# Patient Record
Sex: FEMALE | Race: BLACK OR AFRICAN AMERICAN | NOT HISPANIC OR LATINO | Employment: OTHER | URBAN - METROPOLITAN AREA
[De-identification: names, ages, dates, MRNs, and addresses within clinical notes are randomized per-mention and may not be internally consistent; named-entity substitution may affect disease eponyms.]

---

## 2017-01-04 DIAGNOSIS — E11.9 TYPE 2 DIABETES MELLITUS WITHOUT COMPLICATION, WITHOUT LONG-TERM CURRENT USE OF INSULIN: ICD-10-CM

## 2017-01-04 DIAGNOSIS — M05.9 RHEUMATOID ARTHRITIS WITH POSITIVE RHEUMATOID FACTOR, INVOLVING UNSPECIFIED SITE: ICD-10-CM

## 2017-01-04 DIAGNOSIS — M35.00 SJOGREN'S DISEASE: ICD-10-CM

## 2017-01-05 RX ORDER — HYDROCODONE BITARTRATE AND ACETAMINOPHEN 10; 325 MG/1; MG/1
1 TABLET ORAL EVERY 8 HOURS PRN
Qty: 90 TABLET | Refills: 0 | Status: SHIPPED | OUTPATIENT
Start: 2017-01-05 | End: 2017-01-17 | Stop reason: SDUPTHER

## 2017-01-09 ENCOUNTER — OFFICE VISIT (OUTPATIENT)
Dept: FAMILY MEDICINE | Facility: CLINIC | Age: 72
End: 2017-01-09
Payer: MEDICARE

## 2017-01-09 ENCOUNTER — LAB VISIT (OUTPATIENT)
Dept: LAB | Facility: HOSPITAL | Age: 72
End: 2017-01-09
Attending: FAMILY MEDICINE
Payer: MEDICARE

## 2017-01-09 VITALS
WEIGHT: 273.38 LBS | SYSTOLIC BLOOD PRESSURE: 94 MMHG | DIASTOLIC BLOOD PRESSURE: 52 MMHG | HEART RATE: 78 BPM | HEIGHT: 66 IN | BODY MASS INDEX: 43.93 KG/M2 | TEMPERATURE: 98 F

## 2017-01-09 DIAGNOSIS — E78.5 TYPE 2 DIABETES MELLITUS WITH HYPERLIPIDEMIA: ICD-10-CM

## 2017-01-09 DIAGNOSIS — E11.59 HYPERTENSION ASSOCIATED WITH DIABETES: ICD-10-CM

## 2017-01-09 DIAGNOSIS — I15.2 HYPERTENSION ASSOCIATED WITH DIABETES: ICD-10-CM

## 2017-01-09 DIAGNOSIS — E11.69 TYPE 2 DIABETES MELLITUS WITH HYPERLIPIDEMIA: Primary | ICD-10-CM

## 2017-01-09 DIAGNOSIS — M05.9 RHEUMATOID ARTHRITIS WITH POSITIVE RHEUMATOID FACTOR, INVOLVING UNSPECIFIED SITE: ICD-10-CM

## 2017-01-09 DIAGNOSIS — M16.0 OSTEOARTHRITIS OF BOTH HIPS, UNSPECIFIED OSTEOARTHRITIS TYPE: ICD-10-CM

## 2017-01-09 DIAGNOSIS — E78.5 TYPE 2 DIABETES MELLITUS WITH HYPERLIPIDEMIA: Primary | ICD-10-CM

## 2017-01-09 DIAGNOSIS — E11.69 TYPE 2 DIABETES MELLITUS WITH HYPERLIPIDEMIA: ICD-10-CM

## 2017-01-09 LAB
ALBUMIN SERPL BCP-MCNC: 3.4 G/DL
ALP SERPL-CCNC: 140 U/L
ALT SERPL W/O P-5'-P-CCNC: 17 U/L
ANION GAP SERPL CALC-SCNC: 14 MMOL/L
AST SERPL-CCNC: 18 U/L
BASOPHILS # BLD AUTO: ABNORMAL K/UL
BASOPHILS NFR BLD: 0 %
BILIRUB SERPL-MCNC: 0.3 MG/DL
BUN SERPL-MCNC: 22 MG/DL
CALCIUM SERPL-MCNC: 10 MG/DL
CHLORIDE SERPL-SCNC: 104 MMOL/L
CHOLEST/HDLC SERPL: 3.6 {RATIO}
CO2 SERPL-SCNC: 22 MMOL/L
CREAT SERPL-MCNC: 1.4 MG/DL
CREAT UR-MCNC: 294 MG/DL
DIFFERENTIAL METHOD: ABNORMAL
EOSINOPHIL # BLD AUTO: ABNORMAL K/UL
EOSINOPHIL NFR BLD: 1 %
ERYTHROCYTE [DISTWIDTH] IN BLOOD BY AUTOMATED COUNT: 16.4 %
EST. GFR  (AFRICAN AMERICAN): 44.2 ML/MIN/1.73 M^2
EST. GFR  (NON AFRICAN AMERICAN): 38.4 ML/MIN/1.73 M^2
GLUCOSE SERPL-MCNC: 99 MG/DL
HCT VFR BLD AUTO: 40.2 %
HDL/CHOLESTEROL RATIO: 28.2 %
HDLC SERPL-MCNC: 206 MG/DL
HDLC SERPL-MCNC: 58 MG/DL
HGB BLD-MCNC: 12.6 G/DL
LDLC SERPL CALC-MCNC: 96.4 MG/DL
LYMPHOCYTES # BLD AUTO: ABNORMAL K/UL
LYMPHOCYTES NFR BLD: 41 %
MCH RBC QN AUTO: 25.1 PG
MCHC RBC AUTO-ENTMCNC: 31.3 %
MCV RBC AUTO: 80 FL
MICROALBUMIN UR DL<=1MG/L-MCNC: 7 UG/ML
MICROALBUMIN/CREATININE RATIO: 2.4 UG/MG
MONOCYTES # BLD AUTO: ABNORMAL K/UL
MONOCYTES NFR BLD: 3 %
NEUTROPHILS NFR BLD: 55 %
NONHDLC SERPL-MCNC: 148 MG/DL
PLATELET # BLD AUTO: 396 K/UL
PMV BLD AUTO: 11.9 FL
POTASSIUM SERPL-SCNC: 4 MMOL/L
PROT SERPL-MCNC: 8 G/DL
RBC # BLD AUTO: 5.02 M/UL
SODIUM SERPL-SCNC: 140 MMOL/L
TRIGL SERPL-MCNC: 258 MG/DL
TSH SERPL DL<=0.005 MIU/L-ACNC: 1.05 UIU/ML
WBC # BLD AUTO: 16.56 K/UL

## 2017-01-09 PROCEDURE — 80061 LIPID PANEL: CPT

## 2017-01-09 PROCEDURE — 84443 ASSAY THYROID STIM HORMONE: CPT

## 2017-01-09 PROCEDURE — 86803 HEPATITIS C AB TEST: CPT

## 2017-01-09 PROCEDURE — 85027 COMPLETE CBC AUTOMATED: CPT

## 2017-01-09 PROCEDURE — 85007 BL SMEAR W/DIFF WBC COUNT: CPT

## 2017-01-09 PROCEDURE — 99999 PR PBB SHADOW E&M-EST. PATIENT-LVL III: CPT | Mod: PBBFAC,,, | Performed by: FAMILY MEDICINE

## 2017-01-09 PROCEDURE — 99214 OFFICE O/P EST MOD 30 MIN: CPT | Mod: S$PBB,,, | Performed by: FAMILY MEDICINE

## 2017-01-09 PROCEDURE — 36415 COLL VENOUS BLD VENIPUNCTURE: CPT | Mod: PO

## 2017-01-09 PROCEDURE — 80053 COMPREHEN METABOLIC PANEL: CPT

## 2017-01-09 PROCEDURE — 83036 HEMOGLOBIN GLYCOSYLATED A1C: CPT

## 2017-01-09 NOTE — PROGRESS NOTES
Rekha Miller presents to fu leg edema doing well now w current reg.  DM last a1c 7.0 no hypoglycemia. Follows w Dr Johnson dx RA,Sjogrens,      Past Medical History   Diagnosis Date    Asthma     COPD (chronic obstructive pulmonary disease)     Degenerative disc disease     Diabetes mellitus     Diabetes mellitus, type 2     Fibromyalgia     Hypertension     Rheumatoid arthritis     Rheumatoid arthritis(714.0)      Rheumatoid arthritis     Past Surgical History   Procedure Laterality Date    Orif femur fracture      Cataract extraction      Right knee ligament rpeair  2005    Breast surgery  1986    Right shoulder surgery  4/18/07     Dr. Gao    Eye surgery  2013     Catatracts     Review of patient's allergies indicates:   Allergen Reactions    Codeine     Dairy digestive ultra      Dairy products      Imuran [azathioprine sodium] Diarrhea    Morphine      DOESN'T FEEL RIGHT     Plaquenil [hydroxychloroquine] Other (See Comments)     headaches     Current Outpatient Prescriptions on File Prior to Visit   Medication Sig Dispense Refill    albuterol 90 mcg/actuation inhaler Inhale 2 puffs into the lungs every 6 (six) hours as needed for Wheezing.      ALPHAGAN P 0.1 % Drop   0    amlodipine-benazepril 5-20 mg (LOTREL) 5-20 mg per capsule Take 1 capsule by mouth once daily. 90 capsule 3    blood sugar diagnostic (TRUETEST TEST STRIPS) Strp Inject 1 strip into the skin once daily. 100 strip 11    coenzyme Q10 100 mg capsule Take 2 capsules (200 mg total) by mouth once daily. 60 capsule 11    diltiazem (DILACOR XR) 240 MG CDCR Take 240 mg by mouth once daily.      esomeprazole (NEXIUM) 40 MG capsule Take 1 capsule (40 mg total) by mouth before breakfast. 30 capsule 6    furosemide (LASIX) 40 MG tablet Take 40 mg by mouth 2 (two) times daily.       glipiZIDE (GLUCOTROL) 5 MG TR24 Take 1 tablet (5 mg total) by mouth daily with breakfast. 2 tabs po qam 90 tablet 3     hydrocodone-acetaminophen 10-325mg (NORCO)  mg Tab Take 1 tablet by mouth every 8 (eight) hours as needed. 90 tablet 0    levocetirizine (XYZAL) 5 MG tablet Take 1 tablet (5 mg total) by mouth every evening. 90 tablet 3    meloxicam (MOBIC) 15 MG tablet Take 1 tablet (15 mg total) by mouth once daily. 30 tablet 4    montelukast (SINGULAIR) 10 mg tablet Take 10 mg by mouth once daily.   0    potassium chloride SA (K-DUR,KLOR-CON) 20 MEQ tablet Take 1 tablet (20 mEq total) by mouth 2 (two) times daily. 180 tablet 4    predniSONE (DELTASONE) 5 MG tablet Take 1 tablet (5 mg total) by mouth 3 (three) times daily. 90 tablet 6    sucralfate (CARAFATE) 1 gram tablet Take 1 tablet (1 g total) by mouth 4 (four) times daily. Coat stomach 90 tablet 1    SYMBICORT 160-4.5 mcg/actuation HFAA Inhale 2 puffs into the lungs 2 (two) times daily. 10.2 g 5    TRUETEST TEST STRIPS Strp test blood sugar ONCE DAILY AS DIRECTED 100 each 11    [DISCONTINUED] albuterol 90 mcg/actuation inhaler Inhale 2 puffs into the lungs every 4 (four) hours as needed for Wheezing. 1 Inhaler 1    [DISCONTINUED] amlodipine-benazepril 5-20 mg (LOTREL) 5-20 mg per capsule TAKE ONE CAPSULE BY MOUTH DAILY 30 capsule 5    [DISCONTINUED] chlorzoxazone (PARAFON FORTE) 500 mg Tab Take 500 mg by mouth 3 (three) times daily as needed.      [DISCONTINUED] tramadol (ULTRAM) 50 mg tablet Take 50 mg by mouth every 6 (six) hours as needed for Pain.       No current facility-administered medications on file prior to visit.      Social History     Social History    Marital status:      Spouse name: N/A    Number of children: 8    Years of education: N/A     Occupational History    Not on file.     Social History Main Topics    Smoking status: Never Smoker    Smokeless tobacco: Not on file    Alcohol use Not on file    Drug use: No    Sexual activity: Not on file     Other Topics Concern    Not on file     Social History Narrative      History reviewed. No pertinent family history.      ROS:  SKIN: No rashes, itching or changes in color or texture of skin.  EYES: Visual acuity fine. No photophobia, ocular pain or diplopia.EARS: Denies ear pain, discharge or vertigo.NOSE: No loss of smell, no epistaxis some postnasal drip.MOUTH & THROAT: No hoarseness or change in voice. No excessive gum bleeding.CHEST: Denies SOSA, cyanosis, wheezing  CARDIOVASCULAR: Denies chest pain, PND, orthopnea or reduced exercise tolerance.  ABDOMEN:  No weight loss.No abdominal pain, no hematemesis or blood in stool.  URINARY: No flank pain, dysuria or hematuria.  PERIPHERAL VASCULAR: No claudication or cyanosis.  MUSCULOSKELETAL:Myalgia and polyarthralgia  NEUROLOGIC: No history of seizures, paralysis, alteration of gait or coordination.    PE: Vital signs as noted  Heent:Normocephalic with no recent cranial trauma,PERRLA,EOMI,conjunctiva clear,fundi reveal no hemmorhage exudate or papilledema.Otic canals clear, tympanic membranes slightly dull bilaterally.Nasal mucosa slightly red and edematous.Posterior pharynx slightly red but without exudate.  Neck:Supple with minimal anterior cervical adenopathy.  Chest:Clear bilateral breath sounds with mild scattered ronchi and min exp wheezing   Heart:Regular rhthym without murmer  Abdomen:Soft, non tender,no masses, no hepatosplenomegaly  Extremeties Mod severe gen degenerative changes 1/1 pretibial pedal edema   Neurologic:Grossly within normal limits  Protective Sensation (w/ 10 gram monofilament): Intact  Visual Inspection (Evidence of Foot Deformity, Ulceration, Nails Intact, Skin Intact):Normal  Pedal Pulses: Present    DM w HBP   Edema  Sjogrens syndrome  Fatigue    Cont l lasix to 80 qd  Cont  K 20meq 2x d  Lab eval  Cont Elevate legs   Rev DM management-last a1c 7.0  Needs new glucometr   Rev diet recs wt loss, exercise and Na   Flu vax

## 2017-01-10 LAB
ESTIMATED AVG GLUCOSE: 157 MG/DL
HBA1C MFR BLD HPLC: 7.1 %
HCV AB SERPL QL IA: NEGATIVE

## 2017-01-11 ENCOUNTER — TELEPHONE (OUTPATIENT)
Dept: FAMILY MEDICINE | Facility: CLINIC | Age: 72
End: 2017-01-11

## 2017-01-11 DIAGNOSIS — E78.5 TYPE 2 DIABETES MELLITUS WITH HYPERLIPIDEMIA: Primary | ICD-10-CM

## 2017-01-11 DIAGNOSIS — E11.69 TYPE 2 DIABETES MELLITUS WITH HYPERLIPIDEMIA: Primary | ICD-10-CM

## 2017-01-17 ENCOUNTER — OFFICE VISIT (OUTPATIENT)
Dept: RHEUMATOLOGY | Facility: CLINIC | Age: 72
End: 2017-01-17
Payer: MEDICARE

## 2017-01-17 VITALS
BODY MASS INDEX: 44.34 KG/M2 | WEIGHT: 274.69 LBS | SYSTOLIC BLOOD PRESSURE: 107 MMHG | HEART RATE: 84 BPM | DIASTOLIC BLOOD PRESSURE: 63 MMHG

## 2017-01-17 DIAGNOSIS — M47.817 LUMBAR AND SACRAL ARTHRITIS: ICD-10-CM

## 2017-01-17 DIAGNOSIS — M35.1 MIXED CONNECTIVE TISSUE DISEASE: ICD-10-CM

## 2017-01-17 DIAGNOSIS — M17.0 PRIMARY OSTEOARTHRITIS OF BOTH KNEES: ICD-10-CM

## 2017-01-17 DIAGNOSIS — E11.9 TYPE 2 DIABETES MELLITUS WITHOUT COMPLICATION, WITHOUT LONG-TERM CURRENT USE OF INSULIN: ICD-10-CM

## 2017-01-17 DIAGNOSIS — B02.9 HERPES ZOSTER WITHOUT COMPLICATION: ICD-10-CM

## 2017-01-17 DIAGNOSIS — M05.9 RHEUMATOID ARTHRITIS WITH POSITIVE RHEUMATOID FACTOR, INVOLVING UNSPECIFIED SITE: Primary | ICD-10-CM

## 2017-01-17 DIAGNOSIS — M35.00 SJOGREN'S DISEASE: ICD-10-CM

## 2017-01-17 DIAGNOSIS — J40 BRONCHITIS: ICD-10-CM

## 2017-01-17 PROCEDURE — 99215 OFFICE O/P EST HI 40 MIN: CPT | Mod: 25,S$PBB,, | Performed by: INTERNAL MEDICINE

## 2017-01-17 PROCEDURE — 96372 THER/PROPH/DIAG INJ SC/IM: CPT | Mod: PBBFAC,PO

## 2017-01-17 PROCEDURE — 99213 OFFICE O/P EST LOW 20 MIN: CPT | Mod: PBBFAC,PO | Performed by: INTERNAL MEDICINE

## 2017-01-17 PROCEDURE — 99999 PR PBB SHADOW E&M-EST. PATIENT-LVL III: CPT | Mod: PBBFAC,,, | Performed by: INTERNAL MEDICINE

## 2017-01-17 RX ORDER — HYDROCODONE BITARTRATE AND ACETAMINOPHEN 10; 325 MG/1; MG/1
1 TABLET ORAL EVERY 8 HOURS PRN
Qty: 90 TABLET | Refills: 0 | Status: SHIPPED | OUTPATIENT
Start: 2017-02-01 | End: 2017-03-30

## 2017-01-17 RX ORDER — CANE
1 EACH MISCELLANEOUS DAILY
Qty: 1 EACH | Refills: 0 | Status: SHIPPED | OUTPATIENT
Start: 2017-01-17 | End: 2019-03-21

## 2017-01-17 RX ORDER — HYDROCODONE BITARTRATE AND ACETAMINOPHEN 10; 325 MG/1; MG/1
1 TABLET ORAL EVERY 8 HOURS PRN
Qty: 90 TABLET | Refills: 0 | Status: SHIPPED | OUTPATIENT
Start: 2017-03-01 | End: 2017-03-30

## 2017-01-17 RX ORDER — PROMETHAZINE HYDROCHLORIDE AND DEXTROMETHORPHAN HYDROBROMIDE 6.25; 15 MG/5ML; MG/5ML
5 SYRUP ORAL 3 TIMES DAILY PRN
Qty: 180 ML | Refills: 0 | Status: SHIPPED | OUTPATIENT
Start: 2017-01-17 | End: 2017-01-30 | Stop reason: SDUPTHER

## 2017-01-17 RX ORDER — PENICILLIN V POTASSIUM 500 MG/1
500 TABLET, FILM COATED ORAL 4 TIMES DAILY
Qty: 40 TABLET | Refills: 3 | Status: SHIPPED | OUTPATIENT
Start: 2017-01-17 | End: 2017-01-27

## 2017-01-17 RX ORDER — VALACYCLOVIR HYDROCHLORIDE 1 G/1
1000 TABLET, FILM COATED ORAL 3 TIMES DAILY
Qty: 21 TABLET | Refills: 0 | Status: SHIPPED | OUTPATIENT
Start: 2017-01-17 | End: 2018-06-15

## 2017-01-17 RX ORDER — AMOXICILLIN 500 MG/1
500 CAPSULE ORAL 3 TIMES DAILY
Qty: 30 CAPSULE | Refills: 3 | Status: SHIPPED | OUTPATIENT
Start: 2017-01-17 | End: 2017-01-27

## 2017-01-17 RX ORDER — CEFTRIAXONE 1 G/1
1 INJECTION, POWDER, FOR SOLUTION INTRAMUSCULAR; INTRAVENOUS
Status: COMPLETED | OUTPATIENT
Start: 2017-01-17 | End: 2017-01-17

## 2017-01-17 RX ADMIN — CEFTRIAXONE 1 G: 1 INJECTION, POWDER, FOR SOLUTION INTRAMUSCULAR; INTRAVENOUS at 01:01

## 2017-01-17 ASSESSMENT — ROUTINE ASSESSMENT OF PATIENT INDEX DATA (RAPID3)
WHEN YOU AWAKENED IN THE MORNING OVER THE LAST WEEK, PLEASE INDICATE THE AMOUNT OF TIME IT TAKES UNTIL YOU ARE AS LIMBER AS YOU WILL BE FOR THE DAY: ALL DAY
PSYCHOLOGICAL DISTRESS SCORE: 5.5
TOTAL RAPID3 SCORE: 7
MDHAQ FUNCTION SCORE: 1.8
AM STIFFNESS SCORE: 1, YES
PATIENT GLOBAL ASSESSMENT SCORE: 5
PAIN SCORE: 10
FATIGUE SCORE: 5

## 2017-01-17 NOTE — PROGRESS NOTES
Administered 1 gram rocephin mixed with 2.1 ml of lidocaine. Given in the left upper outer gluteal. Informed of s/s to report verbalized understanding. No adverse reactions noted.    Lot # [id number]  Expiration may 2018

## 2017-01-17 NOTE — PROGRESS NOTES
Subjective:       Patient ID: Rekha Miller is a 71 y.o. female.    Chief Complaint: Follow-up    HPI Comments: Follow up    Pt came in with sob and wheezing and a cough, and  Skin in the back  Is itching and burning  . She has B knee pains, new nodules mcp, pip, and ankles R side swollen R>.L.  Patient complains of arthralgias and myalgias for which has been present for a few years. Pain is located in multiple joints, both shoulder(s), both elbow(s), both wrist(s), both MCP(s): 1st, 2nd, 3rd, 4th and 5th, both PIP(s): 1st, 2nd, 3rd, 4th and 5th, both DIP(s): 1st and 2nd, both hip(s), both knee(s) and both MTP(s): 1st, 2nd, 3rd, 4th and 5th, is described as aching, pulsating, shooting and throbbing, and is constant, moderate .  Associated symptoms include: crepitation, decreased range of motion, edema, effusion, tenderness and warmth.   3 tabs 15 mg daily of prednisone            She complains of joint swelling. Associated symptoms include fatigue and myalgias.         Review of Systems   Constitutional: Positive for activity change and fatigue. Negative for appetite change, chills, diaphoresis and unexpected weight change.   HENT: Negative for congestion, ear discharge, ear pain, facial swelling, mouth sores, postnasal drip, sinus pressure and sore throat.    Eyes: Negative for photophobia, pain, discharge, redness and itching.   Respiratory: Negative for cough, chest tightness and shortness of breath.    Cardiovascular: Positive for leg swelling. Negative for chest pain and palpitations.   Gastrointestinal: Negative for abdominal pain, constipation, diarrhea, nausea and vomiting.   Endocrine: Negative for cold intolerance, heat intolerance, polydipsia and polyuria.   Genitourinary: Negative for decreased urine volume, difficulty urinating, flank pain, frequency, hematuria and urgency.   Musculoskeletal: Positive for arthralgias, back pain, joint swelling, myalgias, neck pain and neck stiffness. Negative for gait  problem.   Skin: Negative for pallor, rash and wound.   Allergic/Immunologic: Negative for immunocompromised state.   Neurological: Negative for dizziness, tremors, weakness and numbness.   Hematological: Negative for adenopathy. Does not bruise/bleed easily.   Psychiatric/Behavioral: Negative for sleep disturbance. The patient is not nervous/anxious.          Objective:     Visit Vitals    /63 (BP Location: Right arm, Patient Position: Sitting, BP Method: Automatic)    Pulse 84    Wt 124.6 kg (274 lb 11.1 oz)    BMI 44.34 kg/m2        Physical Exam   Nursing note and vitals reviewed.  Constitutional: She is oriented to person, place, and time. She appears distressed.   HENT:   Head: Normocephalic and atraumatic.   Mouth/Throat: Oropharynx is clear and moist.   Eyes: EOM are normal. Pupils are equal, round, and reactive to light.   Neck: Neck supple. No thyromegaly present.   Cardiovascular: Normal rate, regular rhythm and normal heart sounds.  Exam reveals no gallop and no friction rub.    No murmur heard.  Pulmonary/Chest: She has wheezes. She has no rales. She exhibits no tenderness.   B bases   Abdominal: There is no tenderness. There is no rebound and no guarding.       Right Side Rheumatological Exam     Examination finds the elbow normal.    The patient is tender to palpation of the shoulder, wrist, knee, 1st PIP, 1st MCP, 2nd PIP, 2nd MCP, 3rd PIP, 3rd MCP, 4th PIP, 4th MCP, 5th PIP and 5th MCP    She has swelling of the 1st PIP, 1st MCP, 2nd PIP, 2nd MCP, 3rd PIP, 3rd MCP, 4th PIP, 4th MCP, 5th PIP and 5th MCP    Shoulder Exam   Tenderness Location: no tenderness    Range of Motion   Active Abduction: abnormal   Adduction: abnormal  Sensation: normal    Knee Exam   Patellofemoral Crepitus: positive  Effusion: negative  Sensation: normal    Hip Exam   Tenderness Location: posterior  Sensation: normal    Elbow/Wrist Exam   Tenderness Location: no tenderness  Sensation: normal    Left Side  Rheumatological Exam     Examination finds the elbow normal.    The patient is tender to palpation of the shoulder, wrist, knee, 1st PIP, 1st MCP, 2nd PIP, 2nd MCP, 3rd PIP, 3rd MCP, 4th PIP, 4th MCP, 5th PIP and 5th MCP.    She has swelling of the 1st PIP, 1st MCP, 2nd PIP, 2nd MCP, 3rd PIP, 3rd MCP, 4th PIP, 4th MCP, 5th PIP and 5th MCP    Shoulder Exam   Tenderness Location: no tenderness    Range of Motion   Active Abduction: abnormal   Sensation: normal    Knee Exam     Patellofemoral Crepitus: positive  Effusion: negative  Sensation: normal    Hip Exam   Tenderness Location: posterior  Sensation: normal    Elbow/Wrist Exam   Sensation: normal      Back/Neck Exam   General Inspection   Gait: normal       Tenderness Right paramedian tenderness of the Lower L-Spine and Upper L-Spine.Left paramedian tenderness of the Lower L-Spine and Upper L-Spine.    Neck Range of Motion   Extension: Limited  Lymphadenopathy:     She has no cervical adenopathy.   Neurological: She is alert and oriented to person, place, and time. Gait normal.   Skin: No rash noted. No erythema. No pallor.     Psychiatric: Mood and affect normal.   Musculoskeletal: She exhibits edema and tenderness.         Results for orders placed or performed in visit on 01/09/17   Microalbumin/creatinine urine ratio   Result Value Ref Range    Microalbum.,U,Random 7.0 ug/mL    Creatinine, Random Ur 294.0 15.0 - 325.0 mg/dL    Microalb Creat Ratio 2.4 0.0 - 30.0 ug/mg      Hemoglobin A1C 4.5 - 6.2 %   7.1 (H)      Assessment:       Encounter Diagnoses   Name Primary?    Rheumatoid arthritis with positive rheumatoid factor, involving unspecified site Yes    Sjogren's disease     Bronchitis     Herpes zoster without complication     Primary osteoarthritis of both knees          Plan:       Rheumatoid arthritis with positive rheumatoid factor, involving unspecified site  -     penicillin v potassium (VEETID) 500 MG tablet; Take 1 tablet (500 mg total) by  mouth 4 (four) times daily.  Dispense: 40 tablet; Refill: 3  -     promethazine-dextromethorphan (PROMETHAZINE-DM) 6.25-15 mg/5 mL Syrp; Take 5 mLs by mouth 3 (three) times daily as needed.  Dispense: 180 mL; Refill: 0  -     cane Lauren; 1 each by Misc.(Non-Drug; Combo Route) route once daily.  Dispense: 1 each; Refill: 0  -     cefTRIAXone injection 1 g; Inject 1 g into the muscle one time.  -     hydrocodone-acetaminophen 10-325mg (NORCO)  mg Tab; Take 1 tablet by mouth every 8 (eight) hours as needed.  Dispense: 90 tablet; Refill: 0  -     hydrocodone-acetaminophen 10-325mg (NORCO)  mg Tab; Take 1 tablet by mouth every 8 (eight) hours as needed.  Dispense: 90 tablet; Refill: 0    Sjogren's disease  -     penicillin v potassium (VEETID) 500 MG tablet; Take 1 tablet (500 mg total) by mouth 4 (four) times daily.  Dispense: 40 tablet; Refill: 3  -     promethazine-dextromethorphan (PROMETHAZINE-DM) 6.25-15 mg/5 mL Syrp; Take 5 mLs by mouth 3 (three) times daily as needed.  Dispense: 180 mL; Refill: 0  -     cane Lauren; 1 each by Misc.(Non-Drug; Combo Route) route once daily.  Dispense: 1 each; Refill: 0  -     cefTRIAXone injection 1 g; Inject 1 g into the muscle one time.  -     hydrocodone-acetaminophen 10-325mg (NORCO)  mg Tab; Take 1 tablet by mouth every 8 (eight) hours as needed.  Dispense: 90 tablet; Refill: 0  -     hydrocodone-acetaminophen 10-325mg (NORCO)  mg Tab; Take 1 tablet by mouth every 8 (eight) hours as needed.  Dispense: 90 tablet; Refill: 0    Bronchitis  -     amoxicillin (AMOXIL) 500 MG capsule; Take 1 capsule (500 mg total) by mouth 3 (three) times daily.  Dispense: 30 capsule; Refill: 3  -     penicillin v potassium (VEETID) 500 MG tablet; Take 1 tablet (500 mg total) by mouth 4 (four) times daily.  Dispense: 40 tablet; Refill: 3  -     promethazine-dextromethorphan (PROMETHAZINE-DM) 6.25-15 mg/5 mL Syrp; Take 5 mLs by mouth 3 (three) times daily as needed.  Dispense:  180 mL; Refill: 0  -     cane Lauren; 1 each by Misc.(Non-Drug; Combo Route) route once daily.  Dispense: 1 each; Refill: 0  -     cefTRIAXone injection 1 g; Inject 1 g into the muscle one time.    Herpes zoster without complication  -     penicillin v potassium (VEETID) 500 MG tablet; Take 1 tablet (500 mg total) by mouth 4 (four) times daily.  Dispense: 40 tablet; Refill: 3  -     valacyclovir (VALTREX) 1000 MG tablet; Take 1 tablet (1,000 mg total) by mouth 3 (three) times daily.  Dispense: 21 tablet; Refill: 0  -     promethazine-dextromethorphan (PROMETHAZINE-DM) 6.25-15 mg/5 mL Syrp; Take 5 mLs by mouth 3 (three) times daily as needed.  Dispense: 180 mL; Refill: 0  -     cane Lauren; 1 each by Misc.(Non-Drug; Combo Route) route once daily.  Dispense: 1 each; Refill: 0  -     cefTRIAXone injection 1 g; Inject 1 g into the muscle one time.    Primary osteoarthritis of both knees  -     cane Lauren; 1 each by Misc.(Non-Drug; Combo Route) route once daily.  Dispense: 1 each; Refill: 0  -     leg brace (KNEE BRACE LARGE-XLARGE) Misc; 2 each by Misc.(Non-Drug; Combo Route) route once daily. sleeve  Dispense: 2 each; Refill: 0  -     cefTRIAXone injection 1 g; Inject 1 g into the muscle one time.    Type 2 diabetes mellitus without complication, without long-term current use of insulin  -     hydrocodone-acetaminophen 10-325mg (NORCO)  mg Tab; Take 1 tablet by mouth every 8 (eight) hours as needed.  Dispense: 90 tablet; Refill: 0  -     hydrocodone-acetaminophen 10-325mg (NORCO)  mg Tab; Take 1 tablet by mouth every 8 (eight) hours as needed.  Dispense: 90 tablet; Refill: 0    Mixed connective tissue disease    Lumbar and sacral arthritis   tried Humira and IMuran if she needs may need to try plaquenil

## 2017-01-24 ENCOUNTER — TELEPHONE (OUTPATIENT)
Dept: FAMILY MEDICINE | Facility: CLINIC | Age: 72
End: 2017-01-24

## 2017-01-24 NOTE — TELEPHONE ENCOUNTER
----- Message from Ольга Fuller sent at 1/24/2017 11:10 AM CST -----  Contact: [facility]  States patient is coming in for abdominal pain. Wants to know if patient last office visit and any recent test results can be faxed over to her. Please fax information to [phone]...  [phone].//thanks. cw

## 2017-01-30 DIAGNOSIS — M35.00 SJOGREN'S DISEASE: ICD-10-CM

## 2017-01-30 DIAGNOSIS — J40 BRONCHITIS: ICD-10-CM

## 2017-01-30 DIAGNOSIS — E11.9 TYPE 2 DIABETES MELLITUS WITHOUT COMPLICATION, UNSPECIFIED LONG TERM INSULIN USE STATUS: ICD-10-CM

## 2017-01-30 DIAGNOSIS — M05.9 RHEUMATOID ARTHRITIS WITH POSITIVE RHEUMATOID FACTOR, INVOLVING UNSPECIFIED SITE: ICD-10-CM

## 2017-01-30 DIAGNOSIS — B02.9 HERPES ZOSTER WITHOUT COMPLICATION: ICD-10-CM

## 2017-01-30 RX ORDER — PROMETHAZINE HYDROCHLORIDE AND DEXTROMETHORPHAN HYDROBROMIDE 6.25; 15 MG/5ML; MG/5ML
3 SYRUP ORAL 3 TIMES DAILY PRN
Qty: 180 ML | Refills: 3 | Status: SHIPPED | OUTPATIENT
Start: 2017-01-30 | End: 2017-02-09

## 2017-01-30 RX ORDER — SUCRALFATE 1 G/1
1 TABLET ORAL 4 TIMES DAILY
Qty: 90 TABLET | Refills: 1 | Status: SHIPPED | OUTPATIENT
Start: 2017-01-30 | End: 2017-06-08 | Stop reason: SDUPTHER

## 2017-02-10 ENCOUNTER — LAB VISIT (OUTPATIENT)
Dept: LAB | Facility: HOSPITAL | Age: 72
End: 2017-02-10
Attending: FAMILY MEDICINE
Payer: MEDICARE

## 2017-02-10 DIAGNOSIS — E11.69 TYPE 2 DIABETES MELLITUS WITH HYPERLIPIDEMIA: ICD-10-CM

## 2017-02-10 DIAGNOSIS — E78.5 TYPE 2 DIABETES MELLITUS WITH HYPERLIPIDEMIA: ICD-10-CM

## 2017-02-10 LAB
ANION GAP SERPL CALC-SCNC: 15 MMOL/L
BUN SERPL-MCNC: 9 MG/DL
CALCIUM SERPL-MCNC: 9.9 MG/DL
CHLORIDE SERPL-SCNC: 97 MMOL/L
CO2 SERPL-SCNC: 27 MMOL/L
CREAT SERPL-MCNC: 1.4 MG/DL
EST. GFR  (AFRICAN AMERICAN): 44.2 ML/MIN/1.73 M^2
EST. GFR  (NON AFRICAN AMERICAN): 38.4 ML/MIN/1.73 M^2
GLUCOSE SERPL-MCNC: 203 MG/DL
POTASSIUM SERPL-SCNC: 3.5 MMOL/L
SODIUM SERPL-SCNC: 139 MMOL/L

## 2017-02-10 PROCEDURE — 36415 COLL VENOUS BLD VENIPUNCTURE: CPT | Mod: PO

## 2017-02-10 PROCEDURE — 80048 BASIC METABOLIC PNL TOTAL CA: CPT

## 2017-02-14 ENCOUNTER — TELEPHONE (OUTPATIENT)
Dept: RHEUMATOLOGY | Facility: CLINIC | Age: 72
End: 2017-02-14

## 2017-02-14 NOTE — TELEPHONE ENCOUNTER
Spoke to pt, she advises she can not take the Mobic, it was discontinued by Dr. Hernandez due to issues with her kidneys. Pt advises she needs something for the inflammation as she is having issues with her hands, wrists, elbows. Advised nurse would discuss with Dr. Johnson to see what we can give her. Pt verbalized understanding and wants any medication sent to [facility] in [place].

## 2017-02-14 NOTE — TELEPHONE ENCOUNTER
----- Message from Maryjane Garcia sent at 2/13/2017  2:59 PM CST -----  Contact: pt [phone]  Patient called and asked for  A call back about her medications.

## 2017-02-16 DIAGNOSIS — N28.9 FUNCTION KIDNEY DECREASED: Primary | ICD-10-CM

## 2017-03-14 ENCOUNTER — LAB VISIT (OUTPATIENT)
Dept: LAB | Facility: HOSPITAL | Age: 72
End: 2017-03-14
Attending: FAMILY MEDICINE
Payer: MEDICARE

## 2017-03-14 DIAGNOSIS — N28.9 FUNCTION KIDNEY DECREASED: ICD-10-CM

## 2017-03-14 LAB
ANION GAP SERPL CALC-SCNC: 12 MMOL/L
BUN SERPL-MCNC: 10 MG/DL
CALCIUM SERPL-MCNC: 9.8 MG/DL
CHLORIDE SERPL-SCNC: 104 MMOL/L
CO2 SERPL-SCNC: 23 MMOL/L
CREAT SERPL-MCNC: 1 MG/DL
EST. GFR  (AFRICAN AMERICAN): >60 ML/MIN/1.73 M^2
EST. GFR  (NON AFRICAN AMERICAN): 57.2 ML/MIN/1.73 M^2
GLUCOSE SERPL-MCNC: 109 MG/DL
POTASSIUM SERPL-SCNC: 4 MMOL/L
SODIUM SERPL-SCNC: 139 MMOL/L

## 2017-03-14 PROCEDURE — 80048 BASIC METABOLIC PNL TOTAL CA: CPT

## 2017-03-14 PROCEDURE — 36415 COLL VENOUS BLD VENIPUNCTURE: CPT | Mod: PO

## 2017-03-15 ENCOUNTER — TELEPHONE (OUTPATIENT)
Dept: FAMILY MEDICINE | Facility: CLINIC | Age: 72
End: 2017-03-15

## 2017-03-15 DIAGNOSIS — N28.9 ABNORMAL KIDNEY FUNCTION: Primary | ICD-10-CM

## 2017-03-27 DIAGNOSIS — M35.00 SJOGREN'S DISEASE: ICD-10-CM

## 2017-03-27 DIAGNOSIS — E11.9 TYPE 2 DIABETES MELLITUS WITHOUT COMPLICATION, WITHOUT LONG-TERM CURRENT USE OF INSULIN: ICD-10-CM

## 2017-03-27 DIAGNOSIS — M05.9 RHEUMATOID ARTHRITIS WITH POSITIVE RHEUMATOID FACTOR, INVOLVING UNSPECIFIED SITE: ICD-10-CM

## 2017-03-27 NOTE — TELEPHONE ENCOUNTER
----- Message from Manjit Steiner sent at 3/27/2017 11:36 AM CDT -----  Patient needs a refill on Hydrocone called into [facility] pharmacy.  Please call patient at [phone] if you have any questions. Thanks! Pt would also like to get a sooner appt then June to see the

## 2017-03-30 RX ORDER — HYDROCODONE BITARTRATE AND ACETAMINOPHEN 10; 325 MG/1; MG/1
1 TABLET ORAL 3 TIMES DAILY PRN
Qty: 90 TABLET | Refills: 0 | Status: SHIPPED | OUTPATIENT
Start: 2017-03-30 | End: 2017-04-21 | Stop reason: SDUPTHER

## 2017-03-30 RX ORDER — HYDROCODONE BITARTRATE AND ACETAMINOPHEN 10; 325 MG/1; MG/1
TABLET ORAL
Qty: 90 TABLET | Refills: 0 | OUTPATIENT
Start: 2017-03-30

## 2017-03-31 RX ORDER — HYDROCODONE BITARTRATE AND ACETAMINOPHEN 10; 325 MG/1; MG/1
1 TABLET ORAL EVERY 8 HOURS PRN
Qty: 90 TABLET | Refills: 0 | OUTPATIENT
Start: 2017-03-31

## 2017-04-04 ENCOUNTER — TELEPHONE (OUTPATIENT)
Dept: RHEUMATOLOGY | Facility: CLINIC | Age: 72
End: 2017-04-04

## 2017-04-04 NOTE — TELEPHONE ENCOUNTER
Returned patient's call. Patient demanding to speak to dr garcia only. Will inform Dr. Garcia to give patient a call after clinic.

## 2017-04-05 ENCOUNTER — TELEPHONE (OUTPATIENT)
Dept: RHEUMATOLOGY | Facility: CLINIC | Age: 72
End: 2017-04-05

## 2017-04-05 DIAGNOSIS — R89.9 ABNORMAL LABORATORY TEST RESULT: Primary | ICD-10-CM

## 2017-04-05 RX ORDER — LEFLUNOMIDE 10 MG/1
10 TABLET ORAL DAILY
Qty: 30 TABLET | Refills: 4 | Status: SHIPPED | OUTPATIENT
Start: 2017-04-05 | End: 2017-06-08 | Stop reason: SDUPTHER

## 2017-04-05 NOTE — TELEPHONE ENCOUNTER
Returned pt call regarding symptoms and medication questions. Pt stated having a lot of inflammation and needs medication to help. Pt informed can no longer take mobic due to kidney functions. Nurse discussed symptoms with Dr Johnson. Informed pt that Dr Johnson advised plaquenil or arava to take for inflammation. Pt stated does not want to take plaquenil due to effects on eyes. Pt requested for arava to be sent to pharmacy and agreed to try this medication. Nurse informed pt that Dr Johnson will be sending referral along with lab results to a hematologist to evaluate lab results. Pt verbalized understanding. Nurse provided phone number and address to hematologist.

## 2017-04-05 NOTE — TELEPHONE ENCOUNTER
----- Message from Gill Vargas sent at 4/5/2017  7:30 AM CDT -----  Contact: self  Patient [phone] is returning call - from this morning 04 05 17/please call

## 2017-04-11 ENCOUNTER — LAB VISIT (OUTPATIENT)
Dept: LAB | Facility: HOSPITAL | Age: 72
End: 2017-04-11
Attending: FAMILY MEDICINE
Payer: MEDICARE

## 2017-04-11 DIAGNOSIS — E11.69 TYPE 2 DIABETES MELLITUS WITH HYPERLIPIDEMIA: ICD-10-CM

## 2017-04-11 DIAGNOSIS — E78.5 TYPE 2 DIABETES MELLITUS WITH HYPERLIPIDEMIA: ICD-10-CM

## 2017-04-11 PROCEDURE — 83036 HEMOGLOBIN GLYCOSYLATED A1C: CPT

## 2017-04-11 PROCEDURE — 36415 COLL VENOUS BLD VENIPUNCTURE: CPT | Mod: PO

## 2017-04-12 LAB
ESTIMATED AVG GLUCOSE: 157 MG/DL
HBA1C MFR BLD HPLC: 7.1 %

## 2017-04-21 RX ORDER — PREDNISONE 5 MG/1
TABLET ORAL
Qty: 90 TABLET | Refills: 3 | Status: SHIPPED | OUTPATIENT
Start: 2017-04-21 | End: 2017-05-30

## 2017-04-21 RX ORDER — HYDROCODONE BITARTRATE AND ACETAMINOPHEN 10; 325 MG/1; MG/1
TABLET ORAL
Qty: 90 TABLET | Refills: 0 | Status: SHIPPED | OUTPATIENT
Start: 2017-04-28 | End: 2017-05-22 | Stop reason: SDUPTHER

## 2017-05-16 ENCOUNTER — PATIENT OUTREACH (OUTPATIENT)
Dept: ADMINISTRATIVE | Facility: HOSPITAL | Age: 72
End: 2017-05-16

## 2017-05-16 NOTE — LETTER
May 16, 2017    Rekha Miller  [address] [address] [address] [facility] [address]           [facility]  [address]  [facility] [address]  Phone: [phone] Dear Mrs. Miller:    [facility] is committed to your overall health.  To help you get the most out of each of your visits, we will review your information to make sure you are up to date on all of your recommended tests and/or procedures.      Roberto Hernandez MD has found that you may be due for   Health Maintenance Due   Topic    DEXA SCAN     Pneumococcal (65+) (2 of 2 - PCV13)    Eye Exam     Mammogram       If you have had any of the above done at another facility, please bring the records or information with you so that your record at [facility] will be complete.    If you are currently taking medication, please bring it with you to your appointment for review.    We will be happy to assist you with scheduling any necessary appointments or you may contact the [facility] appointment desk at [phone] to schedule at your convenience.     Thank you for choosing [facility] for your healthcare needs,      If you have any questions or concerns, please don't hesitate to call.    Sincerely,    Jerilyn ALY LPN  Care Coordination Department  [facility]

## 2017-05-23 RX ORDER — HYDROCODONE BITARTRATE AND ACETAMINOPHEN 10; 325 MG/1; MG/1
TABLET ORAL
Qty: 90 TABLET | Refills: 0 | Status: SHIPPED | OUTPATIENT
Start: 2017-05-23 | End: 2017-05-30

## 2017-05-30 ENCOUNTER — OFFICE VISIT (OUTPATIENT)
Dept: FAMILY MEDICINE | Facility: CLINIC | Age: 72
End: 2017-05-30
Payer: MEDICARE

## 2017-05-30 ENCOUNTER — TELEPHONE (OUTPATIENT)
Dept: FAMILY MEDICINE | Facility: CLINIC | Age: 72
End: 2017-05-30

## 2017-05-30 VITALS
WEIGHT: 250.25 LBS | HEIGHT: 66 IN | HEART RATE: 67 BPM | SYSTOLIC BLOOD PRESSURE: 132 MMHG | DIASTOLIC BLOOD PRESSURE: 68 MMHG | BODY MASS INDEX: 40.22 KG/M2

## 2017-05-30 DIAGNOSIS — K52.9 GASTROENTERITIS: Primary | ICD-10-CM

## 2017-05-30 DIAGNOSIS — E11.59 HYPERTENSION ASSOCIATED WITH DIABETES: ICD-10-CM

## 2017-05-30 DIAGNOSIS — E78.5 TYPE 2 DIABETES MELLITUS WITH HYPERLIPIDEMIA: ICD-10-CM

## 2017-05-30 DIAGNOSIS — I15.2 HYPERTENSION ASSOCIATED WITH DIABETES: ICD-10-CM

## 2017-05-30 DIAGNOSIS — E11.8 DIABETES MELLITUS WITH COMPLICATION: ICD-10-CM

## 2017-05-30 DIAGNOSIS — R60.0 PERIPHERAL EDEMA: ICD-10-CM

## 2017-05-30 DIAGNOSIS — E11.69 TYPE 2 DIABETES MELLITUS WITH HYPERLIPIDEMIA: ICD-10-CM

## 2017-05-30 DIAGNOSIS — M16.0 OSTEOARTHRITIS OF BOTH HIPS, UNSPECIFIED OSTEOARTHRITIS TYPE: ICD-10-CM

## 2017-05-30 PROCEDURE — 99999 PR PBB SHADOW E&M-EST. PATIENT-LVL II: CPT | Mod: PBBFAC,,, | Performed by: FAMILY MEDICINE

## 2017-05-30 PROCEDURE — 99214 OFFICE O/P EST MOD 30 MIN: CPT | Mod: S$PBB,,, | Performed by: FAMILY MEDICINE

## 2017-05-30 PROCEDURE — 99212 OFFICE O/P EST SF 10 MIN: CPT | Mod: PBBFAC,PO | Performed by: FAMILY MEDICINE

## 2017-05-30 RX ORDER — GLIPIZIDE 5 MG/1
5 TABLET, FILM COATED, EXTENDED RELEASE ORAL
Qty: 90 TABLET | Refills: 3 | Status: SHIPPED | OUTPATIENT
Start: 2017-05-30 | End: 2018-01-20 | Stop reason: SDUPTHER

## 2017-05-30 RX ORDER — TIZANIDINE 4 MG/1
4 TABLET ORAL EVERY 8 HOURS
Qty: 90 TABLET | Refills: 3 | Status: SHIPPED | OUTPATIENT
Start: 2017-05-30 | End: 2017-06-08

## 2017-05-30 RX ORDER — ALBUTEROL SULFATE 90 UG/1
2 AEROSOL, METERED RESPIRATORY (INHALATION) EVERY 6 HOURS PRN
Qty: 18 G | Refills: 11 | Status: SHIPPED | OUTPATIENT
Start: 2017-05-30 | End: 2020-02-03 | Stop reason: SDUPTHER

## 2017-05-30 NOTE — PROGRESS NOTES
The patient presents with a recent history of nausea vomiting and diarrhea.No hematemesis,melena nor severe abdominal cramps.Still able to tolerate liquids somewhat.  Also co musc spasm legs back  Also fu DM   Past Medical History:  Past Medical History:   Diagnosis Date    Asthma     COPD (chronic obstructive pulmonary disease)     Degenerative disc disease     Diabetes mellitus     Diabetes mellitus, type 2     Fibromyalgia     Hypertension     Rheumatoid arthritis     Rheumatoid arthritis(714.0)     Rheumatoid arthritis     Past Surgical History:   Procedure Laterality Date    BREAST SURGERY  1986    CATARACT EXTRACTION      EYE SURGERY  2013    Catatracts    ORIF FEMUR FRACTURE      right knee ligament rpeair  2005    right shoulder surgery  4/18/07    Dr. Gao     Review of patient's allergies indicates:   Allergen Reactions    Codeine     Dairy digestive ultra      Dairy products      Imuran [azathioprine sodium] Diarrhea    Morphine      DOESN'T FEEL RIGHT     Plaquenil [hydroxychloroquine] Other (See Comments)     headaches     Current Outpatient Prescriptions on File Prior to Visit   Medication Sig Dispense Refill    albuterol 90 mcg/actuation inhaler Inhale 2 puffs into the lungs every 6 (six) hours as needed for Wheezing.      amlodipine-benazepril 5-20 mg (LOTREL) 5-20 mg per capsule Take 1 capsule by mouth once daily. 90 capsule 3    blood sugar diagnostic (TRUETEST TEST STRIPS) Strp Inject 1 strip into the skin once daily. 100 strip 11    cane Lauren 1 each by Misc.(Non-Drug; Combo Route) route once daily. 1 each 0    furosemide (LASIX) 40 MG tablet Take 40 mg by mouth 2 (two) times daily.       glipiZIDE (GLUCOTROL) 5 MG TR24 Take 1 tablet (5 mg total) by mouth daily with breakfast. 2 tabs po qam 90 tablet 3    leflunomide (ARAVA) 10 MG Tab Take 1 tablet (10 mg total) by mouth once daily. 30 tablet 4    leg brace (KNEE BRACE LARGE-XLARGE) Misc 2 each by Misc.(Non-Drug; Combo  Route) route once daily. sleeve 2 each 0    montelukast (SINGULAIR) 10 mg tablet Take 10 mg by mouth once daily.   0    potassium chloride SA (K-DUR,KLOR-CON) 20 MEQ tablet Take 1 tablet (20 mEq total) by mouth 2 (two) times daily. 180 tablet 4    sucralfate (CARAFATE) 1 gram tablet Take 1 tablet (1 g total) by mouth 4 (four) times daily. Coat stomach 90 tablet 1    SYMBICORT 160-4.5 mcg/actuation HFAA Inhale 2 puffs into the lungs 2 (two) times daily. 10.2 g 5    TRUETEST TEST STRIPS Strp test blood sugar ONCE DAILY AS DIRECTED 100 each 11    valacyclovir (VALTREX) 1000 MG tablet Take 1 tablet (1,000 mg total) by mouth 3 (three) times daily. 21 tablet 0    [DISCONTINUED] ALPHAGAN P 0.1 % Drop   0    [DISCONTINUED] coenzyme Q10 100 mg capsule Take 2 capsules (200 mg total) by mouth once daily. 60 capsule 11    [DISCONTINUED] diltiazem (DILACOR XR) 240 MG CDCR Take 240 mg by mouth once daily.      [DISCONTINUED] esomeprazole (NEXIUM) 40 MG capsule Take 1 capsule (40 mg total) by mouth before breakfast. 30 capsule 6    [DISCONTINUED] hydrocodone-acetaminophen 10-325mg (NORCO)  mg Tab TAKE 1 TABLET BY MOUTH THREE TIMES DAILY **DNF 4/28/17** 90 tablet 0    [DISCONTINUED] levocetirizine (XYZAL) 5 MG tablet Take 1 tablet (5 mg total) by mouth every evening. 90 tablet 3    [DISCONTINUED] predniSONE (DELTASONE) 5 MG tablet Take 1 tablet (5 mg total) by mouth 3 (three) times daily. 90 tablet 6    [DISCONTINUED] predniSONE (DELTASONE) 5 MG tablet Take 1 tablet (5 mg total) by mouth 3 (three) times daily. 90 tablet 3    [DISCONTINUED] ranitidine (ZANTAC) 300 MG tablet Take 1 tablet (300 mg total) by mouth every evening. 30 tablet 11     No current facility-administered medications on file prior to visit.      Social History     Social History    Marital status:      Spouse name: N/A    Number of children: 8    Years of education: N/A     Occupational History    Not on file.     Social History  Main Topics    Smoking status: Never Smoker    Smokeless tobacco: Not on file    Alcohol use Not on file    Drug use: No    Sexual activity: Not on file     Other Topics Concern    Not on file     Social History Narrative    No narrative on file     No family history on file.    ROS:  SKIN: No rashes, itching or changes in color or texture of skin.  EYES: Visual acuity fine. No photophobia, ocular pain or diplopia.EARS: Denies ear pain, discharge or vertigo.NOSE: No loss of smell, no epistaxis or postnasal drip.MOUTH & THROAT: No hoarseness or change in voice. No excessive gum bleeding.NODES: Denies swollen glands.  CHEST: Denies SOSA, cyanosis, wheezing, cough and sputum production.  CARDIOVASCULAR: Denies chest pain, PND, orthopnea or reduced exercise tolerance.  ABDOMEN:  No weight loss.Mild abdominal pain, no hematemesis or blood in stool.  URINARY: No flank pain, dysuria or hematuria.  PERIPHERAL VASCULAR: No claudication or cyanosis.  MUSCULOSKELETAL: Negative   NEUROLOGIC: No history of seizures, paralysis, alteration of gait or coordination.    PE Vital signs noted  Heent: Normocephalic,with no recent trauma,PERRLA,EOMI,conjunctiva clear with no exudate.Nasopharynx is not injected .Otic canal.TMs are not affected.Pharynx is slightly red.   Neck:Supple without adenopathy  Chest:Clear bilateral breath sounds normal  Heart:Regular rhthym without murmer  Abdomen:Soft, mild generalized tenderness,no rebound,no masses, no hepatosplenomegaly  Extremeties and Neurologic:Grossly within normal limits  Impression: Gastroenteritis 558.9  Plan:Clear liquid progressive diet as tolerated,Tylenol as needed for fever or mild pain,and observation.Consider Visteril prn n&v and Immodium AD/Lomotil if diarrhea worsens,notify us if not significantly better in 48 hours or if any worsening occurs.   Diagnoses and all orders for this visit:    Gastroenteritis    Diabetes mellitus with complication   -     glipiZIDE  (GLUCOTROL) 5 MG TR24; Take 1 tablet (5 mg total) by mouth daily with breakfast. 2 tabs po qam    Type 2 diabetes mellitus with hyperlipidemia    Osteoarthritis of both hips, unspecified osteoarthritis type    Hypertension associated with diabetes    Peripheral edema    Other orders  -     albuterol 90 mcg/actuation inhaler; Inhale 2 puffs into the lungs every 6 (six) hours as needed for Wheezing.  -     tizanidine (ZANAFLEX) 4 MG tablet; Take 1 tablet (4 mg total) by mouth every 8 (eight) hours.

## 2017-05-30 NOTE — TELEPHONE ENCOUNTER
----- Message from Haven Aldana sent at 5/30/2017 11:12 AM CDT -----  Contact: Genevieve suh [employer]   [phone]  Needs clarification on inst for script recd for Glipizide - there are two dif inst

## 2017-06-08 ENCOUNTER — OFFICE VISIT (OUTPATIENT)
Dept: RHEUMATOLOGY | Facility: CLINIC | Age: 72
End: 2017-06-08
Payer: MEDICARE

## 2017-06-08 VITALS
DIASTOLIC BLOOD PRESSURE: 78 MMHG | HEART RATE: 67 BPM | WEIGHT: 248 LBS | BODY MASS INDEX: 40.03 KG/M2 | SYSTOLIC BLOOD PRESSURE: 166 MMHG

## 2017-06-08 DIAGNOSIS — M25.561 CHRONIC PAIN OF RIGHT KNEE: Primary | ICD-10-CM

## 2017-06-08 DIAGNOSIS — M35.00 SJOGREN'S SYNDROME, WITH UNSPECIFIED ORGAN INVOLVEMENT: ICD-10-CM

## 2017-06-08 DIAGNOSIS — M05.9 RHEUMATOID ARTHRITIS WITH POSITIVE RHEUMATOID FACTOR, INVOLVING UNSPECIFIED SITE: ICD-10-CM

## 2017-06-08 DIAGNOSIS — E11.9 TYPE 2 DIABETES MELLITUS WITHOUT COMPLICATION, UNSPECIFIED LONG TERM INSULIN USE STATUS: ICD-10-CM

## 2017-06-08 DIAGNOSIS — G89.29 CHRONIC PAIN OF RIGHT KNEE: Primary | ICD-10-CM

## 2017-06-08 PROCEDURE — 1159F MED LIST DOCD IN RCRD: CPT | Mod: ,,, | Performed by: INTERNAL MEDICINE

## 2017-06-08 PROCEDURE — 1125F AMNT PAIN NOTED PAIN PRSNT: CPT | Mod: ,,, | Performed by: INTERNAL MEDICINE

## 2017-06-08 PROCEDURE — 99999 PR PBB SHADOW E&M-EST. PATIENT-LVL III: CPT | Mod: PBBFAC,,, | Performed by: INTERNAL MEDICINE

## 2017-06-08 PROCEDURE — 3045F PR MOST RECENT HEMOGLOBIN A1C LEVEL 7.0-9.0%: CPT | Mod: ,,, | Performed by: INTERNAL MEDICINE

## 2017-06-08 PROCEDURE — 99213 OFFICE O/P EST LOW 20 MIN: CPT | Mod: PBBFAC,PO | Performed by: INTERNAL MEDICINE

## 2017-06-08 PROCEDURE — 99215 OFFICE O/P EST HI 40 MIN: CPT | Mod: S$PBB,,, | Performed by: INTERNAL MEDICINE

## 2017-06-08 RX ORDER — CHLORZOXAZONE 750 MG/1
750 TABLET ORAL 3 TIMES DAILY
Qty: 90 TABLET | Refills: 3 | Status: SHIPPED | OUTPATIENT
Start: 2017-06-08 | End: 2017-07-08

## 2017-06-08 RX ORDER — LEFLUNOMIDE 20 MG/1
20 TABLET ORAL DAILY
Qty: 30 TABLET | Refills: 4 | Status: SHIPPED | OUTPATIENT
Start: 2017-06-08 | End: 2017-09-19

## 2017-06-08 RX ORDER — HYDROCODONE BITARTRATE AND ACETAMINOPHEN 10; 325 MG/1; MG/1
1 TABLET ORAL 3 TIMES DAILY PRN
Qty: 90 TABLET | Refills: 0 | Status: SHIPPED | OUTPATIENT
Start: 2017-06-08 | End: 2017-06-08 | Stop reason: SDUPTHER

## 2017-06-08 RX ORDER — DICLOFENAC SODIUM AND MISOPROSTOL 75; 200 MG/1; UG/1
1 TABLET, DELAYED RELEASE ORAL 2 TIMES DAILY
Qty: 60 TABLET | Refills: 5 | Status: SHIPPED | OUTPATIENT
Start: 2017-06-08 | End: 2017-11-30 | Stop reason: SDUPTHER

## 2017-06-08 RX ORDER — CLOBETASOL PROPIONATE 0.5 MG/G
OINTMENT TOPICAL 2 TIMES DAILY
Qty: 45 G | Refills: 2 | Status: SHIPPED | OUTPATIENT
Start: 2017-06-08 | End: 2018-06-15

## 2017-06-08 RX ORDER — SUCRALFATE 1 G/1
1 TABLET ORAL 4 TIMES DAILY
Qty: 90 TABLET | Refills: 1 | Status: SHIPPED | OUTPATIENT
Start: 2017-06-08 | End: 2017-09-06 | Stop reason: SDUPTHER

## 2017-06-08 RX ORDER — HYDROCODONE BITARTRATE AND ACETAMINOPHEN 10; 325 MG/1; MG/1
1 TABLET ORAL 3 TIMES DAILY PRN
Qty: 90 TABLET | Refills: 0 | Status: SHIPPED | OUTPATIENT
Start: 2017-07-08 | End: 2017-08-07 | Stop reason: SDUPTHER

## 2017-06-08 ASSESSMENT — ROUTINE ASSESSMENT OF PATIENT INDEX DATA (RAPID3)
FATIGUE SCORE: 4
PSYCHOLOGICAL DISTRESS SCORE: 3.3
PAIN SCORE: 10
TOTAL RAPID3 SCORE: 6.11
PATIENT GLOBAL ASSESSMENT SCORE: 3
WHEN YOU AWAKENED IN THE MORNING OVER THE LAST WEEK, PLEASE INDICATE THE AMOUNT OF TIME IT TAKES UNTIL YOU ARE AS LIMBER AS YOU WILL BE FOR THE DAY: ALL DAY
AM STIFFNESS SCORE: 1, YES
MDHAQ FUNCTION SCORE: 1.6

## 2017-06-08 NOTE — PROGRESS NOTES
Subjective:       Patient ID: Rekha Miller is a 72 y.o. female.    Chief Complaint: Follow-up    Follow up    Started arava  10 mg helped a little She has B knee pains,psoriasis on her elbows and dry skin.Patient complains of arthralgias and myalgias for which has been present for a few years. Pain is located in multiple joints, both shoulder(s), both elbow(s), both wrist(s), both MCP(s): 1st, 2nd, 3rd, 4th and 5th, both PIP(s): 1st, 2nd, 3rd, 4th and 5th, both DIP(s): 1st and 2nd, both hip(s), both knee(s) and both MTP(s): 1st, 2nd, 3rd, 4th and 5th, is described as aching, pulsating, shooting and throbbing, and is constant, moderate .  Associated symptoms include: crepitation, decreased range of motion, edema, effusion, tenderness and warmth.   3 tabs 15 mg daily of prednisone              She complains of joint swelling. Associated symptoms include fatigue and myalgias.         Review of Systems   Constitutional: Positive for activity change and fatigue. Negative for appetite change, chills, diaphoresis and unexpected weight change.   HENT: Negative for congestion, ear discharge, ear pain, facial swelling, mouth sores, postnasal drip, sinus pressure and sore throat.    Eyes: Negative for photophobia, pain, discharge, redness and itching.   Respiratory: Negative for cough, chest tightness and shortness of breath.    Cardiovascular: Positive for leg swelling. Negative for chest pain and palpitations.   Gastrointestinal: Negative for abdominal pain, constipation, diarrhea, nausea and vomiting.   Endocrine: Negative for cold intolerance, heat intolerance, polydipsia and polyuria.   Genitourinary: Negative for decreased urine volume, difficulty urinating, flank pain, frequency, hematuria and urgency.   Musculoskeletal: Positive for arthralgias, back pain, joint swelling, myalgias, neck pain and neck stiffness. Negative for gait problem.   Skin: Negative for pallor, rash and wound.   Allergic/Immunologic: Negative  for immunocompromised state.   Neurological: Negative for dizziness, tremors, weakness and numbness.   Hematological: Negative for adenopathy. Does not bruise/bleed easily.   Psychiatric/Behavioral: Negative for sleep disturbance. The patient is not nervous/anxious.          Objective:     There were no vitals taken for this visit.     Physical Exam   Nursing note and vitals reviewed.  Constitutional: She is oriented to person, place, and time. She appears distressed.   HENT:   Head: Normocephalic and atraumatic.   Mouth/Throat: Oropharynx is clear and moist.   Eyes: EOM are normal. Pupils are equal, round, and reactive to light.   Neck: Neck supple. No thyromegaly present.   Cardiovascular: Normal rate, regular rhythm and normal heart sounds.  Exam reveals no gallop and no friction rub.    No murmur heard.  Pulmonary/Chest: She has wheezes. She has no rales. She exhibits no tenderness.   B bases   Abdominal: There is no tenderness. There is no rebound and no guarding.       Right Side Rheumatological Exam     Examination finds the elbow normal.    The patient is tender to palpation of the shoulder, wrist, knee, 1st PIP, 1st MCP, 2nd PIP, 2nd MCP, 3rd PIP, 3rd MCP, 4th PIP, 4th MCP, 5th PIP and 5th MCP    She has swelling of the 1st PIP, 1st MCP, 2nd PIP, 2nd MCP, 3rd PIP, 3rd MCP, 4th PIP, 4th MCP, 5th PIP and 5th MCP    Shoulder Exam   Tenderness Location: no tenderness    Range of Motion   Active Abduction: abnormal   Adduction: abnormal  Sensation: normal    Knee Exam   Patellofemoral Crepitus: positive  Effusion: negative  Sensation: normal    Hip Exam   Tenderness Location: posterior  Sensation: normal    Elbow/Wrist Exam   Tenderness Location: no tenderness  Sensation: normal    Left Side Rheumatological Exam     Examination finds the elbow normal.    The patient is tender to palpation of the shoulder, wrist, knee, 1st PIP, 1st MCP, 2nd PIP, 2nd MCP, 3rd PIP, 3rd MCP, 4th PIP, 4th MCP, 5th PIP and 5th  MCP.    She has swelling of the 1st PIP, 1st MCP, 2nd PIP, 2nd MCP, 3rd PIP, 3rd MCP, 4th PIP, 4th MCP, 5th PIP and 5th MCP    Shoulder Exam   Tenderness Location: no tenderness    Range of Motion   Active Abduction: abnormal   Sensation: normal    Knee Exam     Patellofemoral Crepitus: positive  Effusion: negative  Sensation: normal    Hip Exam   Tenderness Location: posterior  Sensation: normal    Elbow/Wrist Exam   Sensation: normal      Back/Neck Exam   General Inspection   Gait: normal       Tenderness Right paramedian tenderness of the Lower L-Spine and Upper L-Spine.Left paramedian tenderness of the Lower L-Spine and Upper L-Spine.    Neck Range of Motion   Extension: Limited  Lymphadenopathy:     She has no cervical adenopathy.   Neurological: She is alert and oriented to person, place, and time. Gait normal.   Skin: No rash noted. No erythema. No pallor.     Psychiatric: Mood and affect normal.   Musculoskeletal: She exhibits edema, tenderness and deformity.         Results for orders placed or performed in visit on 04/11/17   Hemoglobin A1c   Result Value Ref Range    Hemoglobin A1C 7.1 (H) 4.5 - 6.2 %    Estimated Avg Glucose 157 (H) 68 - 131 mg/dL          Assessment:       Encounter Diagnoses   Name Primary?    Chronic pain of right knee Yes    Rheumatoid arthritis with positive rheumatoid factor, involving unspecified site     Sjogren's syndrome, with unspecified organ involvement     Type 2 diabetes mellitus without complication, unspecified long term insulin use status          Plan:       Chronic pain of right knee  -     chlorzoxazone 750 mg Tab; Take 750 mg by mouth 3 (three) times daily.  Dispense: 90 tablet; Refill: 3  -     clobetasol 0.05% (TEMOVATE) 0.05 % Oint; Apply topically 2 (two) times daily.  Dispense: 45 g; Refill: 2  -     diclofenac-misoprostol  mg-mcg (ARTHROTEC 75)  mg-mcg per tablet; Take 1 tablet by mouth 2 (two) times daily.  Dispense: 60 tablet; Refill:  5    Rheumatoid arthritis with positive rheumatoid factor, involving unspecified site  -     sucralfate (CARAFATE) 1 gram tablet; Take 1 tablet (1 g total) by mouth 4 (four) times daily. Coat stomach  Dispense: 90 tablet; Refill: 1  -     CBC auto differential; Future; Expected date: 06/08/2017  -     Comprehensive metabolic panel; Future; Expected date: 06/08/2017  -     Iron and TIBC; Future; Expected date: 06/08/2017  -     Sedimentation rate, manual; Future; Expected date: 06/08/2017  -     Rheumatoid factor; Future; Expected date: 06/08/2017  -     C-reactive protein; Future; Expected date: 06/08/2017  -     leflunomide (ARAVA) 20 MG Tab; Take 1 tablet (20 mg total) by mouth once daily.  Dispense: 30 tablet; Refill: 4  -     chlorzoxazone 750 mg Tab; Take 750 mg by mouth 3 (three) times daily.  Dispense: 90 tablet; Refill: 3  -     clobetasol 0.05% (TEMOVATE) 0.05 % Oint; Apply topically 2 (two) times daily.  Dispense: 45 g; Refill: 2  -     diclofenac-misoprostol  mg-mcg (ARTHROTEC 75)  mg-mcg per tablet; Take 1 tablet by mouth 2 (two) times daily.  Dispense: 60 tablet; Refill: 5    Sjogren's syndrome, with unspecified organ involvement  -     sucralfate (CARAFATE) 1 gram tablet; Take 1 tablet (1 g total) by mouth 4 (four) times daily. Coat stomach  Dispense: 90 tablet; Refill: 1  -     CBC auto differential; Future; Expected date: 06/08/2017  -     Comprehensive metabolic panel; Future; Expected date: 06/08/2017  -     Iron and TIBC; Future; Expected date: 06/08/2017  -     Sedimentation rate, manual; Future; Expected date: 06/08/2017  -     Rheumatoid factor; Future; Expected date: 06/08/2017  -     C-reactive protein; Future; Expected date: 06/08/2017  -     leflunomide (ARAVA) 20 MG Tab; Take 1 tablet (20 mg total) by mouth once daily.  Dispense: 30 tablet; Refill: 4  -     chlorzoxazone 750 mg Tab; Take 750 mg by mouth 3 (three) times daily.  Dispense: 90 tablet; Refill: 3  -     clobetasol  0.05% (TEMOVATE) 0.05 % Oint; Apply topically 2 (two) times daily.  Dispense: 45 g; Refill: 2  -     diclofenac-misoprostol  mg-mcg (ARTHROTEC 75)  mg-mcg per tablet; Take 1 tablet by mouth 2 (two) times daily.  Dispense: 60 tablet; Refill: 5    Type 2 diabetes mellitus without complication, unspecified long term insulin use status  -     sucralfate (CARAFATE) 1 gram tablet; Take 1 tablet (1 g total) by mouth 4 (four) times daily. Coat stomach  Dispense: 90 tablet; Refill: 1  -     CBC auto differential; Future; Expected date: 06/08/2017  -     Comprehensive metabolic panel; Future; Expected date: 06/08/2017  -     Iron and TIBC; Future; Expected date: 06/08/2017  -     Sedimentation rate, manual; Future; Expected date: 06/08/2017  -     Rheumatoid factor; Future; Expected date: 06/08/2017  -     C-reactive protein; Future; Expected date: 06/08/2017  -     leflunomide (ARAVA) 20 MG Tab; Take 1 tablet (20 mg total) by mouth once daily.  Dispense: 30 tablet; Refill: 4  -     chlorzoxazone 750 mg Tab; Take 750 mg by mouth 3 (three) times daily.  Dispense: 90 tablet; Refill: 3  -     clobetasol 0.05% (TEMOVATE) 0.05 % Oint; Apply topically 2 (two) times daily.  Dispense: 45 g; Refill: 2  -     diclofenac-misoprostol  mg-mcg (ARTHROTEC 75)  mg-mcg per tablet; Take 1 tablet by mouth 2 (two) times daily.  Dispense: 60 tablet; Refill: 5    Other orders  -     Discontinue: hydrocodone-acetaminophen 10-325mg (NORCO)  mg Tab; Take 1 tablet by mouth 3 (three) times daily as needed.  Dispense: 90 tablet; Refill: 0  -     hydrocodone-acetaminophen 10-325mg (NORCO)  mg Tab; Take 1 tablet by mouth 3 (three) times daily as needed.  Dispense: 90 tablet; Refill: 0      Increase  arava

## 2017-06-13 ENCOUNTER — TELEPHONE (OUTPATIENT)
Dept: RHEUMATOLOGY | Facility: CLINIC | Age: 72
End: 2017-06-13

## 2017-06-13 NOTE — TELEPHONE ENCOUNTER
----- Message from Manjit Steiner sent at 6/13/2017 11:15 AM CDT -----  Pt called asking for a call back from the nurse/pls call back at [phone]

## 2017-06-13 NOTE — TELEPHONE ENCOUNTER
Returned pt call. Pt inquiring about paperwork for insurance approval for medication. Informed paperwork has been received not completed yet. Pt verbalized understanding.

## 2017-06-16 ENCOUNTER — LAB VISIT (OUTPATIENT)
Dept: LAB | Facility: HOSPITAL | Age: 72
End: 2017-06-16
Attending: FAMILY MEDICINE
Payer: MEDICARE

## 2017-06-16 DIAGNOSIS — N28.9 ABNORMAL KIDNEY FUNCTION: ICD-10-CM

## 2017-06-16 DIAGNOSIS — E11.9 TYPE 2 DIABETES MELLITUS WITHOUT COMPLICATION, UNSPECIFIED LONG TERM INSULIN USE STATUS: ICD-10-CM

## 2017-06-16 DIAGNOSIS — E78.2 DM TYPE 2 WITH DIABETIC MIXED HYPERLIPIDEMIA: ICD-10-CM

## 2017-06-16 DIAGNOSIS — F19.20 CORTICOSTEROID DEPENDENCE: ICD-10-CM

## 2017-06-16 DIAGNOSIS — M35.00 SJOGREN'S SYNDROME, WITH UNSPECIFIED ORGAN INVOLVEMENT: ICD-10-CM

## 2017-06-16 DIAGNOSIS — E88.810 METABOLIC SYNDROME: ICD-10-CM

## 2017-06-16 DIAGNOSIS — E11.69 DM TYPE 2 WITH DIABETIC MIXED HYPERLIPIDEMIA: ICD-10-CM

## 2017-06-16 DIAGNOSIS — M35.00 SJOGREN'S DISEASE: ICD-10-CM

## 2017-06-16 DIAGNOSIS — K21.9 GASTROESOPHAGEAL REFLUX DISEASE, ESOPHAGITIS PRESENCE NOT SPECIFIED: ICD-10-CM

## 2017-06-16 DIAGNOSIS — D64.9 ANEMIA: ICD-10-CM

## 2017-06-16 DIAGNOSIS — M05.9 RHEUMATOID ARTHRITIS WITH POSITIVE RHEUMATOID FACTOR, INVOLVING UNSPECIFIED SITE: ICD-10-CM

## 2017-06-16 DIAGNOSIS — I10 ESSENTIAL HYPERTENSION: ICD-10-CM

## 2017-06-16 DIAGNOSIS — J45.909 UNCOMPLICATED ASTHMA: ICD-10-CM

## 2017-06-16 DIAGNOSIS — E11.9 TYPE 2 DIABETES MELLITUS WITHOUT COMPLICATION: ICD-10-CM

## 2017-06-16 LAB
ALBUMIN SERPL BCP-MCNC: 3.1 G/DL
ALBUMIN SERPL BCP-MCNC: 3.1 G/DL
ALP SERPL-CCNC: 154 U/L
ALP SERPL-CCNC: 154 U/L
ALT SERPL W/O P-5'-P-CCNC: 10 U/L
ALT SERPL W/O P-5'-P-CCNC: 10 U/L
ANION GAP SERPL CALC-SCNC: 9 MMOL/L
AST SERPL-CCNC: 14 U/L
AST SERPL-CCNC: 14 U/L
BASOPHILS # BLD AUTO: 0.1 K/UL
BASOPHILS # BLD AUTO: 0.1 K/UL
BASOPHILS NFR BLD: 0.9 %
BASOPHILS NFR BLD: 0.9 %
BILIRUB SERPL-MCNC: 0.3 MG/DL
BILIRUB SERPL-MCNC: 0.3 MG/DL
BUN SERPL-MCNC: 11 MG/DL
CALCIUM SERPL-MCNC: 9.5 MG/DL
CHLORIDE SERPL-SCNC: 108 MMOL/L
CO2 SERPL-SCNC: 22 MMOL/L
CREAT SERPL-MCNC: 0.9 MG/DL
CRP SERPL-MCNC: 34.5 MG/L
CRP SERPL-MCNC: 34.5 MG/L
DIFFERENTIAL METHOD: ABNORMAL
DIFFERENTIAL METHOD: ABNORMAL
EOSINOPHIL # BLD AUTO: 0.1 K/UL
EOSINOPHIL # BLD AUTO: 0.1 K/UL
EOSINOPHIL NFR BLD: 1.2 %
EOSINOPHIL NFR BLD: 1.2 %
ERYTHROCYTE [DISTWIDTH] IN BLOOD BY AUTOMATED COUNT: 17.4 %
ERYTHROCYTE [DISTWIDTH] IN BLOOD BY AUTOMATED COUNT: 17.4 %
ERYTHROCYTE [SEDIMENTATION RATE] IN BLOOD BY WESTERGREN METHOD: 28 MM/HR
ERYTHROCYTE [SEDIMENTATION RATE] IN BLOOD BY WESTERGREN METHOD: 28 MM/HR
EST. GFR  (AFRICAN AMERICAN): >60 ML/MIN/1.73 M^2
EST. GFR  (NON AFRICAN AMERICAN): >60 ML/MIN/1.73 M^2
GLUCOSE SERPL-MCNC: 120 MG/DL
HCT VFR BLD AUTO: 36.5 %
HCT VFR BLD AUTO: 36.5 %
HGB BLD-MCNC: 11.7 G/DL
HGB BLD-MCNC: 11.7 G/DL
IRON SERPL-MCNC: 28 UG/DL
LYMPHOCYTES # BLD AUTO: 3.5 K/UL
LYMPHOCYTES # BLD AUTO: 3.5 K/UL
LYMPHOCYTES NFR BLD: 31 %
LYMPHOCYTES NFR BLD: 31 %
MCH RBC QN AUTO: 25.4 PG
MCH RBC QN AUTO: 25.4 PG
MCHC RBC AUTO-ENTMCNC: 32.1 %
MCHC RBC AUTO-ENTMCNC: 32.1 %
MCV RBC AUTO: 79 FL
MCV RBC AUTO: 79 FL
MONOCYTES # BLD AUTO: 0.7 K/UL
MONOCYTES # BLD AUTO: 0.7 K/UL
MONOCYTES NFR BLD: 5.8 %
MONOCYTES NFR BLD: 5.8 %
NEUTROPHILS # BLD AUTO: 6.8 K/UL
NEUTROPHILS # BLD AUTO: 6.8 K/UL
NEUTROPHILS NFR BLD: 60.8 %
NEUTROPHILS NFR BLD: 60.8 %
PLATELET # BLD AUTO: 359 K/UL
PLATELET # BLD AUTO: 359 K/UL
PMV BLD AUTO: 12.1 FL
PMV BLD AUTO: 12.1 FL
POTASSIUM SERPL-SCNC: 4 MMOL/L
PROT SERPL-MCNC: 7.7 G/DL
PROT SERPL-MCNC: 7.7 G/DL
RBC # BLD AUTO: 4.61 M/UL
RBC # BLD AUTO: 4.61 M/UL
SATURATED IRON: 10 %
SODIUM SERPL-SCNC: 139 MMOL/L
T3FREE SERPL-MCNC: 2.5 PG/ML
T4 FREE SERPL-MCNC: 0.98 NG/DL
TOTAL IRON BINDING CAPACITY: 283 UG/DL
TRANSFERRIN SERPL-MCNC: 191 MG/DL
TSH SERPL DL<=0.005 MIU/L-ACNC: 0.64 UIU/ML
WBC # BLD AUTO: 11.21 K/UL
WBC # BLD AUTO: 11.21 K/UL

## 2017-06-16 PROCEDURE — 86431 RHEUMATOID FACTOR QUANT: CPT

## 2017-06-16 PROCEDURE — 85651 RBC SED RATE NONAUTOMATED: CPT | Mod: PO

## 2017-06-16 PROCEDURE — 84443 ASSAY THYROID STIM HORMONE: CPT

## 2017-06-16 PROCEDURE — 85025 COMPLETE CBC W/AUTO DIFF WBC: CPT

## 2017-06-16 PROCEDURE — 84481 FREE ASSAY (FT-3): CPT

## 2017-06-16 PROCEDURE — 86140 C-REACTIVE PROTEIN: CPT

## 2017-06-16 PROCEDURE — 86235 NUCLEAR ANTIGEN ANTIBODY: CPT | Mod: 59

## 2017-06-16 PROCEDURE — 36415 COLL VENOUS BLD VENIPUNCTURE: CPT | Mod: PO

## 2017-06-16 PROCEDURE — 84439 ASSAY OF FREE THYROXINE: CPT

## 2017-06-16 PROCEDURE — 83036 HEMOGLOBIN GLYCOSYLATED A1C: CPT

## 2017-06-16 PROCEDURE — 86038 ANTINUCLEAR ANTIBODIES: CPT

## 2017-06-16 PROCEDURE — 86039 ANTINUCLEAR ANTIBODIES (ANA): CPT

## 2017-06-16 PROCEDURE — 83540 ASSAY OF IRON: CPT

## 2017-06-16 PROCEDURE — 80053 COMPREHEN METABOLIC PANEL: CPT

## 2017-06-17 LAB
ESTIMATED AVG GLUCOSE: 148 MG/DL
HBA1C MFR BLD HPLC: 6.8 %

## 2017-06-19 LAB
ANA SER QL IF: POSITIVE
ANA TITR SER IF: NORMAL {TITER}
RHEUMATOID FACT SERPL-ACNC: 74 IU/ML
RHEUMATOID FACT SERPL-ACNC: 74 IU/ML

## 2017-06-21 LAB
ANTI SM ANTIBODY: 0.28 EU
ANTI SM/RNP ANTIBODY: 0.8 EU
ANTI-SM INTERPRETATION: NEGATIVE
ANTI-SM/RNP INTERPRETATION: NEGATIVE
ANTI-SSA ANTIBODY: 0.49 EU
ANTI-SSA INTERPRETATION: NEGATIVE
ANTI-SSB ANTIBODY: 42.58 EU
ANTI-SSB INTERPRETATION: POSITIVE
DSDNA AB SER-ACNC: ABNORMAL [IU]/ML

## 2017-06-21 RX ORDER — AMLODIPINE AND BENAZEPRIL HYDROCHLORIDE 5; 20 MG/1; MG/1
CAPSULE ORAL
Qty: 30 CAPSULE | Refills: 3 | Status: SHIPPED | OUTPATIENT
Start: 2017-06-21 | End: 2017-09-19

## 2017-06-22 ENCOUNTER — PATIENT OUTREACH (OUTPATIENT)
Dept: ADMINISTRATIVE | Facility: HOSPITAL | Age: 72
End: 2017-06-22

## 2017-06-22 DIAGNOSIS — Z12.31 ENCOUNTER FOR SCREENING MAMMOGRAM FOR BREAST CANCER: Primary | ICD-10-CM

## 2017-06-22 NOTE — PROGRESS NOTES
Spoke with pt concerning her overdue health maintenance.  Pt stated that she would like to schedule her mammogram on the same day as appt.  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 Mammogram ordered.  Pre visit chart audit and appt letter sent.

## 2017-07-06 ENCOUNTER — TELEPHONE (OUTPATIENT)
Dept: FAMILY MEDICINE | Facility: CLINIC | Age: 72
End: 2017-07-06

## 2017-07-06 NOTE — TELEPHONE ENCOUNTER
----- Message from Holly Brower sent at 7/6/2017 10:36 AM CDT -----  Contact: Patient  Patient was late for her appt today and needs to reschedule possibly for the July 13 am. She also has Mammo same morning.  Please call to advise.  Thanks//

## 2017-08-14 RX ORDER — HYDROCODONE BITARTRATE AND ACETAMINOPHEN 10; 325 MG/1; MG/1
TABLET ORAL
Qty: 90 TABLET | Refills: 0 | Status: SHIPPED | OUTPATIENT
Start: 2017-08-14 | End: 2017-09-11 | Stop reason: SDUPTHER

## 2017-08-14 NOTE — TELEPHONE ENCOUNTER
checked, last filled 7/17/17.   Spoke to pt, advised received request from pharmacy and her. Advised that request has been sent to Dr. Johnson and medication is not due until the 17th. No further questions.

## 2017-08-17 NOTE — PROGRESS NOTES
Your test for Sjogren's are stable. Continue your present medications. Will will adjust medicatiions at your next visit.

## 2017-08-24 ENCOUNTER — TELEPHONE (OUTPATIENT)
Dept: RHEUMATOLOGY | Facility: CLINIC | Age: 72
End: 2017-08-24

## 2017-08-24 NOTE — TELEPHONE ENCOUNTER
----- Message from Pedro Johnson MD sent at 8/17/2017 10:57 AM CDT -----  Your test for Sjogren's are stable. Continue your present medications. Will will adjust medicatiions at your next visit.

## 2017-08-24 NOTE — TELEPHONE ENCOUNTER
Contacted pt and informed sjogren's is stable. Continue current medications and will adjust medications at next visit. Verbalized understanding

## 2017-09-06 DIAGNOSIS — M05.9 RHEUMATOID ARTHRITIS WITH POSITIVE RHEUMATOID FACTOR, INVOLVING UNSPECIFIED SITE: ICD-10-CM

## 2017-09-06 DIAGNOSIS — M35.00 SJOGREN'S SYNDROME, WITH UNSPECIFIED ORGAN INVOLVEMENT: ICD-10-CM

## 2017-09-06 DIAGNOSIS — E11.9 TYPE 2 DIABETES MELLITUS WITHOUT COMPLICATION, UNSPECIFIED LONG TERM INSULIN USE STATUS: ICD-10-CM

## 2017-09-06 RX ORDER — SUCRALFATE 1 G/1
TABLET ORAL
Qty: 90 TABLET | Refills: 3 | Status: SHIPPED | OUTPATIENT
Start: 2017-09-06 | End: 2017-09-19

## 2017-09-08 ENCOUNTER — TELEPHONE (OUTPATIENT)
Dept: RHEUMATOLOGY | Facility: CLINIC | Age: 72
End: 2017-09-08

## 2017-09-08 NOTE — TELEPHONE ENCOUNTER
----- Message from Anamaria Seals sent at 9/7/2017  8:22 AM CDT -----  Contact: Genevieve with boarding passs  Genevieve with [email or web address] pharmacy [phone] called regarding Sucralsate for above patient. They are requesting one of the following: Rx substitution for Carafate supension. Thanks!

## 2017-09-11 DIAGNOSIS — F19.20 CORTICOSTEROID DEPENDENCE: ICD-10-CM

## 2017-09-11 DIAGNOSIS — M05.9 RHEUMATOID ARTHRITIS WITH POSITIVE RHEUMATOID FACTOR, INVOLVING UNSPECIFIED SITE: ICD-10-CM

## 2017-09-11 DIAGNOSIS — J45.909 UNCOMPLICATED ASTHMA: ICD-10-CM

## 2017-09-11 DIAGNOSIS — E88.810 METABOLIC SYNDROME: ICD-10-CM

## 2017-09-11 DIAGNOSIS — M35.00 SJOGREN'S DISEASE: ICD-10-CM

## 2017-09-11 DIAGNOSIS — K21.9 GASTROESOPHAGEAL REFLUX DISEASE, ESOPHAGITIS PRESENCE NOT SPECIFIED: ICD-10-CM

## 2017-09-11 DIAGNOSIS — I10 ESSENTIAL HYPERTENSION: ICD-10-CM

## 2017-09-13 NOTE — TELEPHONE ENCOUNTER
----- Message from María Elena Callejas sent at 9/13/2017  1:33 PM CDT -----  Contact: patient  Patient called requesting refill on hydrocodone send to [facility]'s pharmacy. Please call back at [phone] when script has been sent. Thanks,

## 2017-09-14 ENCOUNTER — TELEPHONE (OUTPATIENT)
Dept: RHEUMATOLOGY | Facility: CLINIC | Age: 72
End: 2017-09-14

## 2017-09-14 RX ORDER — HYDROCODONE BITARTRATE AND ACETAMINOPHEN 10; 325 MG/1; MG/1
TABLET ORAL
Qty: 90 TABLET | Refills: 0 | Status: SHIPPED | OUTPATIENT
Start: 2017-09-14 | End: 2017-10-09 | Stop reason: SDUPTHER

## 2017-09-14 RX ORDER — FUROSEMIDE 40 MG/1
TABLET ORAL
Qty: 180 TABLET | Refills: 1 | Status: SHIPPED | OUTPATIENT
Start: 2017-09-14 | End: 2017-09-19 | Stop reason: SDUPTHER

## 2017-09-14 RX ORDER — ALBUTEROL SULFATE 0.83 MG/ML
SOLUTION RESPIRATORY (INHALATION)
Qty: 180 ML | Refills: 3 | Status: SHIPPED | OUTPATIENT
Start: 2017-09-14 | End: 2017-09-19 | Stop reason: SDUPTHER

## 2017-09-17 RX ORDER — HYDROCODONE BITARTRATE AND ACETAMINOPHEN 10; 325 MG/1; MG/1
1 TABLET ORAL 3 TIMES DAILY PRN
Qty: 90 TABLET | Refills: 0 | Status: SHIPPED | OUTPATIENT
Start: 2017-09-17 | End: 2017-09-19 | Stop reason: SDUPTHER

## 2017-09-19 ENCOUNTER — OFFICE VISIT (OUTPATIENT)
Dept: FAMILY MEDICINE | Facility: CLINIC | Age: 72
End: 2017-09-19
Payer: MEDICARE

## 2017-09-19 VITALS
HEIGHT: 66 IN | HEART RATE: 79 BPM | WEIGHT: 254.88 LBS | DIASTOLIC BLOOD PRESSURE: 66 MMHG | BODY MASS INDEX: 40.96 KG/M2 | SYSTOLIC BLOOD PRESSURE: 130 MMHG | TEMPERATURE: 98 F

## 2017-09-19 DIAGNOSIS — I10 ESSENTIAL HYPERTENSION: ICD-10-CM

## 2017-09-19 DIAGNOSIS — F19.20 CORTICOSTEROID DEPENDENCE: ICD-10-CM

## 2017-09-19 DIAGNOSIS — M05.9 RHEUMATOID ARTHRITIS WITH POSITIVE RHEUMATOID FACTOR, INVOLVING UNSPECIFIED SITE: ICD-10-CM

## 2017-09-19 DIAGNOSIS — E78.5 TYPE 2 DIABETES MELLITUS WITH HYPERLIPIDEMIA: ICD-10-CM

## 2017-09-19 DIAGNOSIS — M35.00 SJOGREN'S SYNDROME, WITH UNSPECIFIED ORGAN INVOLVEMENT: ICD-10-CM

## 2017-09-19 DIAGNOSIS — E11.69 TYPE 2 DIABETES MELLITUS WITH HYPERLIPIDEMIA: ICD-10-CM

## 2017-09-19 DIAGNOSIS — Z00.00 ROUTINE CHECK-UP: Primary | ICD-10-CM

## 2017-09-19 DIAGNOSIS — E88.810 METABOLIC SYNDROME: ICD-10-CM

## 2017-09-19 DIAGNOSIS — K21.9 GASTROESOPHAGEAL REFLUX DISEASE, ESOPHAGITIS PRESENCE NOT SPECIFIED: ICD-10-CM

## 2017-09-19 DIAGNOSIS — J45.909 UNCOMPLICATED ASTHMA, UNSPECIFIED ASTHMA SEVERITY: ICD-10-CM

## 2017-09-19 PROCEDURE — 3078F DIAST BP <80 MM HG: CPT | Mod: ,,, | Performed by: FAMILY MEDICINE

## 2017-09-19 PROCEDURE — 3075F SYST BP GE 130 - 139MM HG: CPT | Mod: ,,, | Performed by: FAMILY MEDICINE

## 2017-09-19 PROCEDURE — 99213 OFFICE O/P EST LOW 20 MIN: CPT | Mod: PBBFAC,PO | Performed by: FAMILY MEDICINE

## 2017-09-19 PROCEDURE — 99999 PR PBB SHADOW E&M-EST. PATIENT-LVL III: CPT | Mod: PBBFAC,,, | Performed by: FAMILY MEDICINE

## 2017-09-19 PROCEDURE — 3044F HG A1C LEVEL LT 7.0%: CPT | Mod: ,,, | Performed by: FAMILY MEDICINE

## 2017-09-19 PROCEDURE — 1159F MED LIST DOCD IN RCRD: CPT | Mod: ,,, | Performed by: FAMILY MEDICINE

## 2017-09-19 PROCEDURE — 99397 PER PM REEVAL EST PAT 65+ YR: CPT | Mod: S$PBB,,, | Performed by: FAMILY MEDICINE

## 2017-09-19 RX ORDER — SUCRALFATE 1 G/10ML
500 SUSPENSION ORAL 4 TIMES DAILY
Qty: 414 ML | Refills: 11 | Status: SHIPPED | OUTPATIENT
Start: 2017-09-19 | End: 2018-05-10

## 2017-09-19 NOTE — PROGRESS NOTES
The patient presents today for general health evaluation and counseling      Past Medical History:  Past Medical History:   Diagnosis Date    Asthma     COPD (chronic obstructive pulmonary disease)     Degenerative disc disease     Diabetes mellitus     Diabetes mellitus, type 2     Fibromyalgia     Hypertension     Rheumatoid arthritis     Rheumatoid arthritis(714.0)     Rheumatoid arthritis     Past Surgical History:   Procedure Laterality Date    BREAST SURGERY  1986    CATARACT EXTRACTION      EYE SURGERY  2013    Catatracts    ORIF FEMUR FRACTURE      right knee ligament rpeair  2005    right shoulder surgery  4/18/07    Dr. Gao     Review of patient's allergies indicates:   Allergen Reactions    Codeine     Dairy digestive ultra      Dairy products      Imuran [azathioprine sodium] Diarrhea    Morphine      DOESN'T FEEL RIGHT     Plaquenil [hydroxychloroquine] Other (See Comments)     headaches     Current Outpatient Prescriptions on File Prior to Visit   Medication Sig Dispense Refill    albuterol (PROVENTIL) 2.5 mg /3 mL (0.083 %) nebulizer solution USE 1 VIAL IN NEBULIZER EVERY 6 HOURS AS NEEDED FOR WHEEZING 180 mL 3    albuterol 90 mcg/actuation inhaler Inhale 2 puffs into the lungs every 6 (six) hours as needed for Wheezing. 18 g 11    amlodipine-benazepril 5-20 mg (LOTREL) 5-20 mg per capsule Take 1 capsule by mouth once daily. 90 capsule 3    cane Lauren 1 each by Misc.(Non-Drug; Combo Route) route once daily. 1 each 0    diclofenac-misoprostol  mg-mcg (ARTHROTEC 75)  mg-mcg per tablet Take 1 tablet by mouth 2 (two) times daily. 60 tablet 5    furosemide (LASIX) 40 MG tablet Take 40 mg by mouth 2 (two) times daily.       glipiZIDE (GLUCOTROL) 5 MG TR24 Take 1 tablet (5 mg total) by mouth daily with breakfast. 2 tabs po qam 90 tablet 3    hydrocodone-acetaminophen 10-325mg (NORCO)  mg Tab TAKE 1 TABLET BY MOUTH THREE TIMES DAILY AS NEEDED 90 tablet 0     montelukast (SINGULAIR) 10 mg tablet Take 10 mg by mouth once daily.   0    potassium chloride SA (K-DUR,KLOR-CON) 20 MEQ tablet Take 1 tablet (20 mEq total) by mouth 2 (two) times daily. 180 tablet 4    sucralfate (CARAFATE) 1 gram tablet TAKE 1 TABLET BY MOUTH FOUR TIMES DAILY to coat stomach 90 tablet 3    TRUETEST TEST STRIPS Strp test blood sugar ONCE DAILY AS DIRECTED 100 each 11    blood sugar diagnostic (TRUETEST TEST STRIPS) Strp Inject 1 strip into the skin once daily. 100 strip 11    clobetasol 0.05% (TEMOVATE) 0.05 % Oint Apply topically 2 (two) times daily. 45 g 2    valacyclovir (VALTREX) 1000 MG tablet Take 1 tablet (1,000 mg total) by mouth 3 (three) times daily. 21 tablet 0    [DISCONTINUED] amlodipine-benazepril 5-20 mg (LOTREL) 5-20 mg per capsule TAKE ONE CAPSULE BY MOUTH DAILY 30 capsule 3    [DISCONTINUED] furosemide (LASIX) 40 MG tablet TAKE 1 TABLET BY MOUTH TWICE DAILY 180 tablet 1    [DISCONTINUED] hydrocodone-acetaminophen 10-325mg (NORCO)  mg Tab Take 1 tablet by mouth 3 (three) times daily as needed. 90 tablet 0    [DISCONTINUED] leflunomide (ARAVA) 20 MG Tab Take 1 tablet (20 mg total) by mouth once daily. 30 tablet 4    [DISCONTINUED] leg brace (KNEE BRACE LARGE-XLARGE) Misc 2 each by Misc.(Non-Drug; Combo Route) route once daily. sleeve 2 each 0    [DISCONTINUED] SYMBICORT 160-4.5 mcg/actuation HFAA Inhale 2 puffs into the lungs 2 (two) times daily. 10.2 g 5     No current facility-administered medications on file prior to visit.      Social History     Social History    Marital status:      Spouse name: N/A    Number of children: 8    Years of education: N/A     Occupational History    Not on file.     Social History Main Topics    Smoking status: Never Smoker    Smokeless tobacco: Not on file    Alcohol use Not on file    Drug use: No    Sexual activity: Not on file     Other Topics Concern    Not on file     Social History Narrative    No  narrative on file     No family history on file.      ROS:GENERAL: No fever, chills, fatigability or weight loss.  SKIN: No rashes, itching or changes in color or texture of skin.  HEAD: No headaches or recent head trauma.EYES: Visual acuity fine. No photophobia, ocular pain or diplopia.EARS: Denies ear pain, discharge or vertigo.NOSE: No loss of smell, no epistaxis or postnasal drip.MOUTH & THROAT: No hoarseness or change in voice. No excessive gum bleeding.NODES: Denies swollen glands.  CHEST: Denies SOSA, cyanosis, wheezing, cough and sputum production.  CARDIOVASCULAR: Denies chest pain, PND, orthopnea or reduced exercise tolerance.  ABDOMEN: Appetite fine. No weight loss. Denies diarrhea, abdominal pain, hematemesis or blood in stool.  URINARY: No flank pain, dysuria or hematuria.  PERIPHERAL VASCULAR: No claudication or cyanosis.  MUSCULOSKELETAL: See above.  NEUROLOGIC: No history of seizures, paralysis, alteration of gait or coordination.  PE:   HEAD: Normocephalic, atraumatic.EYES: PERRL. EOMI.   EARS: TM's intact. Light reflex normal. No retraction or perforation.   NOSE: Mucosa pink. Airway clear.MOUTH & THROAT: No tonsillar enlargement. No pharyngeal erythema or exudate. No stridor.  NODES: No cervical, axillary or inguinal lymph node enlargement.  CHEST: Lungs clear to auscultation.  CARDIOVASCULAR: Normal S1, S2. No rubs, murmurs or gallops.  ABDOMEN: Bowel sounds normal. Not distended. Soft. No tenderness or masses.  MUSCULOSKELETAL: No palpable abnormality  NEUROLOGIC: Cranial Nerves: II-XII grossly intact.  Motor: 5/5 strength major flexors/extensors.  DTR's: Knees, Ankles 2+ and equal bilaterally; downgoing toes.  Sensory: Intact to light touch distally.  Gait & Posture: Normal gait and fine motion. No cerebellar signs.     Impression:Rekha was seen today for annual exam.    Diagnoses and all orders for this visit:    Routine check-up    Type 2 diabetes mellitus with hyperlipidemia    Rheumatoid  arthritis with positive rheumatoid factor, involving unspecified site    IBS    Routine health check  Plan:Lab eval  Rec diet and ex recs  Trial kliq carafate   Probiotics  Consider trisal low dose bulking agent

## 2017-09-21 RX ORDER — FUROSEMIDE 40 MG/1
40 TABLET ORAL DAILY
Qty: 30 TABLET | Refills: 5 | Status: SHIPPED | OUTPATIENT
Start: 2017-09-21 | End: 2019-01-23 | Stop reason: SDUPTHER

## 2017-09-21 RX ORDER — ALBUTEROL SULFATE 0.83 MG/ML
SOLUTION RESPIRATORY (INHALATION)
Qty: 180 ML | Refills: 3 | Status: SHIPPED | OUTPATIENT
Start: 2017-09-21 | End: 2020-02-03 | Stop reason: SDUPTHER

## 2017-10-09 NOTE — TELEPHONE ENCOUNTER
----- Message from Brenda Dorado sent at 10/9/2017  8:27 AM CDT -----  Call pt at [phone]  Asking to speak to nurse about refills

## 2017-10-09 NOTE — TELEPHONE ENCOUNTER
Spoke to pt, she advised she was needing refill for her norco. Advised nurse would send Dr. Johnson refill request. No further questions.    checked, last filled 9/14/17.

## 2017-10-09 NOTE — TELEPHONE ENCOUNTER
----- Message from Isela Diane sent at 10/9/2017  2:47 PM CDT -----  Contact: self [phone]  She is calling back about getting her prescription filled.  Thank you!

## 2017-10-10 RX ORDER — HYDROCODONE BITARTRATE AND ACETAMINOPHEN 10; 325 MG/1; MG/1
1 TABLET ORAL 3 TIMES DAILY PRN
Qty: 90 TABLET | Refills: 0 | Status: SHIPPED | OUTPATIENT
Start: 2017-10-10 | End: 2017-11-02 | Stop reason: SDUPTHER

## 2017-10-25 ENCOUNTER — TELEPHONE (OUTPATIENT)
Dept: FAMILY MEDICINE | Facility: CLINIC | Age: 72
End: 2017-10-25

## 2017-11-02 RX ORDER — HYDROCODONE BITARTRATE AND ACETAMINOPHEN 10; 325 MG/1; MG/1
1 TABLET ORAL 3 TIMES DAILY PRN
Qty: 90 TABLET | Refills: 0 | Status: SHIPPED | OUTPATIENT
Start: 2017-11-10 | End: 2017-12-05 | Stop reason: SDUPTHER

## 2017-11-02 NOTE — TELEPHONE ENCOUNTER
----- Message from Anamaria Seals sent at 11/2/2017 11:15 AM CDT -----  Contact: self  Patient called requesting a medication refill. Please see details below.  Medication Name:Hydrocodone  Medication Strength:10/325 mg  Preferred pharmacy:[facility]  First time calling about this refill (y/n):y  Call Back Number:    [facility] - [place] [facility] [place] - [address] [id number] [facility]  [place] [address]  Phone: [phone] Fax: [phone]

## 2017-11-30 DIAGNOSIS — G89.29 CHRONIC PAIN OF RIGHT KNEE: ICD-10-CM

## 2017-11-30 DIAGNOSIS — E11.9 TYPE 2 DIABETES MELLITUS WITHOUT COMPLICATION, UNSPECIFIED LONG TERM INSULIN USE STATUS: ICD-10-CM

## 2017-11-30 DIAGNOSIS — M35.00 SJOGREN'S SYNDROME, WITH UNSPECIFIED ORGAN INVOLVEMENT: ICD-10-CM

## 2017-11-30 DIAGNOSIS — M25.561 CHRONIC PAIN OF RIGHT KNEE: ICD-10-CM

## 2017-11-30 DIAGNOSIS — M05.9 RHEUMATOID ARTHRITIS WITH POSITIVE RHEUMATOID FACTOR, INVOLVING UNSPECIFIED SITE: ICD-10-CM

## 2017-11-30 RX ORDER — DICLOFENAC SODIUM AND MISOPROSTOL 75; 200 MG/1; UG/1
TABLET, DELAYED RELEASE ORAL
Qty: 60 TABLET | Refills: 3 | Status: SHIPPED | OUTPATIENT
Start: 2017-11-30 | End: 2018-03-16 | Stop reason: SDUPTHER

## 2017-11-30 RX ORDER — AMLODIPINE AND BENAZEPRIL HYDROCHLORIDE 5; 20 MG/1; MG/1
CAPSULE ORAL
Qty: 30 CAPSULE | Refills: 3 | Status: SHIPPED | OUTPATIENT
Start: 2017-11-30 | End: 2017-12-05 | Stop reason: SDUPTHER

## 2017-12-05 ENCOUNTER — OFFICE VISIT (OUTPATIENT)
Dept: RHEUMATOLOGY | Facility: CLINIC | Age: 72
End: 2017-12-05
Payer: MEDICARE

## 2017-12-05 VITALS
BODY MASS INDEX: 41.78 KG/M2 | DIASTOLIC BLOOD PRESSURE: 83 MMHG | SYSTOLIC BLOOD PRESSURE: 174 MMHG | HEART RATE: 80 BPM | HEIGHT: 66 IN | WEIGHT: 260 LBS

## 2017-12-05 DIAGNOSIS — M17.0 PRIMARY OSTEOARTHRITIS OF BOTH KNEES: ICD-10-CM

## 2017-12-05 DIAGNOSIS — M05.9 RHEUMATOID ARTHRITIS WITH POSITIVE RHEUMATOID FACTOR, INVOLVING UNSPECIFIED SITE: Primary | ICD-10-CM

## 2017-12-05 DIAGNOSIS — M47.817 LUMBAR AND SACRAL ARTHRITIS: ICD-10-CM

## 2017-12-05 DIAGNOSIS — M35.00 SJOGREN'S SYNDROME, WITH UNSPECIFIED ORGAN INVOLVEMENT: ICD-10-CM

## 2017-12-05 PROCEDURE — 96372 THER/PROPH/DIAG INJ SC/IM: CPT | Mod: PBBFAC,PO

## 2017-12-05 PROCEDURE — 99213 OFFICE O/P EST LOW 20 MIN: CPT | Mod: PBBFAC,PO | Performed by: INTERNAL MEDICINE

## 2017-12-05 PROCEDURE — 99214 OFFICE O/P EST MOD 30 MIN: CPT | Mod: 25,S$PBB,, | Performed by: INTERNAL MEDICINE

## 2017-12-05 PROCEDURE — 99999 PR PBB SHADOW E&M-EST. PATIENT-LVL III: CPT | Mod: PBBFAC,,, | Performed by: INTERNAL MEDICINE

## 2017-12-05 RX ORDER — KETOROLAC TROMETHAMINE 30 MG/ML
60 INJECTION, SOLUTION INTRAMUSCULAR; INTRAVENOUS
Status: COMPLETED | OUTPATIENT
Start: 2017-12-05 | End: 2017-12-05

## 2017-12-05 RX ORDER — HYDROCODONE BITARTRATE AND ACETAMINOPHEN 10; 325 MG/1; MG/1
1 TABLET ORAL 3 TIMES DAILY PRN
Qty: 90 TABLET | Refills: 0 | Status: SHIPPED | OUTPATIENT
Start: 2018-02-01 | End: 2018-02-26 | Stop reason: SDUPTHER

## 2017-12-05 RX ORDER — LEFLUNOMIDE 10 MG/1
10 TABLET ORAL DAILY
Qty: 30 TABLET | Refills: 5 | Status: SHIPPED | OUTPATIENT
Start: 2017-12-05 | End: 2018-03-16 | Stop reason: SDUPTHER

## 2017-12-05 RX ORDER — HYDROCODONE BITARTRATE AND ACETAMINOPHEN 10; 325 MG/1; MG/1
1 TABLET ORAL 3 TIMES DAILY PRN
Qty: 90 TABLET | Refills: 0 | Status: SHIPPED | OUTPATIENT
Start: 2017-12-05 | End: 2017-12-05 | Stop reason: SDUPTHER

## 2017-12-05 RX ORDER — CYANOCOBALAMIN 1000 UG/ML
1000 INJECTION, SOLUTION INTRAMUSCULAR; SUBCUTANEOUS
Status: COMPLETED | OUTPATIENT
Start: 2017-12-05 | End: 2017-12-05

## 2017-12-05 RX ORDER — METHYLPREDNISOLONE ACETATE 80 MG/ML
160 INJECTION, SUSPENSION INTRA-ARTICULAR; INTRALESIONAL; INTRAMUSCULAR; SOFT TISSUE
Status: COMPLETED | OUTPATIENT
Start: 2017-12-05 | End: 2017-12-05

## 2017-12-05 RX ORDER — HYDROCODONE BITARTRATE AND ACETAMINOPHEN 10; 325 MG/1; MG/1
1 TABLET ORAL 3 TIMES DAILY PRN
Qty: 90 TABLET | Refills: 0 | Status: SHIPPED | OUTPATIENT
Start: 2018-01-05 | End: 2017-12-05 | Stop reason: SDUPTHER

## 2017-12-05 RX ADMIN — METHYLPREDNISOLONE ACETATE 160 MG: 80 INJECTION, SUSPENSION INTRA-ARTICULAR; INTRALESIONAL; INTRAMUSCULAR; SOFT TISSUE at 03:12

## 2017-12-05 RX ADMIN — CYANOCOBALAMIN 1000 MCG: 1000 INJECTION, SOLUTION INTRAMUSCULAR at 03:12

## 2017-12-05 RX ADMIN — KETOROLAC TROMETHAMINE 60 MG: 60 INJECTION, SOLUTION INTRAMUSCULAR at 03:12

## 2017-12-05 NOTE — PROGRESS NOTES
Administered 1 cc ( 1000 mcg/ml ) of b12 to the right upper outer gluteal. Informed of s/s to report verbalized understanding. No adverse reactions noted.    Lot # [id number]  Expiration apr 19    Administered 2 cc ( 80 mg/ml ) of depomedrol to the right upper outer gluteal. Informed of s/s to report verbalized understanding. No adverse reactions noted.    Lot # [id number]  Expiration 08/2018    Administered 2 cc ( 30 mg/ml ) of toradol to the left upper outer gluteal. Informed of s/s to report verbalized understanding. No adverse reactions noted.    Lot # [id number]  Expiration 05/19

## 2017-12-05 NOTE — PROGRESS NOTES
Subjective:       Patient ID: Rekha Miller is a 72 y.o. female.    Chief Complaint: Disease Management and Pain    Follow up     Ra flare  In pain  restarted arava  10 mg helped a little She has B knee pains,psoriasis on her elbows and dry skin.Patient complains of arthralgias and myalgias for which has been present for a few years. Pain is located in multiple joints, both shoulder(s), both elbow(s), both wrist(s), both MCP(s): 1st, 2nd, 3rd, 4th and 5th, both PIP(s): 1st, 2nd, 3rd, 4th and 5th, both DIP(s): 1st and 2nd, both hip(s), both knee(s) and both MTP(s): 1st, 2nd, 3rd, 4th and 5th, is described as aching, pulsating, shooting and throbbing, and is constant, moderate .  Associated symptoms include: crepitation, decreased range of motion, edema, effusion, tenderness and warmth.   3 tabs 15 mg daily of prednisone      Review of Systems   Constitutional: Positive for activity change. Negative for appetite change, chills, diaphoresis and unexpected weight change.   HENT: Negative for congestion, ear discharge, ear pain, facial swelling, mouth sores, postnasal drip, sinus pressure and sore throat.    Eyes: Negative for photophobia, pain, discharge, redness and itching.   Respiratory: Negative for cough, chest tightness and shortness of breath.    Cardiovascular: Positive for leg swelling. Negative for chest pain and palpitations.   Gastrointestinal: Negative for abdominal pain, constipation, diarrhea, nausea and vomiting.   Endocrine: Negative for cold intolerance, heat intolerance, polydipsia and polyuria.   Genitourinary: Negative for decreased urine volume, difficulty urinating, flank pain, frequency, hematuria and urgency.   Musculoskeletal: Positive for arthralgias, back pain, neck pain and neck stiffness. Negative for gait problem.   Skin: Negative for pallor, rash and wound.   Allergic/Immunologic: Negative for immunocompromised state.   Neurological: Negative for dizziness, tremors, weakness and  "numbness.   Hematological: Negative for adenopathy. Does not bruise/bleed easily.   Psychiatric/Behavioral: Negative for sleep disturbance. The patient is not nervous/anxious.          Objective:     BP (!) 174/83 (BP Location: Left arm, Patient Position: Sitting, BP Method: Medium (Automatic))   Pulse 80   Ht 5' 6" (1.676 m)   Wt 117.9 kg (260 lb)   BMI 41.97 kg/m²      Physical Exam   Nursing note and vitals reviewed.  Constitutional: She is oriented to person, place, and time. She appears distressed.   HENT:   Head: Normocephalic and atraumatic.   Mouth/Throat: Oropharynx is clear and moist.   Eyes: EOM are normal. Pupils are equal, round, and reactive to light.   Neck: Neck supple. No thyromegaly present.   Cardiovascular: Normal rate, regular rhythm and normal heart sounds.  Exam reveals no gallop and no friction rub.    No murmur heard.  Pulmonary/Chest: She has wheezes. She has no rales. She exhibits no tenderness.   B bases   Abdominal: There is no tenderness. There is no rebound and no guarding.       Right Side Rheumatological Exam     Examination finds the elbow normal.    The patient is tender to palpation of the shoulder, wrist, knee, 1st PIP, 1st MCP, 2nd PIP, 2nd MCP, 3rd PIP, 3rd MCP, 4th PIP, 4th MCP, 5th PIP and 5th MCP    She has swelling of the 1st PIP, 1st MCP, 2nd PIP, 2nd MCP, 3rd PIP, 3rd MCP, 4th PIP, 4th MCP, 5th PIP and 5th MCP    Shoulder Exam   Tenderness Location: no tenderness    Range of Motion   Active Abduction: abnormal   Adduction: abnormal  Sensation: normal    Knee Exam   Patellofemoral Crepitus: positive  Effusion: negative  Sensation: normal    Hip Exam   Tenderness Location: posterior  Sensation: normal    Elbow/Wrist Exam   Tenderness Location: no tenderness  Sensation: normal    Left Side Rheumatological Exam     Examination finds the elbow normal.    The patient is tender to palpation of the shoulder, wrist, knee, 1st PIP, 1st MCP, 2nd PIP, 2nd MCP, 3rd PIP, 3rd MCP, " 4th PIP, 4th MCP, 5th PIP and 5th MCP.    She has swelling of the 1st PIP, 1st MCP, 2nd PIP, 2nd MCP, 3rd PIP, 3rd MCP, 4th PIP, 4th MCP, 5th PIP and 5th MCP    Shoulder Exam   Tenderness Location: no tenderness    Range of Motion   Active Abduction: abnormal   Sensation: normal    Knee Exam     Patellofemoral Crepitus: positive  Effusion: negative  Sensation: normal    Hip Exam   Tenderness Location: posterior  Sensation: normal    Elbow/Wrist Exam   Sensation: normal      Back/Neck Exam   General Inspection   Gait: normal       Tenderness Right paramedian tenderness of the Lower L-Spine and Upper L-Spine.Left paramedian tenderness of the Lower L-Spine and Upper L-Spine.    Neck Range of Motion   Extension: Limited  Lymphadenopathy:     She has no cervical adenopathy.   Neurological: She is alert and oriented to person, place, and time. Gait normal.   Skin: No rash noted. No erythema. No pallor.     Psychiatric: Mood and affect normal.   Musculoskeletal: She exhibits edema, tenderness and deformity.         Results for orders placed or performed in visit on 06/16/17   BRAD   Result Value Ref Range    BRAD Screen Positive (A) Negative <1:160   Rheumatoid factor   Result Value Ref Range    Rheumatoid Factor 74.0 (H) 0.0 - 15.0 IU/mL   Sedimentation rate, manual   Result Value Ref Range    Sed Rate 28 (H) 0 - 20 mm/Hr   C-reactive protein   Result Value Ref Range    CRP 34.5 (H) 0.0 - 8.2 mg/L   Comprehensive metabolic panel   Result Value Ref Range    Sodium 139 136 - 145 mmol/L    Potassium 4.0 3.5 - 5.1 mmol/L    Chloride 108 95 - 110 mmol/L    CO2 22 (L) 23 - 29 mmol/L    Glucose 120 (H) 70 - 110 mg/dL    BUN, Bld 11 8 - 23 mg/dL    Creatinine 0.9 0.5 - 1.4 mg/dL    Calcium 9.5 8.7 - 10.5 mg/dL    Total Protein 7.7 6.0 - 8.4 g/dL    Albumin 3.1 (L) 3.5 - 5.2 g/dL    Total Bilirubin 0.3 0.1 - 1.0 mg/dL    Alkaline Phosphatase 154 (H) 55 - 135 U/L    AST 14 10 - 40 U/L    ALT 10 10 - 44 U/L    Anion Gap 9 8 - 16  mmol/L    eGFR if African American >60.0 >60 mL/min/1.73 m^2    eGFR if non African American >60.0 >60 mL/min/1.73 m^2   CBC auto differential   Result Value Ref Range    WBC 11.21 3.90 - 12.70 K/uL    RBC 4.61 4.00 - 5.40 M/uL    Hemoglobin 11.7 (L) 12.0 - 16.0 g/dL    Hematocrit 36.5 (L) 37.0 - 48.5 %    MCV 79 (L) 82 - 98 fL    MCH 25.4 (L) 27.0 - 31.0 pg    MCHC 32.1 32.0 - 36.0 %    RDW 17.4 (H) 11.5 - 14.5 %    Platelets 359 (H) 150 - 350 K/uL    MPV 12.1 9.2 - 12.9 fL    Gran # 6.8 1.8 - 7.7 K/uL    Lymph # 3.5 1.0 - 4.8 K/uL    Mono # 0.7 0.3 - 1.0 K/uL    Eos # 0.1 0.0 - 0.5 K/uL    Baso # 0.10 0.00 - 0.20 K/uL    Gran% 60.8 38.0 - 73.0 %    Lymph% 31.0 18.0 - 48.0 %    Mono% 5.8 4.0 - 15.0 %    Eosinophil% 1.2 0.0 - 8.0 %    Basophil% 0.9 0.0 - 1.9 %    Differential Method Automated    TSH   Result Value Ref Range    TSH 0.643 0.400 - 4.000 uIU/mL   T4, free   Result Value Ref Range    Free T4 0.98 0.71 - 1.51 ng/dL   T3, free   Result Value Ref Range    T3, Free 2.5 2.3 - 4.2 pg/mL   Hemoglobin A1c   Result Value Ref Range    Hemoglobin A1C 6.8 (H) 4.0 - 5.6 %    Estimated Avg Glucose 148 (H) 68 - 131 mg/dL   Basic metabolic panel   Result Value Ref Range    Sodium 139 136 - 145 mmol/L    Potassium 4.0 3.5 - 5.1 mmol/L    Chloride 108 95 - 110 mmol/L    CO2 22 (L) 23 - 29 mmol/L    Glucose 120 (H) 70 - 110 mg/dL    BUN, Bld 11 8 - 23 mg/dL    Creatinine 0.9 0.5 - 1.4 mg/dL    Calcium 9.5 8.7 - 10.5 mg/dL    Anion Gap 9 8 - 16 mmol/L    eGFR if African American >60.0 >60 mL/min/1.73 m^2    eGFR if non African American >60.0 >60 mL/min/1.73 m^2   CBC auto differential   Result Value Ref Range    WBC 11.21 3.90 - 12.70 K/uL    RBC 4.61 4.00 - 5.40 M/uL    Hemoglobin 11.7 (L) 12.0 - 16.0 g/dL    Hematocrit 36.5 (L) 37.0 - 48.5 %    MCV 79 (L) 82 - 98 fL    MCH 25.4 (L) 27.0 - 31.0 pg    MCHC 32.1 32.0 - 36.0 %    RDW 17.4 (H) 11.5 - 14.5 %    Platelets 359 (H) 150 - 350 K/uL    MPV 12.1 9.2 - 12.9 fL    Gran  # 6.8 1.8 - 7.7 K/uL    Lymph # 3.5 1.0 - 4.8 K/uL    Mono # 0.7 0.3 - 1.0 K/uL    Eos # 0.1 0.0 - 0.5 K/uL    Baso # 0.10 0.00 - 0.20 K/uL    Gran% 60.8 38.0 - 73.0 %    Lymph% 31.0 18.0 - 48.0 %    Mono% 5.8 4.0 - 15.0 %    Eosinophil% 1.2 0.0 - 8.0 %    Basophil% 0.9 0.0 - 1.9 %    Differential Method Automated    Comprehensive metabolic panel   Result Value Ref Range    Sodium 139 136 - 145 mmol/L    Potassium 4.0 3.5 - 5.1 mmol/L    Chloride 108 95 - 110 mmol/L    CO2 22 (L) 23 - 29 mmol/L    Glucose 120 (H) 70 - 110 mg/dL    BUN, Bld 11 8 - 23 mg/dL    Creatinine 0.9 0.5 - 1.4 mg/dL    Calcium 9.5 8.7 - 10.5 mg/dL    Total Protein 7.7 6.0 - 8.4 g/dL    Albumin 3.1 (L) 3.5 - 5.2 g/dL    Total Bilirubin 0.3 0.1 - 1.0 mg/dL    Alkaline Phosphatase 154 (H) 55 - 135 U/L    AST 14 10 - 40 U/L    ALT 10 10 - 44 U/L    Anion Gap 9 8 - 16 mmol/L    eGFR if African American >60.0 >60 mL/min/1.73 m^2    eGFR if non African American >60.0 >60 mL/min/1.73 m^2   Iron and TIBC   Result Value Ref Range    Iron 28 (L) 30 - 160 ug/dL    Transferrin 191 (L) 200 - 375 mg/dL    TIBC 283 250 - 450 ug/dL    Saturated Iron 10 (L) 20 - 50 %   Sedimentation rate, manual   Result Value Ref Range    Sed Rate 28 (H) 0 - 20 mm/Hr   Rheumatoid factor   Result Value Ref Range    Rheumatoid Factor 74.0 (H) 0.0 - 15.0 IU/mL   C-reactive protein   Result Value Ref Range    CRP 34.5 (H) 0.0 - 8.2 mg/L   BRAD Titer   Result Value Ref Range    BRAD HEP-2 Titer Positive 1:320 Homogeneous    BRAD Profile   Result Value Ref Range    Anti Sm Antibody 0.28 0.00 - 19.99 EU    Anti-Sm Interpretation Negative Negative    Anti-SSA Antibody 0.49 0.00 - 19.99 EU    Anti-SSA Interpretation Negative Negative    Anti-SSB Antibody 42.58 (H) 0.00 - 19.99 EU    Anti-SSB Interpretation Positive (A) Negative    ds DNA Ab Negative 1:10 Negative 1:10    Anti Sm/RNP Antibody 0.80 0.00 - 19.99 EU    Anti-Sm/RNP Interpretation Negative Negative          Assessment:        Encounter Diagnoses   Name Primary?    Rheumatoid arthritis with positive rheumatoid factor, involving unspecified site Yes    Sjogren's syndrome, with unspecified organ involvement     Primary osteoarthritis of both knees     Lumbar and sacral arthritis          Plan:       Rheumatoid arthritis with positive rheumatoid factor, involving unspecified site  -     Discontinue: hydrocodone-acetaminophen 10-325mg (NORCO)  mg Tab; Take 1 tablet by mouth 3 (three) times daily as needed.  Dispense: 90 tablet; Refill: 0  -     methylPREDNISolone acetate injection 160 mg; Inject 2 mLs (160 mg total) into the muscle one time.  -     ketorolac injection 60 mg; Inject 2 mLs (60 mg total) into the muscle one time.  -     Discontinue: hydrocodone-acetaminophen 10-325mg (NORCO)  mg Tab; Take 1 tablet by mouth 3 (three) times daily as needed.  Dispense: 90 tablet; Refill: 0  -     hydrocodone-acetaminophen 10-325mg (NORCO)  mg Tab; Take 1 tablet by mouth 3 (three) times daily as needed.  Dispense: 90 tablet; Refill: 0  -     cyanocobalamin injection 1,000 mcg; Inject 1 mL (1,000 mcg total) into the muscle one time.  -     CBC auto differential; Future; Expected date: 12/05/2017  -     Comprehensive metabolic panel; Future; Expected date: 12/05/2017  -     C-reactive protein; Future; Expected date: 12/05/2017  -     Sedimentation rate, manual; Future; Expected date: 12/05/2017  -     Sjogrens syndrome-A extractable nuclear antibody; Future; Expected date: 12/05/2017  -     Sjogrens syndrome-B extractable nuclear antibody; Future; Expected date: 12/05/2017  -     BRAD; Future; Expected date: 12/05/2017  -     leflunomide (ARAVA) 10 MG Tab; Take 1 tablet (10 mg total) by mouth once daily.  Dispense: 30 tablet; Refill: 5    Sjogren's syndrome, with unspecified organ involvement  -     Discontinue: hydrocodone-acetaminophen 10-325mg (NORCO)  mg Tab; Take 1 tablet by mouth 3 (three) times daily as needed.   Dispense: 90 tablet; Refill: 0  -     methylPREDNISolone acetate injection 160 mg; Inject 2 mLs (160 mg total) into the muscle one time.  -     ketorolac injection 60 mg; Inject 2 mLs (60 mg total) into the muscle one time.  -     Discontinue: hydrocodone-acetaminophen 10-325mg (NORCO)  mg Tab; Take 1 tablet by mouth 3 (three) times daily as needed.  Dispense: 90 tablet; Refill: 0  -     hydrocodone-acetaminophen 10-325mg (NORCO)  mg Tab; Take 1 tablet by mouth 3 (three) times daily as needed.  Dispense: 90 tablet; Refill: 0  -     cyanocobalamin injection 1,000 mcg; Inject 1 mL (1,000 mcg total) into the muscle one time.  -     CBC auto differential; Future; Expected date: 12/05/2017  -     Comprehensive metabolic panel; Future; Expected date: 12/05/2017  -     C-reactive protein; Future; Expected date: 12/05/2017  -     Sedimentation rate, manual; Future; Expected date: 12/05/2017  -     Sjogrens syndrome-A extractable nuclear antibody; Future; Expected date: 12/05/2017  -     Sjogrens syndrome-B extractable nuclear antibody; Future; Expected date: 12/05/2017  -     BRAD; Future; Expected date: 12/05/2017  -     leflunomide (ARAVA) 10 MG Tab; Take 1 tablet (10 mg total) by mouth once daily.  Dispense: 30 tablet; Refill: 5    Primary osteoarthritis of both knees  -     Discontinue: hydrocodone-acetaminophen 10-325mg (NORCO)  mg Tab; Take 1 tablet by mouth 3 (three) times daily as needed.  Dispense: 90 tablet; Refill: 0  -     methylPREDNISolone acetate injection 160 mg; Inject 2 mLs (160 mg total) into the muscle one time.  -     ketorolac injection 60 mg; Inject 2 mLs (60 mg total) into the muscle one time.  -     Discontinue: hydrocodone-acetaminophen 10-325mg (NORCO)  mg Tab; Take 1 tablet by mouth 3 (three) times daily as needed.  Dispense: 90 tablet; Refill: 0  -     hydrocodone-acetaminophen 10-325mg (NORCO)  mg Tab; Take 1 tablet by mouth 3 (three) times daily as needed.   Dispense: 90 tablet; Refill: 0  -     cyanocobalamin injection 1,000 mcg; Inject 1 mL (1,000 mcg total) into the muscle one time.  -     CBC auto differential; Future; Expected date: 12/05/2017  -     Comprehensive metabolic panel; Future; Expected date: 12/05/2017  -     C-reactive protein; Future; Expected date: 12/05/2017  -     Sedimentation rate, manual; Future; Expected date: 12/05/2017  -     Sjogrens syndrome-A extractable nuclear antibody; Future; Expected date: 12/05/2017  -     Sjogrens syndrome-B extractable nuclear antibody; Future; Expected date: 12/05/2017  -     BRAD; Future; Expected date: 12/05/2017  -     leflunomide (ARAVA) 10 MG Tab; Take 1 tablet (10 mg total) by mouth once daily.  Dispense: 30 tablet; Refill: 5    Lumbar and sacral arthritis  -     Discontinue: hydrocodone-acetaminophen 10-325mg (NORCO)  mg Tab; Take 1 tablet by mouth 3 (three) times daily as needed.  Dispense: 90 tablet; Refill: 0  -     methylPREDNISolone acetate injection 160 mg; Inject 2 mLs (160 mg total) into the muscle one time.  -     ketorolac injection 60 mg; Inject 2 mLs (60 mg total) into the muscle one time.  -     Discontinue: hydrocodone-acetaminophen 10-325mg (NORCO)  mg Tab; Take 1 tablet by mouth 3 (three) times daily as needed.  Dispense: 90 tablet; Refill: 0  -     hydrocodone-acetaminophen 10-325mg (NORCO)  mg Tab; Take 1 tablet by mouth 3 (three) times daily as needed.  Dispense: 90 tablet; Refill: 0  -     cyanocobalamin injection 1,000 mcg; Inject 1 mL (1,000 mcg total) into the muscle one time.  -     CBC auto differential; Future; Expected date: 12/05/2017  -     Comprehensive metabolic panel; Future; Expected date: 12/05/2017  -     C-reactive protein; Future; Expected date: 12/05/2017  -     Sedimentation rate, manual; Future; Expected date: 12/05/2017  -     Sjogrens syndrome-A extractable nuclear antibody; Future; Expected date: 12/05/2017  -     Sjogrens syndrome-B extractable  nuclear antibody; Future; Expected date: 12/05/2017  -     BRAD; Future; Expected date: 12/05/2017  -     leflunomide (ARAVA) 10 MG Tab; Take 1 tablet (10 mg total) by mouth once daily.  Dispense: 30 tablet; Refill: 5      Increase  arava

## 2017-12-29 DIAGNOSIS — E11.9 TYPE 2 DIABETES MELLITUS WITHOUT COMPLICATION: ICD-10-CM

## 2018-01-12 DIAGNOSIS — E11.9 TYPE 2 DIABETES MELLITUS WITHOUT COMPLICATION: ICD-10-CM

## 2018-01-20 DIAGNOSIS — E11.8 DIABETES MELLITUS WITH COMPLICATION: ICD-10-CM

## 2018-01-21 RX ORDER — PREDNISONE 5 MG/1
TABLET ORAL
Qty: 90 TABLET | Refills: 3 | Status: SHIPPED | OUTPATIENT
Start: 2018-01-21 | End: 2018-06-15

## 2018-01-21 RX ORDER — GLIPIZIDE 5 MG/1
TABLET, FILM COATED, EXTENDED RELEASE ORAL
Qty: 60 TABLET | Refills: 12 | Status: SHIPPED | OUTPATIENT
Start: 2018-01-21 | End: 2018-10-15

## 2018-02-08 ENCOUNTER — PATIENT OUTREACH (OUTPATIENT)
Dept: ADMINISTRATIVE | Facility: HOSPITAL | Age: 73
End: 2018-02-08

## 2018-02-08 NOTE — PROGRESS NOTES
Eye exam dated 02/28/2018 recieved from Dr. Osman Moran, [facility].  HM updated and report sent to scanning.

## 2018-02-26 DIAGNOSIS — M47.817 LUMBAR AND SACRAL ARTHRITIS: ICD-10-CM

## 2018-02-26 DIAGNOSIS — M35.00 SJOGREN'S SYNDROME, WITH UNSPECIFIED ORGAN INVOLVEMENT: ICD-10-CM

## 2018-02-26 DIAGNOSIS — M17.0 PRIMARY OSTEOARTHRITIS OF BOTH KNEES: ICD-10-CM

## 2018-02-26 DIAGNOSIS — M05.9 RHEUMATOID ARTHRITIS WITH POSITIVE RHEUMATOID FACTOR, INVOLVING UNSPECIFIED SITE: ICD-10-CM

## 2018-02-26 NOTE — TELEPHONE ENCOUNTER
----- Message from Gladys Newton sent at 2/23/2018  2:28 PM CST -----  Contact: Self  Patient is requesting a refill of her hydrocodone-acetaminophen 10-325mg (NORCO)  mg Tab and she is also asking for a prescription of amoxicillin.  Thank you!    [facility] [place] - [address] [id number] [facility]  [place] [address]  Phone: [phone] Fax: [phone]

## 2018-02-27 RX ORDER — HYDROCODONE BITARTRATE AND ACETAMINOPHEN 10; 325 MG/1; MG/1
1 TABLET ORAL 3 TIMES DAILY PRN
Qty: 90 TABLET | Refills: 0 | Status: SHIPPED | OUTPATIENT
Start: 2018-02-27 | End: 2018-03-16 | Stop reason: SDUPTHER

## 2018-02-28 ENCOUNTER — TELEPHONE (OUTPATIENT)
Dept: RHEUMATOLOGY | Facility: CLINIC | Age: 73
End: 2018-02-28

## 2018-02-28 NOTE — TELEPHONE ENCOUNTER
----- Message from Olinda Jenkins sent at 2/28/2018  1:39 PM CST -----  Contact: self   Patient is having hip pain, please call back at [phone] (home)

## 2018-03-14 NOTE — TELEPHONE ENCOUNTER
----- Message from Bebe Quinn sent at 3/14/2018  8:42 AM CDT -----  Contact: Rekha Da Silva said she called over a week ago to get a back injection. She is trying to come today or tomorrow [phone] (home) . She would like to talk to Dr. Johnson also if possible.

## 2018-03-16 ENCOUNTER — OFFICE VISIT (OUTPATIENT)
Dept: RHEUMATOLOGY | Facility: CLINIC | Age: 73
End: 2018-03-16
Payer: MEDICARE

## 2018-03-16 VITALS — SYSTOLIC BLOOD PRESSURE: 163 MMHG | DIASTOLIC BLOOD PRESSURE: 82 MMHG | HEART RATE: 77 BPM

## 2018-03-16 DIAGNOSIS — G89.29 CHRONIC PAIN OF RIGHT KNEE: ICD-10-CM

## 2018-03-16 DIAGNOSIS — M35.00 SJOGREN'S SYNDROME, WITH UNSPECIFIED ORGAN INVOLVEMENT: ICD-10-CM

## 2018-03-16 DIAGNOSIS — M47.817 LUMBAR AND SACRAL ARTHRITIS: ICD-10-CM

## 2018-03-16 DIAGNOSIS — M53.3 CHRONIC RIGHT SI JOINT PAIN: Primary | ICD-10-CM

## 2018-03-16 DIAGNOSIS — M25.561 CHRONIC PAIN OF RIGHT KNEE: ICD-10-CM

## 2018-03-16 DIAGNOSIS — M17.0 PRIMARY OSTEOARTHRITIS OF BOTH KNEES: ICD-10-CM

## 2018-03-16 DIAGNOSIS — E11.9 TYPE 2 DIABETES MELLITUS WITHOUT COMPLICATION, UNSPECIFIED LONG TERM INSULIN USE STATUS: ICD-10-CM

## 2018-03-16 DIAGNOSIS — G89.29 CHRONIC RIGHT SI JOINT PAIN: Primary | ICD-10-CM

## 2018-03-16 DIAGNOSIS — M05.9 RHEUMATOID ARTHRITIS WITH POSITIVE RHEUMATOID FACTOR, INVOLVING UNSPECIFIED SITE: ICD-10-CM

## 2018-03-16 PROCEDURE — 99214 OFFICE O/P EST MOD 30 MIN: CPT | Mod: 25,S$PBB,, | Performed by: INTERNAL MEDICINE

## 2018-03-16 PROCEDURE — 20610 DRAIN/INJ JOINT/BURSA W/O US: CPT | Mod: PBBFAC,PO | Performed by: INTERNAL MEDICINE

## 2018-03-16 PROCEDURE — 99999 PR PBB SHADOW E&M-EST. PATIENT-LVL III: CPT | Mod: PBBFAC,,, | Performed by: INTERNAL MEDICINE

## 2018-03-16 PROCEDURE — 99213 OFFICE O/P EST LOW 20 MIN: CPT | Mod: PBBFAC,PO,25 | Performed by: INTERNAL MEDICINE

## 2018-03-16 RX ORDER — HYDROCODONE BITARTRATE AND ACETAMINOPHEN 10; 325 MG/1; MG/1
1 TABLET ORAL 3 TIMES DAILY PRN
Qty: 90 TABLET | Refills: 0 | Status: SHIPPED | OUTPATIENT
Start: 2018-03-16 | End: 2018-03-16 | Stop reason: SDUPTHER

## 2018-03-16 RX ORDER — HYDROCODONE BITARTRATE AND ACETAMINOPHEN 10; 325 MG/1; MG/1
1 TABLET ORAL 3 TIMES DAILY PRN
Qty: 90 TABLET | Refills: 0 | Status: SHIPPED | OUTPATIENT
Start: 2018-04-16 | End: 2018-05-17 | Stop reason: SDUPTHER

## 2018-03-16 RX ORDER — KETOROLAC TROMETHAMINE 30 MG/ML
60 INJECTION, SOLUTION INTRAMUSCULAR; INTRAVENOUS
Status: COMPLETED | OUTPATIENT
Start: 2018-03-16 | End: 2018-03-16

## 2018-03-16 RX ORDER — TRAZODONE HYDROCHLORIDE 50 MG/1
50 TABLET ORAL NIGHTLY
Refills: 5 | COMMUNITY
Start: 2018-02-20 | End: 2019-07-19

## 2018-03-16 RX ORDER — LEFLUNOMIDE 10 MG/1
10 TABLET ORAL DAILY
Qty: 30 TABLET | Refills: 5 | Status: SHIPPED | OUTPATIENT
Start: 2018-03-16 | End: 2018-06-15

## 2018-03-16 RX ORDER — DICLOFENAC SODIUM AND MISOPROSTOL 75; 200 MG/1; UG/1
1 TABLET, DELAYED RELEASE ORAL 2 TIMES DAILY
Qty: 60 TABLET | Refills: 11 | Status: SHIPPED | OUTPATIENT
Start: 2018-03-16 | End: 2018-07-12

## 2018-03-16 RX ORDER — CYANOCOBALAMIN 1000 UG/ML
1000 INJECTION, SOLUTION INTRAMUSCULAR; SUBCUTANEOUS
Status: COMPLETED | OUTPATIENT
Start: 2018-03-16 | End: 2018-03-16

## 2018-03-16 RX ORDER — POTASSIUM CHLORIDE 20 MEQ/1
TABLET, EXTENDED RELEASE ORAL
Qty: 180 TABLET | Refills: 4 | Status: SHIPPED | OUTPATIENT
Start: 2018-03-16 | End: 2019-01-23 | Stop reason: SDUPTHER

## 2018-03-16 RX ORDER — BUDESONIDE AND FORMOTEROL FUMARATE DIHYDRATE 160; 4.5 UG/1; UG/1
2 AEROSOL RESPIRATORY (INHALATION)
COMMUNITY
Start: 2015-10-13 | End: 2018-07-12

## 2018-03-16 RX ADMIN — CYANOCOBALAMIN 1000 MCG: 1000 INJECTION, SOLUTION INTRAMUSCULAR at 03:03

## 2018-03-16 RX ADMIN — KETOROLAC TROMETHAMINE 60 MG: 60 INJECTION, SOLUTION INTRAMUSCULAR at 03:03

## 2018-03-16 RX ADMIN — TRIAMCINOLONE ACETONIDE 40 MG: 40 INJECTION, SUSPENSION INTRA-ARTICULAR; INTRAMUSCULAR at 02:03

## 2018-03-16 NOTE — PROCEDURES
Large Joint Aspiration/Injection  Date/Time: 3/16/2018 2:50 PM  Performed by: REA BRADLEY  Authorized by: REA BRADLEY     Consent Done?:  Yes (Verbal)  Indications:  Pain and joint swelling  Procedure site marked: Yes      Location:  Hip  Site:  R hip joint  Needle size:  25 G  Medications:  40 mg triamcinolone acetonide 40 mg/mL    Additional Comments:  After verbal consent and cleansing with Chloraprep the right. SI  joint injected with Kenalog 40mg with 1 ml 1 % lidocaine. Patient tolerated procedure well.

## 2018-03-16 NOTE — PROGRESS NOTES
Subjective:       Patient ID: Rekha Miller is a 73 y.o. female.    Chief Complaint: Follow-up    Follow up     Ra flare  In pain  on arava  10 mg, she has severe SI joint pain.Patient complains of arthralgias and myalgias for which has been present for a few years. Pain is located in multiple joints, both shoulder(s), both elbow(s), both wrist(s), both MCP(s): 1st, 2nd, 3rd, 4th and 5th, both PIP(s): 1st, 2nd, 3rd, 4th and 5th, both DIP(s): 1st and 2nd, both hip(s), both knee(s) and both MTP(s): 1st, 2nd, 3rd, 4th and 5th, is described as aching, pulsating, shooting and throbbing, and is constant, moderate .  Associated symptoms include: crepitation, decreased range of motion, edema, effusion, tenderness and warmth.         Review of Systems   Constitutional: Positive for activity change. Negative for appetite change, chills, diaphoresis and unexpected weight change.   HENT: Negative for congestion, ear discharge, ear pain, facial swelling, mouth sores, postnasal drip, sinus pressure and sore throat.    Eyes: Negative for photophobia, pain, discharge, redness and itching.   Respiratory: Negative for cough, chest tightness and shortness of breath.    Cardiovascular: Positive for leg swelling. Negative for chest pain and palpitations.   Gastrointestinal: Negative for abdominal pain, constipation, diarrhea, nausea and vomiting.   Endocrine: Negative for cold intolerance, heat intolerance, polydipsia and polyuria.   Genitourinary: Negative for decreased urine volume, difficulty urinating, flank pain, frequency, hematuria and urgency.   Musculoskeletal: Positive for arthralgias, back pain, neck pain and neck stiffness. Negative for gait problem.   Skin: Negative for pallor, rash and wound.   Allergic/Immunologic: Negative for immunocompromised state.   Neurological: Negative for dizziness, tremors, weakness and numbness.   Hematological: Negative for adenopathy. Does not bruise/bleed easily.   Psychiatric/Behavioral:  Negative for sleep disturbance. The patient is not nervous/anxious.          Objective:     BP (!) 163/82 (BP Location: Left arm, Patient Position: Sitting, BP Method: Medium (Automatic))   Pulse 77      Physical Exam   Nursing note and vitals reviewed.  Constitutional: She is oriented to person, place, and time. She appears distressed.   HENT:   Head: Normocephalic and atraumatic.   Mouth/Throat: Oropharynx is clear and moist.   Eyes: EOM are normal. Pupils are equal, round, and reactive to light.   Neck: Neck supple. No thyromegaly present.   Cardiovascular: Normal rate, regular rhythm and normal heart sounds.  Exam reveals no gallop and no friction rub.    No murmur heard.  Pulmonary/Chest: She has no wheezes. She has no rales. She exhibits no tenderness.   B bases   Abdominal: There is no tenderness. There is no rebound and no guarding.       Right Side Rheumatological Exam     Examination finds the elbow normal.    The patient is tender to palpation of the shoulder, wrist, knee, 1st PIP, 1st MCP, 2nd PIP, 2nd MCP, 3rd PIP, 3rd MCP, 4th PIP, 4th MCP, 5th PIP and 5th MCP    She has swelling of the 1st PIP, 1st MCP, 2nd PIP, 2nd MCP, 3rd PIP, 3rd MCP, 4th PIP, 4th MCP, 5th PIP and 5th MCP    Shoulder Exam   Tenderness Location: no tenderness    Range of Motion   Active Abduction: abnormal   Adduction: abnormal  Sensation: normal    Knee Exam   Patellofemoral Crepitus: positive  Effusion: negative  Sensation: normal    Hip Exam   Tenderness Location: posterior  Sensation: normal    Elbow/Wrist Exam   Tenderness Location: no tenderness  Sensation: normal    Left Side Rheumatological Exam     Examination finds the elbow normal.    The patient is tender to palpation of the shoulder, wrist, knee, 1st PIP, 1st MCP, 2nd PIP, 2nd MCP, 3rd PIP, 3rd MCP, 4th PIP, 4th MCP, 5th PIP and 5th MCP.    She has swelling of the 1st PIP, 1st MCP, 2nd PIP, 2nd MCP, 3rd PIP, 3rd MCP, 4th PIP, 4th MCP, 5th PIP and 5th MCP    Shoulder  Exam   Tenderness Location: no tenderness    Range of Motion   Active Abduction: abnormal   Sensation: normal    Knee Exam     Patellofemoral Crepitus: positive  Effusion: negative  Sensation: normal    Hip Exam   Tenderness Location: posterior  Sensation: normal    Elbow/Wrist Exam   Sensation: normal      Back/Neck Exam   General Inspection   Gait: normal       Tenderness Right paramedian tenderness of the Lower L-Spine and Upper L-Spine.Left paramedian tenderness of the Lower L-Spine and Upper L-Spine.    Neck Range of Motion   Extension: Limited  Lymphadenopathy:     She has no cervical adenopathy.   Neurological: She is alert and oriented to person, place, and time. Gait normal.   Skin: No rash noted. No erythema. No pallor.     Psychiatric: Mood and affect normal.   Musculoskeletal: She exhibits edema, tenderness and deformity.   SI joint pain         Results for orders placed or performed in visit on 06/16/17   BRAD   Result Value Ref Range    BRAD Screen Positive (A) Negative <1:160   Rheumatoid factor   Result Value Ref Range    Rheumatoid Factor 74.0 (H) 0.0 - 15.0 IU/mL   Sedimentation rate, manual   Result Value Ref Range    Sed Rate 28 (H) 0 - 20 mm/Hr   C-reactive protein   Result Value Ref Range    CRP 34.5 (H) 0.0 - 8.2 mg/L   Comprehensive metabolic panel   Result Value Ref Range    Sodium 139 136 - 145 mmol/L    Potassium 4.0 3.5 - 5.1 mmol/L    Chloride 108 95 - 110 mmol/L    CO2 22 (L) 23 - 29 mmol/L    Glucose 120 (H) 70 - 110 mg/dL    BUN, Bld 11 8 - 23 mg/dL    Creatinine 0.9 0.5 - 1.4 mg/dL    Calcium 9.5 8.7 - 10.5 mg/dL    Total Protein 7.7 6.0 - 8.4 g/dL    Albumin 3.1 (L) 3.5 - 5.2 g/dL    Total Bilirubin 0.3 0.1 - 1.0 mg/dL    Alkaline Phosphatase 154 (H) 55 - 135 U/L    AST 14 10 - 40 U/L    ALT 10 10 - 44 U/L    Anion Gap 9 8 - 16 mmol/L    eGFR if African American >60.0 >60 mL/min/1.73 m^2    eGFR if non African American >60.0 >60 mL/min/1.73 m^2   CBC auto differential   Result Value  Ref Range    WBC 11.21 3.90 - 12.70 K/uL    RBC 4.61 4.00 - 5.40 M/uL    Hemoglobin 11.7 (L) 12.0 - 16.0 g/dL    Hematocrit 36.5 (L) 37.0 - 48.5 %    MCV 79 (L) 82 - 98 fL    MCH 25.4 (L) 27.0 - 31.0 pg    MCHC 32.1 32.0 - 36.0 %    RDW 17.4 (H) 11.5 - 14.5 %    Platelets 359 (H) 150 - 350 K/uL    MPV 12.1 9.2 - 12.9 fL    Gran # (ANC) 6.8 1.8 - 7.7 K/uL    Lymph # 3.5 1.0 - 4.8 K/uL    Mono # 0.7 0.3 - 1.0 K/uL    Eos # 0.1 0.0 - 0.5 K/uL    Baso # 0.10 0.00 - 0.20 K/uL    Gran% 60.8 38.0 - 73.0 %    Lymph% 31.0 18.0 - 48.0 %    Mono% 5.8 4.0 - 15.0 %    Eosinophil% 1.2 0.0 - 8.0 %    Basophil% 0.9 0.0 - 1.9 %    Differential Method Automated    TSH   Result Value Ref Range    TSH 0.643 0.400 - 4.000 uIU/mL   T4, free   Result Value Ref Range    Free T4 0.98 0.71 - 1.51 ng/dL   T3, free   Result Value Ref Range    T3, Free 2.5 2.3 - 4.2 pg/mL   Hemoglobin A1c   Result Value Ref Range    Hemoglobin A1C 6.8 (H) 4.0 - 5.6 %    Estimated Avg Glucose 148 (H) 68 - 131 mg/dL   Basic metabolic panel   Result Value Ref Range    Sodium 139 136 - 145 mmol/L    Potassium 4.0 3.5 - 5.1 mmol/L    Chloride 108 95 - 110 mmol/L    CO2 22 (L) 23 - 29 mmol/L    Glucose 120 (H) 70 - 110 mg/dL    BUN, Bld 11 8 - 23 mg/dL    Creatinine 0.9 0.5 - 1.4 mg/dL    Calcium 9.5 8.7 - 10.5 mg/dL    Anion Gap 9 8 - 16 mmol/L    eGFR if African American >60.0 >60 mL/min/1.73 m^2    eGFR if non African American >60.0 >60 mL/min/1.73 m^2   CBC auto differential   Result Value Ref Range    WBC 11.21 3.90 - 12.70 K/uL    RBC 4.61 4.00 - 5.40 M/uL    Hemoglobin 11.7 (L) 12.0 - 16.0 g/dL    Hematocrit 36.5 (L) 37.0 - 48.5 %    MCV 79 (L) 82 - 98 fL    MCH 25.4 (L) 27.0 - 31.0 pg    MCHC 32.1 32.0 - 36.0 %    RDW 17.4 (H) 11.5 - 14.5 %    Platelets 359 (H) 150 - 350 K/uL    MPV 12.1 9.2 - 12.9 fL    Gran # (ANC) 6.8 1.8 - 7.7 K/uL    Lymph # 3.5 1.0 - 4.8 K/uL    Mono # 0.7 0.3 - 1.0 K/uL    Eos # 0.1 0.0 - 0.5 K/uL    Baso # 0.10 0.00 - 0.20 K/uL     Gran% 60.8 38.0 - 73.0 %    Lymph% 31.0 18.0 - 48.0 %    Mono% 5.8 4.0 - 15.0 %    Eosinophil% 1.2 0.0 - 8.0 %    Basophil% 0.9 0.0 - 1.9 %    Differential Method Automated    Comprehensive metabolic panel   Result Value Ref Range    Sodium 139 136 - 145 mmol/L    Potassium 4.0 3.5 - 5.1 mmol/L    Chloride 108 95 - 110 mmol/L    CO2 22 (L) 23 - 29 mmol/L    Glucose 120 (H) 70 - 110 mg/dL    BUN, Bld 11 8 - 23 mg/dL    Creatinine 0.9 0.5 - 1.4 mg/dL    Calcium 9.5 8.7 - 10.5 mg/dL    Total Protein 7.7 6.0 - 8.4 g/dL    Albumin 3.1 (L) 3.5 - 5.2 g/dL    Total Bilirubin 0.3 0.1 - 1.0 mg/dL    Alkaline Phosphatase 154 (H) 55 - 135 U/L    AST 14 10 - 40 U/L    ALT 10 10 - 44 U/L    Anion Gap 9 8 - 16 mmol/L    eGFR if African American >60.0 >60 mL/min/1.73 m^2    eGFR if non African American >60.0 >60 mL/min/1.73 m^2   Iron and TIBC   Result Value Ref Range    Iron 28 (L) 30 - 160 ug/dL    Transferrin 191 (L) 200 - 375 mg/dL    TIBC 283 250 - 450 ug/dL    Saturated Iron 10 (L) 20 - 50 %   Sedimentation rate, manual   Result Value Ref Range    Sed Rate 28 (H) 0 - 20 mm/Hr   Rheumatoid factor   Result Value Ref Range    Rheumatoid Factor 74.0 (H) 0.0 - 15.0 IU/mL   C-reactive protein   Result Value Ref Range    CRP 34.5 (H) 0.0 - 8.2 mg/L   BRAD Titer   Result Value Ref Range    BRAD HEP-2 Titer Positive 1:320 Homogeneous    BRAD Profile   Result Value Ref Range    Anti Sm Antibody 0.28 0.00 - 19.99 EU    Anti-Sm Interpretation Negative Negative    Anti-SSA Antibody 0.49 0.00 - 19.99 EU    Anti-SSA Interpretation Negative Negative    Anti-SSB Antibody 42.58 (H) 0.00 - 19.99 EU    Anti-SSB Interpretation Positive (A) Negative    ds DNA Ab Negative 1:10 Negative 1:10    Anti Sm/RNP Antibody 0.80 0.00 - 19.99 EU    Anti-Sm/RNP Interpretation Negative Negative          Assessment:       Encounter Diagnoses   Name Primary?    Chronic right SI joint pain Yes    Rheumatoid arthritis with positive rheumatoid factor, involving  unspecified site     Sjogren's syndrome, with unspecified organ involvement     Type 2 diabetes mellitus without complication, unspecified long term insulin use status     Chronic pain of right knee     Primary osteoarthritis of both knees     Lumbar and sacral arthritis          Plan:       Chronic right SI joint pain  -     ketorolac injection 60 mg; Inject 2 mLs (60 mg total) into the muscle one time.  -     cyanocobalamin injection 1,000 mcg; Inject 1 mL (1,000 mcg total) into the skin one time.  -     Large Joint Aspiration/Injection    Rheumatoid arthritis with positive rheumatoid factor, involving unspecified site  -     diclofenac-misoprostol  mg-mcg (ARTHROTEC 75)  mg-mcg per tablet; Take 1 tablet by mouth 2 (two) times daily.  Dispense: 60 tablet; Refill: 11  -     Discontinue: hydrocodone-acetaminophen 10-325mg (NORCO)  mg Tab; Take 1 tablet by mouth 3 (three) times daily as needed.  Dispense: 90 tablet; Refill: 0  -     leflunomide (ARAVA) 10 MG Tab; Take 1 tablet (10 mg total) by mouth once daily.  Dispense: 30 tablet; Refill: 5  -     Discontinue: hydrocodone-acetaminophen 10-325mg (NORCO)  mg Tab; Take 1 tablet by mouth 3 (three) times daily as needed.  Dispense: 90 tablet; Refill: 0  -     hydrocodone-acetaminophen 10-325mg (NORCO)  mg Tab; Take 1 tablet by mouth 3 (three) times daily as needed.  Dispense: 90 tablet; Refill: 0  -     BRAD; Future; Expected date: 03/16/2018  -     Sjogrens syndrome-A extractable nuclear antibody; Future; Expected date: 03/16/2018  -     Sjogrens syndrome-B extractable nuclear antibody; Future; Expected date: 03/16/2018  -     CBC auto differential; Future; Expected date: 03/16/2018  -     Comprehensive metabolic panel; Future; Expected date: 03/16/2018  -     C-reactive protein; Future; Expected date: 03/16/2018  -     Sedimentation rate, manual; Future; Expected date: 03/16/2018  -     ketorolac injection 60 mg; Inject 2 mLs (60 mg  total) into the muscle one time.  -     cyanocobalamin injection 1,000 mcg; Inject 1 mL (1,000 mcg total) into the skin one time.    Sjogren's syndrome, with unspecified organ involvement  -     diclofenac-misoprostol  mg-mcg (ARTHROTEC 75)  mg-mcg per tablet; Take 1 tablet by mouth 2 (two) times daily.  Dispense: 60 tablet; Refill: 11  -     Discontinue: hydrocodone-acetaminophen 10-325mg (NORCO)  mg Tab; Take 1 tablet by mouth 3 (three) times daily as needed.  Dispense: 90 tablet; Refill: 0  -     leflunomide (ARAVA) 10 MG Tab; Take 1 tablet (10 mg total) by mouth once daily.  Dispense: 30 tablet; Refill: 5  -     Discontinue: hydrocodone-acetaminophen 10-325mg (NORCO)  mg Tab; Take 1 tablet by mouth 3 (three) times daily as needed.  Dispense: 90 tablet; Refill: 0  -     hydrocodone-acetaminophen 10-325mg (NORCO)  mg Tab; Take 1 tablet by mouth 3 (three) times daily as needed.  Dispense: 90 tablet; Refill: 0  -     BRAD; Future; Expected date: 03/16/2018  -     Sjogrens syndrome-A extractable nuclear antibody; Future; Expected date: 03/16/2018  -     Sjogrens syndrome-B extractable nuclear antibody; Future; Expected date: 03/16/2018  -     CBC auto differential; Future; Expected date: 03/16/2018  -     Comprehensive metabolic panel; Future; Expected date: 03/16/2018  -     C-reactive protein; Future; Expected date: 03/16/2018  -     Sedimentation rate, manual; Future; Expected date: 03/16/2018    Type 2 diabetes mellitus without complication, unspecified long term insulin use status  -     diclofenac-misoprostol  mg-mcg (ARTHROTEC 75)  mg-mcg per tablet; Take 1 tablet by mouth 2 (two) times daily.  Dispense: 60 tablet; Refill: 11  -     Discontinue: hydrocodone-acetaminophen 10-325mg (NORCO)  mg Tab; Take 1 tablet by mouth 3 (three) times daily as needed.  Dispense: 90 tablet; Refill: 0  -     leflunomide (ARAVA) 10 MG Tab; Take 1 tablet (10 mg total) by mouth once  daily.  Dispense: 30 tablet; Refill: 5  -     Discontinue: hydrocodone-acetaminophen 10-325mg (NORCO)  mg Tab; Take 1 tablet by mouth 3 (three) times daily as needed.  Dispense: 90 tablet; Refill: 0  -     hydrocodone-acetaminophen 10-325mg (NORCO)  mg Tab; Take 1 tablet by mouth 3 (three) times daily as needed.  Dispense: 90 tablet; Refill: 0  -     BRAD; Future; Expected date: 03/16/2018  -     Sjogrens syndrome-A extractable nuclear antibody; Future; Expected date: 03/16/2018  -     Sjogrens syndrome-B extractable nuclear antibody; Future; Expected date: 03/16/2018  -     CBC auto differential; Future; Expected date: 03/16/2018  -     Comprehensive metabolic panel; Future; Expected date: 03/16/2018  -     C-reactive protein; Future; Expected date: 03/16/2018  -     Sedimentation rate, manual; Future; Expected date: 03/16/2018    Chronic pain of right knee  -     diclofenac-misoprostol  mg-mcg (ARTHROTEC 75)  mg-mcg per tablet; Take 1 tablet by mouth 2 (two) times daily.  Dispense: 60 tablet; Refill: 11  -     Discontinue: hydrocodone-acetaminophen 10-325mg (NORCO)  mg Tab; Take 1 tablet by mouth 3 (three) times daily as needed.  Dispense: 90 tablet; Refill: 0  -     leflunomide (ARAVA) 10 MG Tab; Take 1 tablet (10 mg total) by mouth once daily.  Dispense: 30 tablet; Refill: 5  -     Discontinue: hydrocodone-acetaminophen 10-325mg (NORCO)  mg Tab; Take 1 tablet by mouth 3 (three) times daily as needed.  Dispense: 90 tablet; Refill: 0  -     hydrocodone-acetaminophen 10-325mg (NORCO)  mg Tab; Take 1 tablet by mouth 3 (three) times daily as needed.  Dispense: 90 tablet; Refill: 0    Primary osteoarthritis of both knees  -     diclofenac-misoprostol  mg-mcg (ARTHROTEC 75)  mg-mcg per tablet; Take 1 tablet by mouth 2 (two) times daily.  Dispense: 60 tablet; Refill: 11  -     Discontinue: hydrocodone-acetaminophen 10-325mg (NORCO)  mg Tab; Take 1 tablet by  mouth 3 (three) times daily as needed.  Dispense: 90 tablet; Refill: 0  -     leflunomide (ARAVA) 10 MG Tab; Take 1 tablet (10 mg total) by mouth once daily.  Dispense: 30 tablet; Refill: 5  -     Discontinue: hydrocodone-acetaminophen 10-325mg (NORCO)  mg Tab; Take 1 tablet by mouth 3 (three) times daily as needed.  Dispense: 90 tablet; Refill: 0  -     hydrocodone-acetaminophen 10-325mg (NORCO)  mg Tab; Take 1 tablet by mouth 3 (three) times daily as needed.  Dispense: 90 tablet; Refill: 0    Lumbar and sacral arthritis  -     diclofenac-misoprostol  mg-mcg (ARTHROTEC 75)  mg-mcg per tablet; Take 1 tablet by mouth 2 (two) times daily.  Dispense: 60 tablet; Refill: 11  -     Discontinue: hydrocodone-acetaminophen 10-325mg (NORCO)  mg Tab; Take 1 tablet by mouth 3 (three) times daily as needed.  Dispense: 90 tablet; Refill: 0  -     leflunomide (ARAVA) 10 MG Tab; Take 1 tablet (10 mg total) by mouth once daily.  Dispense: 30 tablet; Refill: 5  -     Discontinue: hydrocodone-acetaminophen 10-325mg (NORCO)  mg Tab; Take 1 tablet by mouth 3 (three) times daily as needed.  Dispense: 90 tablet; Refill: 0  -     hydrocodone-acetaminophen 10-325mg (NORCO)  mg Tab; Take 1 tablet by mouth 3 (three) times daily as needed.  Dispense: 90 tablet; Refill: 0      Increase  arava

## 2018-03-17 NOTE — PROGRESS NOTES
Administered 1 cc Vitamin B12 1000mcg/cc to left ventrogluteal. Pt tolerated well. No acute reaction noted to site. Pt instructed on S/S to report. Advised patient to wait in lobby 15 minutes after receiving injection to monitor for any reactions. Pt verbalized understanding.     Lot: [id number]  Exp: [id number]  Administered 2 cc  Toradol 30mg/cc  to left dorsogluteal. Pt tolerated well. No acute reaction noted to site. Pt instructed on S/S to report. Advised patient to wait in lobby 15 minutes after receiving injection to monitor for any reactions. Pt verbalized understanding.     Lot: [id number]  Exp: [id number]

## 2018-03-22 RX ORDER — AMLODIPINE AND BENAZEPRIL HYDROCHLORIDE 5; 20 MG/1; MG/1
CAPSULE ORAL
Qty: 30 CAPSULE | Refills: 3 | Status: SHIPPED | OUTPATIENT
Start: 2018-03-22 | End: 2018-07-13 | Stop reason: SDUPTHER

## 2018-03-25 RX ORDER — TRIAMCINOLONE ACETONIDE 40 MG/ML
40 INJECTION, SUSPENSION INTRA-ARTICULAR; INTRAMUSCULAR
Status: DISCONTINUED | OUTPATIENT
Start: 2018-03-16 | End: 2018-03-25 | Stop reason: HOSPADM

## 2018-04-03 ENCOUNTER — TELEPHONE (OUTPATIENT)
Dept: RHEUMATOLOGY | Facility: CLINIC | Age: 73
End: 2018-04-03

## 2018-04-03 NOTE — TELEPHONE ENCOUNTER
----- Message from Gill Young sent at 4/3/2018  9:49 AM CDT -----  Contact: Rekha  Patient calling to state has bladder infection and asking for Rx antibiotic to be sent to     [facility] [place] - [address] [address] [facility] [place] [address]  Phone: [phone] Fax: [phone]    Asking to speak to nurse [phone]. Thanks!

## 2018-04-04 RX ORDER — PENICILLIN V POTASSIUM 500 MG/1
TABLET, FILM COATED ORAL
Qty: 40 TABLET | Refills: 3 | Status: SHIPPED | OUTPATIENT
Start: 2018-04-04 | End: 2018-07-12

## 2018-04-12 RX ORDER — AMLODIPINE AND BENAZEPRIL HYDROCHLORIDE 5; 20 MG/1; MG/1
1 CAPSULE ORAL DAILY
Qty: 90 CAPSULE | Refills: 3 | Status: SHIPPED | OUTPATIENT
Start: 2018-04-12 | End: 2018-07-12

## 2018-04-16 ENCOUNTER — LAB VISIT (OUTPATIENT)
Dept: LAB | Facility: HOSPITAL | Age: 73
End: 2018-04-16
Attending: INTERNAL MEDICINE
Payer: MEDICARE

## 2018-04-16 DIAGNOSIS — M35.00 SJOGREN'S SYNDROME, WITH UNSPECIFIED ORGAN INVOLVEMENT: ICD-10-CM

## 2018-04-16 DIAGNOSIS — E11.9 TYPE 2 DIABETES MELLITUS WITHOUT COMPLICATION: ICD-10-CM

## 2018-04-16 DIAGNOSIS — M05.9 RHEUMATOID ARTHRITIS WITH POSITIVE RHEUMATOID FACTOR, INVOLVING UNSPECIFIED SITE: ICD-10-CM

## 2018-04-16 DIAGNOSIS — M47.817 LUMBAR AND SACRAL ARTHRITIS: ICD-10-CM

## 2018-04-16 DIAGNOSIS — M17.0 PRIMARY OSTEOARTHRITIS OF BOTH KNEES: ICD-10-CM

## 2018-04-16 LAB
ALBUMIN SERPL BCP-MCNC: 3.1 G/DL
ALP SERPL-CCNC: 226 U/L
ALT SERPL W/O P-5'-P-CCNC: 22 U/L
ANION GAP SERPL CALC-SCNC: 10 MMOL/L
AST SERPL-CCNC: 18 U/L
BASOPHILS # BLD AUTO: 0.15 K/UL
BASOPHILS NFR BLD: 1 %
BILIRUB SERPL-MCNC: 0.3 MG/DL
BUN SERPL-MCNC: 15 MG/DL
CALCIUM SERPL-MCNC: 9.4 MG/DL
CHLORIDE SERPL-SCNC: 108 MMOL/L
CHOLEST SERPL-MCNC: 209 MG/DL
CHOLEST/HDLC SERPL: 4.1 {RATIO}
CO2 SERPL-SCNC: 24 MMOL/L
CREAT SERPL-MCNC: 0.8 MG/DL
CRP SERPL-MCNC: 17.1 MG/L
DIFFERENTIAL METHOD: ABNORMAL
EOSINOPHIL # BLD AUTO: 0.2 K/UL
EOSINOPHIL NFR BLD: 1 %
ERYTHROCYTE [DISTWIDTH] IN BLOOD BY AUTOMATED COUNT: 16.7 %
ERYTHROCYTE [SEDIMENTATION RATE] IN BLOOD BY WESTERGREN METHOD: 32 MM/HR
EST. GFR  (AFRICAN AMERICAN): >60 ML/MIN/1.73 M^2
EST. GFR  (NON AFRICAN AMERICAN): >60 ML/MIN/1.73 M^2
ESTIMATED AVG GLUCOSE: 131 MG/DL
GLUCOSE SERPL-MCNC: 76 MG/DL
HBA1C MFR BLD HPLC: 6.2 %
HCT VFR BLD AUTO: 39.3 %
HDLC SERPL-MCNC: 51 MG/DL
HDLC SERPL: 24.4 %
HGB BLD-MCNC: 12.1 G/DL
IMM GRANULOCYTES # BLD AUTO: 0.12 K/UL
IMM GRANULOCYTES NFR BLD AUTO: 0.8 %
LDLC SERPL CALC-MCNC: 124.4 MG/DL
LYMPHOCYTES # BLD AUTO: 5.1 K/UL
LYMPHOCYTES NFR BLD: 33.9 %
MCH RBC QN AUTO: 25.7 PG
MCHC RBC AUTO-ENTMCNC: 30.8 G/DL
MCV RBC AUTO: 84 FL
MONOCYTES # BLD AUTO: 1.3 K/UL
MONOCYTES NFR BLD: 8.6 %
NEUTROPHILS # BLD AUTO: 8.2 K/UL
NEUTROPHILS NFR BLD: 54.7 %
NONHDLC SERPL-MCNC: 158 MG/DL
NRBC BLD-RTO: 0 /100 WBC
PLATELET # BLD AUTO: 330 K/UL
PMV BLD AUTO: 11.9 FL
POTASSIUM SERPL-SCNC: 4.2 MMOL/L
PROT SERPL-MCNC: 7 G/DL
RBC # BLD AUTO: 4.7 M/UL
SODIUM SERPL-SCNC: 142 MMOL/L
TRIGL SERPL-MCNC: 168 MG/DL
WBC # BLD AUTO: 14.9 K/UL

## 2018-04-16 PROCEDURE — 86235 NUCLEAR ANTIGEN ANTIBODY: CPT

## 2018-04-16 PROCEDURE — 85651 RBC SED RATE NONAUTOMATED: CPT | Mod: PO

## 2018-04-16 PROCEDURE — 36415 COLL VENOUS BLD VENIPUNCTURE: CPT | Mod: PO

## 2018-04-16 PROCEDURE — 85025 COMPLETE CBC W/AUTO DIFF WBC: CPT

## 2018-04-16 PROCEDURE — 80053 COMPREHEN METABOLIC PANEL: CPT

## 2018-04-16 PROCEDURE — 83036 HEMOGLOBIN GLYCOSYLATED A1C: CPT

## 2018-04-16 PROCEDURE — 86140 C-REACTIVE PROTEIN: CPT

## 2018-04-16 PROCEDURE — 86235 NUCLEAR ANTIGEN ANTIBODY: CPT | Mod: 59

## 2018-04-16 PROCEDURE — 86038 ANTINUCLEAR ANTIBODIES: CPT

## 2018-04-16 PROCEDURE — 86039 ANTINUCLEAR ANTIBODIES (ANA): CPT

## 2018-04-16 PROCEDURE — 80061 LIPID PANEL: CPT

## 2018-04-17 LAB
ANA SER QL IF: POSITIVE
ANA TITR SER IF: NORMAL {TITER}

## 2018-04-18 LAB
ANTI-SSA ANTIBODY: 0.75 EU
ANTI-SSA INTERPRETATION: NEGATIVE

## 2018-04-19 LAB
ANTI SM ANTIBODY: 0 EU
ANTI SM/RNP ANTIBODY: 0 EU
ANTI-SM INTERPRETATION: NEGATIVE
ANTI-SM/RNP INTERPRETATION: NEGATIVE
ANTI-SSA ANTIBODY: 0.75 EU
ANTI-SSA INTERPRETATION: NEGATIVE
ANTI-SSB ANTIBODY: 25.3 EU
ANTI-SSB ANTIBODY: 25.3 EU
ANTI-SSB INTERPRETATION: POSITIVE
ANTI-SSB INTERPRETATION: POSITIVE
DSDNA AB SER-ACNC: ABNORMAL [IU]/ML

## 2018-04-24 ENCOUNTER — PATIENT OUTREACH (OUTPATIENT)
Dept: ADMINISTRATIVE | Facility: HOSPITAL | Age: 73
End: 2018-04-24

## 2018-04-24 NOTE — LETTER
April 24, 2018    Rekha Miller  [address] [facility] [address]           [facility]  [address]  [facility] [address]  Phone: [phone] Dear Mrs. Miller:    [facility] is committed to your overall health.  To help you get the most out of each of your visits, we will review your information to make sure you are up to date on all of your recommended tests and/or procedures.      Roberto Hernandez MD has found that you may be due for   Health Maintenance Due   Topic    DEXA SCAN     Pneumococcal (65+) (2 of 2 - PCV13)    Mammogram     Influenza Vaccine     Foot Exam       If you have had any of the above done at another facility, please bring the records or information with you so that your record at [facility] will be complete.    If you are currently taking medication, please bring it with you to your appointment for review.    We will be happy to assist you with scheduling any necessary appointments or you may contact the [facility] appointment desk at [phone] to schedule at your convenience.     Thank you for choosing [facility] for your healthcare needs,    If you have any questions or concerns, please don't hesitate to call.    Sincerely,    ROSANNE Jean-Baptiste  Care Coordination Department  [facility]  [phone]

## 2018-04-30 NOTE — PROGRESS NOTES
Your test for Sjogren's are stable.inflammation levels are elevated but better than prior visit wbc were also elevated which may indicate an infection at the time of the labs. Continue your present medications. Will will adjust medicatiions at your next visit.

## 2018-05-11 RX ORDER — SUCRALFATE 1 G/1
1 TABLET ORAL
Qty: 480 TABLET | Refills: 3 | Status: SHIPPED | OUTPATIENT
Start: 2018-05-11 | End: 2018-10-15

## 2018-05-14 ENCOUNTER — TELEPHONE (OUTPATIENT)
Dept: FAMILY MEDICINE | Facility: CLINIC | Age: 73
End: 2018-05-14

## 2018-05-14 NOTE — TELEPHONE ENCOUNTER
----- Message from Lora Salinas sent at 5/14/2018  9:40 AM CDT -----  pls work pt in today (has 5/16 appt), prefers [phone] (home)

## 2018-05-15 ENCOUNTER — PATIENT OUTREACH (OUTPATIENT)
Dept: ADMINISTRATIVE | Facility: HOSPITAL | Age: 73
End: 2018-05-15

## 2018-05-15 NOTE — PROGRESS NOTES
Pre-Visit Chart audit complete and HM updated.  Alert PCP/Staff concering HM updates needed. Dexa scan order pended.

## 2018-05-16 ENCOUNTER — TELEPHONE (OUTPATIENT)
Dept: FAMILY MEDICINE | Facility: CLINIC | Age: 73
End: 2018-05-16

## 2018-05-17 DIAGNOSIS — M17.0 PRIMARY OSTEOARTHRITIS OF BOTH KNEES: ICD-10-CM

## 2018-05-17 DIAGNOSIS — G89.29 CHRONIC PAIN OF RIGHT KNEE: ICD-10-CM

## 2018-05-17 DIAGNOSIS — M47.817 LUMBAR AND SACRAL ARTHRITIS: ICD-10-CM

## 2018-05-17 DIAGNOSIS — M25.561 CHRONIC PAIN OF RIGHT KNEE: ICD-10-CM

## 2018-05-17 DIAGNOSIS — M35.00 SJOGREN'S SYNDROME, WITH UNSPECIFIED ORGAN INVOLVEMENT: ICD-10-CM

## 2018-05-17 DIAGNOSIS — M05.9 RHEUMATOID ARTHRITIS WITH POSITIVE RHEUMATOID FACTOR, INVOLVING UNSPECIFIED SITE: ICD-10-CM

## 2018-05-17 RX ORDER — HYDROCODONE BITARTRATE AND ACETAMINOPHEN 10; 325 MG/1; MG/1
1 TABLET ORAL 3 TIMES DAILY PRN
Qty: 90 TABLET | Refills: 0 | Status: SHIPPED | OUTPATIENT
Start: 2018-05-23 | End: 2018-06-27 | Stop reason: SDUPTHER

## 2018-05-17 NOTE — TELEPHONE ENCOUNTER
----- Message from Kika Hamilton sent at 5/17/2018  1:39 PM CDT -----  Type:  RX Refill Request    Who Called: Rekha  Refill or New Rx:  refill  RX Name and Strength:  hydrocodone-acetaminophen 10-325mg (NORCO)  mg Tab  How is the patient currently taking it? (ex. 1XDay):  Take 1 tablet by mouth 3 (three) times daily as needed. - Oral  Is this a 30 day or 90 day RX:  30  Preferred Pharmacy with phone number:   [facility] [address] [facility] [address]  Phone: [phone] Fax: [phone]  Local or Mail Order:  local  Ordering Provider:  jose Mercado Call Back Number:  [phone]  Additional Information:  Due for refill

## 2018-05-24 ENCOUNTER — TELEPHONE (OUTPATIENT)
Dept: RHEUMATOLOGY | Facility: CLINIC | Age: 73
End: 2018-05-24

## 2018-05-24 NOTE — TELEPHONE ENCOUNTER
----- Message from Anamaria Seals sent at 5/23/2018  1:29 PM CDT -----  Contact: self  Patient needs reschedule missed appointment due to annual check. Patient fractured her back.  Please call patient at [phone]. Thanks!

## 2018-05-25 NOTE — TELEPHONE ENCOUNTER
----- Message from María Elena Callejas sent at 5/25/2018  8:48 AM CDT -----  Contact: patient  Type:  Sooner Apoointment Request    Caller is requesting a sooner appointment.  Caller declined first available appointment listed below.  Caller will not accept being placed on the waitlist and is requesting a message be sent to doctor.    Name of Caller: Patient  When is the first available appointment?  10/12  Symptoms:  Pain in back, and hip  Best Call Back Number:  [phone]  Additional Information:  Requesting a call back

## 2018-05-30 NOTE — TELEPHONE ENCOUNTER
Spoke to pt, she advises she has been pretty ill and difficulty walking. Dr. Smith is pt's back doctor and ordering MRI with diagnostic imaging and will have copy of report sent to us. Pt will call back to try to get in sooner if possible. No further questions.

## 2018-06-07 ENCOUNTER — TELEPHONE (OUTPATIENT)
Dept: FAMILY MEDICINE | Facility: CLINIC | Age: 73
End: 2018-06-07

## 2018-06-07 NOTE — TELEPHONE ENCOUNTER
----- Message from Violet Dean sent at 6/7/2018  1:00 PM CDT -----  Contact: Pt  Pt is needing to schedule an appt before 7/24 says she has to have surgery and also need a checkup    Pt can be reached at [phone]    Thanks

## 2018-06-07 NOTE — TELEPHONE ENCOUNTER
Spoke with patient booked appt with Dr. Hernandez for 7/12/18, offered NP for an appt sooner, patient refused at this time.

## 2018-06-12 ENCOUNTER — TELEPHONE (OUTPATIENT)
Dept: FAMILY MEDICINE | Facility: CLINIC | Age: 73
End: 2018-06-12

## 2018-06-12 DIAGNOSIS — M05.9 RHEUMATOID ARTHRITIS WITH POSITIVE RHEUMATOID FACTOR, INVOLVING UNSPECIFIED SITE: ICD-10-CM

## 2018-06-12 DIAGNOSIS — E11.9 TYPE 2 DIABETES MELLITUS WITHOUT COMPLICATION: ICD-10-CM

## 2018-06-12 DIAGNOSIS — M35.00 SJOGREN'S SYNDROME, WITH UNSPECIFIED ORGAN INVOLVEMENT: ICD-10-CM

## 2018-06-12 RX ORDER — SUCRALFATE 1 G/1
TABLET ORAL
Qty: 120 TABLET | Refills: 3 | Status: SHIPPED | OUTPATIENT
Start: 2018-06-12 | End: 2018-06-15

## 2018-06-12 NOTE — TELEPHONE ENCOUNTER
----- Message from Corrina Fuller sent at 6/12/2018 12:08 PM CDT -----  Contact: pt  The pt wants to be worked in before her 7/12 appt, the pt wants to come in this week, the pt can be reached at [phone]///[id number]

## 2018-06-15 ENCOUNTER — OFFICE VISIT (OUTPATIENT)
Dept: FAMILY MEDICINE | Facility: CLINIC | Age: 73
End: 2018-06-15
Payer: MEDICARE

## 2018-06-15 VITALS
HEIGHT: 66 IN | BODY MASS INDEX: 41.62 KG/M2 | DIASTOLIC BLOOD PRESSURE: 65 MMHG | WEIGHT: 259 LBS | HEART RATE: 77 BPM | SYSTOLIC BLOOD PRESSURE: 128 MMHG | TEMPERATURE: 98 F

## 2018-06-15 DIAGNOSIS — Z01.818 PRE-OP EXAMINATION: Primary | ICD-10-CM

## 2018-06-15 DIAGNOSIS — D72.829 LEUKOCYTOSIS, UNSPECIFIED TYPE: ICD-10-CM

## 2018-06-15 DIAGNOSIS — N39.0 URINARY TRACT INFECTION WITHOUT HEMATURIA, SITE UNSPECIFIED: ICD-10-CM

## 2018-06-15 PROCEDURE — 99214 OFFICE O/P EST MOD 30 MIN: CPT | Mod: PBBFAC,PO | Performed by: NURSE PRACTITIONER

## 2018-06-15 PROCEDURE — 99213 OFFICE O/P EST LOW 20 MIN: CPT | Mod: S$PBB,,, | Performed by: NURSE PRACTITIONER

## 2018-06-15 PROCEDURE — 99999 PR PBB SHADOW E&M-EST. PATIENT-LVL IV: CPT | Mod: PBBFAC,,, | Performed by: NURSE PRACTITIONER

## 2018-06-18 ENCOUNTER — TELEPHONE (OUTPATIENT)
Dept: FAMILY MEDICINE | Facility: CLINIC | Age: 73
End: 2018-06-18

## 2018-06-18 NOTE — TELEPHONE ENCOUNTER
----- Message from Katt Dorado sent at 6/18/2018 11:51 AM CDT -----  Contact: Dr. Alli Arteaga ([place])  Calling for a medical clearance and please advise [phone] and fax # [phone]

## 2018-06-18 NOTE — TELEPHONE ENCOUNTER
----- Message from Wild Manning sent at 6/18/2018  9:54 AM CDT -----  Contact: Dr. Holt Office Emili Leon is calling regarding Medical Clearance. ...Phone#[phone] Fax#[phone]

## 2018-06-18 NOTE — PROGRESS NOTES
Subjective:       Patient ID: Rekha Miller is a 73 y.o. female.    Chief Complaint: Pre-op Exam  Pt in today for pre op exam. Pt had labs, EKG, CXR done at [facility] on yesterday. Positive for UTI; currently taking antibiotics. WBCs also elevated (10.9). EKG results not available at present. CXR shows bilateral interstitial scarring and cardiomegaly. BP WNL today. Type 2 diabetes controlled. Asthma controlled with current medication. Pt denies any problems with anesthesia in the past. Pt has no other complaints.     Past Medical History:   Diagnosis Date    Asthma     COPD (chronic obstructive pulmonary disease)     Degenerative disc disease     Diabetes mellitus     Diabetes mellitus, type 2     Fibromyalgia     Hypertension     Rheumatoid arthritis     Rheumatoid arthritis(714.0)     Rheumatoid arthritis     Social History     Social History    Marital status:      Spouse name: N/A    Number of children: 8    Years of education: N/A     Occupational History    Not on file.     Social History Main Topics    Smoking status: Never Smoker    Smokeless tobacco: Not on file    Alcohol use Not on file    Drug use: No    Sexual activity: Not on file     Social History Narrative    No narrative on file     Past Surgical History:   Procedure Laterality Date    BREAST SURGERY  1986    CATARACT EXTRACTION      EYE SURGERY  2013    Catatracts    ORIF FEMUR FRACTURE      right knee ligament rpeair  2005    right shoulder surgery  4/18/07    Dr. Gao       HPI  Review of Systems   Constitutional: Negative.    HENT: Negative.    Eyes: Negative.    Respiratory: Negative.    Cardiovascular: Negative.    Gastrointestinal: Negative.    Endocrine: Negative.    Genitourinary: Negative.    Musculoskeletal: Negative.    Skin: Negative.    Allergic/Immunologic: Negative.    Neurological: Negative.    Psychiatric/Behavioral: Negative.        Objective:      Physical Exam   Constitutional: She is oriented to  person, place, and time. She appears well-developed and well-nourished.   HENT:   Head: Normocephalic.   Right Ear: External ear normal.   Left Ear: External ear normal.   Nose: Nose normal.   Mouth/Throat: Oropharynx is clear and moist.   Eyes: Conjunctivae are normal. Pupils are equal, round, and reactive to light.   Neck: Normal range of motion. Neck supple.   Cardiovascular: Normal rate, regular rhythm and normal heart sounds.    Pulmonary/Chest: Effort normal and breath sounds normal.   Abdominal: Soft. Bowel sounds are normal.   Neurological: She is alert and oriented to person, place, and time.   Skin: Skin is warm and dry. Capillary refill takes 2 to 3 seconds.   Psychiatric: She has a normal mood and affect. Her behavior is normal. Judgment and thought content normal.   Nursing note and vitals reviewed.      Assessment:       1. Pre-op examination    2. Leukocytosis, unspecified type    3. Urinary tract infection without hematuria, site unspecified        Plan:           Rekha was seen today for pre-op exam.    Diagnoses and all orders for this visit:    Pre-op examination  Leukocytosis, unspecified type  Urinary tract infection without hematuria, site unspecified  Repeat UA 1 w  EKG results requested    Addendum (6/20/18): Repeat UA negative for UTI; EKG acceptable. CMP, PT/INR, PTT, CXR unremarkable. Cleared for surgery.

## 2018-06-18 NOTE — TELEPHONE ENCOUNTER
----- Message from Sindi Nava sent at 6/18/2018  3:30 PM CDT -----  Contact: patient  Calling in reference to  services. Please call patient @ [phone]. Thanks, kenan

## 2018-06-18 NOTE — TELEPHONE ENCOUNTER
Emili informed me that pt will be coming in to there office today for a repeat UA. She stated she will fax over EKG results and labs for pt today.

## 2018-06-18 NOTE — TELEPHONE ENCOUNTER
----- Message from Lora Salinas sent at 6/18/2018  8:51 AM CDT -----  Contact: [facility]  pls fax 6/15 notes to her at [phone]//ph:[phone] (6/21 surgery)

## 2018-06-19 NOTE — TELEPHONE ENCOUNTER
Pt states she will address the home health request w/ Dr. Hernandez after her procedure on the 07/12/18.

## 2018-06-20 ENCOUNTER — TELEPHONE (OUTPATIENT)
Dept: FAMILY MEDICINE | Facility: CLINIC | Age: 73
End: 2018-06-20

## 2018-06-20 NOTE — TELEPHONE ENCOUNTER
----- Message from Alycia Macedo sent at 6/20/2018 11:31 AM CDT -----  Contact: PT   Call pt regarding clearance. Pt states that her clearance need to be sent to the doctor office for surgery before 1 today or they going to have to reschedule her surgery. Pt states that she left all the info including the fax number for the other doctor.    [phone] (home)

## 2018-06-20 NOTE — TELEPHONE ENCOUNTER
----- Message from Lora Salinas sent at 6/20/2018 12:50 PM CDT -----  duplicate msg rg status of preop clearance pprwork being faxed, pt scheduled for surgery tmrw but can't have done if not in by 1p..[phone]

## 2018-06-20 NOTE — TELEPHONE ENCOUNTER
----- Message from Olinda Jenkins sent at 6/20/2018 12:07 PM CDT -----  Contact: self   Type:  RX Refill Request    Who Called: patient   Refill or New Rx: refill   RX Name and Strength: HYDROcodone-acetaminophen (NORCO)  mg per tablet  Preferred Pharmacy with phone number:     [facility] [place] - [address] [address]  Phone: [phone] Fax: [phone]    Best Call Back Number: [phone] (home)

## 2018-06-20 NOTE — TELEPHONE ENCOUNTER
Emili at Dr. Lefluer's office states she received clearance info and also notified pt that it was received.

## 2018-06-27 DIAGNOSIS — M17.0 PRIMARY OSTEOARTHRITIS OF BOTH KNEES: ICD-10-CM

## 2018-06-27 DIAGNOSIS — G89.29 CHRONIC PAIN OF RIGHT KNEE: ICD-10-CM

## 2018-06-27 DIAGNOSIS — M35.00 SJOGREN'S SYNDROME, WITH UNSPECIFIED ORGAN INVOLVEMENT: ICD-10-CM

## 2018-06-27 DIAGNOSIS — M25.561 CHRONIC PAIN OF RIGHT KNEE: ICD-10-CM

## 2018-06-27 DIAGNOSIS — M47.817 LUMBAR AND SACRAL ARTHRITIS: ICD-10-CM

## 2018-06-27 DIAGNOSIS — M05.9 RHEUMATOID ARTHRITIS WITH POSITIVE RHEUMATOID FACTOR, INVOLVING UNSPECIFIED SITE: ICD-10-CM

## 2018-06-27 NOTE — TELEPHONE ENCOUNTER
----- Message from RT Eric sent at 6/27/2018 12:00 PM CDT -----  Contact: pt    pt , requesting medication refill: [place], thanks.

## 2018-07-03 RX ORDER — HYDROCODONE BITARTRATE AND ACETAMINOPHEN 10; 325 MG/1; MG/1
TABLET ORAL
Qty: 90 TABLET | Refills: 0 | Status: SHIPPED | OUTPATIENT
Start: 2018-07-03 | End: 2018-07-27 | Stop reason: SDUPTHER

## 2018-07-11 ENCOUNTER — PATIENT OUTREACH (OUTPATIENT)
Dept: ADMINISTRATIVE | Facility: HOSPITAL | Age: 73
End: 2018-07-11

## 2018-07-12 ENCOUNTER — OFFICE VISIT (OUTPATIENT)
Dept: FAMILY MEDICINE | Facility: CLINIC | Age: 73
End: 2018-07-12
Payer: MEDICARE

## 2018-07-12 VITALS
HEART RATE: 76 BPM | DIASTOLIC BLOOD PRESSURE: 59 MMHG | HEIGHT: 65 IN | BODY MASS INDEX: 42.65 KG/M2 | TEMPERATURE: 99 F | SYSTOLIC BLOOD PRESSURE: 102 MMHG | WEIGHT: 256 LBS

## 2018-07-12 DIAGNOSIS — G62.9 NEUROPATHY: ICD-10-CM

## 2018-07-12 DIAGNOSIS — E78.5 TYPE 2 DIABETES MELLITUS WITH HYPERLIPIDEMIA: ICD-10-CM

## 2018-07-12 DIAGNOSIS — M35.00 SJOGREN'S SYNDROME, WITH UNSPECIFIED ORGAN INVOLVEMENT: ICD-10-CM

## 2018-07-12 DIAGNOSIS — M05.9 RHEUMATOID ARTHRITIS WITH POSITIVE RHEUMATOID FACTOR, INVOLVING UNSPECIFIED SITE: ICD-10-CM

## 2018-07-12 DIAGNOSIS — E11.59 HYPERTENSION ASSOCIATED WITH DIABETES: Primary | ICD-10-CM

## 2018-07-12 DIAGNOSIS — E11.69 TYPE 2 DIABETES MELLITUS WITH HYPERLIPIDEMIA: ICD-10-CM

## 2018-07-12 DIAGNOSIS — I15.2 HYPERTENSION ASSOCIATED WITH DIABETES: Primary | ICD-10-CM

## 2018-07-12 PROCEDURE — 99214 OFFICE O/P EST MOD 30 MIN: CPT | Mod: S$PBB,,, | Performed by: FAMILY MEDICINE

## 2018-07-12 PROCEDURE — 99999 PR PBB SHADOW E&M-EST. PATIENT-LVL III: CPT | Mod: PBBFAC,,, | Performed by: FAMILY MEDICINE

## 2018-07-12 PROCEDURE — 99213 OFFICE O/P EST LOW 20 MIN: CPT | Mod: PBBFAC,PO | Performed by: FAMILY MEDICINE

## 2018-07-12 RX ORDER — GABAPENTIN 300 MG/1
300 CAPSULE ORAL 3 TIMES DAILY
Qty: 90 CAPSULE | Refills: 11 | Status: SHIPPED | OUTPATIENT
Start: 2018-07-12 | End: 2020-07-06 | Stop reason: SDUPTHER

## 2018-07-12 RX ORDER — GLIPIZIDE 5 MG/1
5 TABLET, FILM COATED, EXTENDED RELEASE ORAL
Qty: 90 TABLET | Refills: 3 | Status: SHIPPED | OUTPATIENT
Start: 2018-07-12 | End: 2019-01-23

## 2018-07-12 RX ORDER — TRAMADOL HYDROCHLORIDE 50 MG/1
50 TABLET ORAL
COMMUNITY
End: 2018-10-15

## 2018-07-12 NOTE — PROGRESS NOTES
Rekha PEREZ Walker presents to fu leg edema doing well now w current reg.  DM last a1c 6.2 no hypoglycemia. Follows w Dr Johnson dx RA,Sjogrens,  Co neuropathy in feet and sciatica. Still has sig prob ambulating       Past Medical History:   Diagnosis Date    Asthma     COPD (chronic obstructive pulmonary disease)     Degenerative disc disease     Diabetes mellitus     Diabetes mellitus, type 2     Fibromyalgia     Hypertension     Rheumatoid arthritis     Rheumatoid arthritis(714.0)     Rheumatoid arthritis     Past Surgical History:   Procedure Laterality Date    BREAST SURGERY  1986    CATARACT EXTRACTION      EYE SURGERY  2013    Catatracts    ORIF FEMUR FRACTURE      right knee ligament rpeair  2005    right shoulder surgery  4/18/07    Dr. Gao     Review of patient's allergies indicates:   Allergen Reactions    Codeine     Dairy digestive ultra      Dairy products      Imuran [azathioprine sodium] Diarrhea    Morphine      DOESN'T FEEL RIGHT     Plaquenil [hydroxychloroquine] Other (See Comments)     headaches     Current Outpatient Prescriptions on File Prior to Visit   Medication Sig Dispense Refill    albuterol (PROVENTIL) 2.5 mg /3 mL (0.083 %) nebulizer solution USE 1 VIAL IN NEBULIZER EVERY 6 HOURS AS NEEDED FOR WHEEZING 180 mL 3    albuterol 90 mcg/actuation inhaler Inhale 2 puffs into the lungs every 6 (six) hours as needed for Wheezing. 18 g 11    amlodipine-benazepril 5-20 mg (LOTREL) 5-20 mg per capsule TAKE ONE CAPSULE BY MOUTH DAILY BY MOUTH EVERY DAY 30 capsule 3    blood sugar diagnostic (TRUETEST TEST STRIPS) Strp Inject 1 strip into the skin once daily. 100 strip 11    furosemide (LASIX) 40 MG tablet Take 1 tablet (40 mg total) by mouth once daily. 30 tablet 5    glipiZIDE (GLUCOTROL) 5 MG TR24 TAKE TWO TABLETS BY MOUTH IN THE MORNING 60 tablet 12    HYDROcodone-acetaminophen (NORCO)  mg per tablet TAKE 1 TABLET BY MOUTH THREE TIMES DAILY AS NEEDED **dnf before  05/23/2018** 90 tablet 0    montelukast (SINGULAIR) 10 mg tablet Take 10 mg by mouth once daily.   0    potassium chloride SA (K-DUR,KLOR-CON) 20 MEQ tablet TAKE 1 TABLET BY MOUTH TWICE DAILY 180 tablet 4    ranitidine (ZANTAC) 300 MG tablet Take 1 tablet (300 mg total) by mouth every evening. 30 tablet 12    sucralfate (CARAFATE) 1 gram tablet Take 1 tablet (1 g total) by mouth 4 (four) times daily before meals and nightly. 480 tablet 3    traZODone (DESYREL) 50 MG tablet Take 50 mg by mouth nightly.  5    TRUETEST TEST STRIPS Strp test blood sugar ONCE DAILY AS DIRECTED 100 each 11    cane Lauren 1 each by Misc.(Non-Drug; Combo Route) route once daily. 1 each 0    [DISCONTINUED] amlodipine-benazepril 5-20 mg (LOTREL) 5-20 mg per capsule Take 1 capsule by mouth once daily. 90 capsule 3    [DISCONTINUED] budesonide-formoterol 160-4.5 mcg (SYMBICORT) 160-4.5 mcg/actuation HFAA Inhale 2 puffs into the lungs.      [DISCONTINUED] diclofenac-misoprostol  mg-mcg (ARTHROTEC 75)  mg-mcg per tablet Take 1 tablet by mouth 2 (two) times daily. 60 tablet 11    [DISCONTINUED] penicillin v potassium (VEETID) 500 MG tablet TAKE 1 TABLET BY MOUTH FOUR TIMES DAILY 40 tablet 3     No current facility-administered medications on file prior to visit.      Social History     Social History    Marital status:      Spouse name: N/A    Number of children: 8    Years of education: N/A     Occupational History    Not on file.     Social History Main Topics    Smoking status: Never Smoker    Smokeless tobacco: Not on file    Alcohol use Not on file    Drug use: No    Sexual activity: Not on file     Other Topics Concern    Not on file     Social History Narrative    No narrative on file     History reviewed. No pertinent family history.      ROS:  SKIN: No rashes, itching or changes in color or texture of skin.  EYES: Visual acuity fine. No photophobia, ocular pain or diplopia.EARS: Denies ear pain,  discharge or vertigo.NOSE: No loss of smell, no epistaxis some postnasal drip.MOUTH & THROAT: No hoarseness or change in voice. No excessive gum bleeding.CHEST: Denies SOSA, cyanosis, wheezing  CARDIOVASCULAR: Denies chest pain, PND, orthopnea or reduced exercise tolerance.  ABDOMEN:  No weight loss.No abdominal pain, no hematemesis or blood in stool.  URINARY: No flank pain, dysuria or hematuria.  PERIPHERAL VASCULAR: No claudication or cyanosis.  MUSCULOSKELETAL:Myalgia and polyarthralgia  NEUROLOGIC: No history of seizures, paralysis, alteration of gait or coordination.    PE: Vital signs as noted  Heent:Normocephalic with no recent cranial trauma,PERRLA,EOMI,conjunctiva clear,fundi reveal no hemmorhage exudate or papilledema.Otic canals clear, tympanic membranes slightly dull bilaterally.Nasal mucosa slightly red and edematous.Posterior pharynx slightly red but without exudate.  Neck:Supple with minimal anterior cervical adenopathy.  Chest:Clear bilateral breath sounds with mild scattered ronchi and min exp wheezing   Heart:Regular rhthym without murmer  Abdomen:Soft, non tender,no masses, no hepatosplenomegaly  Extremeties Mod severe gen degenerative changes 1/1 pretibial pedal edema   Neurologic:Grossly within normal limits  Protective Sensation (w/ 10 gram monofilament): Random deficits bilat   Visual Inspection (Evidence of Foot Deformity, Ulceration, Nails Intact, Skin Intact):Normal  Pedal Pulses: Present    DM w HBP   Edema  Sjogrens syndrome  Fatigue  Neuropathy  Sciatica      Lab eval  Cont Elevate legs   Rev DM management-last a1c 6.2  Rec trial nani 3-900 hs   Rev diet recs wt loss, exercise and Na

## 2018-07-13 RX ORDER — AMLODIPINE AND BENAZEPRIL HYDROCHLORIDE 5; 20 MG/1; MG/1
CAPSULE ORAL
Qty: 30 CAPSULE | Refills: 3 | Status: SHIPPED | OUTPATIENT
Start: 2018-07-13 | End: 2018-11-02 | Stop reason: SDUPTHER

## 2018-07-25 ENCOUNTER — TELEPHONE (OUTPATIENT)
Dept: RHEUMATOLOGY | Facility: CLINIC | Age: 73
End: 2018-07-25

## 2018-07-25 NOTE — TELEPHONE ENCOUNTER
Returned patient's call. Patient states she had surgery last month and stopped Arthrotec. Patient restarted two weeks after surgery. Patient states its causing blood pressure to rise 160 Systolic. Patient would like different medication that does not have blood pressure side effects.

## 2018-07-25 NOTE — TELEPHONE ENCOUNTER
----- Message from Harrison Cook sent at 7/25/2018 10:26 AM CDT -----  Type: Needs Medical Advice    Who Called:  Patient  Best Call Back Number: [phone]  Additional Information: Back surgery on 6.21.18 - current medication is running her blood pressure up. Needs new medicine. Please call today.

## 2018-07-26 ENCOUNTER — TELEPHONE (OUTPATIENT)
Dept: FAMILY MEDICINE | Facility: CLINIC | Age: 73
End: 2018-07-26

## 2018-07-26 NOTE — TELEPHONE ENCOUNTER
Spoke with patient, patient states has not heart from [facility], will refax order(spoke to Karly at [facility]), spoke with Roberto at [facility] about brand only glucatrol xl, he will fax PA.

## 2018-07-26 NOTE — TELEPHONE ENCOUNTER
----- Message from Eric Faria sent at 7/26/2018  9:41 AM CDT -----  Contact: pt   Pt calling to state hasn't heard from [facility], pt also would like to discuss new diabetic medication and would callback         ..[phone] ([place])

## 2018-07-27 DIAGNOSIS — G89.29 CHRONIC PAIN OF RIGHT KNEE: ICD-10-CM

## 2018-07-27 DIAGNOSIS — M35.00 SJOGREN'S SYNDROME, WITH UNSPECIFIED ORGAN INVOLVEMENT: ICD-10-CM

## 2018-07-27 DIAGNOSIS — M05.9 RHEUMATOID ARTHRITIS WITH POSITIVE RHEUMATOID FACTOR, INVOLVING UNSPECIFIED SITE: ICD-10-CM

## 2018-07-27 DIAGNOSIS — M17.0 PRIMARY OSTEOARTHRITIS OF BOTH KNEES: ICD-10-CM

## 2018-07-27 DIAGNOSIS — M25.561 CHRONIC PAIN OF RIGHT KNEE: ICD-10-CM

## 2018-07-27 DIAGNOSIS — M47.817 LUMBAR AND SACRAL ARTHRITIS: ICD-10-CM

## 2018-07-27 NOTE — TELEPHONE ENCOUNTER
----- Message from Shila Cervantes sent at 7/27/2018 12:00 PM CDT -----  Contact: self  Waiting to hear back regarding meds for her RA. Also needs refill on HYDROcodone-acetaminophen (NORCO)  mg per tablet takes 3 times a day as needed. Please call back at [phone] (home)     [facility] [place] [facility] [place] - [id number] [facility]  [place] [address]  Phone: [phone] Fax: [phone]

## 2018-07-27 NOTE — TELEPHONE ENCOUNTER
Returned patient's call. Dr. Johnson to replace arthrotec with mobic. Iab orders to be placed prior to follow up. Will mail appt letter.

## 2018-07-31 RX ORDER — MELOXICAM 15 MG/1
TABLET ORAL
Qty: 30 TABLET | Refills: 4 | Status: SHIPPED | OUTPATIENT
Start: 2018-07-31 | End: 2018-10-15

## 2018-07-31 RX ORDER — HYDROCODONE BITARTRATE AND ACETAMINOPHEN 10; 325 MG/1; MG/1
1 TABLET ORAL EVERY 8 HOURS PRN
Qty: 90 TABLET | Refills: 0 | Status: SHIPPED | OUTPATIENT
Start: 2018-07-31 | End: 2018-08-30

## 2018-07-31 NOTE — TELEPHONE ENCOUNTER
Pt notified that request received and sent to Dr. Johnson. Advised to allow up to 72 hours for this to be processed. Pt verbalized understanding.

## 2018-07-31 NOTE — TELEPHONE ENCOUNTER
----- Message from Olinda Jenkins sent at 7/31/2018  8:53 AM CDT -----  Contact: self   Type:  RX Refill Request    Who Called:  Patient   Refill or New Rx: refill  RX Name and Strength: HYDROcodone-acetaminophen (NORCO)  mg per tablet  Preferred Pharmacy with phone number:      [facility] [place] - [address] [facility] [address]  Phone: [phone] Fax: [phone]      Best Call Back Number: [phone] (home)

## 2018-08-17 RX ORDER — CALCIUM CITRATE/VITAMIN D3 200MG-6.25
TABLET ORAL
Qty: 100 EACH | Refills: 12 | Status: SHIPPED | OUTPATIENT
Start: 2018-08-17 | End: 2019-01-23

## 2018-08-23 ENCOUNTER — PATIENT OUTREACH (OUTPATIENT)
Dept: ADMINISTRATIVE | Facility: HOSPITAL | Age: 73
End: 2018-08-23

## 2018-08-27 DIAGNOSIS — G89.29 CHRONIC PAIN OF RIGHT KNEE: ICD-10-CM

## 2018-08-27 DIAGNOSIS — M17.0 PRIMARY OSTEOARTHRITIS OF BOTH KNEES: ICD-10-CM

## 2018-08-27 DIAGNOSIS — M25.561 CHRONIC PAIN OF RIGHT KNEE: ICD-10-CM

## 2018-08-27 DIAGNOSIS — M47.817 LUMBAR AND SACRAL ARTHRITIS: ICD-10-CM

## 2018-08-27 DIAGNOSIS — M35.00 SJOGREN'S SYNDROME, WITH UNSPECIFIED ORGAN INVOLVEMENT: ICD-10-CM

## 2018-08-27 DIAGNOSIS — M05.9 RHEUMATOID ARTHRITIS WITH POSITIVE RHEUMATOID FACTOR, INVOLVING UNSPECIFIED SITE: ICD-10-CM

## 2018-08-29 DIAGNOSIS — M25.561 CHRONIC PAIN OF RIGHT KNEE: ICD-10-CM

## 2018-08-29 DIAGNOSIS — M05.9 RHEUMATOID ARTHRITIS WITH POSITIVE RHEUMATOID FACTOR, INVOLVING UNSPECIFIED SITE: ICD-10-CM

## 2018-08-29 DIAGNOSIS — G89.29 CHRONIC PAIN OF RIGHT KNEE: ICD-10-CM

## 2018-08-29 DIAGNOSIS — M17.0 PRIMARY OSTEOARTHRITIS OF BOTH KNEES: ICD-10-CM

## 2018-08-29 DIAGNOSIS — M47.817 LUMBAR AND SACRAL ARTHRITIS: ICD-10-CM

## 2018-08-29 DIAGNOSIS — M35.00 SJOGREN'S SYNDROME, WITH UNSPECIFIED ORGAN INVOLVEMENT: ICD-10-CM

## 2018-08-29 NOTE — TELEPHONE ENCOUNTER
checked last filled 8-1-18 when contacting patient to inform refill request has been received and a message has been sent to Dr Johnson. Patient stated she believes she has parasites in her stomach. Stated she feels something moving and it is making skin itch. Informed that would discuss with Dr Johnson and call her back. Discussed with Dr Johnson and was advised for patient to see GI for testing. Contacted patient and informed what Dr Johnson advised patient has GI doctor and will notify their office.

## 2018-08-29 NOTE — TELEPHONE ENCOUNTER
----- Message from Maria Alejandra Perdue sent at 8/29/2018  8:42 AM CDT -----  Contact: Pt  Can the clinic reply in [place]: Rekha      Please refill the medication(s) listed below. Please call the patient when the prescription(s) is ready for  at this phone number [phone] (home)       Medication #1 HYDROcodone-acetaminophen (NORCO)  mg per tablet      Preferred Pharmacy:   [facility] [place]  [place] [address] [address]  Phone: [phone] Fax: [phone]

## 2018-08-30 ENCOUNTER — TELEPHONE (OUTPATIENT)
Dept: RHEUMATOLOGY | Facility: CLINIC | Age: 73
End: 2018-08-30

## 2018-08-30 RX ORDER — HYDROCODONE BITARTRATE AND ACETAMINOPHEN 10; 325 MG/1; MG/1
TABLET ORAL
Qty: 90 TABLET | Refills: 0 | Status: SHIPPED | OUTPATIENT
Start: 2018-08-30 | End: 2018-09-26 | Stop reason: SDUPTHER

## 2018-08-30 RX ORDER — HYDROCODONE BITARTRATE AND ACETAMINOPHEN 10; 325 MG/1; MG/1
1 TABLET ORAL EVERY 8 HOURS PRN
Qty: 90 TABLET | OUTPATIENT
Start: 2018-08-30

## 2018-08-30 NOTE — TELEPHONE ENCOUNTER
----- Message from Jeanine Bassett RT sent at 8/30/2018 11:59 AM CDT -----  Contact: pt   pt , requesting a cameron back soon she is seeking medical advise concerning her medication not helping and causing her B/P to fluctuate, thanks.

## 2018-08-30 NOTE — TELEPHONE ENCOUNTER
"Spoke to pt, she advises that we sent wrong medication, that the bottle says Hydrocodone/acetaminophen 10/325 substitue for Norco. She is upset askign "why we would substitue her medication that this is making her BP vivian rocket" Advised  That we did not change her medication. Pt will check with pharmacy also as pills are a different color that she was getting previously. Pt will follow up with pharmacy. Pt also reports recently and intense back surgery and Dr. Hernandez is following her BP.   "

## 2018-08-31 RX ORDER — PREDNISONE 5 MG/1
TABLET ORAL
Qty: 90 TABLET | Refills: 3 | Status: SHIPPED | OUTPATIENT
Start: 2018-08-31 | End: 2019-02-23 | Stop reason: SDUPTHER

## 2018-09-26 DIAGNOSIS — M17.0 PRIMARY OSTEOARTHRITIS OF BOTH KNEES: ICD-10-CM

## 2018-09-26 DIAGNOSIS — M05.9 RHEUMATOID ARTHRITIS WITH POSITIVE RHEUMATOID FACTOR, INVOLVING UNSPECIFIED SITE: ICD-10-CM

## 2018-09-26 DIAGNOSIS — M47.817 LUMBAR AND SACRAL ARTHRITIS: ICD-10-CM

## 2018-09-26 DIAGNOSIS — M25.561 CHRONIC PAIN OF RIGHT KNEE: ICD-10-CM

## 2018-09-26 DIAGNOSIS — M35.00 SJOGREN'S SYNDROME, WITH UNSPECIFIED ORGAN INVOLVEMENT: ICD-10-CM

## 2018-09-26 DIAGNOSIS — G89.29 CHRONIC PAIN OF RIGHT KNEE: ICD-10-CM

## 2018-09-26 RX ORDER — HYDROCODONE BITARTRATE AND ACETAMINOPHEN 10; 325 MG/1; MG/1
TABLET ORAL
Qty: 90 TABLET | Refills: 0 | Status: SHIPPED | OUTPATIENT
Start: 2018-09-26 | End: 2018-10-15 | Stop reason: SDUPTHER

## 2018-09-26 NOTE — TELEPHONE ENCOUNTER
----- Message from Shyanne Garcia sent at 9/26/2018 10:29 AM CDT -----  Type: Needs Medical Advice    Who Called:  Patient  Best Call Back Number: [phone]  Additional Information: Patient calling ahead to remind doctor that her pain medication: HYDROcodone-acetaminophen (NORCO)  mg per table is coming due/also has question about another medication/please call back to advise.

## 2018-10-10 DIAGNOSIS — E11.9 TYPE 2 DIABETES MELLITUS WITHOUT COMPLICATION: ICD-10-CM

## 2018-10-10 DIAGNOSIS — M05.9 RHEUMATOID ARTHRITIS WITH POSITIVE RHEUMATOID FACTOR, INVOLVING UNSPECIFIED SITE: ICD-10-CM

## 2018-10-10 DIAGNOSIS — M35.00 SJOGREN'S SYNDROME, WITH UNSPECIFIED ORGAN INVOLVEMENT: ICD-10-CM

## 2018-10-10 RX ORDER — SUCRALFATE 1 G/1
TABLET ORAL
Qty: 120 TABLET | Refills: 3 | Status: SHIPPED | OUTPATIENT
Start: 2018-10-10 | End: 2020-06-21

## 2018-10-15 ENCOUNTER — TELEPHONE (OUTPATIENT)
Dept: RHEUMATOLOGY | Facility: CLINIC | Age: 73
End: 2018-10-15

## 2018-10-15 ENCOUNTER — OFFICE VISIT (OUTPATIENT)
Dept: RHEUMATOLOGY | Facility: CLINIC | Age: 73
End: 2018-10-15
Payer: MEDICARE

## 2018-10-15 ENCOUNTER — TELEPHONE (OUTPATIENT)
Dept: PHARMACY | Facility: CLINIC | Age: 73
End: 2018-10-15

## 2018-10-15 ENCOUNTER — LAB VISIT (OUTPATIENT)
Dept: LAB | Facility: HOSPITAL | Age: 73
End: 2018-10-15
Attending: INTERNAL MEDICINE
Payer: MEDICARE

## 2018-10-15 VITALS
WEIGHT: 251.19 LBS | HEIGHT: 66 IN | HEART RATE: 96 BPM | BODY MASS INDEX: 40.37 KG/M2 | DIASTOLIC BLOOD PRESSURE: 85 MMHG | SYSTOLIC BLOOD PRESSURE: 172 MMHG

## 2018-10-15 DIAGNOSIS — D64.9 ANEMIA, UNSPECIFIED TYPE: ICD-10-CM

## 2018-10-15 DIAGNOSIS — M25.561 CHRONIC PAIN OF RIGHT KNEE: ICD-10-CM

## 2018-10-15 DIAGNOSIS — M47.817 LUMBAR AND SACRAL ARTHRITIS: ICD-10-CM

## 2018-10-15 DIAGNOSIS — G89.29 WRIST PAIN, CHRONIC, RIGHT: ICD-10-CM

## 2018-10-15 DIAGNOSIS — M17.0 PRIMARY OSTEOARTHRITIS OF BOTH KNEES: ICD-10-CM

## 2018-10-15 DIAGNOSIS — M25.531 WRIST PAIN, CHRONIC, RIGHT: ICD-10-CM

## 2018-10-15 DIAGNOSIS — M35.00 SJOGREN'S SYNDROME, WITH UNSPECIFIED ORGAN INVOLVEMENT: ICD-10-CM

## 2018-10-15 DIAGNOSIS — G89.29 CHRONIC PAIN OF RIGHT KNEE: ICD-10-CM

## 2018-10-15 DIAGNOSIS — M05.9 RHEUMATOID ARTHRITIS WITH POSITIVE RHEUMATOID FACTOR, INVOLVING UNSPECIFIED SITE: ICD-10-CM

## 2018-10-15 DIAGNOSIS — M05.9 RHEUMATOID ARTHRITIS WITH POSITIVE RHEUMATOID FACTOR, INVOLVING UNSPECIFIED SITE: Primary | ICD-10-CM

## 2018-10-15 LAB
ALBUMIN SERPL BCP-MCNC: 3.3 G/DL
ALP SERPL-CCNC: 144 U/L
ALT SERPL W/O P-5'-P-CCNC: 16 U/L
ANION GAP SERPL CALC-SCNC: 11 MMOL/L
AST SERPL-CCNC: 16 U/L
BASOPHILS # BLD AUTO: 0.13 K/UL
BASOPHILS NFR BLD: 1 %
BILIRUB SERPL-MCNC: 0.3 MG/DL
BUN SERPL-MCNC: 14 MG/DL
CALCIUM SERPL-MCNC: 9.8 MG/DL
CCP AB SER IA-ACNC: 163.9 U/ML
CHLORIDE SERPL-SCNC: 106 MMOL/L
CO2 SERPL-SCNC: 24 MMOL/L
CREAT SERPL-MCNC: 0.9 MG/DL
CRP SERPL-MCNC: 49 MG/L
DIFFERENTIAL METHOD: ABNORMAL
EOSINOPHIL # BLD AUTO: 0.1 K/UL
EOSINOPHIL NFR BLD: 1 %
ERYTHROCYTE [DISTWIDTH] IN BLOOD BY AUTOMATED COUNT: 16.8 %
ERYTHROCYTE [SEDIMENTATION RATE] IN BLOOD BY WESTERGREN METHOD: 39 MM/HR
EST. GFR  (AFRICAN AMERICAN): >60 ML/MIN/1.73 M^2
EST. GFR  (NON AFRICAN AMERICAN): >60 ML/MIN/1.73 M^2
GLUCOSE SERPL-MCNC: 77 MG/DL
HCT VFR BLD AUTO: 40 %
HGB BLD-MCNC: 11.7 G/DL
IMM GRANULOCYTES # BLD AUTO: 0.06 K/UL
IMM GRANULOCYTES NFR BLD AUTO: 0.5 %
IRON SERPL-MCNC: 24 UG/DL
LYMPHOCYTES # BLD AUTO: 4.2 K/UL
LYMPHOCYTES NFR BLD: 32.1 %
MCH RBC QN AUTO: 24.4 PG
MCHC RBC AUTO-ENTMCNC: 29.3 G/DL
MCV RBC AUTO: 83 FL
MONOCYTES # BLD AUTO: 0.9 K/UL
MONOCYTES NFR BLD: 6.7 %
NEUTROPHILS # BLD AUTO: 7.8 K/UL
NEUTROPHILS NFR BLD: 58.7 %
NRBC BLD-RTO: 0 /100 WBC
PLATELET # BLD AUTO: 405 K/UL
PMV BLD AUTO: 11.5 FL
POTASSIUM SERPL-SCNC: 3.6 MMOL/L
PROT SERPL-MCNC: 7.6 G/DL
RBC # BLD AUTO: 4.8 M/UL
RHEUMATOID FACT SERPL-ACNC: 129 IU/ML
SATURATED IRON: 8 %
SODIUM SERPL-SCNC: 141 MMOL/L
TOTAL IRON BINDING CAPACITY: 318 UG/DL
TRANSFERRIN SERPL-MCNC: 215 MG/DL
WBC # BLD AUTO: 13.18 K/UL

## 2018-10-15 PROCEDURE — 99215 OFFICE O/P EST HI 40 MIN: CPT | Mod: 25,S$PBB,, | Performed by: INTERNAL MEDICINE

## 2018-10-15 PROCEDURE — 86140 C-REACTIVE PROTEIN: CPT

## 2018-10-15 PROCEDURE — 86200 CCP ANTIBODY: CPT

## 2018-10-15 PROCEDURE — 86431 RHEUMATOID FACTOR QUANT: CPT

## 2018-10-15 PROCEDURE — 80053 COMPREHEN METABOLIC PANEL: CPT

## 2018-10-15 PROCEDURE — 86705 HEP B CORE ANTIBODY IGM: CPT

## 2018-10-15 PROCEDURE — 86803 HEPATITIS C AB TEST: CPT

## 2018-10-15 PROCEDURE — 83540 ASSAY OF IRON: CPT

## 2018-10-15 PROCEDURE — 20605 DRAIN/INJ JOINT/BURSA W/O US: CPT | Mod: PBBFAC,PO | Performed by: INTERNAL MEDICINE

## 2018-10-15 PROCEDURE — 99999 PR PBB SHADOW E&M-EST. PATIENT-LVL IV: CPT | Mod: PBBFAC,,, | Performed by: INTERNAL MEDICINE

## 2018-10-15 PROCEDURE — 85025 COMPLETE CBC W/AUTO DIFF WBC: CPT

## 2018-10-15 PROCEDURE — 85651 RBC SED RATE NONAUTOMATED: CPT | Mod: PO

## 2018-10-15 PROCEDURE — 99214 OFFICE O/P EST MOD 30 MIN: CPT | Mod: PBBFAC,PO,25 | Performed by: INTERNAL MEDICINE

## 2018-10-15 RX ORDER — HYDROCODONE BITARTRATE AND ACETAMINOPHEN 10; 325 MG/1; MG/1
TABLET ORAL
Qty: 100 TABLET | Refills: 0 | Status: SHIPPED | OUTPATIENT
Start: 2018-10-15 | End: 2018-10-15 | Stop reason: SDUPTHER

## 2018-10-15 RX ORDER — KETOROLAC TROMETHAMINE 30 MG/ML
60 INJECTION, SOLUTION INTRAMUSCULAR; INTRAVENOUS
Status: COMPLETED | OUTPATIENT
Start: 2018-10-15 | End: 2018-10-15

## 2018-10-15 RX ORDER — HYDROCODONE BITARTRATE AND ACETAMINOPHEN 10; 325 MG/1; MG/1
TABLET ORAL
Qty: 90 TABLET | Refills: 0 | Status: SHIPPED | OUTPATIENT
Start: 2018-12-15 | End: 2018-12-18 | Stop reason: SDUPTHER

## 2018-10-15 RX ORDER — LEFLUNOMIDE 10 MG/1
10 TABLET ORAL DAILY
COMMUNITY
End: 2018-10-15

## 2018-10-15 RX ORDER — HYDROCODONE BITARTRATE AND ACETAMINOPHEN 10; 325 MG/1; MG/1
TABLET ORAL
Qty: 90 TABLET | Refills: 0 | Status: SHIPPED | OUTPATIENT
Start: 2018-11-15 | End: 2018-10-15 | Stop reason: SDUPTHER

## 2018-10-15 RX ORDER — LEVOCETIRIZINE DIHYDROCHLORIDE 5 MG/1
5 TABLET, FILM COATED ORAL
COMMUNITY
Start: 2016-11-16 | End: 2023-02-22 | Stop reason: ALTCHOICE

## 2018-10-15 RX ORDER — METHYLPREDNISOLONE ACETATE 80 MG/ML
160 INJECTION, SUSPENSION INTRA-ARTICULAR; INTRALESIONAL; INTRAMUSCULAR; SOFT TISSUE
Status: COMPLETED | OUTPATIENT
Start: 2018-10-15 | End: 2018-10-15

## 2018-10-15 RX ORDER — ACETAMINOPHEN 325 MG/1
650 TABLET ORAL
COMMUNITY

## 2018-10-15 RX ADMIN — KETOROLAC TROMETHAMINE 60 MG: 60 INJECTION, SOLUTION INTRAMUSCULAR at 12:10

## 2018-10-15 RX ADMIN — METHYLPREDNISOLONE ACETATE 160 MG: 80 INJECTION, SUSPENSION INTRA-ARTICULAR; INTRALESIONAL; INTRAMUSCULAR; SOFT TISSUE at 12:10

## 2018-10-15 RX ADMIN — METHYLPREDNISOLONE ACETATE 80 MG: 80 INJECTION, SUSPENSION INTRA-ARTICULAR; INTRALESIONAL; INTRAMUSCULAR; SOFT TISSUE at 12:10

## 2018-10-15 NOTE — PROCEDURES
Intermediate Joint Aspiration/Injection: R radiocarpal  Date/Time: 10/15/2018 12:40 PM  Performed by: Pedro Johnson MD  Authorized by: Pedro Johnson MD     Consent Done?:  Yes (Verbal)  Indications:  Pain and joint swelling  Site marked: The procedure site was marked      Location:  Wrist  Site:  R radiocarpal  Needle size:  25 G  Medications:  80 mg methylPREDNISolone acetate 80 mg/mL     Additional Comments: After verbal consent and cleansing with betadine and alcohol, skin was numbed with ethylene chloride spray 2cc 1% lidocaine was injected into the  right wrist after waiting 45 sec was injected with depo-medrol 40mg with 1 ml 1 % lidocaine. Patient tolerated procedure well.

## 2018-10-15 NOTE — PROGRESS NOTES
Administered 2 cc DepoMedrol 80mg/cc  to right upper outer gluteal. Pt tolerated well. No acute reaction noted to site. Pt instructed on S/S to report. Advised patient to wait in lobby 15 minutes after receiving injection to monitor for any reactions..  Pt verbalized understanding.     Lot: [id number]  Exp: 11/19  Administered 2 cc  Toradol 30mg/cc  to left upper outer gluteal. Pt tolerated well. No acute reaction noted to site. Pt instructed on S/S to report. Advised patient to wait in lobby 15 minutes after receiving injection to monitor for any reactions. Pt verbalized understanding.     Lot: -[id number]  Exp: [id number]

## 2018-10-15 NOTE — TELEPHONE ENCOUNTER
----- Message from Gill Ribera sent at 10/15/2018 10:35 AM CDT -----  Pt called to say she is on her way, running late would not indicate a time.      Thank you!

## 2018-10-15 NOTE — PROGRESS NOTES
Subjective:       Patient ID: Rekha Miller is a 73 y.o. female.    Chief Complaint: Follow-up    Follow up     Ra flare  In pain  on arava  10 mg, she is having a flare R wrist has been off treatment because she had a spinal fusion..Patient complains of arthralgias and myalgias for which has been present for a few years. Pain is located in multiple joints, both shoulder(s), both elbow(s), both wrist(s), both MCP(s): 1st, 2nd, 3rd, 4th and 5th, both PIP(s): 1st, 2nd, 3rd, 4th and 5th, both DIP(s): 1st and 2nd, both hip(s), both knee(s) and both MTP(s): 1st, 2nd, 3rd, 4th and 5th, is described as aching, pulsating, shooting and throbbing, and is constant, moderate .  Associated symptoms include: crepitation, decreased range of motion, edema, effusion, tenderness and warmth.         Review of Systems   Constitutional: Positive for activity change. Negative for appetite change, chills, diaphoresis and unexpected weight change.   HENT: Negative for congestion, ear discharge, ear pain, facial swelling, mouth sores, postnasal drip, sinus pressure and sore throat.    Eyes: Negative for photophobia, pain, discharge, redness and itching.   Respiratory: Negative for cough, chest tightness and shortness of breath.    Cardiovascular: Positive for leg swelling. Negative for chest pain and palpitations.   Gastrointestinal: Negative for abdominal pain, constipation, diarrhea, nausea and vomiting.   Endocrine: Negative for cold intolerance, heat intolerance, polydipsia and polyuria.   Genitourinary: Negative for decreased urine volume, difficulty urinating, flank pain, frequency, hematuria and urgency.   Musculoskeletal: Positive for arthralgias, back pain, neck pain and neck stiffness. Negative for gait problem.   Skin: Negative for pallor, rash and wound.   Allergic/Immunologic: Negative for immunocompromised state.   Neurological: Negative for dizziness, tremors, weakness and numbness.   Hematological: Negative for adenopathy.  "Does not bruise/bleed easily.   Psychiatric/Behavioral: Negative for sleep disturbance. The patient is not nervous/anxious.          Objective:     BP (!) 172/85 (BP Location: Left arm, Patient Position: Sitting, BP Method: Large (Automatic))   Pulse 96   Ht 5' 6" (1.676 m)   Wt 114 kg (251 lb 3.4 oz)   BMI 40.55 kg/m²      Physical Exam   Nursing note and vitals reviewed.  Constitutional: She is oriented to person, place, and time. She appears distressed.   HENT:   Head: Normocephalic and atraumatic.   Mouth/Throat: Oropharynx is clear and moist.   Eyes: EOM are normal. Pupils are equal, round, and reactive to light.   Neck: Neck supple. No thyromegaly present.   Cardiovascular: Normal rate, regular rhythm and normal heart sounds.  Exam reveals no gallop and no friction rub.    No murmur heard.  Pulmonary/Chest: She has no wheezes. She has no rales. She exhibits no tenderness.   B bases   Abdominal: There is no tenderness. There is no rebound and no guarding.       Right Side Rheumatological Exam     Examination finds the elbow normal.    The patient is tender to palpation of the shoulder, wrist, knee, 1st PIP, 1st MCP, 2nd PIP, 2nd MCP, 3rd PIP, 3rd MCP, 4th PIP, 4th MCP, 5th PIP and 5th MCP    She has swelling of the 1st PIP, 1st MCP, 2nd PIP, 2nd MCP, 3rd PIP, 3rd MCP, 4th PIP, 4th MCP, 5th PIP and 5th MCP    Shoulder Exam   Tenderness Location: no tenderness    Range of Motion   Active abduction: abnormal   Adduction: abnormal  Sensation: normal    Knee Exam   Patellofemoral Crepitus: positive  Effusion: negative  Sensation: normal    Hip Exam   Tenderness Location: posterior  Sensation: normal    Elbow/Wrist Exam   Tenderness Location: no tenderness  Sensation: normal    Left Side Rheumatological Exam     Examination finds the elbow normal.    The patient is tender to palpation of the shoulder, wrist, knee, 1st PIP, 1st MCP, 2nd PIP, 2nd MCP, 3rd PIP, 3rd MCP, 4th PIP, 4th MCP, 5th PIP and 5th MCP.    She " has swelling of the 1st PIP, 1st MCP, 2nd PIP, 2nd MCP, 3rd PIP, 3rd MCP, 4th PIP, 4th MCP, 5th PIP and 5th MCP    Shoulder Exam   Tenderness Location: no tenderness    Range of Motion   Active abduction: abnormal   Sensation: normal    Knee Exam     Patellofemoral Crepitus: positive  Effusion: negative  Sensation: normal    Hip Exam   Tenderness Location: posterior  Sensation: normal    Elbow/Wrist Exam   Sensation: normal      Back/Neck Exam   General Inspection   Gait: normal and abnormal       Tenderness Right paramedian tenderness of the Lower L-Spine, Upper L-Spine and SI Joint.Left paramedian tenderness of the Lower L-Spine, Upper L-Spine and SI Joint.    Back Range of Motion   Extension: abnormal  Flexion: abnormal  Lateral Bend Right: abnormal  Lateral Bend Left: abnormal  Rotation Right: abnormal  Rotation Left: abnormal    Neck Range of Motion   Flexion: Limited  Extension: Limited  Lymphadenopathy:     She has no cervical adenopathy.   Neurological: She is alert and oriented to person, place, and time. Gait normal.   Skin: No rash noted. No erythema. No pallor.     Psychiatric: Mood and affect normal.   Musculoskeletal: She exhibits edema, tenderness and deformity.   SI joint pain         Results for orders placed or performed in visit on 04/16/18   CBC auto differential   Result Value Ref Range    WBC 14.90 (H) 3.90 - 12.70 K/uL    RBC 4.70 4.00 - 5.40 M/uL    Hemoglobin 12.1 12.0 - 16.0 g/dL    Hematocrit 39.3 37.0 - 48.5 %    MCV 84 82 - 98 fL    MCH 25.7 (L) 27.0 - 31.0 pg    MCHC 30.8 (L) 32.0 - 36.0 g/dL    RDW 16.7 (H) 11.5 - 14.5 %    Platelets 330 150 - 350 K/uL    MPV 11.9 9.2 - 12.9 fL    Immature Granulocytes 0.8 (H) 0.0 - 0.5 %    Gran # (ANC) 8.2 (H) 1.8 - 7.7 K/uL    Immature Grans (Abs) 0.12 (H) 0.00 - 0.04 K/uL    Lymph # 5.1 (H) 1.0 - 4.8 K/uL    Mono # 1.3 (H) 0.3 - 1.0 K/uL    Eos # 0.2 0.0 - 0.5 K/uL    Baso # 0.15 0.00 - 0.20 K/uL    nRBC 0 0 /100 WBC    Gran% 54.7 38.0 - 73.0 %     Lymph% 33.9 18.0 - 48.0 %    Mono% 8.6 4.0 - 15.0 %    Eosinophil% 1.0 0.0 - 8.0 %    Basophil% 1.0 0.0 - 1.9 %    Differential Method Automated    Comprehensive metabolic panel   Result Value Ref Range    Sodium 142 136 - 145 mmol/L    Potassium 4.2 3.5 - 5.1 mmol/L    Chloride 108 95 - 110 mmol/L    CO2 24 23 - 29 mmol/L    Glucose 76 70 - 110 mg/dL    BUN, Bld 15 8 - 23 mg/dL    Creatinine 0.8 0.5 - 1.4 mg/dL    Calcium 9.4 8.7 - 10.5 mg/dL    Total Protein 7.0 6.0 - 8.4 g/dL    Albumin 3.1 (L) 3.5 - 5.2 g/dL    Total Bilirubin 0.3 0.1 - 1.0 mg/dL    Alkaline Phosphatase 226 (H) 55 - 135 U/L    AST 18 10 - 40 U/L    ALT 22 10 - 44 U/L    Anion Gap 10 8 - 16 mmol/L    eGFR if African American >60.0 >60 mL/min/1.73 m^2    eGFR if non African American >60.0 >60 mL/min/1.73 m^2   C-reactive protein   Result Value Ref Range    CRP 17.1 (H) 0.0 - 8.2 mg/L   Sedimentation rate, manual   Result Value Ref Range    Sed Rate 32 (H) 0 - 20 mm/Hr   Sjogrens syndrome-A extractable nuclear antibody   Result Value Ref Range    Anti-SSA Antibody 0.75 0.00 - 19.99 EU    Anti-SSA Interpretation Negative Negative   Sjogrens syndrome-B extractable nuclear antibody   Result Value Ref Range    Anti-SSB Antibody 25.30 (H) 0.00 - 19.99 EU    Anti-SSB Interpretation Positive (A) Negative   BRAD   Result Value Ref Range    BRAD Screen Positive (A) Negative <1:160   Hemoglobin A1c   Result Value Ref Range    Hemoglobin A1C 6.2 (H) 4.0 - 5.6 %    Estimated Avg Glucose 131 68 - 131 mg/dL   Lipid panel   Result Value Ref Range    Cholesterol 209 (H) 120 - 199 mg/dL    Triglycerides 168 (H) 30 - 150 mg/dL    HDL 51 40 - 75 mg/dL    LDL Cholesterol 124.4 63.0 - 159.0 mg/dL    HDL/Chol Ratio 24.4 20.0 - 50.0 %    Total Cholesterol/HDL Ratio 4.1 2.0 - 5.0    Non-HDL Cholesterol 158 mg/dL   BRAD Titer   Result Value Ref Range    BRAD HEP-2 Titer Positive 1:320 Homogeneous     BRAD Profile   Result Value Ref Range    Anti Sm Antibody 0.00 0.00 - 19.99 EU     Anti-Sm Interpretation Negative Negative    Anti-SSA Antibody 0.75 0.00 - 19.99 EU    Anti-SSA Interpretation Negative Negative    Anti-SSB Antibody 25.30 (H) 0.00 - 19.99 EU    Anti-SSB Interpretation Positive (A) Negative    ds DNA Ab Negative 1:10 Negative 1:10    Anti Sm/RNP Antibody 0.00 0.00 - 19.99 EU    Anti-Sm/RNP Interpretation Negative Negative          Assessment:       Encounter Diagnoses   Name Primary?    Rheumatoid arthritis with positive rheumatoid factor, involving unspecified site Yes    Sjogren's syndrome, with unspecified organ involvement     Anemia, unspecified type     Lumbar and sacral arthritis     Comment: surgery of the spine    Wrist pain, chronic, right          Plan:       Rheumatoid arthritis with positive rheumatoid factor, involving unspecified site  -     CBC auto differential; Future; Expected date: 10/15/2018  -     Comprehensive metabolic panel; Future; Expected date: 10/15/2018  -     Iron and TIBC; Future; Expected date: 10/15/2018  -     Rheumatoid factor; Future; Expected date: 10/15/2018  -     C-reactive protein; Future; Expected date: 10/15/2018  -     Cyclic citrul peptide antibody, IgG; Future; Expected date: 10/15/2018  -     Hepatitis B core antibody, IgM; Future; Expected date: 10/15/2018  -     Hepatitis C antibody; Future; Expected date: 10/15/2018  -     Quantiferon Gold TB; Future; Expected date: 10/15/2018  -     Sedimentation rate; Future; Expected date: 10/15/2018  -     methylPREDNISolone acetate injection 160 mg; Inject 2 mLs (160 mg total) into the muscle one time.  -     ketorolac injection 60 mg; Inject 2 mLs (60 mg total) into the muscle one time.  -     tofacitinib (XELJANZ XR) 11 mg Tb24; Take 11 mg by mouth Daily.  Dispense: 30 tablet; Refill: 11  -     Discontinue: HYDROcodone-acetaminophen (NORCO)  mg per tablet; TAKE 1 TABLET BY MOUTH  q6 hrs TIMES DAILY AS NEEDED  Dispense: 100 tablet; Refill: 0  -     Discontinue:  HYDROcodone-acetaminophen (NORCO)  mg per tablet; TAKE 1 TABLET BY MOUTH  q 8 hrs TIMES DAILY AS NEEDED  Dispense: 90 tablet; Refill: 0  -     HYDROcodone-acetaminophen (NORCO)  mg per tablet; TAKE 1 TABLET BY MOUTH  q 8 hrs TIMES DAILY AS NEEDED  Dispense: 90 tablet; Refill: 0  -     MOTORIZED SCOOTER FOR HOME USE    Sjogren's syndrome, with unspecified organ involvement  -     CBC auto differential; Future; Expected date: 10/15/2018  -     Comprehensive metabolic panel; Future; Expected date: 10/15/2018  -     Iron and TIBC; Future; Expected date: 10/15/2018  -     Rheumatoid factor; Future; Expected date: 10/15/2018  -     C-reactive protein; Future; Expected date: 10/15/2018  -     Cyclic citrul peptide antibody, IgG; Future; Expected date: 10/15/2018  -     Hepatitis B core antibody, IgM; Future; Expected date: 10/15/2018  -     Hepatitis C antibody; Future; Expected date: 10/15/2018  -     Quantiferon Gold TB; Future; Expected date: 10/15/2018  -     Sedimentation rate; Future; Expected date: 10/15/2018  -     methylPREDNISolone acetate injection 160 mg; Inject 2 mLs (160 mg total) into the muscle one time.  -     ketorolac injection 60 mg; Inject 2 mLs (60 mg total) into the muscle one time.  -     tofacitinib (XELJANZ XR) 11 mg Tb24; Take 11 mg by mouth Daily.  Dispense: 30 tablet; Refill: 11  -     Discontinue: HYDROcodone-acetaminophen (NORCO)  mg per tablet; TAKE 1 TABLET BY MOUTH  q6 hrs TIMES DAILY AS NEEDED  Dispense: 100 tablet; Refill: 0  -     Discontinue: HYDROcodone-acetaminophen (NORCO)  mg per tablet; TAKE 1 TABLET BY MOUTH  q 8 hrs TIMES DAILY AS NEEDED  Dispense: 90 tablet; Refill: 0  -     HYDROcodone-acetaminophen (NORCO)  mg per tablet; TAKE 1 TABLET BY MOUTH  q 8 hrs TIMES DAILY AS NEEDED  Dispense: 90 tablet; Refill: 0  -     MOTORIZED SCOOTER FOR HOME USE    Anemia, unspecified type  -     CBC auto differential; Future; Expected date: 10/15/2018  -      Comprehensive metabolic panel; Future; Expected date: 10/15/2018  -     Iron and TIBC; Future; Expected date: 10/15/2018  -     Rheumatoid factor; Future; Expected date: 10/15/2018  -     C-reactive protein; Future; Expected date: 10/15/2018  -     Cyclic citrul peptide antibody, IgG; Future; Expected date: 10/15/2018  -     Hepatitis B core antibody, IgM; Future; Expected date: 10/15/2018  -     Hepatitis C antibody; Future; Expected date: 10/15/2018  -     Quantiferon Gold TB; Future; Expected date: 10/15/2018  -     Sedimentation rate; Future; Expected date: 10/15/2018  -     methylPREDNISolone acetate injection 160 mg; Inject 2 mLs (160 mg total) into the muscle one time.  -     ketorolac injection 60 mg; Inject 2 mLs (60 mg total) into the muscle one time.  -     tofacitinib (XELJANZ XR) 11 mg Tb24; Take 11 mg by mouth Daily.  Dispense: 30 tablet; Refill: 11  -     MOTORIZED SCOOTER FOR HOME USE    Lumbar and sacral arthritis  Comments:  surgery of the spine  Orders:  -     tofacitinib (XELJANZ XR) 11 mg Tb24; Take 11 mg by mouth Daily.  Dispense: 30 tablet; Refill: 11  -     Discontinue: HYDROcodone-acetaminophen (NORCO)  mg per tablet; TAKE 1 TABLET BY MOUTH  q6 hrs TIMES DAILY AS NEEDED  Dispense: 100 tablet; Refill: 0  -     Discontinue: HYDROcodone-acetaminophen (NORCO)  mg per tablet; TAKE 1 TABLET BY MOUTH  q 8 hrs TIMES DAILY AS NEEDED  Dispense: 90 tablet; Refill: 0  -     HYDROcodone-acetaminophen (NORCO)  mg per tablet; TAKE 1 TABLET BY MOUTH  q 8 hrs TIMES DAILY AS NEEDED  Dispense: 90 tablet; Refill: 0  -     MOTORIZED SCOOTER FOR HOME USE    Wrist pain, chronic, right  -     Intermediate Joint Aspiration/Injection: R radiocarpal  -     MOTORIZED SCOOTER FOR HOME USE    Primary osteoarthritis of both knees  -     Discontinue: HYDROcodone-acetaminophen (NORCO)  mg per tablet; TAKE 1 TABLET BY MOUTH  q6 hrs TIMES DAILY AS NEEDED  Dispense: 100 tablet; Refill: 0  -      Discontinue: HYDROcodone-acetaminophen (NORCO)  mg per tablet; TAKE 1 TABLET BY MOUTH  q 8 hrs TIMES DAILY AS NEEDED  Dispense: 90 tablet; Refill: 0  -     HYDROcodone-acetaminophen (NORCO)  mg per tablet; TAKE 1 TABLET BY MOUTH  q 8 hrs TIMES DAILY AS NEEDED  Dispense: 90 tablet; Refill: 0  -     MOTORIZED SCOOTER FOR HOME USE    Lumbar and sacral arthritis  -     tofacitinib (XELJANZ XR) 11 mg Tb24; Take 11 mg by mouth Daily.  Dispense: 30 tablet; Refill: 11  -     Discontinue: HYDROcodone-acetaminophen (NORCO)  mg per tablet; TAKE 1 TABLET BY MOUTH  q6 hrs TIMES DAILY AS NEEDED  Dispense: 100 tablet; Refill: 0  -     Discontinue: HYDROcodone-acetaminophen (NORCO)  mg per tablet; TAKE 1 TABLET BY MOUTH  q 8 hrs TIMES DAILY AS NEEDED  Dispense: 90 tablet; Refill: 0  -     HYDROcodone-acetaminophen (NORCO)  mg per tablet; TAKE 1 TABLET BY MOUTH  q 8 hrs TIMES DAILY AS NEEDED  Dispense: 90 tablet; Refill: 0  -     MOTORIZED SCOOTER FOR HOME USE    Chronic pain of right knee  -     Discontinue: HYDROcodone-acetaminophen (NORCO)  mg per tablet; TAKE 1 TABLET BY MOUTH  q6 hrs TIMES DAILY AS NEEDED  Dispense: 100 tablet; Refill: 0  -     Discontinue: HYDROcodone-acetaminophen (NORCO)  mg per tablet; TAKE 1 TABLET BY MOUTH  q 8 hrs TIMES DAILY AS NEEDED  Dispense: 90 tablet; Refill: 0  -     HYDROcodone-acetaminophen (NORCO)  mg per tablet; TAKE 1 TABLET BY MOUTH  q 8 hrs TIMES DAILY AS NEEDED  Dispense: 90 tablet; Refill: 0  -     MOTORIZED SCOOTER FOR HOME USE     p   Pt has tried and failed arava, methotrexate, plaquenil, imuran, enbrel samples

## 2018-10-16 LAB
HBV CORE IGM SERPL QL IA: NEGATIVE
HCV AB SERPL QL IA: NEGATIVE

## 2018-10-18 NOTE — TELEPHONE ENCOUNTER
DOCUMENTATION ONLY:  Prior authorization for Xeljanz approved from 7/20/18 to 10/17/21    Co-pay: $3.70    Patient Assistance is not required

## 2018-10-21 RX ORDER — METHYLPREDNISOLONE ACETATE 80 MG/ML
80 INJECTION, SUSPENSION INTRA-ARTICULAR; INTRALESIONAL; INTRAMUSCULAR; SOFT TISSUE
Status: DISCONTINUED | OUTPATIENT
Start: 2018-10-15 | End: 2018-10-22 | Stop reason: HOSPADM

## 2018-10-23 NOTE — TELEPHONE ENCOUNTER
----- Message from Keren Lowe sent at 9/14/2017  8:44 AM CDT -----  Contact: pt  Pt states that pharmacy is saying her prescription has not been faxed over so she would like a call to see why,please.      1. What is the name of the medication you are requesting? LASIX,albuterol, NORCO  2. What is the dose? 40 mg,10-325mg   3. How do you take the medication? Orally, topically, etc?oral ,inhaler  4. How often do you take this medication? Take 40 mg by mouth 2 (two) times daily,Inhale 2 puffs into the lungs every 6 (six) hours as needed for Wheezing,TAKE 1 TABLET BY MOUTH THREE TIMES DAILY AS NEEDED  5. Do you need a 30 day or 90 day supply? 90  6. How many refills are you requesting? Up to dr  7. What is your preferred pharmacy and location of the pharmacy?   [facility] [place] - [address]  [address] [place] [address]  Phone: [phone] Fax: [phone]    8. Who can we contact with further questions? .[phone]       
Spoke to pt, she is upset that her medications have not been sent to her pharmacy yet. Advised that it is a 72 business hour request for refills. Pt advises that pharamcy has supposedly been faxing us requests for 3 days. Apologized as nurse did not have those requests only the one phone request from patient in the system for Norco. Pt advised she also needs lasix and albuterol refilled, but that is filled by Dr. Hernandez. Dr. Johnson and Dr. Hernandez has already submitted all refills this morning.   
[employer] school

## 2018-10-24 ENCOUNTER — TELEPHONE (OUTPATIENT)
Dept: PHARMACY | Facility: CLINIC | Age: 73
End: 2018-10-24

## 2018-10-24 NOTE — TELEPHONE ENCOUNTER
Initial Xejanz consult completed on 10/24 . Xeljanz will be shipped on 10/25 to arrive at patient's home on 10/26 via [employer]. $3.70 copay. Patient will start Xeljanz XR on 10/29. Address confirmed. Confirmed 2 patient identifiers - name and . Therapy Appropriate.     Patient was counseled on the administration directions for Xeljanz XR:  -Take 1 tablet (11mg) by mouth once daily, with or without food  -If dose is missed, skip the missed dose and go back to your normal time.  Do not take 2 doses at the same time or extra doses    Patient was counseled on the following possible side effects, which include, but are not limited to:   -diarrhea, headache, cold like symptoms - runny nose, stuffy nose, sore throat.  Contact provider if allergic reaction, changes in heartbeat, muscle pain or weakness, signs of infection, liver problems - dark urine, fatigue, light-colored stools, yellow skin or eyes.       DDIs:  Medication list reviewed, potential DDI addressed      Patient confirmed understanding. Patient did not have additional questions.  Patient will start Xeljanz XR on 10/29. Consultation included: indication; goals of treatment; administration; storage and handling; side effects; how to handle side effects; the importance of compliance; how to handle missed doses; the importance of laboratory monitoring; the importance of keeping all follow up appointments.  Patient understands to report any medication changes to [facility] and provider. All questions answered and addressed to patients satisfaction. I will f/u with her in 1 week from start, [facility] to contact patient in 3 weeks for refills.

## 2018-10-25 ENCOUNTER — TELEPHONE (OUTPATIENT)
Dept: RHEUMATOLOGY | Facility: CLINIC | Age: 73
End: 2018-10-25

## 2018-10-25 DIAGNOSIS — J32.9 SINUSITIS, UNSPECIFIED CHRONICITY, UNSPECIFIED LOCATION: Primary | ICD-10-CM

## 2018-10-25 RX ORDER — AZITHROMYCIN 250 MG/1
TABLET, FILM COATED ORAL
Qty: 6 TABLET | Refills: 0 | Status: CANCELLED | OUTPATIENT
Start: 2018-10-25 | End: 2018-10-30

## 2018-10-25 NOTE — TELEPHONE ENCOUNTER
----- Message from Renetta Al sent at 10/25/2018 10:20 AM CDT -----  Contact: self  Patient need to speak to nurse regarding patient states she need antibiotic for sinus infection    Please call to advice [phone] (home)         [facility] [place] [id number] [facility]  [place] [address]  Phone: [phone] Fax: [phone]

## 2018-10-25 NOTE — TELEPHONE ENCOUNTER
Returned patient call regarding sinus infection. Patient stated she was informed not to start xeljanz until sinus infection gone. Discussed with Dr Johnson received verbal order to call in zpack.

## 2018-10-26 DIAGNOSIS — E11.9 TYPE 2 DIABETES MELLITUS WITHOUT COMPLICATION: ICD-10-CM

## 2018-11-02 RX ORDER — AMLODIPINE AND BENAZEPRIL HYDROCHLORIDE 5; 20 MG/1; MG/1
CAPSULE ORAL
Qty: 30 CAPSULE | Refills: 3 | Status: SHIPPED | OUTPATIENT
Start: 2018-11-02 | End: 2019-01-23

## 2018-11-20 ENCOUNTER — TELEPHONE (OUTPATIENT)
Dept: RHEUMATOLOGY | Facility: CLINIC | Age: 73
End: 2018-11-20

## 2018-11-20 NOTE — TELEPHONE ENCOUNTER
----- Message from Taina Jung sent at 11/20/2018 11:47 AM CST -----  Contact: Patient  Type: Needs Medical Advice    Who Called:  Patient  Symptoms (please be specific):  Possible infection- she doesn't know what kind she has  How long has patient had these symptoms:  na  Pharmacy name and phone #:    [facility] [place] - [address] [facility]  [address] [place] [address]  Phone: [phone] Fax: [phone]  Best Call Back Number: [phone]  Additional Information: Calling to speak with the Nurse to request an antibiotic. Please advise.

## 2018-11-20 NOTE — TELEPHONE ENCOUNTER
Spoke to pt, she thinks she has bronchitis or asthma and is requesting abx with refills to have on since she gets frequently. She reports [facility] advised her to hold until cough has cleared. Pt refuses to seek evaluation with UC or primary physician. Advised that she has been recurrently calling with bronchitis and requested abx without evaluation that she needs to be evaluated. Pt advised she will wait to hear from Dr. Johnson, that Dr. Johnson will take care of what she needs and she will hold Xeljanz until then. Advised Dr. Johnson is out of the office for the week and will be internmentally answering messages. Nurse will send message.

## 2018-11-21 NOTE — TELEPHONE ENCOUNTER
Spoke to pt, gave Ariana's advisement. Pt is upset, states she has bronchial asthma and just needs something to make cough go away. Advised we are unable to treat as we don't fully know what she has going on. Pt refuses to go get evaluated with chest xray. Pt advises her bronchitis never goes away and we just need to treat her with something. Asked pt if she sees specialist for her asthma, she reports she sees someone that does the lung tests. Advised she needs to get in with them for further evaluation. Pt again is upset and inquires if I am allowed to make decisions without Dr. Johnson's presence. Advised the nurses are allowed to make proper judgement along with our PA that has advised. Advised that we treat her arthritis not her lung condition. Pt would like to speak to Dr. Johnson directly this week.

## 2018-11-21 NOTE — TELEPHONE ENCOUNTER
Patient needs to be evaluated before further abx. She was given zpack last month and labs at that time were consistent with possible infection. She needs to be seen and possibly have a chest x-ray for complete evaluation to rule out pneumonia or CHF.

## 2018-12-08 DIAGNOSIS — M17.0 PRIMARY OSTEOARTHRITIS OF BOTH KNEES: ICD-10-CM

## 2018-12-08 DIAGNOSIS — M35.00 SJOGREN'S SYNDROME, WITH UNSPECIFIED ORGAN INVOLVEMENT: ICD-10-CM

## 2018-12-08 DIAGNOSIS — M05.9 RHEUMATOID ARTHRITIS WITH POSITIVE RHEUMATOID FACTOR, INVOLVING UNSPECIFIED SITE: ICD-10-CM

## 2018-12-08 DIAGNOSIS — M47.817 LUMBAR AND SACRAL ARTHRITIS: ICD-10-CM

## 2018-12-10 RX ORDER — LEFLUNOMIDE 10 MG/1
10 TABLET ORAL DAILY
Qty: 30 TABLET | Refills: 5 | Status: SHIPPED | OUTPATIENT
Start: 2018-12-10 | End: 2019-03-21 | Stop reason: SDUPTHER

## 2018-12-18 DIAGNOSIS — M47.817 LUMBAR AND SACRAL ARTHRITIS: ICD-10-CM

## 2018-12-18 DIAGNOSIS — M17.0 PRIMARY OSTEOARTHRITIS OF BOTH KNEES: ICD-10-CM

## 2018-12-18 DIAGNOSIS — M05.9 RHEUMATOID ARTHRITIS WITH POSITIVE RHEUMATOID FACTOR, INVOLVING UNSPECIFIED SITE: ICD-10-CM

## 2018-12-18 DIAGNOSIS — G89.29 CHRONIC PAIN OF RIGHT KNEE: ICD-10-CM

## 2018-12-18 DIAGNOSIS — M25.561 CHRONIC PAIN OF RIGHT KNEE: ICD-10-CM

## 2018-12-18 DIAGNOSIS — M35.00 SJOGREN'S SYNDROME, WITH UNSPECIFIED ORGAN INVOLVEMENT: ICD-10-CM

## 2018-12-21 RX ORDER — HYDROCODONE BITARTRATE AND ACETAMINOPHEN 10; 325 MG/1; MG/1
TABLET ORAL
Qty: 90 TABLET | Refills: 0 | Status: SHIPPED | OUTPATIENT
Start: 2018-12-21 | End: 2019-02-14 | Stop reason: SDUPTHER

## 2018-12-24 ENCOUNTER — TELEPHONE (OUTPATIENT)
Dept: PHARMACY | Facility: CLINIC | Age: 73
End: 2018-12-24

## 2019-01-09 ENCOUNTER — PATIENT OUTREACH (OUTPATIENT)
Dept: ADMINISTRATIVE | Facility: HOSPITAL | Age: 74
End: 2019-01-09

## 2019-01-09 ENCOUNTER — TELEPHONE (OUTPATIENT)
Dept: RHEUMATOLOGY | Facility: CLINIC | Age: 74
End: 2019-01-09

## 2019-01-09 NOTE — TELEPHONE ENCOUNTER
----- Message from Natalie Stark sent at 1/9/2019 10:28 AM CST -----  Contact: Rekha  Type: Needs Medical Advice    Who Called:  patient  Pharmacy name and phone #:    [facility] [place] - [id number] [facility]  [place] [address]  Phone: [phone] Fax: [phone]  Best Call Back Number: [phone]  Additional Information: patient is not doing well--she is suffering with RA & she can't take the medication that was called in to the [facility] on [address]--she needs something sent in that she can take for her RA regardless of any other illness she may have--would like a call back today--please advise--thank you

## 2019-01-09 NOTE — PROGRESS NOTES
Health Maintenance reviewed. Pre visit chart audit letter sent. Dexa scan and Mammogram order pended.

## 2019-01-09 NOTE — LETTER
January 9, 2019        Rekha Miller  [address] [facility] [address]      Dear Mrs. Miller,    You have an upcoming appointment with Roberto Hernandez MD on 1/23/19 @ 2:00 pm.      Your chart is indicating you may be due for the following and I will be happy to assist you in scheduling any needed appointments:  Health Maintenance Due   Topic    DEXA SCAN     Pneumococcal Vaccine (65+ Low/Medium Risk) (2 of 2 - PCV13)    Mammogram     Influenza Vaccine     Hemoglobin A1c     Eye Exam      If you have had any of the above done at another facility, please bring the records or information with you so that your record at [facility] will be complete.    We will be happy to assist you with scheduling any necessary appointments or you may contact the [facility] appointment desk at [phone] to schedule at your convenience.     Thank you for choosing [facility] for your healthcare needs,      ROSANNE Jean-Baptiste  Care Coordination Department  [facility]  [phone]

## 2019-01-23 ENCOUNTER — OFFICE VISIT (OUTPATIENT)
Dept: FAMILY MEDICINE | Facility: CLINIC | Age: 74
End: 2019-01-23
Payer: MEDICARE

## 2019-01-23 VITALS
WEIGHT: 255 LBS | BODY MASS INDEX: 40.98 KG/M2 | SYSTOLIC BLOOD PRESSURE: 114 MMHG | HEART RATE: 70 BPM | HEIGHT: 66 IN | TEMPERATURE: 98 F | DIASTOLIC BLOOD PRESSURE: 62 MMHG

## 2019-01-23 DIAGNOSIS — M81.0 OSTEOPOROSIS, UNSPECIFIED OSTEOPOROSIS TYPE, UNSPECIFIED PATHOLOGICAL FRACTURE PRESENCE: ICD-10-CM

## 2019-01-23 DIAGNOSIS — F19.20 CORTICOSTEROID DEPENDENCE: ICD-10-CM

## 2019-01-23 DIAGNOSIS — E11.69 TYPE 2 DIABETES MELLITUS WITH HYPERLIPIDEMIA: ICD-10-CM

## 2019-01-23 DIAGNOSIS — Z12.31 SCREENING MAMMOGRAM, ENCOUNTER FOR: Primary | ICD-10-CM

## 2019-01-23 DIAGNOSIS — Z13.820 ENCOUNTER FOR SCREENING FOR OSTEOPOROSIS: ICD-10-CM

## 2019-01-23 DIAGNOSIS — E78.5 TYPE 2 DIABETES MELLITUS WITH HYPERLIPIDEMIA: ICD-10-CM

## 2019-01-23 DIAGNOSIS — M05.9 RHEUMATOID ARTHRITIS WITH POSITIVE RHEUMATOID FACTOR, INVOLVING UNSPECIFIED SITE: ICD-10-CM

## 2019-01-23 DIAGNOSIS — J45.40 MODERATE PERSISTENT ASTHMA WITHOUT COMPLICATION: ICD-10-CM

## 2019-01-23 DIAGNOSIS — I15.2 HYPERTENSION ASSOCIATED WITH DIABETES: ICD-10-CM

## 2019-01-23 DIAGNOSIS — E11.59 HYPERTENSION ASSOCIATED WITH DIABETES: ICD-10-CM

## 2019-01-23 DIAGNOSIS — E11.9 TYPE 2 DIABETES MELLITUS WITHOUT COMPLICATION, WITHOUT LONG-TERM CURRENT USE OF INSULIN: ICD-10-CM

## 2019-01-23 LAB
ALBUMIN/CREAT UR: 7 UG/MG
CREAT UR-MCNC: 43 MG/DL
MICROALBUMIN UR DL<=1MG/L-MCNC: 3 UG/ML

## 2019-01-23 PROCEDURE — 99214 OFFICE O/P EST MOD 30 MIN: CPT | Mod: S$PBB,,, | Performed by: FAMILY MEDICINE

## 2019-01-23 PROCEDURE — 99999 PR PBB SHADOW E&M-EST. PATIENT-LVL IV: ICD-10-PCS | Mod: PBBFAC,,, | Performed by: FAMILY MEDICINE

## 2019-01-23 PROCEDURE — 82043 UR ALBUMIN QUANTITATIVE: CPT

## 2019-01-23 PROCEDURE — 99999 PR PBB SHADOW E&M-EST. PATIENT-LVL IV: CPT | Mod: PBBFAC,,, | Performed by: FAMILY MEDICINE

## 2019-01-23 PROCEDURE — 99214 OFFICE O/P EST MOD 30 MIN: CPT | Mod: PBBFAC,PO | Performed by: FAMILY MEDICINE

## 2019-01-23 PROCEDURE — 99214 PR OFFICE/OUTPT VISIT, EST, LEVL IV, 30-39 MIN: ICD-10-PCS | Mod: S$PBB,,, | Performed by: FAMILY MEDICINE

## 2019-01-23 RX ORDER — AMLODIPINE AND BENAZEPRIL HYDROCHLORIDE 5; 20 MG/1; MG/1
1 CAPSULE ORAL 2 TIMES DAILY
Qty: 180 CAPSULE | Refills: 3 | Status: SHIPPED | OUTPATIENT
Start: 2019-01-23 | End: 2019-12-30 | Stop reason: SDUPTHER

## 2019-01-23 RX ORDER — FUROSEMIDE 40 MG/1
40 TABLET ORAL DAILY
Qty: 90 TABLET | Refills: 3 | Status: SHIPPED | OUTPATIENT
Start: 2019-01-23 | End: 2019-06-05 | Stop reason: SDUPTHER

## 2019-01-23 RX ORDER — CLOTRIMAZOLE AND BETAMETHASONE DIPROPIONATE 10; .64 MG/G; MG/G
CREAM TOPICAL 2 TIMES DAILY
Qty: 45 G | Refills: 6 | Status: SHIPPED | OUTPATIENT
Start: 2019-01-23 | End: 2020-07-06 | Stop reason: SDUPTHER

## 2019-01-23 RX ORDER — POTASSIUM CHLORIDE 20 MEQ/1
20 TABLET, EXTENDED RELEASE ORAL 2 TIMES DAILY
Qty: 180 TABLET | Refills: 3 | Status: SHIPPED | OUTPATIENT
Start: 2019-01-23 | End: 2020-07-06 | Stop reason: SDUPTHER

## 2019-01-23 RX ORDER — GLIPIZIDE 5 MG/1
5 TABLET, FILM COATED, EXTENDED RELEASE ORAL
Qty: 90 TABLET | Refills: 3 | Status: SHIPPED | OUTPATIENT
Start: 2019-01-23 | End: 2020-01-31

## 2019-01-23 NOTE — PROGRESS NOTES
Rekha Miller presents to fu  DM last a1c <7no hypoglycemia.HBP not well controlled but kevyn current med  Follows w Dr Johnson dx RA,Sjogrens,  Co neuropathy in feet and sciatica. Still has sig prob ambulating       Past Medical History:   Diagnosis Date    Asthma     COPD (chronic obstructive pulmonary disease)     Degenerative disc disease     Diabetes mellitus     Diabetes mellitus, type 2     Fibromyalgia     Hypertension     Rheumatoid arthritis     Rheumatoid arthritis(714.0)     Rheumatoid arthritis     Past Surgical History:   Procedure Laterality Date    BREAST SURGERY  1986    CATARACT EXTRACTION      EYE SURGERY  2013    Catatracts    ORIF FEMUR FRACTURE      right knee ligament rpeair  2005    right shoulder surgery  4/18/07    Dr. Gao     Review of patient's allergies indicates:   Allergen Reactions    Codeine     Dairy digestive ultra      Dairy products      Imuran [azathioprine sodium] Diarrhea    Morphine      DOESN'T FEEL RIGHT     Plaquenil [hydroxychloroquine] Other (See Comments)     headaches     Current Outpatient Medications on File Prior to Visit   Medication Sig Dispense Refill    acetaminophen (TYLENOL) 325 MG tablet Take 650 mg by mouth as needed.      albuterol (PROVENTIL) 2.5 mg /3 mL (0.083 %) nebulizer solution USE 1 VIAL IN NEBULIZER EVERY 6 HOURS AS NEEDED FOR WHEEZING 180 mL 3    albuterol 90 mcg/actuation inhaler Inhale 2 puffs into the lungs every 6 (six) hours as needed for Wheezing. 18 g 11    amlodipine-benazepril 5-20 mg (LOTREL) 5-20 mg per capsule TAKE ONE CAPSULE BY MOUTH DAILY BY MOUTH EVERY DAY 30 capsule 3    blood sugar diagnostic (TRUETEST TEST STRIPS) Strp Inject 1 strip into the skin once daily. 100 strip 11    cane Lauren 1 each by Misc.(Non-Drug; Combo Route) route once daily. 1 each 0    furosemide (LASIX) 40 MG tablet Take 1 tablet (40 mg total) by mouth once daily. 30 tablet 5    gabapentin (NEURONTIN) 300 MG capsule Take 1 capsule  (300 mg total) by mouth 3 (three) times daily. 90 capsule 11    glipiZIDE (GLUCOTROL XL) 5 MG TR24 Take 1 tablet (5 mg total) by mouth daily with breakfast. 90 tablet 3    HYDROcodone-acetaminophen (NORCO)  mg per tablet TAKE 1 TABLET BY MOUTH EVERY 8 HOURS AS NEEDED 90 tablet 0    leflunomide (ARAVA) 10 MG Tab Take 1 tablet (10 mg total) by mouth once daily. 30 tablet 5    levocetirizine (XYZAL) 5 MG tablet Take 5 mg by mouth.      montelukast (SINGULAIR) 10 mg tablet Take 10 mg by mouth once daily.   0    potassium chloride SA (K-DUR,KLOR-CON) 20 MEQ tablet TAKE 1 TABLET BY MOUTH TWICE DAILY 180 tablet 4    predniSONE (DELTASONE) 5 MG tablet TAKE 1 TABLET BY MOUTH THREE TIMES DAILY 90 tablet 3    ranitidine (ZANTAC) 300 MG tablet Take 1 tablet (300 mg total) by mouth every evening. 30 tablet 12    sucralfate (CARAFATE) 1 gram tablet TAKE 1 TABLET BY MOUTH FOUR TIMES DAILY to coat stomach 120 tablet 3    traZODone (DESYREL) 50 MG tablet Take 50 mg by mouth nightly.  5    TRUE METRIX GLUCOSE TEST STRIP Strp TEST BLOOD SUGAR TWICE DAILY 100 each 12    TRUETEST TEST STRIPS Strp test blood sugar ONCE DAILY AS DIRECTED 100 each 11    tofacitinib (XELJANZ XR) 11 mg Tb24 Take 1 tablet (11 mg) by mouth daily. 30 tablet 11     No current facility-administered medications on file prior to visit.      Social History     Socioeconomic History    Marital status:      Spouse name: Not on file    Number of children: 8    Years of education: Not on file    Highest education level: Not on file   Social Needs    Financial resource strain: Not on file    Food insecurity - worry: Not on file    Food insecurity - inability: Not on file    Transportation needs - medical: Not on file    Transportation needs - non-medical: Not on file   Occupational History    Not on file   Tobacco Use    Smoking status: Never Smoker   Substance and Sexual Activity    Alcohol use: Not on file    Drug use: No     Sexual activity: Not on file   Other Topics Concern    Not on file   Social History Narrative    Not on file     History reviewed. No pertinent family history.      ROS:  SKIN: No rashes, itching or changes in color or texture of skin.  EYES: Visual acuity fine. No photophobia, ocular pain or diplopia.EARS: Denies ear pain, discharge or vertigo.NOSE: No loss of smell, no epistaxis some postnasal drip.MOUTH & THROAT: No hoarseness or change in voice. No excessive gum bleeding.CHEST: Denies SOSA, cyanosis, wheezing  CARDIOVASCULAR: Denies chest pain, PND, orthopnea or reduced exercise tolerance.  ABDOMEN:  No weight loss.No abdominal pain, no hematemesis or blood in stool.  URINARY: No flank pain, dysuria or hematuria.  PERIPHERAL VASCULAR: No claudication or cyanosis.  MUSCULOSKELETAL:Myalgia and polyarthralgia  NEUROLOGIC: No history of seizures, paralysis, alteration of gait or coordination.    PE: Vital signs as noted  Heent:Normocephalic with no recent cranial trauma,PERRLA,EOMI,conjunctiva clear,fundi reveal no hemmorhage exudate or papilledema.Otic canals clear, tympanic membranes slightly dull bilaterally.Nasal mucosa slightly red and edematous.Posterior pharynx slightly red but without exudate.  Neck:Supple with minimal anterior cervical adenopathy.  Chest:Clear bilateral breath sounds with mild scattered ronchi and min exp wheezing   Heart:Regular rhthym without murmer  Abdomen:Soft, non tender,no masses, no hepatosplenomegaly  Extremeties Mod severe gen degenerative changes 1/1 pretibial pedal edema   Neurologic:Grossly within normal limits  Protective Sensation (w/ 10 gram monofilament): Random deficits bilat   Visual Inspection (Evidence of Foot Deformity, Ulceration, Nails Intact, Skin Intact):Normal  Pedal Pulses: Present    DM w HBP   Edema  Sjogrens syndrome  Fatigue  Neuropathy  Sciatica      Lab eval  Cont Elevate legs   Rev DM management-last a1c 6.2  Rec trial nani 3-900 hs   Rev diet recs wt  loss, exercise and Na

## 2019-02-04 ENCOUNTER — LAB VISIT (OUTPATIENT)
Dept: LAB | Facility: HOSPITAL | Age: 74
End: 2019-02-04
Attending: FAMILY MEDICINE
Payer: MEDICARE

## 2019-02-04 DIAGNOSIS — E11.9 TYPE 2 DIABETES MELLITUS WITHOUT COMPLICATION: ICD-10-CM

## 2019-02-04 LAB
ESTIMATED AVG GLUCOSE: 137 MG/DL
HBA1C MFR BLD HPLC: 6.4 %

## 2019-02-04 PROCEDURE — 36415 COLL VENOUS BLD VENIPUNCTURE: CPT | Mod: PO

## 2019-02-04 PROCEDURE — 83036 HEMOGLOBIN GLYCOSYLATED A1C: CPT

## 2019-02-05 ENCOUNTER — TELEPHONE (OUTPATIENT)
Dept: FAMILY MEDICINE | Facility: CLINIC | Age: 74
End: 2019-02-05

## 2019-02-05 DIAGNOSIS — E11.8 TYPE 2 DIABETES MELLITUS WITH COMPLICATION, UNSPECIFIED WHETHER LONG TERM INSULIN USE: Primary | ICD-10-CM

## 2019-02-06 ENCOUNTER — TELEPHONE (OUTPATIENT)
Dept: FAMILY MEDICINE | Facility: CLINIC | Age: 74
End: 2019-02-06

## 2019-02-06 RX ORDER — MONTELUKAST SODIUM 10 MG/1
10 TABLET ORAL NIGHTLY
Qty: 30 TABLET | Refills: 0 | Status: SHIPPED | OUTPATIENT
Start: 2019-02-06 | End: 2019-03-08

## 2019-02-06 NOTE — TELEPHONE ENCOUNTER
----- Message from Wild Manning sent at 2/6/2019  9:01 AM CST -----  Contact: Pt.   Pt is calling regarding requesting a script sinus infection. Pt is requesting  Amoxicillin, Pt states that this script works well.  ..[phone] (home)         .  [facility] [place]  [place] - [facility] [address]  Phone: [phone] Fax: [phone]    ..Thank You  Melly Manning

## 2019-02-06 NOTE — TELEPHONE ENCOUNTER
Pt notified that an appt is needed, states she has to have a few days notice in order to get transportation lined up. She is asking if something can be sent in for congestion with a yellowish discharge

## 2019-02-14 ENCOUNTER — TELEPHONE (OUTPATIENT)
Dept: RHEUMATOLOGY | Facility: CLINIC | Age: 74
End: 2019-02-14

## 2019-02-14 DIAGNOSIS — M35.00 SJOGREN'S SYNDROME, WITH UNSPECIFIED ORGAN INVOLVEMENT: ICD-10-CM

## 2019-02-14 DIAGNOSIS — B37.9 YEAST INFECTION: ICD-10-CM

## 2019-02-14 DIAGNOSIS — M05.9 RHEUMATOID ARTHRITIS WITH POSITIVE RHEUMATOID FACTOR, INVOLVING UNSPECIFIED SITE: ICD-10-CM

## 2019-02-14 DIAGNOSIS — M17.0 PRIMARY OSTEOARTHRITIS OF BOTH KNEES: ICD-10-CM

## 2019-02-14 DIAGNOSIS — M25.561 CHRONIC PAIN OF RIGHT KNEE: ICD-10-CM

## 2019-02-14 DIAGNOSIS — M47.817 LUMBAR AND SACRAL ARTHRITIS: ICD-10-CM

## 2019-02-14 DIAGNOSIS — J32.9 SINUSITIS, UNSPECIFIED CHRONICITY, UNSPECIFIED LOCATION: Primary | ICD-10-CM

## 2019-02-14 DIAGNOSIS — G89.29 CHRONIC PAIN OF RIGHT KNEE: ICD-10-CM

## 2019-02-14 RX ORDER — AMOXICILLIN 875 MG/1
875 TABLET, FILM COATED ORAL 2 TIMES DAILY
Qty: 20 TABLET | Refills: 0 | Status: SHIPPED | OUTPATIENT
Start: 2019-02-14 | End: 2019-02-24

## 2019-02-14 RX ORDER — HYDROCODONE BITARTRATE AND ACETAMINOPHEN 10; 325 MG/1; MG/1
TABLET ORAL
Qty: 90 TABLET | Refills: 0 | Status: CANCELLED | OUTPATIENT
Start: 2019-02-14

## 2019-02-14 RX ORDER — FLUCONAZOLE 150 MG/1
150 TABLET ORAL DAILY
Qty: 5 TABLET | Refills: 0 | Status: SHIPPED | OUTPATIENT
Start: 2019-02-14 | End: 2019-02-19

## 2019-02-14 NOTE — TELEPHONE ENCOUNTER
Thank you. Contacted patient regarding current illness. Patient informed nurse that she has chronic sinus issues and asthma. Stated she has tried to call office before now about this and no call back. Patient stated she has been coughing up yellow sputum no fever voice sounded raspy on the phone and complained of throat burning. Requesting antibiotics possibly amoxicillin stated it works better for this with her in the past. Informed would discuss with Dr Johnson and see what she advises. Patient  Also inquired why she has to take this medication since she has so many illnesses. Informed would discuss with Dr Johnson and call her back with advisement. Patient verbalized understanding.

## 2019-02-14 NOTE — TELEPHONE ENCOUNTER
----- Message from Lotus Maya, PharmD sent at 2/13/2019  2:56 PM CST -----  Regarding: Xeljanz  Good afternoon,    Just wanted to make you aware Ms. Miller has not yet started her Xeljanz. She received it on 10/26, and has not started it yet due to recurrent illness. Her next f/u appt is 3/21/19.     If there is anything I can do to further assist, please let me know.    Thank you,  Lotus Maya, PharmD  Clinical Pharmacist  [facility]  [phone]

## 2019-02-14 NOTE — TELEPHONE ENCOUNTER
Contacted patient and informed spoke with Dr Johnson regarding illness. Dr Johnson will call in amoxicillin to pharmacy. Patient requesting nose spray. Nothing listed on med card. Patient stated she has taken Flonase in the past. Informed that Flonase is over the counter. Patient verbalized understanding that Flonase is over the counter. Informed that Dr Johnson advised if getting sick this way frequently that she will need to see ENT to see why she is getting sinus infections often. Patient verbalized understanding about seeing ENT. Patient also requested refill on pain medication. Informed and refill request will be sent to Dr Johnson. Patient verbalized understanding.

## 2019-02-15 RX ORDER — HYDROCODONE BITARTRATE AND ACETAMINOPHEN 10; 325 MG/1; MG/1
TABLET ORAL
Qty: 90 TABLET | Refills: 0 | Status: SHIPPED | OUTPATIENT
Start: 2019-02-15 | End: 2019-03-16 | Stop reason: SDUPTHER

## 2019-02-25 RX ORDER — PREDNISONE 5 MG/1
TABLET ORAL
Qty: 90 TABLET | Refills: 3 | Status: SHIPPED | OUTPATIENT
Start: 2019-02-25 | End: 2019-07-19 | Stop reason: SDUPTHER

## 2019-02-26 ENCOUNTER — TELEPHONE (OUTPATIENT)
Dept: RHEUMATOLOGY | Facility: CLINIC | Age: 74
End: 2019-02-26

## 2019-02-26 NOTE — TELEPHONE ENCOUNTER
----- Message from José Mcconnell sent at 2/26/2019 12:50 PM CST -----  Contact: Patient  Type: Needs Medical Advice    Who Called:  Patient  Best Call Back Number: [phone]  Additional Information: Patient is having issues with her pharmacy and medications. She is requesting to speak directly with Dr Johnson. [please advise

## 2019-02-26 NOTE — TELEPHONE ENCOUNTER
Returned patient call regarding medications. Stated sinus issues little better but was told by another doctor that she has ulcers and requested a call back by Dr Jhonson

## 2019-02-27 NOTE — TELEPHONE ENCOUNTER
----- Message from Gisele Sharpe sent at 2/27/2019 10:01 AM CST -----  Contact: Patient  Patient is calling to only speak with Dr. Johnson regarding her antibiotic that she is taking.  She still is not feeling completely better.  She stated that she should have gotten at least one refill on this medication and she would like to speak with her regarding her illness too.  She said that the doctor can call her after hours if she needs to.  Call Back#[phone]  Thanks

## 2019-03-08 ENCOUNTER — TELEPHONE (OUTPATIENT)
Dept: FAMILY MEDICINE | Facility: CLINIC | Age: 74
End: 2019-03-08

## 2019-03-08 NOTE — TELEPHONE ENCOUNTER
----- Message from Ck Kruger sent at 3/8/2019  2:31 PM CST -----  ...Type:  Pharmacy Calling to Clarify an RX    Name of Caller: Annmarie ( ( [facility])   [facility]  Prescription Name:[facility] test scripts   What do they need to clarify?: states pt prescription is coved on medicare part B  Best Call Back Number:[phone]  Additional Information:

## 2019-03-16 DIAGNOSIS — M17.0 PRIMARY OSTEOARTHRITIS OF BOTH KNEES: ICD-10-CM

## 2019-03-16 DIAGNOSIS — M35.00 SJOGREN'S SYNDROME, WITH UNSPECIFIED ORGAN INVOLVEMENT: ICD-10-CM

## 2019-03-16 DIAGNOSIS — M47.817 LUMBAR AND SACRAL ARTHRITIS: ICD-10-CM

## 2019-03-16 DIAGNOSIS — G89.29 CHRONIC PAIN OF RIGHT KNEE: ICD-10-CM

## 2019-03-16 DIAGNOSIS — M05.9 RHEUMATOID ARTHRITIS WITH POSITIVE RHEUMATOID FACTOR, INVOLVING UNSPECIFIED SITE: ICD-10-CM

## 2019-03-16 DIAGNOSIS — M25.561 CHRONIC PAIN OF RIGHT KNEE: ICD-10-CM

## 2019-03-18 NOTE — TELEPHONE ENCOUNTER
----- Message from Terri Fuller sent at 3/18/2019  2:55 PM CDT -----  Type:  RX Refill Request    Who Called: self   Refill or New Rx:  Refill   RX Name and Strength:  Hydrocodone   How is the patient currently taking it? (ex. 1XDay):  3 x day   Is this a 30 day or 90 day RX:  30 day supply   Preferred Pharmacy with phone number:  [facility]  Local or Mail Order:  Local Ordering Provider:  Dr Elizabeth Mercado Call Back Number:  [phone] Additional Information:

## 2019-03-19 NOTE — TELEPHONE ENCOUNTER
----- Message from Gisele Sharpe sent at 3/19/2019 10:53 AM CDT -----  Contact: Patient  Type:  RX Refill Request    Who Called:  Patient  Refill or New Rx:  Refill   RX Name and Strength:    HYDROcodone-acetaminophen (NORCO)  mg per tablet  How is the patient currently taking it? (ex. 1XDay):    Is this a 30 day or 90 day RX:    Preferred Pharmacy with phone number:    [facility] [place] - [address] [address] [facility] [address]  Phone: [phone] Fax: [phone]  Local or Mail Order:  Local  Ordering Provider:  Elizabeth Mercado Call Back Number:  [phone]  Additional Information:  Patient stated that she called a few days ago requesting this medication and the pharmacy has not received it.  Patient is currently out of this medication.

## 2019-03-21 ENCOUNTER — OFFICE VISIT (OUTPATIENT)
Dept: RHEUMATOLOGY | Facility: CLINIC | Age: 74
End: 2019-03-21
Payer: MEDICARE

## 2019-03-21 VITALS
WEIGHT: 255 LBS | HEART RATE: 72 BPM | DIASTOLIC BLOOD PRESSURE: 74 MMHG | BODY MASS INDEX: 41.16 KG/M2 | SYSTOLIC BLOOD PRESSURE: 136 MMHG

## 2019-03-21 DIAGNOSIS — M17.0 PRIMARY OSTEOARTHRITIS OF BOTH KNEES: ICD-10-CM

## 2019-03-21 DIAGNOSIS — M35.00 SJOGREN'S SYNDROME, WITH UNSPECIFIED ORGAN INVOLVEMENT: ICD-10-CM

## 2019-03-21 DIAGNOSIS — G89.29 CHRONIC PAIN OF RIGHT KNEE: ICD-10-CM

## 2019-03-21 DIAGNOSIS — M47.817 LUMBAR AND SACRAL ARTHRITIS: ICD-10-CM

## 2019-03-21 DIAGNOSIS — M05.9 RHEUMATOID ARTHRITIS WITH POSITIVE RHEUMATOID FACTOR, INVOLVING UNSPECIFIED SITE: ICD-10-CM

## 2019-03-21 DIAGNOSIS — M25.561 CHRONIC PAIN OF RIGHT KNEE: ICD-10-CM

## 2019-03-21 DIAGNOSIS — D64.9 ANEMIA, UNSPECIFIED TYPE: ICD-10-CM

## 2019-03-21 DIAGNOSIS — Z74.09 MOBILITY IMPAIRED: Primary | ICD-10-CM

## 2019-03-21 DIAGNOSIS — J45.909 ASTHMA, UNSPECIFIED ASTHMA SEVERITY, UNSPECIFIED WHETHER COMPLICATED, UNSPECIFIED WHETHER PERSISTENT: ICD-10-CM

## 2019-03-21 DIAGNOSIS — Z74.09 REQUIRES SCOOTER FOR MOBILITY: ICD-10-CM

## 2019-03-21 DIAGNOSIS — J32.1 SINUSITIS CHRONIC, FRONTAL: ICD-10-CM

## 2019-03-21 PROCEDURE — 99999 PR PBB SHADOW E&M-EST. PATIENT-LVL IV: ICD-10-PCS | Mod: PBBFAC,,, | Performed by: INTERNAL MEDICINE

## 2019-03-21 PROCEDURE — 99215 PR OFFICE/OUTPT VISIT, EST, LEVL V, 40-54 MIN: ICD-10-PCS | Mod: 25,S$PBB,, | Performed by: INTERNAL MEDICINE

## 2019-03-21 PROCEDURE — 99214 OFFICE O/P EST MOD 30 MIN: CPT | Mod: PBBFAC,PO,25 | Performed by: INTERNAL MEDICINE

## 2019-03-21 PROCEDURE — 99999 PR PBB SHADOW E&M-EST. PATIENT-LVL IV: CPT | Mod: PBBFAC,,, | Performed by: INTERNAL MEDICINE

## 2019-03-21 PROCEDURE — 99215 OFFICE O/P EST HI 40 MIN: CPT | Mod: 25,S$PBB,, | Performed by: INTERNAL MEDICINE

## 2019-03-21 PROCEDURE — 96372 THER/PROPH/DIAG INJ SC/IM: CPT | Mod: PBBFAC,PO

## 2019-03-21 RX ORDER — HYDROCODONE BITARTRATE AND ACETAMINOPHEN 10; 325 MG/1; MG/1
TABLET ORAL
Qty: 90 TABLET | Refills: 0 | Status: SHIPPED | OUTPATIENT
Start: 2019-06-18 | End: 2019-07-19 | Stop reason: SDUPTHER

## 2019-03-21 RX ORDER — HYDROXYCHLOROQUINE SULFATE 200 MG/1
200 TABLET, FILM COATED ORAL 2 TIMES DAILY
Qty: 180 TABLET | Refills: 3 | Status: SHIPPED | OUTPATIENT
Start: 2019-03-21 | End: 2019-05-05

## 2019-03-21 RX ORDER — HYDROCODONE BITARTRATE AND ACETAMINOPHEN 10; 325 MG/1; MG/1
TABLET ORAL
Qty: 90 TABLET | Refills: 0 | Status: SHIPPED | OUTPATIENT
Start: 2019-05-18 | End: 2019-03-21 | Stop reason: SDUPTHER

## 2019-03-21 RX ORDER — HYDROCODONE BITARTRATE AND ACETAMINOPHEN 10; 325 MG/1; MG/1
TABLET ORAL
Qty: 90 TABLET | Refills: 0 | Status: SHIPPED | OUTPATIENT
Start: 2019-04-19 | End: 2019-03-21 | Stop reason: SDUPTHER

## 2019-03-21 RX ORDER — CYANOCOBALAMIN 1000 UG/ML
1000 INJECTION, SOLUTION INTRAMUSCULAR; SUBCUTANEOUS
Status: COMPLETED | OUTPATIENT
Start: 2019-03-21 | End: 2019-03-21

## 2019-03-21 RX ORDER — HYDROCODONE BITARTRATE AND ACETAMINOPHEN 10; 325 MG/1; MG/1
TABLET ORAL
Qty: 90 TABLET | Refills: 0 | Status: SHIPPED | OUTPATIENT
Start: 2019-03-21 | End: 2019-07-19 | Stop reason: SDUPTHER

## 2019-03-21 RX ORDER — LEFLUNOMIDE 10 MG/1
10 TABLET ORAL DAILY
Qty: 90 TABLET | Refills: 3 | Status: SHIPPED | OUTPATIENT
Start: 2019-03-21 | End: 2019-12-30 | Stop reason: SDUPTHER

## 2019-03-21 RX ADMIN — CYANOCOBALAMIN 1000 MCG: 1000 INJECTION, SOLUTION INTRAMUSCULAR at 05:03

## 2019-03-21 NOTE — PROGRESS NOTES
Administered 1 cc B12 1000mcg to right arm.  Pt tolerated well.No acute reaction noted to site. Pt instructed on S/S to report. Advised pt to waitn in lobby 15 minutes after receiving injection to monitor for any reactions. Pt verbalized understanding.     Lot: [id number]  Exp: Sep20

## 2019-03-21 NOTE — PROGRESS NOTES
Subjective:       Patient ID: Rekha Miller is a 74 y.o. female.    Chief Complaint: Rheumatoid Arthritis and Pain    Follow up     Ra flare  In pain  on arava  10 mg, she is having a flare R wrist, she did not start xeljanz and would like retry  Plaquenil. Patient complains of arthralgias and myalgias for which has been present for a few years. Pain is located in multiple joints, both shoulder(s), both elbow(s), both wrist(s), both MCP(s): 1st, 2nd, 3rd, 4th and 5th, both PIP(s): 1st, 2nd, 3rd, 4th and 5th, both DIP(s): 1st and 2nd, both hip(s), both knee(s) and both MTP(s): 1st, 2nd, 3rd, 4th and 5th, is described as aching, pulsating, shooting and throbbing, and is constant, moderate .  Associated symptoms include: crepitation, decreased range of motion, edema, effusion, tenderness and warmth.   Pt asthma and seronegative rheumatoid arthritis has worsened and requires a power mobility device, she is in pain and requires a power mobility device. andis unable to walk without assistance,             She complains of joint swelling. Associated symptoms include fatigue and myalgias.         Review of Systems   Constitutional: Positive for activity change and fatigue. Negative for appetite change, chills, diaphoresis and unexpected weight change.   HENT: Negative for congestion, ear discharge, ear pain, facial swelling, mouth sores, postnasal drip, sinus pressure and sore throat.    Eyes: Negative for photophobia, pain, discharge, redness and itching.   Respiratory: Positive for wheezing. Negative for cough, chest tightness and shortness of breath.    Cardiovascular: Positive for leg swelling. Negative for chest pain and palpitations.   Gastrointestinal: Negative for abdominal pain, constipation, diarrhea, nausea and vomiting.   Endocrine: Negative for cold intolerance, heat intolerance, polydipsia and polyuria.   Genitourinary: Negative for decreased urine volume, difficulty urinating, flank pain, frequency,  hematuria and urgency.   Musculoskeletal: Positive for arthralgias, back pain, gait problem, joint swelling, myalgias, neck pain and neck stiffness.   Skin: Negative for pallor, rash and wound.   Allergic/Immunologic: Negative for immunocompromised state.   Neurological: Negative for dizziness, tremors, weakness and numbness.   Hematological: Negative for adenopathy. Does not bruise/bleed easily.   Psychiatric/Behavioral: Negative for sleep disturbance. The patient is not nervous/anxious.          Objective:     /74 (BP Location: Right arm, Patient Position: Sitting, BP Method: Large (Automatic))   Pulse 72   Wt 115.7 kg (255 lb)   BMI 41.16 kg/m²      Physical Exam   Nursing note and vitals reviewed.  Constitutional: She is oriented to person, place, and time. She appears distressed.   HENT:   Head: Normocephalic and atraumatic.   Mouth/Throat: Oropharynx is clear and moist.   Eyes: EOM are normal. Pupils are equal, round, and reactive to light.   Neck: Neck supple. No thyromegaly present.   Cardiovascular: Normal rate, regular rhythm and normal heart sounds.  Exam reveals no gallop and no friction rub.    No murmur heard.  Pulmonary/Chest: She has no wheezes. She has no rales. She exhibits no tenderness.   B bases   Abdominal: There is no tenderness. There is no rebound and no guarding.       Right Side Rheumatological Exam     Examination finds the elbow normal.    The patient is tender to palpation of the shoulder, wrist, knee, 1st PIP, 1st MCP, 2nd PIP, 2nd MCP, 3rd PIP, 3rd MCP, 4th PIP, 4th MCP, 5th PIP and 5th MCP    She has swelling of the 1st PIP, 1st MCP, 2nd PIP, 2nd MCP, 3rd PIP, 3rd MCP, 4th PIP, 4th MCP, 5th PIP and 5th MCP    Shoulder Exam   Tenderness Location: no tenderness    Range of Motion   Active abduction: abnormal   Adduction: abnormal  Sensation: normal    Knee Exam   Patellofemoral Crepitus: positive  Effusion: negative  Sensation: normal    Hip Exam   Tenderness Location:  posterior  Sensation: normal    Elbow/Wrist Exam   Tenderness Location: no tenderness  Sensation: normal    Left Side Rheumatological Exam     Examination finds the elbow normal.    The patient is tender to palpation of the shoulder, wrist, knee, 1st PIP, 1st MCP, 2nd PIP, 2nd MCP, 3rd PIP, 3rd MCP, 4th PIP, 4th MCP, 5th PIP and 5th MCP.    She has swelling of the 1st PIP, 1st MCP, 2nd PIP, 2nd MCP, 3rd PIP, 3rd MCP, 4th PIP, 4th MCP, 5th PIP and 5th MCP    Shoulder Exam   Tenderness Location: no tenderness    Range of Motion   Active abduction: abnormal   Sensation: normal    Knee Exam     Patellofemoral Crepitus: positive  Effusion: negative  Sensation: normal    Hip Exam   Tenderness Location: posterior  Sensation: normal    Elbow/Wrist Exam   Sensation: normal      Back/Neck Exam   General Inspection   Gait: abnormal       Tenderness Right paramedian tenderness of the Lower L-Spine, Upper L-Spine, SI Joint and Upper C-Spine.Left paramedian tenderness of the Lower L-Spine, Upper L-Spine, SI Joint and Upper C-Spine.    Back Range of Motion   Extension: abnormal  Flexion: abnormal  Lateral Bend Right: abnormal  Lateral Bend Left: abnormal  Rotation Right: abnormal  Rotation Left: abnormal    Neck Range of Motion   Flexion: Limited  Extension: Limited  Lymphadenopathy:     She has no cervical adenopathy.   Neurological: She is alert and oriented to person, place, and time. Gait normal.   Skin: No rash noted. No erythema. No pallor.     Psychiatric: Mood and affect normal.   Musculoskeletal: She exhibits edema, tenderness and deformity.   SI joint pain         Results for orders placed or performed in visit on 02/04/19   Hemoglobin A1c   Result Value Ref Range    Hemoglobin A1C 6.4 (H) 4.0 - 5.6 %    Estimated Avg Glucose 137 (H) 68 - 131 mg/dL          Assessment:       Encounter Diagnoses   Name Primary?    Mobility impaired Yes    Rheumatoid arthritis with positive rheumatoid factor, involving unspecified site      Sjogren's syndrome, with unspecified organ involvement     Primary osteoarthritis of both knees     Lumbar and sacral arthritis     Chronic pain of right knee     Requires scooter for mobility     Asthma, unspecified asthma severity, unspecified whether complicated, unspecified whether persistent     Anemia, unspecified type     Sinusitis chronic, frontal          Plan:       Mobility impaired  -     MOTORIZED SCOOTER FOR HOME USE  -     folic acid-vit B6-vit B12 2.5-25-2 mg (FOLBIC OR EQUIV) 2.5-25-2 mg Tab; Take 1 tablet by mouth once daily.  Dispense: 90 tablet; Refill: 3  -     cyanocobalamin injection 1,000 mcg  -     Ambulatory consult to ENT  -     Ambulatory consult to Podiatry  -     Ambulatory consult to Dermatology    Rheumatoid arthritis with positive rheumatoid factor, involving unspecified site  -     Discontinue: HYDROcodone-acetaminophen (NORCO)  mg per tablet; TAKE 1 TABLET BY MOUTH EVERY 8 HOURS AS NEEDED  Dispense: 90 tablet; Refill: 0  -     Discontinue: HYDROcodone-acetaminophen (NORCO)  mg per tablet; TAKE 1 TABLET BY MOUTH EVERY 8 HOURS AS NEEDED  Dispense: 90 tablet; Refill: 0  -     HYDROcodone-acetaminophen (NORCO)  mg per tablet; TAKE 1 TABLET BY MOUTH EVERY 8 HOURS AS NEEDED  Dispense: 90 tablet; Refill: 0  -     leflunomide (ARAVA) 10 MG Tab; Take 1 tablet (10 mg total) by mouth once daily.  Dispense: 90 tablet; Refill: 3  -     MOTORIZED SCOOTER FOR HOME USE  -     folic acid-vit B6-vit B12 2.5-25-2 mg (FOLBIC OR EQUIV) 2.5-25-2 mg Tab; Take 1 tablet by mouth once daily.  Dispense: 90 tablet; Refill: 3  -     cyanocobalamin injection 1,000 mcg  -     CBC auto differential; Future; Expected date: 03/21/2019  -     Comprehensive metabolic panel; Future; Expected date: 03/21/2019  -     C-reactive protein; Future; Expected date: 03/21/2019  -     Sedimentation rate; Future; Expected date: 03/21/2019  -     Rheumatoid factor; Future; Expected date: 03/21/2019  -      Cyclic citrul peptide antibody, IgG; Future; Expected date: 03/21/2019  -     Ambulatory consult to ENT  -     Ambulatory consult to Podiatry  -     Ambulatory consult to Dermatology    Sjogren's syndrome, with unspecified organ involvement  -     Discontinue: HYDROcodone-acetaminophen (NORCO)  mg per tablet; TAKE 1 TABLET BY MOUTH EVERY 8 HOURS AS NEEDED  Dispense: 90 tablet; Refill: 0  -     Discontinue: HYDROcodone-acetaminophen (NORCO)  mg per tablet; TAKE 1 TABLET BY MOUTH EVERY 8 HOURS AS NEEDED  Dispense: 90 tablet; Refill: 0  -     HYDROcodone-acetaminophen (NORCO)  mg per tablet; TAKE 1 TABLET BY MOUTH EVERY 8 HOURS AS NEEDED  Dispense: 90 tablet; Refill: 0  -     leflunomide (ARAVA) 10 MG Tab; Take 1 tablet (10 mg total) by mouth once daily.  Dispense: 90 tablet; Refill: 3  -     MOTORIZED SCOOTER FOR HOME USE  -     folic acid-vit B6-vit B12 2.5-25-2 mg (FOLBIC OR EQUIV) 2.5-25-2 mg Tab; Take 1 tablet by mouth once daily.  Dispense: 90 tablet; Refill: 3  -     cyanocobalamin injection 1,000 mcg  -     CBC auto differential; Future; Expected date: 03/21/2019  -     Comprehensive metabolic panel; Future; Expected date: 03/21/2019  -     C-reactive protein; Future; Expected date: 03/21/2019  -     Sedimentation rate; Future; Expected date: 03/21/2019  -     Rheumatoid factor; Future; Expected date: 03/21/2019  -     Cyclic citrul peptide antibody, IgG; Future; Expected date: 03/21/2019  -     Ambulatory consult to ENT  -     Ambulatory consult to Podiatry  -     Ambulatory consult to Dermatology    Primary osteoarthritis of both knees  -     Discontinue: HYDROcodone-acetaminophen (NORCO)  mg per tablet; TAKE 1 TABLET BY MOUTH EVERY 8 HOURS AS NEEDED  Dispense: 90 tablet; Refill: 0  -     Discontinue: HYDROcodone-acetaminophen (NORCO)  mg per tablet; TAKE 1 TABLET BY MOUTH EVERY 8 HOURS AS NEEDED  Dispense: 90 tablet; Refill: 0  -     HYDROcodone-acetaminophen (NORCO)   mg per tablet; TAKE 1 TABLET BY MOUTH EVERY 8 HOURS AS NEEDED  Dispense: 90 tablet; Refill: 0  -     leflunomide (ARAVA) 10 MG Tab; Take 1 tablet (10 mg total) by mouth once daily.  Dispense: 90 tablet; Refill: 3  -     MOTORIZED SCOOTER FOR HOME USE  -     folic acid-vit B6-vit B12 2.5-25-2 mg (FOLBIC OR EQUIV) 2.5-25-2 mg Tab; Take 1 tablet by mouth once daily.  Dispense: 90 tablet; Refill: 3  -     cyanocobalamin injection 1,000 mcg  -     CBC auto differential; Future; Expected date: 03/21/2019  -     Comprehensive metabolic panel; Future; Expected date: 03/21/2019  -     C-reactive protein; Future; Expected date: 03/21/2019  -     Sedimentation rate; Future; Expected date: 03/21/2019  -     Rheumatoid factor; Future; Expected date: 03/21/2019  -     Cyclic citrul peptide antibody, IgG; Future; Expected date: 03/21/2019  -     Ambulatory consult to ENT  -     Ambulatory consult to Podiatry  -     Ambulatory consult to Dermatology    Lumbar and sacral arthritis  -     Discontinue: HYDROcodone-acetaminophen (NORCO)  mg per tablet; TAKE 1 TABLET BY MOUTH EVERY 8 HOURS AS NEEDED  Dispense: 90 tablet; Refill: 0  -     Discontinue: HYDROcodone-acetaminophen (NORCO)  mg per tablet; TAKE 1 TABLET BY MOUTH EVERY 8 HOURS AS NEEDED  Dispense: 90 tablet; Refill: 0  -     HYDROcodone-acetaminophen (NORCO)  mg per tablet; TAKE 1 TABLET BY MOUTH EVERY 8 HOURS AS NEEDED  Dispense: 90 tablet; Refill: 0  -     leflunomide (ARAVA) 10 MG Tab; Take 1 tablet (10 mg total) by mouth once daily.  Dispense: 90 tablet; Refill: 3  -     MOTORIZED SCOOTER FOR HOME USE  -     folic acid-vit B6-vit B12 2.5-25-2 mg (FOLBIC OR EQUIV) 2.5-25-2 mg Tab; Take 1 tablet by mouth once daily.  Dispense: 90 tablet; Refill: 3  -     cyanocobalamin injection 1,000 mcg  -     CBC auto differential; Future; Expected date: 03/21/2019  -     Comprehensive metabolic panel; Future; Expected date: 03/21/2019  -     C-reactive protein; Future;  Expected date: 03/21/2019  -     Sedimentation rate; Future; Expected date: 03/21/2019  -     Rheumatoid factor; Future; Expected date: 03/21/2019  -     Cyclic citrul peptide antibody, IgG; Future; Expected date: 03/21/2019  -     Ambulatory consult to ENT  -     Ambulatory consult to Podiatry  -     Ambulatory consult to Dermatology    Chronic pain of right knee  -     Discontinue: HYDROcodone-acetaminophen (NORCO)  mg per tablet; TAKE 1 TABLET BY MOUTH EVERY 8 HOURS AS NEEDED  Dispense: 90 tablet; Refill: 0  -     Discontinue: HYDROcodone-acetaminophen (NORCO)  mg per tablet; TAKE 1 TABLET BY MOUTH EVERY 8 HOURS AS NEEDED  Dispense: 90 tablet; Refill: 0  -     HYDROcodone-acetaminophen (NORCO)  mg per tablet; TAKE 1 TABLET BY MOUTH EVERY 8 HOURS AS NEEDED  Dispense: 90 tablet; Refill: 0  -     MOTORIZED SCOOTER FOR HOME USE  -     folic acid-vit B6-vit B12 2.5-25-2 mg (FOLBIC OR EQUIV) 2.5-25-2 mg Tab; Take 1 tablet by mouth once daily.  Dispense: 90 tablet; Refill: 3  -     cyanocobalamin injection 1,000 mcg  -     CBC auto differential; Future; Expected date: 03/21/2019  -     Comprehensive metabolic panel; Future; Expected date: 03/21/2019  -     C-reactive protein; Future; Expected date: 03/21/2019  -     Sedimentation rate; Future; Expected date: 03/21/2019  -     Rheumatoid factor; Future; Expected date: 03/21/2019  -     Cyclic citrul peptide antibody, IgG; Future; Expected date: 03/21/2019  -     Ambulatory consult to ENT  -     Ambulatory consult to Podiatry  -     Ambulatory consult to Dermatology    Requires scooter for mobility  -     MOTORIZED SCOOTER FOR HOME USE  -     folic acid-vit B6-vit B12 2.5-25-2 mg (FOLBIC OR EQUIV) 2.5-25-2 mg Tab; Take 1 tablet by mouth once daily.  Dispense: 90 tablet; Refill: 3  -     cyanocobalamin injection 1,000 mcg  -     Ambulatory consult to ENT  -     Ambulatory consult to Podiatry  -     Ambulatory consult to Dermatology    Asthma, unspecified  asthma severity, unspecified whether complicated, unspecified whether persistent  -     MOTORIZED SCOOTER FOR HOME USE  -     folic acid-vit B6-vit B12 2.5-25-2 mg (FOLBIC OR EQUIV) 2.5-25-2 mg Tab; Take 1 tablet by mouth once daily.  Dispense: 90 tablet; Refill: 3  -     cyanocobalamin injection 1,000 mcg  -     Ambulatory consult to ENT  -     Ambulatory consult to Podiatry  -     Ambulatory consult to Dermatology    Anemia, unspecified type  -     folic acid-vit B6-vit B12 2.5-25-2 mg (FOLBIC OR EQUIV) 2.5-25-2 mg Tab; Take 1 tablet by mouth once daily.  Dispense: 90 tablet; Refill: 3  -     cyanocobalamin injection 1,000 mcg  -     Ambulatory consult to ENT  -     Ambulatory consult to Podiatry  -     Ambulatory consult to Dermatology    Sinusitis chronic, frontal  -     Ambulatory consult to ENT  -     Ambulatory consult to Podiatry  -     Ambulatory consult to Dermatology    Other orders  -     hydroxychloroquine (PLAQUENIL) 200 mg tablet; Take 1 tablet (200 mg total) by mouth 2 (two) times daily. for 90 doses  Dispense: 180 tablet; Refill: 3        Pt has tried and failed arava, methotrexate, plaquenil, imuran, enbrel samples  Patient can not use a cane or walker or a manual wheelchair because her osteoarthritis shoulder, knees B and rheumatoid arthritis has deformed her hands and neck and   Due to her Rheumatoid arthritis and osteoarthritis her disease is progressive and pt needs a POV. She is willing and motivated to use her POV to help with her ADLs and she can safely operate a Power operated vehicles and she can can safely operate a POV both mentally and physically. Her home provide adequate access to accomodates a POV.

## 2019-06-05 NOTE — TELEPHONE ENCOUNTER
Spoke to pt, advised Dr. Hernandez filled her lasix, she advises Dr. Johnson filled it prior to Dr. Hernandez and his script was not Lasix. Advised nurse did not see a difference in the order or brand from here. She advised if Dr. Johnson couldn't fill to not worry about filling. Advised did see where Elizabeth had prescribed previously and would submit request to her. Advised pt to check with pharmacy to see if they changed  on her Lasix as to the difference in previous scripts. Pt verbalized understanding.

## 2019-06-07 RX ORDER — FUROSEMIDE 40 MG/1
40 TABLET ORAL DAILY
Qty: 90 TABLET | Refills: 3 | Status: SHIPPED | OUTPATIENT
Start: 2019-06-07 | End: 2020-07-06 | Stop reason: SDUPTHER

## 2019-06-12 ENCOUNTER — TELEPHONE (OUTPATIENT)
Dept: RHEUMATOLOGY | Facility: CLINIC | Age: 74
End: 2019-06-12

## 2019-06-12 NOTE — TELEPHONE ENCOUNTER
Incoming call from Nga at [facility]. Advised they received order for Motorized Scooter, they do not provide this there. Advised order should have went to DME and would let provider know. Order was original sent to [facility]. Reached out to [facility], spoke with Jane she advised the 7 element form was not filled out and returned within the 45 day period for medicare. She refaxed the forms, advised we would get filled out and returned.

## 2019-07-06 ENCOUNTER — NURSE TRIAGE (OUTPATIENT)
Dept: ADMINISTRATIVE | Facility: CLINIC | Age: 74
End: 2019-07-06

## 2019-07-06 NOTE — TELEPHONE ENCOUNTER
Reason for Disposition   [1] Thigh, calf, or ankle swelling AND [2] bilateral AND [3] 1 side is more swollen    Protocols used: LEG SWELLING AND EDEMA-A-AH  LM at 509pm at [phone]  Pt called re swelling - both ankles swollen L more R. took fluid pill this am. when walking around ankle stated swelling-may have been on feet too long. Hx CHF- told 25 yrs ago. No SOB , Red spot on L ankle, hx anemia , one leg is shorter than other , hx RA , bilat hip pain, low pain in back.ongoing. swelling going down and red spot going away.. rec ED due to swelling worse on L side. Pt states she will rest to see if that helps. Pt states she thought the clinic was open today. Pt states she will see dr perkins next week. Dion back with questions

## 2019-07-15 DIAGNOSIS — M47.817 LUMBAR AND SACRAL ARTHRITIS: ICD-10-CM

## 2019-07-15 DIAGNOSIS — M17.0 PRIMARY OSTEOARTHRITIS OF BOTH KNEES: ICD-10-CM

## 2019-07-15 DIAGNOSIS — M35.00 SJOGREN'S SYNDROME, WITH UNSPECIFIED ORGAN INVOLVEMENT: ICD-10-CM

## 2019-07-15 DIAGNOSIS — M25.561 CHRONIC PAIN OF RIGHT KNEE: ICD-10-CM

## 2019-07-15 DIAGNOSIS — M05.9 RHEUMATOID ARTHRITIS WITH POSITIVE RHEUMATOID FACTOR, INVOLVING UNSPECIFIED SITE: ICD-10-CM

## 2019-07-15 DIAGNOSIS — G89.29 CHRONIC PAIN OF RIGHT KNEE: ICD-10-CM

## 2019-07-15 RX ORDER — HYDROCODONE BITARTRATE AND ACETAMINOPHEN 10; 325 MG/1; MG/1
1 TABLET ORAL EVERY 8 HOURS PRN
Qty: 90 TABLET | Refills: 0 | Status: CANCELLED | OUTPATIENT
Start: 2019-07-15

## 2019-07-15 NOTE — TELEPHONE ENCOUNTER
----- Message from Vasu George sent at 7/15/2019  2:22 PM CDT -----  Contact: pt  Type:  RX Refill Request    Who Called:  pt  Refill or New Rx:  refill  RX Name and Strength:  HYDROcodone-acetaminophen (NORCO)  mg per tablet  How is the patient currently taking it? (ex. 1XDay):  TAKE 1 TABLET BY MOUTH EVERY 8 HOURS AS NEEDED  Is this a 30 day or 90 day RX:    Preferred Pharmacy with phone number:    [facility] [place] [address] [address]  Phone: [phone] Fax: [phone]    Local or Mail Order: local  Ordering Provider:  Elizabeth Mercado Call Back Number:  [phone]  Additional Information:

## 2019-07-18 RX ORDER — HYDROCODONE BITARTRATE AND ACETAMINOPHEN 10; 325 MG/1; MG/1
TABLET ORAL
Qty: 90 TABLET | Refills: 0 | Status: SHIPPED | OUTPATIENT
Start: 2019-07-18 | End: 2019-07-19 | Stop reason: SDUPTHER

## 2019-07-18 NOTE — TELEPHONE ENCOUNTER
Returned patient call and informed refill request has been sent to Dr Johnson waiting for her to send to the pharmacy. Patient verbalized understanding.

## 2019-07-18 NOTE — TELEPHONE ENCOUNTER
----- Message from José Mcconnell sent at 7/18/2019  4:11 PM CDT -----  Contact: Patient  Type: Needs Medical Advice    Who Called:  Patient  Pharmacy name and phone #:    [facility] [place] - [address] [facility]  [address] [address]  [place] [address]  Phone: [phone] Fax: [phone]  Best Call Back Number: [phone]  Additional Information: Requesting an update on her medication refill request for (NORCO)  mg per tablet that was submitted this morning. She was told by a nurse this morning it would be sent today. Please advise of status

## 2019-07-18 NOTE — TELEPHONE ENCOUNTER
Spoke to pt, advised that Dr. Johnson has sent refill to pharmacy. Advised in the future to please make sure it is a 72 business hour request for refills to ensure that we do not have to pull the provider out of clinic, after multiple calls to send in her medication. Pt verbalized understanding.

## 2019-07-18 NOTE — TELEPHONE ENCOUNTER
----- Message from Shyanne Garcia sent at 7/18/2019  8:04 AM CDT -----  Type: Needs Medication ordered    Who Called:  Patient  Best Call Back Number: [phone]  Additional Information: Patient calling back/stated [facility] does not have medication: HYDROcodone-acetaminophen (NORCO)  mg per tablet/please order and call patient back to advise.

## 2019-07-19 ENCOUNTER — OFFICE VISIT (OUTPATIENT)
Dept: RHEUMATOLOGY | Facility: CLINIC | Age: 74
End: 2019-07-19
Payer: MEDICARE

## 2019-07-19 VITALS
SYSTOLIC BLOOD PRESSURE: 125 MMHG | WEIGHT: 255 LBS | HEIGHT: 66 IN | BODY MASS INDEX: 40.98 KG/M2 | HEART RATE: 63 BPM | DIASTOLIC BLOOD PRESSURE: 73 MMHG

## 2019-07-19 DIAGNOSIS — M25.561 CHRONIC PAIN OF RIGHT KNEE: ICD-10-CM

## 2019-07-19 DIAGNOSIS — M35.00 SJOGREN'S SYNDROME, WITH UNSPECIFIED ORGAN INVOLVEMENT: ICD-10-CM

## 2019-07-19 DIAGNOSIS — D64.9 ANEMIA, UNSPECIFIED TYPE: Primary | ICD-10-CM

## 2019-07-19 DIAGNOSIS — G89.29 CHRONIC PAIN OF RIGHT KNEE: ICD-10-CM

## 2019-07-19 DIAGNOSIS — M05.9 RHEUMATOID ARTHRITIS WITH POSITIVE RHEUMATOID FACTOR, INVOLVING UNSPECIFIED SITE: ICD-10-CM

## 2019-07-19 DIAGNOSIS — M17.0 PRIMARY OSTEOARTHRITIS OF BOTH KNEES: ICD-10-CM

## 2019-07-19 DIAGNOSIS — M47.817 LUMBAR AND SACRAL ARTHRITIS: ICD-10-CM

## 2019-07-19 PROCEDURE — 99214 OFFICE O/P EST MOD 30 MIN: CPT | Mod: S$PBB,,, | Performed by: INTERNAL MEDICINE

## 2019-07-19 PROCEDURE — 99214 PR OFFICE/OUTPT VISIT, EST, LEVL IV, 30-39 MIN: ICD-10-PCS | Mod: S$PBB,,, | Performed by: INTERNAL MEDICINE

## 2019-07-19 PROCEDURE — 99213 OFFICE O/P EST LOW 20 MIN: CPT | Mod: PBBFAC,PO | Performed by: INTERNAL MEDICINE

## 2019-07-19 PROCEDURE — 99999 PR PBB SHADOW E&M-EST. PATIENT-LVL III: CPT | Mod: PBBFAC,,, | Performed by: INTERNAL MEDICINE

## 2019-07-19 PROCEDURE — 99999 PR PBB SHADOW E&M-EST. PATIENT-LVL III: ICD-10-PCS | Mod: PBBFAC,,, | Performed by: INTERNAL MEDICINE

## 2019-07-19 RX ORDER — HYDROXYCHLOROQUINE SULFATE 200 MG/1
TABLET, FILM COATED ORAL
Refills: 3 | COMMUNITY
Start: 2019-05-22 | End: 2020-02-03

## 2019-07-19 RX ORDER — PREDNISONE 5 MG/1
5 TABLET ORAL 3 TIMES DAILY
Qty: 90 TABLET | Refills: 3 | Status: SHIPPED | OUTPATIENT
Start: 2019-07-19 | End: 2019-12-09 | Stop reason: SDUPTHER

## 2019-07-19 RX ORDER — HYDROCODONE BITARTRATE AND ACETAMINOPHEN 10; 325 MG/1; MG/1
1 TABLET ORAL EVERY 8 HOURS PRN
Qty: 90 TABLET | Refills: 0 | Status: SHIPPED | OUTPATIENT
Start: 2019-09-17 | End: 2019-10-11 | Stop reason: SDUPTHER

## 2019-07-19 RX ORDER — HYDROCODONE BITARTRATE AND ACETAMINOPHEN 10; 325 MG/1; MG/1
1 TABLET ORAL EVERY 8 HOURS PRN
Qty: 90 TABLET | Refills: 0 | Status: SHIPPED | OUTPATIENT
Start: 2019-08-17 | End: 2019-09-16

## 2019-07-19 NOTE — PROGRESS NOTES
Subjective:       Patient ID: Rekha Miller is a 74 y.o. female.    Chief Complaint: Rheumatoid Arthritis and Pain (chronic right SI joint pain)    Follow up     Ra flare  In pain  on arava  10 mg and on Plaquenil improved but noticed wrinkles.. Patient complains of arthralgias and myalgias for which has been present for a few years. Pain is located in multiple joints, both shoulder(s), both elbow(s), both wrist(s), both MCP(s): 1st, 2nd, 3rd, 4th and 5th, both PIP(s): 1st, 2nd, 3rd, 4th and 5th, both DIP(s): 1st and 2nd, both hip(s), both knee(s) and both MTP(s): 1st, 2nd, 3rd, 4th and 5th, is described as aching, pulsating, shooting and throbbing, and is constant, moderate .  Associated symptoms include: crepitation, decreased range of motion, edema, effusion, tenderness and warmth.  Pt asthma and seronegative rheumatoid arthritis has worsened and requires a power mobility device, she is in pain and requires a power mobility device. andis unable to walk without assistance,                She complains of joint swelling. Associated symptoms include fatigue and myalgias.         Review of Systems   Constitutional: Positive for activity change and fatigue. Negative for appetite change, chills, diaphoresis and unexpected weight change.   HENT: Negative for congestion, ear discharge, ear pain, facial swelling, mouth sores, postnasal drip, sinus pressure and sore throat.    Eyes: Negative for photophobia, pain, discharge, redness and itching.   Respiratory: Positive for wheezing. Negative for cough, chest tightness and shortness of breath.    Cardiovascular: Positive for leg swelling. Negative for chest pain and palpitations.   Gastrointestinal: Negative for abdominal pain, constipation, diarrhea, nausea and vomiting.   Endocrine: Negative for cold intolerance, heat intolerance, polydipsia and polyuria.   Genitourinary: Negative for decreased urine volume, difficulty urinating, flank pain, frequency, hematuria and  "urgency.   Musculoskeletal: Positive for arthralgias, back pain, gait problem, joint swelling, myalgias, neck pain and neck stiffness.   Skin: Negative for pallor, rash and wound.   Allergic/Immunologic: Negative for immunocompromised state.   Neurological: Negative for dizziness, tremors, weakness and numbness.   Hematological: Negative for adenopathy. Does not bruise/bleed easily.   Psychiatric/Behavioral: Negative for sleep disturbance. The patient is not nervous/anxious.          Objective:     /73 (BP Location: Left arm, Patient Position: Sitting, BP Method: Medium (Automatic))   Pulse 63   Ht 5' 6" (1.676 m)   Wt 115.7 kg (255 lb)   BMI 41.16 kg/m²      Physical Exam   Nursing note and vitals reviewed.  Constitutional: She is oriented to person, place, and time. She appears distressed.   HENT:   Head: Normocephalic and atraumatic.   Mouth/Throat: Oropharynx is clear and moist.   Eyes: EOM are normal. Pupils are equal, round, and reactive to light.   Neck: Neck supple. No thyromegaly present.   Cardiovascular: Normal rate, regular rhythm and normal heart sounds.  Exam reveals no gallop and no friction rub.    No murmur heard.  Pulmonary/Chest: She has no wheezes. She has no rales. She exhibits no tenderness.   B bases   Abdominal: There is no tenderness. There is no rebound and no guarding.       Right Side Rheumatological Exam     Examination finds the elbow normal.    The patient is tender to palpation of the shoulder, wrist, knee, 1st PIP, 1st MCP, 2nd PIP, 2nd MCP, 3rd PIP, 3rd MCP, 4th PIP, 4th MCP, 5th PIP and 5th MCP    She has swelling of the 1st PIP, 1st MCP, 2nd PIP, 2nd MCP, 3rd PIP, 3rd MCP, 4th PIP, 4th MCP, 5th PIP and 5th MCP    Shoulder Exam   Tenderness Location: no tenderness    Range of Motion   Active abduction: abnormal   Adduction: abnormal  Sensation: normal    Knee Exam   Patellofemoral Crepitus: positive  Effusion: negative  Sensation: normal    Hip Exam   Tenderness Location: " posterior  Sensation: normal    Elbow/Wrist Exam   Tenderness Location: no tenderness  Sensation: normal    Left Side Rheumatological Exam     Examination finds the elbow normal.    The patient is tender to palpation of the shoulder, wrist, knee, 1st PIP, 1st MCP, 2nd PIP, 2nd MCP, 3rd PIP, 3rd MCP, 4th PIP, 4th MCP, 5th PIP and 5th MCP.    She has swelling of the 1st PIP, 1st MCP, 2nd PIP, 2nd MCP, 3rd PIP, 3rd MCP, 4th PIP, 4th MCP, 5th PIP and 5th MCP    Shoulder Exam   Tenderness Location: no tenderness    Range of Motion   Active abduction: abnormal   Sensation: normal    Knee Exam     Patellofemoral Crepitus: positive  Effusion: negative  Sensation: normal    Hip Exam   Tenderness Location: posterior  Sensation: normal    Elbow/Wrist Exam   Sensation: normal      Back/Neck Exam   General Inspection   Gait: abnormal       Tenderness Right paramedian tenderness of the Lower L-Spine, Upper L-Spine, SI Joint and Upper C-Spine.Left paramedian tenderness of the Lower L-Spine, Upper L-Spine, SI Joint and Upper C-Spine.    Back Range of Motion   Extension: abnormal  Flexion: abnormal  Lateral Bend Right: abnormal  Lateral Bend Left: abnormal  Rotation Right: abnormal  Rotation Left: abnormal    Neck Range of Motion   Flexion: Limited  Extension: Limited  Lymphadenopathy:     She has no cervical adenopathy.   Neurological: She is alert and oriented to person, place, and time.   Skin: No rash noted. No erythema. No pallor.     Psychiatric: Mood and affect normal.   Musculoskeletal: She exhibits tenderness and deformity. She exhibits no edema.   SI joint pain, require assistance with walking         Results for orders placed or performed in visit on 02/04/19   Hemoglobin A1c   Result Value Ref Range    Hemoglobin A1C 6.4 (H) 4.0 - 5.6 %    Estimated Avg Glucose 137 (H) 68 - 131 mg/dL          Assessment:       Encounter Diagnoses   Name Primary?    Anemia, unspecified type Yes    Rheumatoid arthritis with positive  rheumatoid factor, involving unspecified site     Sjogren's syndrome, with unspecified organ involvement     Primary osteoarthritis of both knees     Lumbar and sacral arthritis     Chronic pain of right knee          Plan:       Anemia, unspecified type  -     Discontinue: folic acid-vit B6-vit B12 (FOLBEE) 2.5-25-1 mg Tab; Take 1 tablet by mouth once daily.  Dispense: 90 each; Refill: 3  -     folic acid-vit B6-vit B12 (FOLBEE) 2.5-25-1 mg Tab; Take 1 tablet by mouth once daily.  Dispense: 90 each; Refill: 3  -     predniSONE (DELTASONE) 5 MG tablet; Take 1 tablet (5 mg total) by mouth 3 (three) times daily.  Dispense: 90 tablet; Refill: 3  -     CBC auto differential; Future; Expected date: 07/19/2019  -     Comprehensive metabolic panel; Future; Expected date: 07/19/2019  -     C-reactive protein; Future; Expected date: 07/19/2019  -     Sedimentation rate; Future; Expected date: 07/19/2019  -     Rheumatoid factor; Future; Expected date: 07/19/2019  -     Cyclic citrul peptide antibody, IgG; Future; Expected date: 07/19/2019  -     TSH; Future; Expected date: 07/19/2019  -     T4, free; Future; Expected date: 07/19/2019  -     T3, free; Future; Expected date: 07/19/2019  -     PTH, intact; Future; Expected date: 07/19/2019    Rheumatoid arthritis with positive rheumatoid factor, involving unspecified site  -     HYDROcodone-acetaminophen (NORCO)  mg per tablet; Take 1 tablet by mouth every 8 (eight) hours as needed.  Dispense: 90 tablet; Refill: 0  -     predniSONE (DELTASONE) 5 MG tablet; Take 1 tablet (5 mg total) by mouth 3 (three) times daily.  Dispense: 90 tablet; Refill: 3  -     CBC auto differential; Future; Expected date: 07/19/2019  -     Comprehensive metabolic panel; Future; Expected date: 07/19/2019  -     C-reactive protein; Future; Expected date: 07/19/2019  -     Sedimentation rate; Future; Expected date: 07/19/2019  -     Rheumatoid factor; Future; Expected date: 07/19/2019  -      Cyclic citrul peptide antibody, IgG; Future; Expected date: 07/19/2019  -     TSH; Future; Expected date: 07/19/2019  -     T4, free; Future; Expected date: 07/19/2019  -     T3, free; Future; Expected date: 07/19/2019  -     PTH, intact; Future; Expected date: 07/19/2019    Sjogren's syndrome, with unspecified organ involvement  -     HYDROcodone-acetaminophen (NORCO)  mg per tablet; Take 1 tablet by mouth every 8 (eight) hours as needed.  Dispense: 90 tablet; Refill: 0  -     predniSONE (DELTASONE) 5 MG tablet; Take 1 tablet (5 mg total) by mouth 3 (three) times daily.  Dispense: 90 tablet; Refill: 3  -     CBC auto differential; Future; Expected date: 07/19/2019  -     Comprehensive metabolic panel; Future; Expected date: 07/19/2019  -     C-reactive protein; Future; Expected date: 07/19/2019  -     Sedimentation rate; Future; Expected date: 07/19/2019  -     Rheumatoid factor; Future; Expected date: 07/19/2019  -     Cyclic citrul peptide antibody, IgG; Future; Expected date: 07/19/2019  -     TSH; Future; Expected date: 07/19/2019  -     T4, free; Future; Expected date: 07/19/2019  -     T3, free; Future; Expected date: 07/19/2019  -     PTH, intact; Future; Expected date: 07/19/2019    Primary osteoarthritis of both knees  -     HYDROcodone-acetaminophen (NORCO)  mg per tablet; Take 1 tablet by mouth every 8 (eight) hours as needed.  Dispense: 90 tablet; Refill: 0  -     predniSONE (DELTASONE) 5 MG tablet; Take 1 tablet (5 mg total) by mouth 3 (three) times daily.  Dispense: 90 tablet; Refill: 3  -     CBC auto differential; Future; Expected date: 07/19/2019  -     Comprehensive metabolic panel; Future; Expected date: 07/19/2019  -     C-reactive protein; Future; Expected date: 07/19/2019  -     Sedimentation rate; Future; Expected date: 07/19/2019  -     Rheumatoid factor; Future; Expected date: 07/19/2019  -     Cyclic citrul peptide antibody, IgG; Future; Expected date: 07/19/2019  -     TSH;  Future; Expected date: 07/19/2019  -     T4, free; Future; Expected date: 07/19/2019  -     T3, free; Future; Expected date: 07/19/2019  -     PTH, intact; Future; Expected date: 07/19/2019    Lumbar and sacral arthritis  -     HYDROcodone-acetaminophen (NORCO)  mg per tablet; Take 1 tablet by mouth every 8 (eight) hours as needed.  Dispense: 90 tablet; Refill: 0  -     predniSONE (DELTASONE) 5 MG tablet; Take 1 tablet (5 mg total) by mouth 3 (three) times daily.  Dispense: 90 tablet; Refill: 3  -     CBC auto differential; Future; Expected date: 07/19/2019  -     Comprehensive metabolic panel; Future; Expected date: 07/19/2019  -     C-reactive protein; Future; Expected date: 07/19/2019  -     Sedimentation rate; Future; Expected date: 07/19/2019  -     Rheumatoid factor; Future; Expected date: 07/19/2019  -     Cyclic citrul peptide antibody, IgG; Future; Expected date: 07/19/2019  -     TSH; Future; Expected date: 07/19/2019  -     T4, free; Future; Expected date: 07/19/2019  -     T3, free; Future; Expected date: 07/19/2019  -     PTH, intact; Future; Expected date: 07/19/2019    Chronic pain of right knee  -     HYDROcodone-acetaminophen (NORCO)  mg per tablet; Take 1 tablet by mouth every 8 (eight) hours as needed.  Dispense: 90 tablet; Refill: 0  -     predniSONE (DELTASONE) 5 MG tablet; Take 1 tablet (5 mg total) by mouth 3 (three) times daily.  Dispense: 90 tablet; Refill: 3  -     CBC auto differential; Future; Expected date: 07/19/2019  -     Comprehensive metabolic panel; Future; Expected date: 07/19/2019  -     C-reactive protein; Future; Expected date: 07/19/2019  -     Sedimentation rate; Future; Expected date: 07/19/2019  -     Rheumatoid factor; Future; Expected date: 07/19/2019  -     Cyclic citrul peptide antibody, IgG; Future; Expected date: 07/19/2019  -     TSH; Future; Expected date: 07/19/2019  -     T4, free; Future; Expected date: 07/19/2019  -     T3, free; Future; Expected  date: 07/19/2019  -     PTH, intact; Future; Expected date: 07/19/2019    Other orders  -     HYDROcodone-acetaminophen (NORCO)  mg per tablet; Take 1 tablet by mouth every 8 (eight) hours as needed.  Dispense: 90 tablet; Refill: 0        Pt has tried and failed arava, methotrexate, plaquenil, imuran, enbrel samples  Patient can not use a cane or walker or a manual wheelchair because her osteoarthritis shoulder, knees B and rheumatoid arthritis has deformed her hands and neck and   Due to her Rheumatoid arthritis and osteoarthritis her disease is progressive and pt needs a POV. She is willing and motivated to use her POV to help with her ADLs and she can safely operate a Power operated vehicles and she can can safely operate a POV both mentally and physically. Her home provide adequate access to accomodates a POV.

## 2019-07-22 ENCOUNTER — DOCUMENTATION ONLY (OUTPATIENT)
Dept: RHEUMATOLOGY | Facility: CLINIC | Age: 74
End: 2019-07-22

## 2019-07-22 NOTE — PROGRESS NOTES
Faxed order for motorized scooter and office notes from march to [facility]

## 2019-08-01 ENCOUNTER — PATIENT OUTREACH (OUTPATIENT)
Dept: ADMINISTRATIVE | Facility: HOSPITAL | Age: 74
End: 2019-08-01

## 2019-08-01 NOTE — PROGRESS NOTES
Spoke with patient Re scheduling Mammogram. Appt. Carly'd 8/6/19. Pt. also Carly'd for Dexa scan and labs on 8/6/19.

## 2019-08-06 ENCOUNTER — HOSPITAL ENCOUNTER (OUTPATIENT)
Dept: RADIOLOGY | Facility: HOSPITAL | Age: 74
Discharge: HOME | End: 2019-08-06
Attending: FAMILY MEDICINE
Payer: MEDICARE

## 2019-08-06 DIAGNOSIS — Z12.31 SCREENING MAMMOGRAM, ENCOUNTER FOR: ICD-10-CM

## 2019-08-06 DIAGNOSIS — M81.0 OSTEOPOROSIS, UNSPECIFIED OSTEOPOROSIS TYPE, UNSPECIFIED PATHOLOGICAL FRACTURE PRESENCE: ICD-10-CM

## 2019-08-07 ENCOUNTER — TELEPHONE (OUTPATIENT)
Dept: FAMILY MEDICINE | Facility: CLINIC | Age: 74
End: 2019-08-07

## 2019-08-07 DIAGNOSIS — E87.6 HYPOKALEMIA: Primary | ICD-10-CM

## 2019-08-07 RX ORDER — SIMVASTATIN 5 MG/1
5 TABLET, FILM COATED ORAL NIGHTLY
Qty: 90 TABLET | Refills: 3 | Status: SHIPPED | OUTPATIENT
Start: 2019-08-07 | End: 2020-10-08 | Stop reason: SDUPTHER

## 2019-08-07 NOTE — TELEPHONE ENCOUNTER
----- Message from Cynthia Webster MA sent at 8/7/2019  1:53 PM CDT -----  Pt notified of results, states she is willing to try a medication for her cholesterol. appt booked

## 2019-08-20 ENCOUNTER — TELEPHONE (OUTPATIENT)
Dept: RHEUMATOLOGY | Facility: CLINIC | Age: 74
End: 2019-08-20

## 2019-08-28 ENCOUNTER — DOCUMENTATION ONLY (OUTPATIENT)
Dept: RHEUMATOLOGY | Facility: CLINIC | Age: 74
End: 2019-08-28

## 2019-08-28 NOTE — PROGRESS NOTES
Script for power wheel chair printed, 7 element form and face to face evaluation completed, last office notes printed faxed to [facility] at [phone]

## 2019-08-29 ENCOUNTER — TELEPHONE (OUTPATIENT)
Dept: RHEUMATOLOGY | Facility: CLINIC | Age: 74
End: 2019-08-29

## 2019-08-29 NOTE — TELEPHONE ENCOUNTER
----- Message from Toni Wu sent at 8/29/2019 12:10 PM CDT -----  Contact: [facility]  [phone]  Type: Needs Medical Advice    Who Called:  [facility]  [phone]    Additional Information:  Advised needs more current information for ordering power chair. Information must be within forty-five days of the last face to face exam with the Ptnt.  Please call to discuss.

## 2019-08-30 ENCOUNTER — LAB VISIT (OUTPATIENT)
Dept: LAB | Facility: HOSPITAL | Age: 74
End: 2019-08-30
Attending: FAMILY MEDICINE
Payer: MEDICARE

## 2019-08-30 DIAGNOSIS — E87.6 HYPOKALEMIA: ICD-10-CM

## 2019-08-30 LAB
ANION GAP SERPL CALC-SCNC: 8 MMOL/L (ref 8–16)
BUN SERPL-MCNC: 15 MG/DL (ref 8–23)
CALCIUM SERPL-MCNC: 9.1 MG/DL (ref 8.7–10.5)
CHLORIDE SERPL-SCNC: 105 MMOL/L (ref 95–110)
CO2 SERPL-SCNC: 27 MMOL/L (ref 23–29)
CREAT SERPL-MCNC: 1 MG/DL (ref 0.5–1.4)
EST. GFR  (AFRICAN AMERICAN): >60 ML/MIN/1.73 M^2
EST. GFR  (NON AFRICAN AMERICAN): 56.4 ML/MIN/1.73 M^2
GLUCOSE SERPL-MCNC: 87 MG/DL (ref 70–110)
POTASSIUM SERPL-SCNC: 4.1 MMOL/L (ref 3.5–5.1)
SODIUM SERPL-SCNC: 140 MMOL/L (ref 136–145)

## 2019-08-30 PROCEDURE — 80048 BASIC METABOLIC PNL TOTAL CA: CPT

## 2019-08-30 PROCEDURE — 36415 COLL VENOUS BLD VENIPUNCTURE: CPT | Mod: PO

## 2019-10-11 DIAGNOSIS — M17.0 PRIMARY OSTEOARTHRITIS OF BOTH KNEES: ICD-10-CM

## 2019-10-11 DIAGNOSIS — G89.29 CHRONIC PAIN OF RIGHT KNEE: ICD-10-CM

## 2019-10-11 DIAGNOSIS — M05.9 RHEUMATOID ARTHRITIS WITH POSITIVE RHEUMATOID FACTOR, INVOLVING UNSPECIFIED SITE: ICD-10-CM

## 2019-10-11 DIAGNOSIS — M35.00 SJOGREN'S SYNDROME, WITH UNSPECIFIED ORGAN INVOLVEMENT: ICD-10-CM

## 2019-10-11 DIAGNOSIS — M25.561 CHRONIC PAIN OF RIGHT KNEE: ICD-10-CM

## 2019-10-11 DIAGNOSIS — M47.817 LUMBAR AND SACRAL ARTHRITIS: ICD-10-CM

## 2019-10-11 RX ORDER — HYDROCODONE BITARTRATE AND ACETAMINOPHEN 10; 325 MG/1; MG/1
TABLET ORAL
Qty: 90 TABLET | Refills: 0 | Status: SHIPPED | OUTPATIENT
Start: 2019-10-17 | End: 2019-11-15 | Stop reason: SDUPTHER

## 2019-10-11 NOTE — TELEPHONE ENCOUNTER
----- Message from Clifford Fong sent at 10/11/2019  3:06 PM CDT -----  Contact: pt  Type:  RX Refill Request    Who Called:  pt  Refill or New Rx:  refill  RX Name and Strength:  HYDROcodone-acetaminophen (NORCO)  mg per tablet  How is the patient currently taking it? (ex. 1XDay):  3 x day  Is this a 30 day or 90 day RX:  30  Preferred Pharmacy with phone number:    [facility] in [place], [place]  Phone: [phone]    Local or Mail Order:    Ordering Provider:    Best Call Back Number:  [phone]  Additional Information:  Pt understands this is to be filled on the 17th

## 2019-10-21 ENCOUNTER — TELEPHONE (OUTPATIENT)
Dept: FAMILY MEDICINE | Facility: CLINIC | Age: 74
End: 2019-10-21

## 2019-10-21 NOTE — TELEPHONE ENCOUNTER
----- Message from Sayda Bob sent at 10/21/2019  1:52 PM CDT -----  Contact: Patient  Patient requesting a work in appt for tomorrow for stomach, pelvic, and back pain. Please call her back at [phone]. Thank you

## 2019-10-24 ENCOUNTER — TELEPHONE (OUTPATIENT)
Dept: RHEUMATOLOGY | Facility: CLINIC | Age: 74
End: 2019-10-24

## 2019-10-24 NOTE — TELEPHONE ENCOUNTER
----- Message from Ameya Lopes sent at 10/24/2019  9:55 AM CDT -----  Type: Needs Medical Advice    Who Called:  Patient  Symptoms (please be specific):  Patient states that she's having a reaction to her hydroxychloroquine (PLAQUENIL) 200 mg tablet and leflunomide (ARAVA) 10 MG Tab .  Ringing in ears, loss of balance, leg pain      How long has patient had these symptoms:  Over a week  Pharmacy name and phone #:   [facility] [address] [id number] [facility] [address]  Phone: [phone] Fax: [phone]      Best Call Back Number: [phone]  Additional Information: Please call to advise

## 2019-10-24 NOTE — TELEPHONE ENCOUNTER
Returned patient call regarding reaction to medication. Stated has blurred vision, falling and loose stool. Nurse offered appointment tomorrow with Dr Johnson. Patient stated does not know if she will have a ride. Will call around to see if she can find someone to bring her and call office back.

## 2019-10-30 ENCOUNTER — OFFICE VISIT (OUTPATIENT)
Dept: FAMILY MEDICINE | Facility: CLINIC | Age: 74
End: 2019-10-30
Payer: MEDICARE

## 2019-10-30 VITALS
HEART RATE: 70 BPM | WEIGHT: 265 LBS | SYSTOLIC BLOOD PRESSURE: 108 MMHG | HEIGHT: 66 IN | DIASTOLIC BLOOD PRESSURE: 60 MMHG | BODY MASS INDEX: 42.59 KG/M2

## 2019-10-30 DIAGNOSIS — R60.0 PERIPHERAL EDEMA: ICD-10-CM

## 2019-10-30 DIAGNOSIS — I15.2 HYPERTENSION ASSOCIATED WITH DIABETES: ICD-10-CM

## 2019-10-30 DIAGNOSIS — E78.5 TYPE 2 DIABETES MELLITUS WITH HYPERLIPIDEMIA: Primary | ICD-10-CM

## 2019-10-30 DIAGNOSIS — E11.69 TYPE 2 DIABETES MELLITUS WITH HYPERLIPIDEMIA: Primary | ICD-10-CM

## 2019-10-30 DIAGNOSIS — M05.9 RHEUMATOID ARTHRITIS WITH POSITIVE RHEUMATOID FACTOR, INVOLVING UNSPECIFIED SITE: ICD-10-CM

## 2019-10-30 DIAGNOSIS — E11.59 HYPERTENSION ASSOCIATED WITH DIABETES: ICD-10-CM

## 2019-10-30 DIAGNOSIS — M35.00 SJOGREN'S SYNDROME, WITH UNSPECIFIED ORGAN INVOLVEMENT: ICD-10-CM

## 2019-10-30 PROCEDURE — 99999 PR PBB SHADOW E&M-EST. PATIENT-LVL IV: CPT | Mod: PBBFAC,,, | Performed by: FAMILY MEDICINE

## 2019-10-30 PROCEDURE — 99214 OFFICE O/P EST MOD 30 MIN: CPT | Mod: PBBFAC,PO | Performed by: FAMILY MEDICINE

## 2019-10-30 PROCEDURE — 99999 PR PBB SHADOW E&M-EST. PATIENT-LVL IV: ICD-10-PCS | Mod: PBBFAC,,, | Performed by: FAMILY MEDICINE

## 2019-10-30 PROCEDURE — 99214 OFFICE O/P EST MOD 30 MIN: CPT | Mod: S$PBB,,, | Performed by: FAMILY MEDICINE

## 2019-10-30 PROCEDURE — 99214 PR OFFICE/OUTPT VISIT, EST, LEVL IV, 30-39 MIN: ICD-10-PCS | Mod: S$PBB,,, | Performed by: FAMILY MEDICINE

## 2019-10-30 RX ORDER — BENAZEPRIL HYDROCHLORIDE 40 MG/1
40 TABLET ORAL DAILY
Qty: 90 TABLET | Refills: 3 | Status: SHIPPED | OUTPATIENT
Start: 2019-10-30 | End: 2019-12-30

## 2019-10-30 RX ORDER — MONTELUKAST SODIUM 10 MG/1
10 TABLET ORAL NIGHTLY
Qty: 90 TABLET | Refills: 3 | Status: SHIPPED | OUTPATIENT
Start: 2019-10-30 | End: 2019-11-29

## 2019-10-30 RX ORDER — AMOXICILLIN 500 MG/1
500 TABLET, FILM COATED ORAL 3 TIMES DAILY
Qty: 30 TABLET | Refills: 0 | Status: SHIPPED | OUTPATIENT
Start: 2019-10-30 | End: 2019-11-09

## 2019-10-30 NOTE — PROGRESS NOTES
Rekha Miller presents to fu polymyalgia and sciatic pain.  Follows w Dr Johnson dx RA,Sjogrens,DM last a1c8/6/19 5.8, no hypoglycemia.HBP well controlled but kevyn current med   Co neuropathy in feet and sciatica.      Past Medical History:   Diagnosis Date    Asthma     COPD (chronic obstructive pulmonary disease)     Degenerative disc disease     Diabetes mellitus     Diabetes mellitus, type 2     Fibromyalgia     Hypertension     Rheumatoid arthritis     Rheumatoid arthritis(714.0)     Rheumatoid arthritis     Past Surgical History:   Procedure Laterality Date    BREAST SURGERY  1986    CATARACT EXTRACTION      EYE SURGERY  2013    Catatracts    ORIF FEMUR FRACTURE      right knee ligament rpeair  2005    right shoulder surgery  4/18/07    Dr. Gao     Review of patient's allergies indicates:   Allergen Reactions    Codeine     Dairy digestive ultra      Dairy products      Imuran [azathioprine sodium] Diarrhea    Morphine      DOESN'T FEEL RIGHT     Plaquenil [hydroxychloroquine] Other (See Comments)     headaches     Current Outpatient Medications on File Prior to Visit   Medication Sig Dispense Refill    acetaminophen (TYLENOL) 325 MG tablet Take 650 mg by mouth as needed.      albuterol (PROVENTIL) 2.5 mg /3 mL (0.083 %) nebulizer solution USE 1 VIAL IN NEBULIZER EVERY 6 HOURS AS NEEDED FOR WHEEZING 180 mL 3    albuterol 90 mcg/actuation inhaler Inhale 2 puffs into the lungs every 6 (six) hours as needed for Wheezing. 18 g 11    amlodipine-benazepril 5-20 mg (LOTREL) 5-20 mg per capsule Take 1 capsule by mouth 2 (two) times daily. 180 capsule 3    blood sugar diagnostic (TRUETEST TEST STRIPS) Strp Inject 1 strip into the skin once daily. 300 strip 3    clotrimazole-betamethasone 1-0.05% (LOTRISONE) cream Apply topically 2 (two) times daily. 45 g 6    folic acid-vit B6-vit B12 (FOLBEE) 2.5-25-1 mg Tab Take 1 tablet by mouth once daily. 90 each 3    furosemide (LASIX) 40 MG tablet  Take 1 tablet (40 mg total) by mouth once daily. 90 tablet 3    glipiZIDE (GLUCOTROL XL) 5 MG TR24 Take 1 tablet (5 mg total) by mouth daily with breakfast. 90 tablet 3    HYDROcodone-acetaminophen (NORCO)  mg per tablet TAKE 1 TABLET BY MOUTH EVERY 8 HOURS AS NEEDED 90 tablet 0    hydroxychloroquine (PLAQUENIL) 200 mg tablet Take 1 tablet (200 mg total) by mouth 2 (two) times daily.  3    levocetirizine (XYZAL) 5 MG tablet Take 5 mg by mouth.      potassium chloride SA (K-DUR,KLOR-CON) 20 MEQ tablet Take 1 tablet (20 mEq total) by mouth 2 (two) times daily. 180 tablet 3    predniSONE (DELTASONE) 5 MG tablet Take 1 tablet (5 mg total) by mouth 3 (three) times daily. 90 tablet 3    ranitidine (ZANTAC) 300 MG tablet Take 1 tablet (300 mg total) by mouth every evening. 90 tablet 3    simvastatin (ZOCOR) 5 MG tablet Take 1 tablet (5 mg total) by mouth every evening. 90 tablet 3    sucralfate (CARAFATE) 1 gram tablet TAKE 1 TABLET BY MOUTH FOUR TIMES DAILY to coat stomach 120 tablet 3    gabapentin (NEURONTIN) 300 MG capsule Take 1 capsule (300 mg total) by mouth 3 (three) times daily. 90 capsule 11    leflunomide (ARAVA) 10 MG Tab Take 1 tablet (10 mg total) by mouth once daily. 90 tablet 3     No current facility-administered medications on file prior to visit.      Social History     Socioeconomic History    Marital status:      Spouse name: Not on file    Number of children: 8    Years of education: Not on file    Highest education level: Not on file   Occupational History    Not on file   Social Needs    Financial resource strain: Not on file    Food insecurity:     Worry: Not on file     Inability: Not on file    Transportation needs:     Medical: Not on file     Non-medical: Not on file   Tobacco Use    Smoking status: Never Smoker    Smokeless tobacco: Never Used   Substance and Sexual Activity    Alcohol use: Not on file    Drug use: No    Sexual activity: Not on file    Lifestyle    Physical activity:     Days per week: Not on file     Minutes per session: Not on file    Stress: Not at all   Relationships    Social connections:     Talks on phone: Not on file     Gets together: Not on file     Attends Worship service: Not on file     Active member of club or organization: Not on file     Attends meetings of clubs or organizations: Not on file     Relationship status: Not on file   Other Topics Concern    Not on file   Social History Narrative    Not on file     No family history on file.      ROS:  SKIN: No rashes, itching or changes in color or texture of skin.  EYES: Visual acuity fine. No photophobia, ocular pain or diplopia.EARS: Denies ear pain, discharge or vertigo.NOSE: No loss of smell, no epistaxis some postnasal drip.MOUTH & THROAT: No hoarseness or change in voice. No excessive gum bleeding.CHEST: Denies SOSA, cyanosis, wheezing  CARDIOVASCULAR: Denies chest pain, PND, orthopnea or reduced exercise tolerance.  ABDOMEN:  No weight loss.No abdominal pain, no hematemesis or blood in stool.  URINARY: No flank pain, dysuria or hematuria.  PERIPHERAL VASCULAR: No claudication or cyanosis.  MUSCULOSKELETAL:Myalgia and polyarthralgia  NEUROLOGIC: No history of seizures, paralysis, alteration of gait or coordination.    PE: Vital signs as noted  Heent:Normocephalic with no recent cranial trauma,PERRLA,EOMI,conjunctiva clear,fundi reveal no hemmorhage exudate or papilledema.Otic canals clear, tympanic membranes slightly dull bilaterally.Nasal mucosa slightly red and edematous.Posterior pharynx slightly red but without exudate.  Neck:Supple with minimal anterior cervical adenopathy.  Chest:Clear bilateral breath sounds with mild scattered ronchi and min exp wheezing   Heart:Regular rhthym without murmer  Abdomen:Soft, non tender,no masses, no hepatosplenomegaly  Extremeties Mod severe gen degenerative changes 1/1 pretibial pedal edema   Neurologic:Grossly within normal  limits  Protective Sensation (w/ 10 gram monofilament): Random deficits bilat   Visual Inspection (Evidence of Foot Deformity, Ulceration, Nails Intact, Skin Intact):Normal  Pedal Pulses: Present    DM w HBP   Edema  Sjogrens syndrome  Fatigue  Neuropathy  Sciatica      Lab eval  Rev DM management-last a1c 65.8  Rec incr nani 300 hs to 600 hs then -900 hs   Rev diet recs wt loss, exercise and Na   Ch amlod/benaz 5-20 to benaz alone and restart daily lasix

## 2019-11-05 ENCOUNTER — TELEPHONE (OUTPATIENT)
Dept: RHEUMATOLOGY | Facility: CLINIC | Age: 74
End: 2019-11-05
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
## 2019-11-05 NOTE — TELEPHONE ENCOUNTER
----- Message from Betty Vieira sent at 11/4/2019  9:47 AM CST -----  Contact: Patient  Type: Needs Medical Advice    Who Called:  Patient  Best Call Back Number: [phone]  Additional Information: Patient is calling to speak with Lia in regards to her appt.Please call back and advise.

## 2019-11-06 ENCOUNTER — PATIENT OUTREACH (OUTPATIENT)
Dept: ADMINISTRATIVE | Facility: HOSPITAL | Age: 74
End: 2019-11-06

## 2019-11-07 ENCOUNTER — TELEPHONE (OUTPATIENT)
Dept: FAMILY MEDICINE | Facility: CLINIC | Age: 74
End: 2019-11-07

## 2019-11-07 DIAGNOSIS — I15.2 HYPERTENSION ASSOCIATED WITH DIABETES: Primary | ICD-10-CM

## 2019-11-07 DIAGNOSIS — E11.59 HYPERTENSION ASSOCIATED WITH DIABETES: Primary | ICD-10-CM

## 2019-11-07 NOTE — TELEPHONE ENCOUNTER
Patient states has to have several days to arrange transportation, can't come in tomorrow, scheduled lab and nurse visit for 11/14/19

## 2019-11-07 NOTE — TELEPHONE ENCOUNTER
----- Message from Yolanda Louise sent at 11/7/2019  1:40 PM CST -----  Contact: pt  She's calling in regards to medication     Pt stated she needs to discuss medication       pls call pt back at [phone] (home)

## 2019-11-07 NOTE — TELEPHONE ENCOUNTER
Pt states the benazepril makes her dizzy. Asking if you can change her back to lotrel 5 /20 unless there is a medical reason you changed the medicine please advise.

## 2019-11-07 NOTE — TELEPHONE ENCOUNTER
Yes the reason we changed it was bc the amlodipine in the lotrel causes swelling and the benazapril is the other ingredient in the lotrel and shouldn't be causing the dizziness. It may be her BP is not adequately controlled with that dose or it might be because the furosemide she restarted is lowering her potassium. Rec BMP and BP check tomorrow

## 2019-11-15 DIAGNOSIS — M05.9 RHEUMATOID ARTHRITIS WITH POSITIVE RHEUMATOID FACTOR, INVOLVING UNSPECIFIED SITE: ICD-10-CM

## 2019-11-15 DIAGNOSIS — M35.00 SJOGREN'S SYNDROME, WITH UNSPECIFIED ORGAN INVOLVEMENT: ICD-10-CM

## 2019-11-15 DIAGNOSIS — M25.561 CHRONIC PAIN OF RIGHT KNEE: ICD-10-CM

## 2019-11-15 DIAGNOSIS — G89.29 CHRONIC PAIN OF RIGHT KNEE: ICD-10-CM

## 2019-11-15 DIAGNOSIS — M47.817 LUMBAR AND SACRAL ARTHRITIS: ICD-10-CM

## 2019-11-15 DIAGNOSIS — M17.0 PRIMARY OSTEOARTHRITIS OF BOTH KNEES: ICD-10-CM

## 2019-11-15 RX ORDER — HYDROCODONE BITARTRATE AND ACETAMINOPHEN 10; 325 MG/1; MG/1
1 TABLET ORAL EVERY 8 HOURS PRN
Qty: 90 TABLET | Refills: 0 | Status: SHIPPED | OUTPATIENT
Start: 2019-11-15 | End: 2019-12-10 | Stop reason: SDUPTHER

## 2019-11-15 NOTE — TELEPHONE ENCOUNTER
----- Message from Marian Burrows sent at 11/15/2019 10:50 AM CST -----  Contact: Patient  Type:  RX Refill Request    Who Called:  Patient  Refill or New Rx:  New RX  RX Name and Strength:  HYDROcodone-acetaminophen (NORCO)  mg per tablet  How is the patient currently taking it? (ex. 1XDay):  TAKE 1 TABLET BY MOUTH EVERY 8 HOURS AS NEEDED  Is this a 30 day or 90 day RX:  90 tablet  Preferred Pharmacy with phone number:    [id number] - [place] - [address] AT [place] [address] & [address] [facility]  [address] [facility]  [place] [address]  Phone: [phone] Fax: [phone]  [facility] [place]  [place] - [address] [address]  Phone: [phone] Fax: [phone]  Local or Mail Order:  Local  Ordering Provider:  Pedro Johnson MD  Best Call Back Number:  [phone]

## 2019-11-19 DIAGNOSIS — G89.29 CHRONIC PAIN OF RIGHT KNEE: ICD-10-CM

## 2019-11-19 DIAGNOSIS — M35.00 SJOGREN'S SYNDROME, WITH UNSPECIFIED ORGAN INVOLVEMENT: ICD-10-CM

## 2019-11-19 DIAGNOSIS — M17.0 PRIMARY OSTEOARTHRITIS OF BOTH KNEES: ICD-10-CM

## 2019-11-19 DIAGNOSIS — M25.561 CHRONIC PAIN OF RIGHT KNEE: ICD-10-CM

## 2019-11-19 DIAGNOSIS — M47.817 LUMBAR AND SACRAL ARTHRITIS: ICD-10-CM

## 2019-11-19 DIAGNOSIS — M05.9 RHEUMATOID ARTHRITIS WITH POSITIVE RHEUMATOID FACTOR, INVOLVING UNSPECIFIED SITE: ICD-10-CM

## 2019-11-22 RX ORDER — HYDROCODONE BITARTRATE AND ACETAMINOPHEN 10; 325 MG/1; MG/1
TABLET ORAL
Qty: 90 TABLET | OUTPATIENT
Start: 2019-11-22

## 2019-12-09 DIAGNOSIS — M47.817 LUMBAR AND SACRAL ARTHRITIS: ICD-10-CM

## 2019-12-09 DIAGNOSIS — M25.561 CHRONIC PAIN OF RIGHT KNEE: ICD-10-CM

## 2019-12-09 DIAGNOSIS — G89.29 CHRONIC PAIN OF RIGHT KNEE: ICD-10-CM

## 2019-12-09 DIAGNOSIS — M35.00 SJOGREN'S SYNDROME, WITH UNSPECIFIED ORGAN INVOLVEMENT: ICD-10-CM

## 2019-12-09 DIAGNOSIS — M05.9 RHEUMATOID ARTHRITIS WITH POSITIVE RHEUMATOID FACTOR, INVOLVING UNSPECIFIED SITE: ICD-10-CM

## 2019-12-09 DIAGNOSIS — M17.0 PRIMARY OSTEOARTHRITIS OF BOTH KNEES: ICD-10-CM

## 2019-12-09 DIAGNOSIS — D64.9 ANEMIA, UNSPECIFIED TYPE: ICD-10-CM

## 2019-12-10 NOTE — TELEPHONE ENCOUNTER
----- Message from Betty Dariel sent at 12/10/2019  9:47 AM CST -----  Contact: Patient  Type:  RX Refill Request    Who Called:  Patient  Refill or New Rx:  refill  RX Name and Strength:  HYDROcodone-acetaminophen (NORCO)  mg per tablet  How is the patient currently taking it? (ex. 1XDay):  3xday  Is this a 30 day or 90 day RX:  30 day  Preferred Pharmacy with phone number:    [facility] [place] [address] [address] [facility] [address]  Phone: [phone] Fax: [phone]    Local or Mail Order:  Local  Ordering Provider: Elizabeth Mercado Call Back Number: [phone]

## 2019-12-13 RX ORDER — PREDNISONE 5 MG/1
15 TABLET ORAL EVERY MORNING
Qty: 90 TABLET | Refills: 3 | Status: SHIPPED | OUTPATIENT
Start: 2019-12-13 | End: 2020-05-11

## 2019-12-13 RX ORDER — HYDROCODONE BITARTRATE AND ACETAMINOPHEN 10; 325 MG/1; MG/1
1 TABLET ORAL EVERY 8 HOURS PRN
Qty: 90 TABLET | Refills: 0 | Status: SHIPPED | OUTPATIENT
Start: 2019-12-13 | End: 2019-12-30 | Stop reason: SDUPTHER

## 2019-12-13 NOTE — TELEPHONE ENCOUNTER
----- Message from Beth Jasso sent at 12/13/2019 11:01 AM CST -----  Patient needs call back to check status of pain medication rx..[phone] ([place])

## 2019-12-27 ENCOUNTER — PATIENT OUTREACH (OUTPATIENT)
Dept: ADMINISTRATIVE | Facility: HOSPITAL | Age: 74
End: 2019-12-27

## 2019-12-30 ENCOUNTER — OFFICE VISIT (OUTPATIENT)
Dept: RHEUMATOLOGY | Facility: CLINIC | Age: 74
End: 2019-12-30
Payer: MEDICARE

## 2019-12-30 VITALS
HEART RATE: 70 BPM | HEIGHT: 64 IN | DIASTOLIC BLOOD PRESSURE: 63 MMHG | BODY MASS INDEX: 42.9 KG/M2 | SYSTOLIC BLOOD PRESSURE: 117 MMHG | WEIGHT: 251.31 LBS

## 2019-12-30 DIAGNOSIS — M35.00 SJOGREN'S SYNDROME, WITH UNSPECIFIED ORGAN INVOLVEMENT: ICD-10-CM

## 2019-12-30 DIAGNOSIS — E11.9 TYPE 2 DIABETES MELLITUS WITHOUT COMPLICATION, UNSPECIFIED WHETHER LONG TERM INSULIN USE: ICD-10-CM

## 2019-12-30 DIAGNOSIS — I10 HYPERTENSION, UNSPECIFIED TYPE: Primary | ICD-10-CM

## 2019-12-30 DIAGNOSIS — G89.29 CHRONIC PAIN OF RIGHT KNEE: ICD-10-CM

## 2019-12-30 DIAGNOSIS — M25.561 CHRONIC PAIN OF RIGHT KNEE: ICD-10-CM

## 2019-12-30 DIAGNOSIS — M05.9 RHEUMATOID ARTHRITIS WITH POSITIVE RHEUMATOID FACTOR, INVOLVING UNSPECIFIED SITE: ICD-10-CM

## 2019-12-30 DIAGNOSIS — M47.817 LUMBAR AND SACRAL ARTHRITIS: ICD-10-CM

## 2019-12-30 DIAGNOSIS — M17.0 PRIMARY OSTEOARTHRITIS OF BOTH KNEES: ICD-10-CM

## 2019-12-30 PROCEDURE — 1125F AMNT PAIN NOTED PAIN PRSNT: CPT | Mod: ,,, | Performed by: INTERNAL MEDICINE

## 2019-12-30 PROCEDURE — 99999 PR PBB SHADOW E&M-EST. PATIENT-LVL III: ICD-10-PCS | Mod: PBBFAC,,, | Performed by: INTERNAL MEDICINE

## 2019-12-30 PROCEDURE — 1159F MED LIST DOCD IN RCRD: CPT | Mod: ,,, | Performed by: INTERNAL MEDICINE

## 2019-12-30 PROCEDURE — 1159F PR MEDICATION LIST DOCUMENTED IN MEDICAL RECORD: ICD-10-PCS | Mod: ,,, | Performed by: INTERNAL MEDICINE

## 2019-12-30 PROCEDURE — 99215 OFFICE O/P EST HI 40 MIN: CPT | Mod: S$PBB,,, | Performed by: INTERNAL MEDICINE

## 2019-12-30 PROCEDURE — 99999 PR PBB SHADOW E&M-EST. PATIENT-LVL III: CPT | Mod: PBBFAC,,, | Performed by: INTERNAL MEDICINE

## 2019-12-30 PROCEDURE — 99215 PR OFFICE/OUTPT VISIT, EST, LEVL V, 40-54 MIN: ICD-10-PCS | Mod: S$PBB,,, | Performed by: INTERNAL MEDICINE

## 2019-12-30 PROCEDURE — 1125F PR PAIN SEVERITY QUANTIFIED, PAIN PRESENT: ICD-10-PCS | Mod: ,,, | Performed by: INTERNAL MEDICINE

## 2019-12-30 PROCEDURE — 99213 OFFICE O/P EST LOW 20 MIN: CPT | Mod: PBBFAC,PO | Performed by: INTERNAL MEDICINE

## 2019-12-30 RX ORDER — MOMETASONE FUROATE 50 UG/1
2 SPRAY, METERED NASAL DAILY
Qty: 17 G | Refills: 12 | Status: SHIPPED | OUTPATIENT
Start: 2019-12-30 | End: 2020-01-29

## 2019-12-30 RX ORDER — LEFLUNOMIDE 10 MG/1
10 TABLET ORAL DAILY
Qty: 90 TABLET | Refills: 3 | Status: SHIPPED | OUTPATIENT
Start: 2019-12-30 | End: 2020-07-06 | Stop reason: SDUPTHER

## 2019-12-30 RX ORDER — HYDROCODONE BITARTRATE AND ACETAMINOPHEN 10; 325 MG/1; MG/1
1 TABLET ORAL EVERY 8 HOURS PRN
Qty: 90 TABLET | Refills: 0 | Status: SHIPPED | OUTPATIENT
Start: 2020-01-10 | End: 2019-12-30 | Stop reason: SDUPTHER

## 2019-12-30 RX ORDER — HYDROCODONE BITARTRATE AND ACETAMINOPHEN 10; 325 MG/1; MG/1
1 TABLET ORAL EVERY 8 HOURS PRN
Qty: 90 TABLET | Refills: 0 | Status: SHIPPED | OUTPATIENT
Start: 2020-02-10 | End: 2019-12-30 | Stop reason: SDUPTHER

## 2019-12-30 RX ORDER — DICYCLOMINE HYDROCHLORIDE 20 MG/1
20 TABLET ORAL EVERY 6 HOURS
Qty: 120 TABLET | Refills: 3 | Status: SHIPPED | OUTPATIENT
Start: 2019-12-30 | End: 2020-04-24

## 2019-12-30 RX ORDER — IBUPROFEN 600 MG/1
TABLET ORAL
Refills: 0 | COMMUNITY
Start: 2019-11-01 | End: 2020-10-08

## 2019-12-30 RX ORDER — HYDROCODONE BITARTRATE AND ACETAMINOPHEN 10; 325 MG/1; MG/1
1 TABLET ORAL EVERY 8 HOURS PRN
Qty: 90 TABLET | Refills: 0 | Status: SHIPPED | OUTPATIENT
Start: 2020-03-10 | End: 2020-04-06 | Stop reason: SDUPTHER

## 2019-12-30 RX ORDER — SUCRALFATE 1 G/1
1 TABLET ORAL 4 TIMES DAILY
Qty: 120 TABLET | Refills: 5 | Status: SHIPPED | OUTPATIENT
Start: 2019-12-30 | End: 2020-01-29

## 2019-12-30 RX ORDER — AMLODIPINE AND BENAZEPRIL HYDROCHLORIDE 5; 20 MG/1; MG/1
1 CAPSULE ORAL 2 TIMES DAILY
Qty: 180 CAPSULE | Refills: 3 | Status: SHIPPED | OUTPATIENT
Start: 2019-12-30 | End: 2020-07-06 | Stop reason: SDUPTHER

## 2019-12-30 RX ORDER — METHOCARBAMOL 500 MG/1
TABLET, FILM COATED ORAL
Refills: 0 | COMMUNITY
Start: 2019-11-01 | End: 2020-07-06

## 2019-12-30 RX ORDER — TRAZODONE HYDROCHLORIDE 50 MG/1
50 TABLET ORAL NIGHTLY
Refills: 5 | COMMUNITY
Start: 2019-12-04 | End: 2023-03-09 | Stop reason: SDUPTHER

## 2019-12-30 RX ORDER — SUCRALFATE 1 G/1
TABLET ORAL
Qty: 120 TABLET | Refills: 3 | Status: CANCELLED | OUTPATIENT
Start: 2019-12-30

## 2019-12-30 ASSESSMENT — ROUTINE ASSESSMENT OF PATIENT INDEX DATA (RAPID3)
MDHAQ FUNCTION SCORE: 1.2
PSYCHOLOGICAL DISTRESS SCORE: 0
TOTAL RAPID3 SCORE: 5.5
FATIGUE SCORE: 1.1
PAIN SCORE: 9.5
PATIENT GLOBAL ASSESSMENT SCORE: 3

## 2019-12-30 NOTE — PROGRESS NOTES
Subjective:       Patient ID: Rekha Miller is a 74 y.o. female.    Chief Complaint: Disease Management and Rheumatoid Arthritis    Follow up     Ra flare  In pain  on arava  10 mg po qam and  Plaquenil  Doing well overall.Pain is located in multiple joints, both shoulder(s), both elbow(s), both wrist(s), both MCP(s): 1st, 2nd, 3rd, 4th and 5th, both PIP(s): 1st, 2nd, 3rd, 4th and 5th, both DIP(s): 1st and 2nd, both hip(s), both knee(s) and both MTP(s): 1st, 2nd, 3rd, 4th and 5th, is described as aching, pulsating, shooting and throbbing, and is constant, moderate .  Associated symptoms include: crepitation, decreased range of motion, edema, effusion, tenderness and warmth.             She complains of joint swelling. Associated symptoms include fatigue and myalgias.               She complains of joint swelling. Associated symptoms include fatigue and myalgias.         Review of Systems   Constitutional: Positive for activity change and fatigue. Negative for appetite change, chills, diaphoresis and unexpected weight change.   HENT: Negative for congestion, ear discharge, ear pain, facial swelling, mouth sores, postnasal drip, sinus pressure and sore throat.    Eyes: Negative for photophobia, pain, discharge, redness and itching.   Respiratory: Positive for wheezing. Negative for cough, chest tightness and shortness of breath.    Cardiovascular: Positive for leg swelling. Negative for chest pain and palpitations.   Gastrointestinal: Negative for abdominal pain, constipation, diarrhea, nausea and vomiting.   Endocrine: Negative for cold intolerance, heat intolerance, polydipsia and polyuria.   Genitourinary: Negative for decreased urine volume, difficulty urinating, flank pain, frequency, hematuria and urgency.   Musculoskeletal: Positive for arthralgias, back pain, gait problem, joint swelling, myalgias, neck pain and neck stiffness.   Skin: Negative for pallor, rash and wound.   Allergic/Immunologic: Negative for  "immunocompromised state.   Neurological: Negative for dizziness, tremors, weakness and numbness.   Hematological: Negative for adenopathy. Does not bruise/bleed easily.   Psychiatric/Behavioral: Negative for sleep disturbance. The patient is not nervous/anxious.          Objective:     /63 (BP Location: Left arm, Patient Position: Sitting, BP Method: Large (Automatic))   Pulse 70   Ht 5' 4" (1.626 m)   Wt 114 kg (251 lb 5.2 oz)   BMI 43.14 kg/m²      Physical Exam   Nursing note and vitals reviewed.  Constitutional: She is oriented to person, place, and time. No distress.   HENT:   Head: Normocephalic and atraumatic.   Mouth/Throat: Oropharynx is clear and moist.   Eyes: EOM are normal. Pupils are equal, round, and reactive to light.   Neck: Neck supple. No thyromegaly present.   Cardiovascular: Normal rate, regular rhythm and normal heart sounds.  Exam reveals no gallop and no friction rub.    No murmur heard.  Pulmonary/Chest: She has no wheezes. She has no rales. She exhibits no tenderness.   B bases   Abdominal: There is no tenderness. There is no rebound and no guarding.       Right Side Rheumatological Exam     Examination finds the elbow normal.    The patient is tender to palpation of the shoulder, wrist, knee, 1st PIP, 1st MCP, 2nd PIP, 2nd MCP, 3rd PIP, 3rd MCP, 4th PIP, 4th MCP, 5th PIP and 5th MCP    She has swelling of the 1st PIP, 1st MCP, 2nd PIP, 2nd MCP, 3rd PIP, 3rd MCP, 4th PIP, 4th MCP, 5th PIP and 5th MCP    Shoulder Exam   Tenderness Location: no tenderness    Range of Motion   Active abduction: abnormal   Adduction: abnormal  Sensation: normal    Knee Exam   Patellofemoral Crepitus: positive  Effusion: negative  Sensation: normal    Hip Exam   Tenderness Location: posterior  Sensation: normal    Elbow/Wrist Exam   Tenderness Location: no tenderness  Sensation: normal    Left Side Rheumatological Exam     Examination finds the elbow normal.    The patient is tender to palpation of the " shoulder, wrist, knee, 1st PIP, 1st MCP, 2nd PIP, 2nd MCP, 3rd PIP, 3rd MCP, 4th PIP, 4th MCP, 5th PIP and 5th MCP.    She has swelling of the 1st PIP, 1st MCP, 2nd PIP, 2nd MCP, 3rd PIP, 3rd MCP, 4th PIP, 4th MCP, 5th PIP and 5th MCP    Shoulder Exam   Tenderness Location: no tenderness    Range of Motion   Active abduction: abnormal   Sensation: normal    Knee Exam     Patellofemoral Crepitus: positive  Effusion: negative  Sensation: normal    Hip Exam   Tenderness Location: posterior  Sensation: normal    Elbow/Wrist Exam   Sensation: normal      Back/Neck Exam   General Inspection   Gait: abnormal       Tenderness Right paramedian tenderness of the Lower L-Spine, Upper L-Spine, SI Joint and Upper C-Spine.Left paramedian tenderness of the Lower L-Spine, Upper L-Spine, SI Joint and Upper C-Spine.    Back Range of Motion   Extension: abnormal  Flexion: abnormal  Lateral Bend Right: abnormal  Lateral Bend Left: abnormal  Rotation Right: abnormal  Rotation Left: abnormal    Neck Range of Motion   Flexion: Limited  Extension: Limited  Lymphadenopathy:     She has no cervical adenopathy.   Neurological: She is alert and oriented to person, place, and time.   Skin: No rash noted. No erythema. No pallor.     Psychiatric: Mood and affect normal.   Musculoskeletal: She exhibits tenderness and deformity. She exhibits no edema.   SI joint pain, require assistance with walking         Results for orders placed or performed in visit on 08/30/19   Basic metabolic panel   Result Value Ref Range    Sodium 140 136 - 145 mmol/L    Potassium 4.1 3.5 - 5.1 mmol/L    Chloride 105 95 - 110 mmol/L    CO2 27 23 - 29 mmol/L    Glucose 87 70 - 110 mg/dL    BUN, Bld 15 8 - 23 mg/dL    Creatinine 1.0 0.5 - 1.4 mg/dL    Calcium 9.1 8.7 - 10.5 mg/dL    Anion Gap 8 8 - 16 mmol/L    eGFR if African American >60.0 >60 mL/min/1.73 m^2    eGFR if non  56.4 (A) >60 mL/min/1.73 m^2        reviewed labs with patient during this visit      Assessment:       Encounter Diagnoses   Name Primary?    Hypertension, unspecified type Yes    Rheumatoid arthritis with positive rheumatoid factor, involving unspecified site     Sjogren's syndrome, with unspecified organ involvement     Primary osteoarthritis of both knees     Lumbar and sacral arthritis     Chronic pain of right knee     Type 2 diabetes mellitus without complication          Plan:       Hypertension, unspecified type  -     amlodipine-benazepril 5-20 mg (LOTREL) 5-20 mg per capsule; Take 1 capsule by mouth 2 (two) times daily.  Dispense: 180 capsule; Refill: 3  -     CBC auto differential; Future; Expected date: 12/30/2019  -     Comprehensive metabolic panel; Future; Expected date: 12/30/2019  -     C-reactive protein; Future; Expected date: 12/30/2019  -     Sedimentation rate; Future; Expected date: 12/30/2019  -     Rheumatoid factor; Future; Expected date: 12/30/2019  -     Cyclic citrul peptide antibody, IgG; Future; Expected date: 12/30/2019    Rheumatoid arthritis with positive rheumatoid factor, involving unspecified site  -     Discontinue: HYDROcodone-acetaminophen (NORCO)  mg per tablet; Take 1 tablet by mouth every 8 (eight) hours as needed.  Dispense: 90 tablet; Refill: 0  -     leflunomide (ARAVA) 10 MG Tab; Take 1 tablet (10 mg total) by mouth once daily.  Dispense: 90 tablet; Refill: 3  -     sucralfate (CARAFATE) 1 gram tablet; Take 1 tablet (1 g total) by mouth 4 (four) times daily.  Dispense: 120 tablet; Refill: 5  -     mometasone (NASONEX) 50 mcg/actuation nasal spray; 2 sprays by Nasal route once daily.  Dispense: 17 g; Refill: 12  -     dicyclomine (BENTYL) 20 mg tablet; Take 1 tablet (20 mg total) by mouth every 6 (six) hours.  Dispense: 120 tablet; Refill: 3  -     amlodipine-benazepril 5-20 mg (LOTREL) 5-20 mg per capsule; Take 1 capsule by mouth 2 (two) times daily.  Dispense: 180 capsule; Refill: 3  -     Discontinue: HYDROcodone-acetaminophen (NORCO)   mg per tablet; Take 1 tablet by mouth every 8 (eight) hours as needed.  Dispense: 90 tablet; Refill: 0  -     CBC auto differential; Future; Expected date: 12/30/2019  -     Comprehensive metabolic panel; Future; Expected date: 12/30/2019  -     C-reactive protein; Future; Expected date: 12/30/2019  -     Sedimentation rate; Future; Expected date: 12/30/2019  -     Rheumatoid factor; Future; Expected date: 12/30/2019  -     Cyclic citrul peptide antibody, IgG; Future; Expected date: 12/30/2019  -     HYDROcodone-acetaminophen (NORCO)  mg per tablet; Take 1 tablet by mouth every 8 (eight) hours as needed.  Dispense: 90 tablet; Refill: 0    Sjogren's syndrome, with unspecified organ involvement  -     Discontinue: HYDROcodone-acetaminophen (NORCO)  mg per tablet; Take 1 tablet by mouth every 8 (eight) hours as needed.  Dispense: 90 tablet; Refill: 0  -     leflunomide (ARAVA) 10 MG Tab; Take 1 tablet (10 mg total) by mouth once daily.  Dispense: 90 tablet; Refill: 3  -     sucralfate (CARAFATE) 1 gram tablet; Take 1 tablet (1 g total) by mouth 4 (four) times daily.  Dispense: 120 tablet; Refill: 5  -     mometasone (NASONEX) 50 mcg/actuation nasal spray; 2 sprays by Nasal route once daily.  Dispense: 17 g; Refill: 12  -     dicyclomine (BENTYL) 20 mg tablet; Take 1 tablet (20 mg total) by mouth every 6 (six) hours.  Dispense: 120 tablet; Refill: 3  -     amlodipine-benazepril 5-20 mg (LOTREL) 5-20 mg per capsule; Take 1 capsule by mouth 2 (two) times daily.  Dispense: 180 capsule; Refill: 3  -     Discontinue: HYDROcodone-acetaminophen (NORCO)  mg per tablet; Take 1 tablet by mouth every 8 (eight) hours as needed.  Dispense: 90 tablet; Refill: 0  -     CBC auto differential; Future; Expected date: 12/30/2019  -     Comprehensive metabolic panel; Future; Expected date: 12/30/2019  -     C-reactive protein; Future; Expected date: 12/30/2019  -     Sedimentation rate; Future; Expected date:  12/30/2019  -     Rheumatoid factor; Future; Expected date: 12/30/2019  -     Cyclic citrul peptide antibody, IgG; Future; Expected date: 12/30/2019  -     HYDROcodone-acetaminophen (NORCO)  mg per tablet; Take 1 tablet by mouth every 8 (eight) hours as needed.  Dispense: 90 tablet; Refill: 0    Primary osteoarthritis of both knees  -     Discontinue: HYDROcodone-acetaminophen (NORCO)  mg per tablet; Take 1 tablet by mouth every 8 (eight) hours as needed.  Dispense: 90 tablet; Refill: 0  -     leflunomide (ARAVA) 10 MG Tab; Take 1 tablet (10 mg total) by mouth once daily.  Dispense: 90 tablet; Refill: 3  -     sucralfate (CARAFATE) 1 gram tablet; Take 1 tablet (1 g total) by mouth 4 (four) times daily.  Dispense: 120 tablet; Refill: 5  -     mometasone (NASONEX) 50 mcg/actuation nasal spray; 2 sprays by Nasal route once daily.  Dispense: 17 g; Refill: 12  -     dicyclomine (BENTYL) 20 mg tablet; Take 1 tablet (20 mg total) by mouth every 6 (six) hours.  Dispense: 120 tablet; Refill: 3  -     amlodipine-benazepril 5-20 mg (LOTREL) 5-20 mg per capsule; Take 1 capsule by mouth 2 (two) times daily.  Dispense: 180 capsule; Refill: 3  -     Discontinue: HYDROcodone-acetaminophen (NORCO)  mg per tablet; Take 1 tablet by mouth every 8 (eight) hours as needed.  Dispense: 90 tablet; Refill: 0  -     CBC auto differential; Future; Expected date: 12/30/2019  -     Comprehensive metabolic panel; Future; Expected date: 12/30/2019  -     C-reactive protein; Future; Expected date: 12/30/2019  -     Sedimentation rate; Future; Expected date: 12/30/2019  -     Rheumatoid factor; Future; Expected date: 12/30/2019  -     Cyclic citrul peptide antibody, IgG; Future; Expected date: 12/30/2019  -     HYDROcodone-acetaminophen (NORCO)  mg per tablet; Take 1 tablet by mouth every 8 (eight) hours as needed.  Dispense: 90 tablet; Refill: 0    Lumbar and sacral arthritis  -     Discontinue: HYDROcodone-acetaminophen  (NORCO)  mg per tablet; Take 1 tablet by mouth every 8 (eight) hours as needed.  Dispense: 90 tablet; Refill: 0  -     leflunomide (ARAVA) 10 MG Tab; Take 1 tablet (10 mg total) by mouth once daily.  Dispense: 90 tablet; Refill: 3  -     sucralfate (CARAFATE) 1 gram tablet; Take 1 tablet (1 g total) by mouth 4 (four) times daily.  Dispense: 120 tablet; Refill: 5  -     mometasone (NASONEX) 50 mcg/actuation nasal spray; 2 sprays by Nasal route once daily.  Dispense: 17 g; Refill: 12  -     dicyclomine (BENTYL) 20 mg tablet; Take 1 tablet (20 mg total) by mouth every 6 (six) hours.  Dispense: 120 tablet; Refill: 3  -     amlodipine-benazepril 5-20 mg (LOTREL) 5-20 mg per capsule; Take 1 capsule by mouth 2 (two) times daily.  Dispense: 180 capsule; Refill: 3  -     Discontinue: HYDROcodone-acetaminophen (NORCO)  mg per tablet; Take 1 tablet by mouth every 8 (eight) hours as needed.  Dispense: 90 tablet; Refill: 0  -     CBC auto differential; Future; Expected date: 12/30/2019  -     Comprehensive metabolic panel; Future; Expected date: 12/30/2019  -     C-reactive protein; Future; Expected date: 12/30/2019  -     Sedimentation rate; Future; Expected date: 12/30/2019  -     Rheumatoid factor; Future; Expected date: 12/30/2019  -     Cyclic citrul peptide antibody, IgG; Future; Expected date: 12/30/2019  -     HYDROcodone-acetaminophen (NORCO)  mg per tablet; Take 1 tablet by mouth every 8 (eight) hours as needed.  Dispense: 90 tablet; Refill: 0    Chronic pain of right knee  -     Discontinue: HYDROcodone-acetaminophen (NORCO)  mg per tablet; Take 1 tablet by mouth every 8 (eight) hours as needed.  Dispense: 90 tablet; Refill: 0  -     leflunomide (ARAVA) 10 MG Tab; Take 1 tablet (10 mg total) by mouth once daily.  Dispense: 90 tablet; Refill: 3  -     sucralfate (CARAFATE) 1 gram tablet; Take 1 tablet (1 g total) by mouth 4 (four) times daily.  Dispense: 120 tablet; Refill: 5  -     mometasone  (NASONEX) 50 mcg/actuation nasal spray; 2 sprays by Nasal route once daily.  Dispense: 17 g; Refill: 12  -     dicyclomine (BENTYL) 20 mg tablet; Take 1 tablet (20 mg total) by mouth every 6 (six) hours.  Dispense: 120 tablet; Refill: 3  -     amlodipine-benazepril 5-20 mg (LOTREL) 5-20 mg per capsule; Take 1 capsule by mouth 2 (two) times daily.  Dispense: 180 capsule; Refill: 3  -     Discontinue: HYDROcodone-acetaminophen (NORCO)  mg per tablet; Take 1 tablet by mouth every 8 (eight) hours as needed.  Dispense: 90 tablet; Refill: 0  -     CBC auto differential; Future; Expected date: 12/30/2019  -     Comprehensive metabolic panel; Future; Expected date: 12/30/2019  -     C-reactive protein; Future; Expected date: 12/30/2019  -     Sedimentation rate; Future; Expected date: 12/30/2019  -     Rheumatoid factor; Future; Expected date: 12/30/2019  -     Cyclic citrul peptide antibody, IgG; Future; Expected date: 12/30/2019  -     HYDROcodone-acetaminophen (NORCO)  mg per tablet; Take 1 tablet by mouth every 8 (eight) hours as needed.  Dispense: 90 tablet; Refill: 0    Type 2 diabetes mellitus without complication  -     Discontinue: HYDROcodone-acetaminophen (NORCO)  mg per tablet; Take 1 tablet by mouth every 8 (eight) hours as needed.  Dispense: 90 tablet; Refill: 0  -     leflunomide (ARAVA) 10 MG Tab; Take 1 tablet (10 mg total) by mouth once daily.  Dispense: 90 tablet; Refill: 3  -     sucralfate (CARAFATE) 1 gram tablet; Take 1 tablet (1 g total) by mouth 4 (four) times daily.  Dispense: 120 tablet; Refill: 5  -     mometasone (NASONEX) 50 mcg/actuation nasal spray; 2 sprays by Nasal route once daily.  Dispense: 17 g; Refill: 12  -     dicyclomine (BENTYL) 20 mg tablet; Take 1 tablet (20 mg total) by mouth every 6 (six) hours.  Dispense: 120 tablet; Refill: 3  -     amlodipine-benazepril 5-20 mg (LOTREL) 5-20 mg per capsule; Take 1 capsule by mouth 2 (two) times daily.  Dispense: 180  capsule; Refill: 3  -     Discontinue: HYDROcodone-acetaminophen (NORCO)  mg per tablet; Take 1 tablet by mouth every 8 (eight) hours as needed.  Dispense: 90 tablet; Refill: 0  -     CBC auto differential; Future; Expected date: 12/30/2019  -     Comprehensive metabolic panel; Future; Expected date: 12/30/2019  -     C-reactive protein; Future; Expected date: 12/30/2019  -     Sedimentation rate; Future; Expected date: 12/30/2019  -     Rheumatoid factor; Future; Expected date: 12/30/2019  -     Cyclic citrul peptide antibody, IgG; Future; Expected date: 12/30/2019  -     HYDROcodone-acetaminophen (NORCO)  mg per tablet; Take 1 tablet by mouth every 8 (eight) hours as needed.  Dispense: 90 tablet; Refill: 0        I have  check [place] prescription monitoring program site and no unusual or abnormal behavior has occurred pt understand the risk and benefits of taking opioid medications and has decided to continue the  medication

## 2020-01-29 NOTE — TELEPHONE ENCOUNTER
Returned patient call regarding symptoms. Patient requesting antibiotic for symptoms. Patient stated coughing up clear mucus and blowing nose. No fever at this time. Nurse advised to start mucinex and allegra. Informed will send a message regarding request for antibiotic.

## 2020-01-29 NOTE — TELEPHONE ENCOUNTER
----- Message from Shayna Cedeño sent at 1/29/2020 11:49 AM CST -----  Type: Needs Medical Advice    Who Called:  Patient  Symptoms (please be specific):  Congestion, mucus, upset stomach, weakness, diarrhea  How long has patient had these symptoms:  1 week off and on  Pharmacy name and phone #:      [facility] [place] - [address] [facility]  [address] [facility]  [place] [address]  Phone: [phone] Fax: [phone]    Best Call Back Number: [phone] (home)     Additional Information: Please call to advise.

## 2020-01-31 RX ORDER — GLIPIZIDE 5 MG/1
TABLET, EXTENDED RELEASE ORAL
Qty: 90 TABLET | Refills: 3 | Status: SHIPPED | OUTPATIENT
Start: 2020-01-31 | End: 2020-12-28 | Stop reason: SDUPTHER

## 2020-02-01 RX ORDER — AZITHROMYCIN 250 MG/1
TABLET, FILM COATED ORAL
Qty: 6 TABLET | Refills: 0 | Status: SHIPPED | OUTPATIENT
Start: 2020-02-01 | End: 2020-02-03

## 2020-02-03 DIAGNOSIS — F19.20 CORTICOSTEROID DEPENDENCE: ICD-10-CM

## 2020-02-03 DIAGNOSIS — J45.909 UNCOMPLICATED ASTHMA: ICD-10-CM

## 2020-02-03 DIAGNOSIS — K21.9 GASTROESOPHAGEAL REFLUX DISEASE, ESOPHAGITIS PRESENCE NOT SPECIFIED: ICD-10-CM

## 2020-02-03 DIAGNOSIS — E88.810 METABOLIC SYNDROME: ICD-10-CM

## 2020-02-03 DIAGNOSIS — M05.9 RHEUMATOID ARTHRITIS WITH POSITIVE RHEUMATOID FACTOR, INVOLVING UNSPECIFIED SITE: ICD-10-CM

## 2020-02-03 DIAGNOSIS — I10 ESSENTIAL HYPERTENSION: ICD-10-CM

## 2020-02-03 DIAGNOSIS — M35.00 SJOGREN'S SYNDROME, WITH UNSPECIFIED ORGAN INVOLVEMENT: Primary | ICD-10-CM

## 2020-02-03 RX ORDER — ALBUTEROL SULFATE 90 UG/1
2 AEROSOL, METERED RESPIRATORY (INHALATION) EVERY 6 HOURS PRN
Qty: 18 G | Refills: 2 | Status: SHIPPED | OUTPATIENT
Start: 2020-02-03 | End: 2020-07-06 | Stop reason: SDUPTHER

## 2020-02-03 RX ORDER — ALBUTEROL SULFATE 0.83 MG/ML
SOLUTION RESPIRATORY (INHALATION)
Qty: 180 ML | Refills: 3 | Status: SHIPPED | OUTPATIENT
Start: 2020-02-03 | End: 2021-07-29 | Stop reason: SDUPTHER

## 2020-02-03 RX ORDER — HYDROXYCHLOROQUINE SULFATE 200 MG/1
TABLET, FILM COATED ORAL
Qty: 180 TABLET | Refills: 1 | Status: SHIPPED | OUTPATIENT
Start: 2020-02-03 | End: 2020-05-07 | Stop reason: SDUPTHER

## 2020-02-03 NOTE — TELEPHONE ENCOUNTER
----- Message from Princess VLAD Macedo sent at 2/3/2020 11:11 AM CST -----  Contact: Patient  Type:  RX Refill Request    Who Called:  Patient  Refill or New Rx:  Refill  RX Name and Strength:  albuterol 90 mcg/actuation inhaler and albuterol (PROVENTIL) 2.5 mg /3 mL (0.083 %) nebulizer solution  How is the patient currently taking it? (ex. 1XDay):  USE 1 VIAL IN NEBULIZER EVERY 6 HOURS AS NEEDED FOR WHEEZING  Is this a 30 day or 90 day RX:  30  Preferred Pharmacy with phone number:    [facility] [place] - [address] [facility]  [address] [facility] [address]  Phone: [phone] Fax: [phone]  Local or Mail Order:  Local  Ordering Provider:  Dr. Elizabeth Mercado Call Back Number:    Additional Information:

## 2020-02-05 ENCOUNTER — LAB VISIT (OUTPATIENT)
Dept: LAB | Facility: HOSPITAL | Age: 75
End: 2020-02-05
Attending: NURSE PRACTITIONER
Payer: MEDICARE

## 2020-02-05 DIAGNOSIS — J30.89 NON-SEASONAL ALLERGIC RHINITIS: Primary | ICD-10-CM

## 2020-02-05 DIAGNOSIS — J31.0 CHRONIC RHINITIS: ICD-10-CM

## 2020-02-05 PROCEDURE — 86003 ALLG SPEC IGE CRUDE XTRC EA: CPT

## 2020-02-05 PROCEDURE — 86003 ALLG SPEC IGE CRUDE XTRC EA: CPT | Mod: 59

## 2020-02-05 PROCEDURE — 30000890 MISCELLANEOUS SENDOUT TEST, BLOOD

## 2020-02-05 PROCEDURE — 82785 ASSAY OF IGE: CPT

## 2020-02-05 PROCEDURE — 36415 COLL VENOUS BLD VENIPUNCTURE: CPT | Mod: PO

## 2020-02-06 ENCOUNTER — PATIENT OUTREACH (OUTPATIENT)
Dept: ADMINISTRATIVE | Facility: HOSPITAL | Age: 75
End: 2020-02-06

## 2020-02-06 LAB — IGE SERPL-ACNC: <35 IU/ML (ref 0–100)

## 2020-02-07 LAB
CLAM IGE QN: <0.1 KU/L
CODFISH IGE QN: <0.1 KU/L
CORN IGE QN: <0.1 KU/L
COW MILK IGE QN: <0.1 KU/L
DEPRECATED CLAM IGE RAST QL: NORMAL
DEPRECATED CODFISH IGE RAST QL: NORMAL
DEPRECATED CORN IGE RAST QL: NORMAL
DEPRECATED COW MILK IGE RAST QL: NORMAL
DEPRECATED EGG WHITE IGE RAST QL: NORMAL
DEPRECATED PEANUT IGE RAST QL: NORMAL
DEPRECATED SCALLOP IGE RAST QL: NORMAL
DEPRECATED SESAME SEED IGE RAST QL: NORMAL
DEPRECATED SHRIMP IGE RAST QL: NORMAL
DEPRECATED SOYBEAN IGE RAST QL: NORMAL
DEPRECATED WALNUT IGE RAST QL: NORMAL
DEPRECATED WHEAT IGE RAST QL: NORMAL
EGG WHITE IGE QN: <0.1 KU/L
PEANUT IGE QN: <0.1 KU/L
SCALLOP IGE QN: <0.1 KU/L
SESAME SEED IGE QN: <0.1 KU/L
SHRIMP IGE QN: <0.1 KU/L
SOYBEAN IGE QN: <0.1 KU/L
WALNUT IGE QN: <0.1 KU/L
WHEAT IGE QN: <0.1 KU/L

## 2020-02-13 ENCOUNTER — TELEPHONE (OUTPATIENT)
Dept: RHEUMATOLOGY | Facility: CLINIC | Age: 75
End: 2020-02-13

## 2020-02-13 NOTE — TELEPHONE ENCOUNTER
----- Message from Ameya Lopes sent at 2/13/2020  3:41 PM CST -----  Type: Needs Medical Advice    Who Called:  Patient    Pharmacy [place] and phone #:        [facility] [place] [address] [facility]  [address] [facility]  [place] [address]  Phone: [phone] Fax: [phone]      Best Call Back Number: [phone]  Additional Information: Caller states that she would like a callback regarding her pharmacy needs to be advised if her albuterol (PROVENTIL) 2.5 mg /3 mL (0.083 %) nebulizer solution is Medically Necessary

## 2020-02-13 NOTE — TELEPHONE ENCOUNTER
Returned patient call regarding medication. Patient stated office needs to call and say it is medically necessary. Nurse called pharmacy and was informed medicare part d will not cover albuterol. Needs to find a pharmacy that can bill medicare part b. Patient verbalized understanding.

## 2020-02-24 ENCOUNTER — TELEPHONE (OUTPATIENT)
Dept: RHEUMATOLOGY | Facility: CLINIC | Age: 75
End: 2020-02-24

## 2020-02-24 ENCOUNTER — DOCUMENTATION ONLY (OUTPATIENT)
Dept: RHEUMATOLOGY | Facility: CLINIC | Age: 75
End: 2020-02-24

## 2020-02-24 NOTE — TELEPHONE ENCOUNTER
----- Message from Diana Redding sent at 2/24/2020 10:16 AM CST -----  Contact: patient  Type: Needs Medical Advice  Who Called:  patient  Best Call Back Number:   Additional Information: Patient states her Medication albuterol (PROVENTIL) 2.5 mg /3 mL (0.083 %) nebulizer solution, needs to have particular directions so that medicare will pay for it.  Patient is providing the number [phone].  To call so department can explain to the nurse what the directions should state.  Please call number provided then call patient to the her know if the office has done this.  Thanks!

## 2020-02-26 ENCOUNTER — TELEPHONE (OUTPATIENT)
Dept: RHEUMATOLOGY | Facility: CLINIC | Age: 75
End: 2020-02-26

## 2020-02-26 NOTE — TELEPHONE ENCOUNTER
Returned patient call. Nurse informed a denial letter was received for albuterol. Patient stated to call that number that was left with previous message. Nurse informed will have Sarah give them a call and will work appeal for the medication. Patient verbalized understanding.

## 2020-02-26 NOTE — TELEPHONE ENCOUNTER
----- Message from Karrie Ayala sent at 2/24/2020  5:04 PM CST -----  Pt is returning a missed call from the office.    Please call and advise            Thank you

## 2020-02-26 NOTE — TELEPHONE ENCOUNTER
Spoke to pt, she is unsure of when she purchased machine. She advises she received it from [facility] [place]. Spoke with Jessica at [facility], she advised that purchase was 12/02/2016. Notified Genevieve with [facility] and they were able to process under Medicare part B. No further questions.

## 2020-02-26 NOTE — TELEPHONE ENCOUNTER
Attempted to reach pt, lvm that Albuterol nebulizer soln covered under Medicare Part B and not Part D. Advised spoke with [facility] and then need date of purchase of her nebulizer so they can file properly. Please call office back.

## 2020-04-06 DIAGNOSIS — E11.9 TYPE 2 DIABETES MELLITUS WITHOUT COMPLICATION, UNSPECIFIED WHETHER LONG TERM INSULIN USE: ICD-10-CM

## 2020-04-06 DIAGNOSIS — M17.0 PRIMARY OSTEOARTHRITIS OF BOTH KNEES: ICD-10-CM

## 2020-04-06 DIAGNOSIS — M47.817 LUMBAR AND SACRAL ARTHRITIS: ICD-10-CM

## 2020-04-06 DIAGNOSIS — M25.561 CHRONIC PAIN OF RIGHT KNEE: ICD-10-CM

## 2020-04-06 DIAGNOSIS — M35.00 SJOGREN'S SYNDROME, WITH UNSPECIFIED ORGAN INVOLVEMENT: ICD-10-CM

## 2020-04-06 DIAGNOSIS — G89.29 CHRONIC PAIN OF RIGHT KNEE: ICD-10-CM

## 2020-04-06 DIAGNOSIS — M05.9 RHEUMATOID ARTHRITIS WITH POSITIVE RHEUMATOID FACTOR, INVOLVING UNSPECIFIED SITE: ICD-10-CM

## 2020-04-06 NOTE — TELEPHONE ENCOUNTER
----- Message from Vasu George sent at 4/6/2020  9:49 AM CDT -----  Contact: pt  Type:  RX Refill Request    Who Called:  pt  Refill or New Rx:  refill  RX Name and Strength:  HYDROcodone-acetaminophen (NORCO)  mg per tablet  How is the patient currently taking it? (ex. 1XDay):  Take 1 tablet by mouth every 8 (eight) hours as needed. - Oral  Is this a 30 day or 90 day RX:  90 tablet  Preferred Pharmacy with phone number:    [facility] [place] - [address] [address]  Phone: [phone] Fax: [phone]      Local or Mail Order: local  Ordering Provider:  Dr. Elizabeth Mercado Call Back Number:  [phone]  Additional Information:

## 2020-04-07 RX ORDER — HYDROCODONE BITARTRATE AND ACETAMINOPHEN 10; 325 MG/1; MG/1
1 TABLET ORAL EVERY 8 HOURS PRN
Qty: 90 TABLET | Refills: 0 | Status: SHIPPED | OUTPATIENT
Start: 2020-04-07 | End: 2020-04-30 | Stop reason: SDUPTHER

## 2020-04-07 NOTE — TELEPHONE ENCOUNTER
Incoming call from pt, she advises she has a tickle in her through and slight cough with it and drip. Pressure around eyes and temple area. Denies any fever. Requesting something for her sinuses.

## 2020-04-07 NOTE — TELEPHONE ENCOUNTER
----- Message from Ameya Lopes sent at 4/7/2020  3:41 PM CDT -----  Type: Needs Medical Advice    Who Called:  Patient    Pharmacy name and phone #:    [facility] [place] - [address]  [address]  [place] [address]  Phone: [phone] Fax: [phone]      Best Call Back Number: [phone]  Additional Information: Patient states that she would like something called in for her sinuses.  Please call to advise

## 2020-04-08 LAB
MISCELLANEOUS TEST NAME: NORMAL
REFERENCE LAB: NORMAL
SPECIMEN TYPE: NORMAL
TEST RESULT: NORMAL

## 2020-04-24 DIAGNOSIS — M17.0 PRIMARY OSTEOARTHRITIS OF BOTH KNEES: ICD-10-CM

## 2020-04-24 DIAGNOSIS — M05.9 RHEUMATOID ARTHRITIS WITH POSITIVE RHEUMATOID FACTOR, INVOLVING UNSPECIFIED SITE: ICD-10-CM

## 2020-04-24 DIAGNOSIS — G89.29 CHRONIC PAIN OF RIGHT KNEE: ICD-10-CM

## 2020-04-24 DIAGNOSIS — M35.00 SJOGREN'S SYNDROME, WITH UNSPECIFIED ORGAN INVOLVEMENT: ICD-10-CM

## 2020-04-24 DIAGNOSIS — M25.561 CHRONIC PAIN OF RIGHT KNEE: ICD-10-CM

## 2020-04-24 DIAGNOSIS — M47.817 LUMBAR AND SACRAL ARTHRITIS: ICD-10-CM

## 2020-04-24 DIAGNOSIS — E11.9 TYPE 2 DIABETES MELLITUS WITHOUT COMPLICATION, UNSPECIFIED WHETHER LONG TERM INSULIN USE: ICD-10-CM

## 2020-04-24 RX ORDER — DICYCLOMINE HYDROCHLORIDE 20 MG/1
TABLET ORAL
Qty: 120 TABLET | Refills: 3 | Status: SHIPPED | OUTPATIENT
Start: 2020-04-24 | End: 2020-07-06 | Stop reason: SDUPTHER

## 2020-04-24 RX ORDER — AMOXICILLIN AND CLAVULANATE POTASSIUM 875; 125 MG/1; MG/1
1 TABLET, FILM COATED ORAL EVERY 12 HOURS
Qty: 20 TABLET | Refills: 0 | Status: SHIPPED | OUTPATIENT
Start: 2020-04-24 | End: 2020-05-04

## 2020-04-24 NOTE — TELEPHONE ENCOUNTER
Returned patient call regarding antibiotics. Patient stated has had a cough with yellow mucus for a week. Nurse advised with a cough with mucus needs to be tested for COVID. Patient stated she does not have any of the other symptoms. Stated it is a sinus infection. Patient requested antibiotic [facility]

## 2020-04-24 NOTE — TELEPHONE ENCOUNTER
----- Message from Yue Bustillos sent at 4/24/2020 12:45 PM CDT -----  Contact: Pt  Type: Needs Medical Advice  Who Called:  Pt  Symptoms (please be specific):  Yellow mucus  How long has patient had these symptoms:   Last week  Pharmacy name and phone #:    [facility] [place] - [id number] [facility] [place] [address]  Phone: [phone] Fax: [phone]    Best Call Back Number:  [phone]  Additional Information: Pt would like an antibiotic called into the pharm

## 2020-04-30 DIAGNOSIS — M17.0 PRIMARY OSTEOARTHRITIS OF BOTH KNEES: ICD-10-CM

## 2020-04-30 DIAGNOSIS — M35.00 SJOGREN'S SYNDROME, WITH UNSPECIFIED ORGAN INVOLVEMENT: ICD-10-CM

## 2020-04-30 DIAGNOSIS — G89.29 CHRONIC PAIN OF RIGHT KNEE: ICD-10-CM

## 2020-04-30 DIAGNOSIS — E11.9 TYPE 2 DIABETES MELLITUS WITHOUT COMPLICATION, UNSPECIFIED WHETHER LONG TERM INSULIN USE: ICD-10-CM

## 2020-04-30 DIAGNOSIS — M05.9 RHEUMATOID ARTHRITIS WITH POSITIVE RHEUMATOID FACTOR, INVOLVING UNSPECIFIED SITE: ICD-10-CM

## 2020-04-30 DIAGNOSIS — M47.817 LUMBAR AND SACRAL ARTHRITIS: ICD-10-CM

## 2020-04-30 DIAGNOSIS — M25.561 CHRONIC PAIN OF RIGHT KNEE: ICD-10-CM

## 2020-04-30 NOTE — TELEPHONE ENCOUNTER
----- Message from Hayley Cruz sent at 4/30/2020 10:35 AM CDT -----  Contact: patient  Type:  RX Refill Request    Who Called:  patient  Refill or New Rx:  refill  RX Name and Strength:  HYDROcodone-acetaminophen (NORCO)  mg per tablet  How is the patient currently taking it? (ex. 1XDay):  As directed  Is this a 30 day or 90 day RX:  30  Preferred Pharmacy with phone number:   [facility] - [place] - [address] [facility] [address]  Phone: [phone] Fax: [phone]  Local or Mail Order:  local  Ordering Provider:  Elizabeth Mercado Call Back Number:  [phone]  Additional Information:  Please advise-thank you

## 2020-05-04 DIAGNOSIS — M35.00 SJOGREN'S SYNDROME, WITH UNSPECIFIED ORGAN INVOLVEMENT: ICD-10-CM

## 2020-05-04 DIAGNOSIS — M05.9 RHEUMATOID ARTHRITIS WITH POSITIVE RHEUMATOID FACTOR, INVOLVING UNSPECIFIED SITE: ICD-10-CM

## 2020-05-04 DIAGNOSIS — M47.817 LUMBAR AND SACRAL ARTHRITIS: ICD-10-CM

## 2020-05-04 DIAGNOSIS — M17.0 PRIMARY OSTEOARTHRITIS OF BOTH KNEES: ICD-10-CM

## 2020-05-04 DIAGNOSIS — E11.9 TYPE 2 DIABETES MELLITUS WITHOUT COMPLICATION, UNSPECIFIED WHETHER LONG TERM INSULIN USE: ICD-10-CM

## 2020-05-04 DIAGNOSIS — M25.561 CHRONIC PAIN OF RIGHT KNEE: ICD-10-CM

## 2020-05-04 DIAGNOSIS — G89.29 CHRONIC PAIN OF RIGHT KNEE: ICD-10-CM

## 2020-05-05 RX ORDER — HYDROCODONE BITARTRATE AND ACETAMINOPHEN 10; 325 MG/1; MG/1
TABLET ORAL
Qty: 90 TABLET | Refills: 0 | OUTPATIENT
Start: 2020-05-05

## 2020-05-05 RX ORDER — HYDROCODONE BITARTRATE AND ACETAMINOPHEN 10; 325 MG/1; MG/1
1 TABLET ORAL EVERY 8 HOURS PRN
Qty: 90 TABLET | Refills: 0 | Status: SHIPPED | OUTPATIENT
Start: 2020-05-05 | End: 2020-06-04

## 2020-05-05 NOTE — TELEPHONE ENCOUNTER
----- Message from Mony Overton sent at 5/5/2020  4:45 PM CDT -----  Contact: pt  Type:  RX Refill Request    Who Called:  pt  Refill or New Rx:  refill  RX Name and Strength:  HYDROcodone-acetaminophen (NORCO)  mg per tablet  How is the patient currently taking it? (ex. 1XDay):    Is this a 30 day or 90 day RX:  90  Preferred Pharmacy with phone number:    [facility] - [place] - [address] [facility] [address]  Phone: [phone] Fax: [phone]    Local or Mail Order:  local  Ordering Provider:  Dr Elizabeth Mercado Call Back Number:  [phone] (home)     Additional Information:  Pt stated she wants a nurse to call her medication.

## 2020-05-07 DIAGNOSIS — M35.00 SJOGREN'S SYNDROME, WITH UNSPECIFIED ORGAN INVOLVEMENT: ICD-10-CM

## 2020-05-07 DIAGNOSIS — M05.9 RHEUMATOID ARTHRITIS WITH POSITIVE RHEUMATOID FACTOR, INVOLVING UNSPECIFIED SITE: ICD-10-CM

## 2020-05-08 RX ORDER — HYDROXYCHLOROQUINE SULFATE 200 MG/1
TABLET, FILM COATED ORAL
Qty: 180 TABLET | Refills: 0 | Status: SHIPPED | OUTPATIENT
Start: 2020-05-08 | End: 2020-07-06 | Stop reason: SDUPTHER

## 2020-05-11 DIAGNOSIS — M47.817 LUMBAR AND SACRAL ARTHRITIS: ICD-10-CM

## 2020-05-11 DIAGNOSIS — M25.561 CHRONIC PAIN OF RIGHT KNEE: ICD-10-CM

## 2020-05-11 DIAGNOSIS — M35.00 SJOGREN'S SYNDROME, WITH UNSPECIFIED ORGAN INVOLVEMENT: ICD-10-CM

## 2020-05-11 DIAGNOSIS — M17.0 PRIMARY OSTEOARTHRITIS OF BOTH KNEES: ICD-10-CM

## 2020-05-11 DIAGNOSIS — G89.29 CHRONIC PAIN OF RIGHT KNEE: ICD-10-CM

## 2020-05-11 DIAGNOSIS — M05.9 RHEUMATOID ARTHRITIS WITH POSITIVE RHEUMATOID FACTOR, INVOLVING UNSPECIFIED SITE: ICD-10-CM

## 2020-05-11 DIAGNOSIS — D64.9 ANEMIA, UNSPECIFIED TYPE: ICD-10-CM

## 2020-05-11 RX ORDER — PREDNISONE 5 MG/1
TABLET ORAL
Qty: 90 TABLET | Refills: 3 | Status: SHIPPED | OUTPATIENT
Start: 2020-05-11 | End: 2020-07-06 | Stop reason: SDUPTHER

## 2020-06-02 ENCOUNTER — PATIENT OUTREACH (OUTPATIENT)
Dept: ADMINISTRATIVE | Facility: OTHER | Age: 75
End: 2020-06-02

## 2020-06-04 ENCOUNTER — TELEPHONE (OUTPATIENT)
Dept: RHEUMATOLOGY | Facility: CLINIC | Age: 75
End: 2020-06-04

## 2020-06-04 NOTE — TELEPHONE ENCOUNTER
----- Message from Clifford Garibay sent at 6/4/2020  1:44 PM CDT -----  Contact: pt  Pt Rekha Miller    Pt missed a call from your office .    Pt has a VV today at 1:30.   I tried to walk pt through the process of the VV however I do not think she understands    Pt can be reached at [phone]

## 2020-06-04 NOTE — TELEPHONE ENCOUNTER
----- Message from Bea Meier sent at 6/4/2020  4:51 PM CDT -----  Contact: Patient Missed Appt  Type: Needs Medical Advice  Who Called:  Patient  Best Call Back Number: [phone]  Additional Information: patient is requesting an audio appt and not virtual missed appt today

## 2020-06-04 NOTE — TELEPHONE ENCOUNTER
----- Message from Hayley Cruz sent at 6/4/2020  9:22 AM CDT -----  Contact: Patient  Type: Needs Medical Advice  Who Called:  Patient  Best Call Back Number:   Additional Information: Per patient requesting a call back to discuss VV today-please advise-thank you

## 2020-06-04 NOTE — TELEPHONE ENCOUNTER
Spoke to pt, she advised was unable to get [employer] luis carlos to work and also called tech support and still did not understand how to work luis carlos. She advises she has no med refills. Advised she would need to be seen to be able to refill her pain meds. Scheduled with Ariana Henriquez 6/10 @ 2:30 in office.

## 2020-06-04 NOTE — TELEPHONE ENCOUNTER
----- Message from Colleen Antunez MA sent at 6/4/2020  3:30 PM CDT -----  Contact: self/ [phone]  PT stated she never got a call back in response about her medication. Pt stated she does not not a refill, Pt is requesting a call back today  Medication: HYDROcodone-acetaminophen (NORCO)  mg per tablet   [facility] and Phone: [facility] [place] - [address] [facility]

## 2020-06-08 ENCOUNTER — PATIENT OUTREACH (OUTPATIENT)
Dept: ADMINISTRATIVE | Facility: OTHER | Age: 75
End: 2020-06-08

## 2020-06-08 DIAGNOSIS — Z12.31 ENCOUNTER FOR SCREENING MAMMOGRAM FOR MALIGNANT NEOPLASM OF BREAST: Primary | ICD-10-CM

## 2020-06-08 NOTE — PROGRESS NOTES
Patient's chart was reviewed.   Requested updates within Care Everywhere.  Immunizations reconciled.    Health Maintenance was updated.  Orders placed:screening mammogram

## 2020-06-26 ENCOUNTER — TELEPHONE (OUTPATIENT)
Dept: RHEUMATOLOGY | Facility: CLINIC | Age: 75
End: 2020-06-26

## 2020-06-26 NOTE — TELEPHONE ENCOUNTER
Spoke with pt, she advises she received letter in the mail with a different appointment date than what she has scheduled. She is very upset, that her appt was canceled and rescheduled multiple times d/t Covid pandemic. She advises this is not right and she has been unable to receive her pain medication d/t this. Advised by law she has to be seen every 3-4 months in order for her pain medication to managed and she was unable to to Video visits. Advised she is scheduled to see Ariana Henriquez PA-C on 7/2/20 at 3pm for an in-office visit, she is upset by this that she thought it was Audio visit, advised we are unable to do AV by law for pain medication, pt advises she was not informed of this. Confirmed that she would be able to make her appt with Ariana on 7/2 in-office, pt advises she will be here.

## 2020-06-26 NOTE — TELEPHONE ENCOUNTER
----- Message from Clifford Fong sent at 6/26/2020  8:52 AM CDT -----  Contact: pt  Type: Needs Medical Advice  Who Called:  pt    Best Call Back Number: [phone]  Additional Information: pt is also requesting a call to discuss her apt further. She is not understanding why her apt is being moved

## 2020-06-30 ENCOUNTER — PATIENT OUTREACH (OUTPATIENT)
Dept: ADMINISTRATIVE | Facility: OTHER | Age: 75
End: 2020-06-30

## 2020-06-30 NOTE — PROGRESS NOTES
Requested updates within Care Everywhere.  Patient's chart was reviewed for overdue CHET topics.  Immunizations reconciled.

## 2020-07-02 ENCOUNTER — TELEPHONE (OUTPATIENT)
Dept: RHEUMATOLOGY | Facility: CLINIC | Age: 75
End: 2020-07-02

## 2020-07-02 NOTE — TELEPHONE ENCOUNTER
Returned patient call. Informed is scheduled Monday at 930 with Ariana for a video visit. Patient verbalized understanding.

## 2020-07-02 NOTE — TELEPHONE ENCOUNTER
Patient missed her 3:00 appointment this afternoon with JES Parada due to medicaid transportation not picking her up again. She was advised that she can be scheduled for a video visit rather than coming in the office. She has requested a phone call from the provider

## 2020-07-02 NOTE — TELEPHONE ENCOUNTER
----- Message from Violet Carrera sent at 7/2/2020  3:56 PM CDT -----  Type: Needs Medical Advice  Who Called:  Patient  Best Call Back Number:[phone] (home)   Additional Information: the patient said she missed a call from the Dr office  please c all her back

## 2020-07-06 ENCOUNTER — OFFICE VISIT (OUTPATIENT)
Dept: RHEUMATOLOGY | Facility: CLINIC | Age: 75
End: 2020-07-06
Payer: MEDICARE

## 2020-07-06 VITALS — BODY MASS INDEX: 43.14 KG/M2 | HEIGHT: 64 IN

## 2020-07-06 DIAGNOSIS — M17.0 PRIMARY OSTEOARTHRITIS OF BOTH KNEES: ICD-10-CM

## 2020-07-06 DIAGNOSIS — I10 HYPERTENSION, UNSPECIFIED TYPE: ICD-10-CM

## 2020-07-06 DIAGNOSIS — M35.00 SJOGREN'S SYNDROME, WITH UNSPECIFIED ORGAN INVOLVEMENT: ICD-10-CM

## 2020-07-06 DIAGNOSIS — G89.4 CHRONIC PAIN SYNDROME: ICD-10-CM

## 2020-07-06 DIAGNOSIS — M05.9 RHEUMATOID ARTHRITIS WITH POSITIVE RHEUMATOID FACTOR, INVOLVING UNSPECIFIED SITE: Primary | ICD-10-CM

## 2020-07-06 DIAGNOSIS — R21 RASH: ICD-10-CM

## 2020-07-06 DIAGNOSIS — Z79.899 HIGH RISK MEDICATION USE: ICD-10-CM

## 2020-07-06 DIAGNOSIS — M10.9 GOUT, UNSPECIFIED CAUSE, UNSPECIFIED CHRONICITY, UNSPECIFIED SITE: ICD-10-CM

## 2020-07-06 DIAGNOSIS — J45.909 ASTHMA, UNSPECIFIED ASTHMA SEVERITY, UNSPECIFIED WHETHER COMPLICATED, UNSPECIFIED WHETHER PERSISTENT: ICD-10-CM

## 2020-07-06 DIAGNOSIS — K21.9 GASTROESOPHAGEAL REFLUX DISEASE, ESOPHAGITIS PRESENCE NOT SPECIFIED: ICD-10-CM

## 2020-07-06 DIAGNOSIS — E11.9 TYPE 2 DIABETES MELLITUS WITHOUT COMPLICATION, WITHOUT LONG-TERM CURRENT USE OF INSULIN: ICD-10-CM

## 2020-07-06 PROCEDURE — 99214 PR OFFICE/OUTPT VISIT, EST, LEVL IV, 30-39 MIN: ICD-10-PCS | Mod: 95,,, | Performed by: PHYSICIAN ASSISTANT

## 2020-07-06 PROCEDURE — 99214 OFFICE O/P EST MOD 30 MIN: CPT | Mod: 95,,, | Performed by: PHYSICIAN ASSISTANT

## 2020-07-06 RX ORDER — METHYLPREDNISOLONE 4 MG/1
TABLET ORAL
Qty: 1 PACKAGE | Refills: 0 | Status: SHIPPED | OUTPATIENT
Start: 2020-07-06 | End: 2020-10-08

## 2020-07-06 RX ORDER — GABAPENTIN 300 MG/1
300 CAPSULE ORAL 3 TIMES DAILY
Qty: 90 CAPSULE | Refills: 3 | Status: SHIPPED | OUTPATIENT
Start: 2020-07-06 | End: 2020-09-29 | Stop reason: SDUPTHER

## 2020-07-06 RX ORDER — LEFLUNOMIDE 10 MG/1
10 TABLET ORAL DAILY
Qty: 90 TABLET | Refills: 1 | Status: SHIPPED | OUTPATIENT
Start: 2020-07-06 | End: 2020-12-30 | Stop reason: SDUPTHER

## 2020-07-06 RX ORDER — POTASSIUM CHLORIDE 20 MEQ/1
20 TABLET, EXTENDED RELEASE ORAL 2 TIMES DAILY
Qty: 180 TABLET | Refills: 1 | Status: SHIPPED | OUTPATIENT
Start: 2020-07-06 | End: 2020-12-30 | Stop reason: SDUPTHER

## 2020-07-06 RX ORDER — PREDNISONE 5 MG/1
TABLET ORAL
Qty: 90 TABLET | Refills: 3 | Status: SHIPPED | OUTPATIENT
Start: 2020-07-06 | End: 2021-06-10 | Stop reason: SDUPTHER

## 2020-07-06 RX ORDER — HYDROXYCHLOROQUINE SULFATE 200 MG/1
TABLET, FILM COATED ORAL
Qty: 180 TABLET | Refills: 1 | Status: SHIPPED | OUTPATIENT
Start: 2020-07-06 | End: 2021-01-19 | Stop reason: SDUPTHER

## 2020-07-06 RX ORDER — FAMOTIDINE 40 MG/1
40 TABLET, FILM COATED ORAL DAILY
Qty: 30 TABLET | Refills: 6 | Status: SHIPPED | OUTPATIENT
Start: 2020-07-06 | End: 2021-06-07

## 2020-07-06 RX ORDER — CLOTRIMAZOLE AND BETAMETHASONE DIPROPIONATE 10; .64 MG/G; MG/G
CREAM TOPICAL 2 TIMES DAILY
Qty: 45 G | Refills: 6 | Status: SHIPPED | OUTPATIENT
Start: 2020-07-06

## 2020-07-06 RX ORDER — DICYCLOMINE HYDROCHLORIDE 20 MG/1
20 TABLET ORAL EVERY 6 HOURS
Qty: 120 TABLET | Refills: 3 | Status: SHIPPED | OUTPATIENT
Start: 2020-07-06 | End: 2020-12-16 | Stop reason: SDUPTHER

## 2020-07-06 RX ORDER — FUROSEMIDE 40 MG/1
40 TABLET ORAL DAILY
Qty: 90 TABLET | Refills: 1 | Status: SHIPPED | OUTPATIENT
Start: 2020-07-06 | End: 2020-10-08

## 2020-07-06 RX ORDER — ALBUTEROL SULFATE 90 UG/1
2 AEROSOL, METERED RESPIRATORY (INHALATION) EVERY 6 HOURS PRN
Qty: 18 G | Refills: 6 | Status: SHIPPED | OUTPATIENT
Start: 2020-07-06 | End: 2021-10-15 | Stop reason: SDUPTHER

## 2020-07-06 RX ORDER — AMLODIPINE AND BENAZEPRIL HYDROCHLORIDE 5; 20 MG/1; MG/1
1 CAPSULE ORAL 2 TIMES DAILY
Qty: 180 CAPSULE | Refills: 1 | Status: SHIPPED | OUTPATIENT
Start: 2020-07-06 | End: 2020-10-08 | Stop reason: ALTCHOICE

## 2020-07-06 NOTE — PROGRESS NOTES
The patient location is: home  The chief complaint leading to consultation is: RA    Visit type: audiovisual    Face to Face time with patient: 32  40 minutes of total time spent on the encounter, which includes face to face time and non-face to face time preparing to see the patient (eg, review of tests), Obtaining and/or reviewing separately obtained history, Documenting clinical information in the electronic or other health record, Independently interpreting results (not separately reported) and communicating results to the patient/family/caregiver, or Care coordination (not separately reported).   Each patient to whom he or she provides medical services by telemedicine is:  (1) informed of the relationship between the physician and patient and the respective role of any other health care provider with respect to management of the patient; and (2) notified that he or she may decline to receive medical services by telemedicine and may withdraw from such care at any time.    Notes:   Subjective:       Patient ID: Rekha Miller is a 75 y.o. female.    Chief Complaint: Disease Management    Mrs. Miller is a 75 year old female who presents to telemedicine virtual visit for follow up on RA. She is a new patient to me. She has been doing poorly for the last 4 weeks. She has missed several follow up visits due to transportation issues. She complains of increased swelling of big toe L>R, toes, and ankles. She is concerned about possible gout. She also continues to have intermittent pain involving her hands. She has been compliant with LEF and plaquenil. Overall, AM stiffness is typically 30 minutes. She has intermittent dry mouth and dry eyes (which is followed by ophthalmology). She is over due for labs. She does not have a primary care doctor at this time and is requesting non-rheum medication refills today. She has been off her norco for over 1 month.    Current tx:  1. Imuran  2. Plaquenil  3. norco    Review of  Systems   Constitutional: Positive for activity change. Negative for appetite change, chills, fatigue and fever.   HENT:        Dry mouth   Eyes: Negative for visual disturbance.        Dry eyes   Respiratory: Negative for cough and shortness of breath.    Cardiovascular: Negative for chest pain, palpitations and leg swelling.   Gastrointestinal: Negative for abdominal pain and constipation.   Musculoskeletal: Positive for arthralgias and joint swelling.   Allergic/Immunologic: Positive for immunocompromised state.   Neurological: Negative for dizziness, weakness, light-headedness and headaches.         Objective:   There were no vitals filed for this visit.    Past Medical History:   Diagnosis Date    Asthma     COPD (chronic obstructive pulmonary disease)     Degenerative disc disease     Diabetes mellitus     Diabetes mellitus, type 2     Fibromyalgia     Hypertension     Rheumatoid arthritis     Rheumatoid arthritis(714.0)     Rheumatoid arthritis     Past Surgical History:   Procedure Laterality Date    BREAST SURGERY  1986    CATARACT EXTRACTION      EYE SURGERY  2013    Catatracts    ORIF FEMUR FRACTURE      right knee ligament rpeair  2005    right shoulder surgery  4/18/07    Dr. Gao          Physical Exam   Constitutional: She is oriented to person, place, and time.   Neurological: She is alert and oriented to person, place, and time.       No new labs  Assessment:       1. Rheumatoid arthritis with positive rheumatoid factor, involving unspecified site    2. Sjogren's syndrome, with unspecified organ involvement    3. Primary osteoarthritis of both knees    4. High risk medication use    5. Chronic pain syndrome    6. Hypertension, unspecified type    7. Gastroesophageal reflux disease, esophagitis presence not specified    8. Asthma, unspecified asthma severity, unspecified whether complicated, unspecified whether persistent    9. Type 2 diabetes mellitus without complication, without  long-term current use of insulin    10. Rash    11. Gout, unspecified cause, unspecified chronicity, unspecified site            Plan:       Rheumatoid arthritis with positive rheumatoid factor, involving unspecified site  -     hydroxychloroquine (PLAQUENIL) 200 mg tablet; Take 1 tablet (200 mg total) by mouth 2 (two) times daily.  Dispense: 180 tablet; Refill: 1  -     leflunomide (ARAVA) 10 MG Tab; Take 1 tablet (10 mg total) by mouth once daily.  Dispense: 90 tablet; Refill: 1  -     gabapentin (NEURONTIN) 300 MG capsule; Take 1 capsule (300 mg total) by mouth 3 (three) times daily.  Dispense: 90 capsule; Refill: 3  -     predniSONE (DELTASONE) 5 MG tablet; TAKE THREE TABLETS BY MOUTH EVERY MORNING AS NEEDED  Dispense: 90 tablet; Refill: 3    Sjogren's syndrome, with unspecified organ involvement  -     hydroxychloroquine (PLAQUENIL) 200 mg tablet; Take 1 tablet (200 mg total) by mouth 2 (two) times daily.  Dispense: 180 tablet; Refill: 1  -     leflunomide (ARAVA) 10 MG Tab; Take 1 tablet (10 mg total) by mouth once daily.  Dispense: 90 tablet; Refill: 1  -     predniSONE (DELTASONE) 5 MG tablet; TAKE THREE TABLETS BY MOUTH EVERY MORNING AS NEEDED  Dispense: 90 tablet; Refill: 3    Primary osteoarthritis of both knees    High risk medication use    Chronic pain syndrome    Hypertension, unspecified type  -     amlodipine-benazepril 5-20 mg (LOTREL) 5-20 mg per capsule; Take 1 capsule by mouth 2 (two) times daily.  Dispense: 180 capsule; Refill: 1  -     furosemide (LASIX) 40 MG tablet; Take 1 tablet (40 mg total) by mouth once daily.  Dispense: 90 tablet; Refill: 1  -     potassium chloride SA (K-DUR,KLOR-CON) 20 MEQ tablet; Take 1 tablet (20 mEq total) by mouth 2 (two) times daily.  Dispense: 180 tablet; Refill: 1    Gastroesophageal reflux disease, esophagitis presence not specified  -     dicyclomine (BENTYL) 20 mg tablet; Take 1 tablet (20 mg total) by mouth every 6 (six) hours.  Dispense: 120 tablet;  Refill: 3  -     famotidine (PEPCID) 40 MG tablet; Take 1 tablet (40 mg total) by mouth once daily.  Dispense: 30 tablet; Refill: 6    Asthma, unspecified asthma severity, unspecified whether complicated, unspecified whether persistent  -     albuterol (PROVENTIL/VENTOLIN HFA) 90 mcg/actuation inhaler; Inhale 2 puffs into the lungs every 6 (six) hours as needed for Wheezing.  Dispense: 18 g; Refill: 6    Type 2 diabetes mellitus without complication, without long-term current use of insulin  -     blood sugar diagnostic (TRUETEST TEST STRIPS) Strp; Inject 1 strip into the skin once daily.  Dispense: 300 strip; Refill: 3    Rash  -     clotrimazole-betamethasone 1-0.05% (LOTRISONE) cream; Apply topically 2 (two) times daily.  Dispense: 45 g; Refill: 6    Gout, unspecified cause, unspecified chronicity, unspecified site  -     methylPREDNISolone (MEDROL DOSEPACK) 4 mg tablet; use as directed  Dispense: 1 Package; Refill: 0  -     Uric acid; Future; Expected date: 07/06/2020        Assessment:  75 year old female with  Seropositive (+RF/+CCP) rheumatoid arthritis, Sjogren's syndrome (+SSB) on LEF and plaquenil  --chronic pain syndrome on norco  --GERD, d/c zantac start famotidine  --HTN  --asthma  --controlled type 2 diabetes  --chronic steroid use    Plan:  1. Hold prednisone. Start medrol dose pack for possible gout flare--likely RA flare   2. Cont. LEF 10 mg daily, plaquenil 200 mg bid  3. D/C zantac, start famotidine  4. Cont. norco prn per .  I have checked [place] prescription monitoring program site and no unusual or abnormal behavior has occurred pt understand the risk and benefits of taking opioid medications and has decided to continue the medication.    The patient is aware that there is a COVID-19 pandemic and that the immunosuppressants being taken to treat arthritis can increased the  risk for infection/Viruses.  This patient understands  to hold (not to take) all DMARD/Immunsuppressants with  the exception of Plaquenil,  if having fever chills, cough, shortness of breath loss of sense of smell and or myalgias.  It is understood that the patient is to call the office as well as call their primary care physician and observe  social isolation    Follow up:  3-4 mo Dr. Johnson

## 2020-07-07 NOTE — TELEPHONE ENCOUNTER
----- Message from Dean Hendrickson sent at 7/7/2020  3:31 PM CDT -----  Regarding: pt  Type: Needs Medical Advice    Who Called:  pt    Best Call Back Number: [phone]   Additional Information: pt following up on NORCO Refill. Pt saw  yesterday and is seeing when refill will be called in. Please call to discuss.

## 2020-07-08 RX ORDER — HYDROCODONE BITARTRATE AND ACETAMINOPHEN 10; 325 MG/1; MG/1
1 TABLET ORAL EVERY 8 HOURS PRN
Qty: 90 TABLET | Refills: 0 | Status: SHIPPED | OUTPATIENT
Start: 2020-07-08 | End: 2020-08-13

## 2020-07-08 NOTE — TELEPHONE ENCOUNTER
----- Message from Gracia Kim sent at 7/8/2020 11:32 AM CDT -----  Type:  RX Refill Request    Who Called:  patient  Refill or New Rx:  refill  RX Name and Strength:  [place]   Preferred Pharmacy with phone number:   [facility] [place] - [address] [facility]  [address] [facility] [address]  Phone: [phone] Fax: [phone]      Local or Mail Order:  local  Ordering Provider:  Elizabeth Mercado Call Back Number:  [phone]  Additional Information:

## 2020-07-21 ENCOUNTER — PATIENT OUTREACH (OUTPATIENT)
Dept: ADMINISTRATIVE | Facility: HOSPITAL | Age: 75
End: 2020-07-21

## 2020-08-06 DIAGNOSIS — M35.00 SJOGREN'S SYNDROME, WITH UNSPECIFIED ORGAN INVOLVEMENT: ICD-10-CM

## 2020-08-06 DIAGNOSIS — G89.4 CHRONIC PAIN SYNDROME: ICD-10-CM

## 2020-08-06 DIAGNOSIS — M05.9 RHEUMATOID ARTHRITIS WITH POSITIVE RHEUMATOID FACTOR, INVOLVING UNSPECIFIED SITE: ICD-10-CM

## 2020-08-12 NOTE — TELEPHONE ENCOUNTER
----- Message from Maryjane Garcia sent at 8/12/2020  9:10 AM CDT -----  Regarding: MED Refill  Contact: pt  Type:  RX Refill Request    Who Called:  pt  Refill or New Rx:  refill   RX Name and Strength:  #HYDROcodone-acetaminophen (NORCO)  mg per tablet  How is the patient currently taking it? (ex. 1XDay):  3 times a day  Is this a 30 day or 90 day RX:  30  Preferred Pharmacy with phone number:  [facility] - [place], [place] - [address] [place] [address];  Local or Mail Order:  local  Ordering Provider:  Dr. Elizabeth Mercado Call Back Number:  [phone]  Additional Information:  Patient is out of this medication      Pt is asking for a call back

## 2020-08-13 RX ORDER — HYDROCODONE BITARTRATE AND ACETAMINOPHEN 10; 325 MG/1; MG/1
TABLET ORAL
Qty: 90 TABLET | Refills: 0 | Status: SHIPPED | OUTPATIENT
Start: 2020-08-13 | End: 2020-09-09 | Stop reason: SDUPTHER

## 2020-08-13 NOTE — TELEPHONE ENCOUNTER
----- Message from Keren Lowe sent at 8/13/2020  7:41 AM CDT -----  Contact: pt  Pt calling states needs a refill on her HYDROcodone-acetaminophen (NORCO)  mg per tablet.Also need the nurse to call her.[phone] (home)             .  [facility] [place] - [address] [facility]  [place] [address]  Phone: [phone] Fax: [phone]

## 2020-08-13 NOTE — TELEPHONE ENCOUNTER
Returned patient call regarding refill request. Informed received refill request on 8/6/2020. Just waiting for Dr Johnson to send to the pharmacy. Patient requested a phone once script was sent.

## 2020-08-21 ENCOUNTER — PATIENT OUTREACH (OUTPATIENT)
Dept: ADMINISTRATIVE | Facility: OTHER | Age: 75
End: 2020-08-21

## 2020-08-24 DIAGNOSIS — M25.551 PAIN OF BOTH HIP JOINTS: Primary | ICD-10-CM

## 2020-08-24 DIAGNOSIS — M25.552 PAIN OF BOTH HIP JOINTS: Primary | ICD-10-CM

## 2020-09-09 ENCOUNTER — TELEPHONE (OUTPATIENT)
Dept: ADMINISTRATIVE | Facility: HOSPITAL | Age: 75
End: 2020-09-09

## 2020-09-09 DIAGNOSIS — G89.4 CHRONIC PAIN SYNDROME: ICD-10-CM

## 2020-09-09 DIAGNOSIS — M05.9 RHEUMATOID ARTHRITIS WITH POSITIVE RHEUMATOID FACTOR, INVOLVING UNSPECIFIED SITE: ICD-10-CM

## 2020-09-09 DIAGNOSIS — M35.00 SJOGREN'S SYNDROME, WITH UNSPECIFIED ORGAN INVOLVEMENT: ICD-10-CM

## 2020-09-09 NOTE — TELEPHONE ENCOUNTER
----- Message from Violet Carrera sent at 9/9/2020  7:44 AM CDT -----  Type: Needs Medical Advice  Who Called:  Patient  Pharm[facility] and phone #:  [facility] Call Back Number: [phone] (home)   Additional Information: the patient is asking for her Rx for pain medication to be sent to he pharm it is due on the 14th of Sept please advise

## 2020-09-10 ENCOUNTER — HOSPITAL ENCOUNTER (OUTPATIENT)
Dept: RADIOLOGY | Facility: HOSPITAL | Age: 75
Discharge: HOME | End: 2020-09-10
Attending: ORTHOPAEDIC SURGERY
Payer: MEDICARE

## 2020-09-10 ENCOUNTER — PATIENT OUTREACH (OUTPATIENT)
Dept: ADMINISTRATIVE | Facility: HOSPITAL | Age: 75
End: 2020-09-10

## 2020-09-10 ENCOUNTER — OFFICE VISIT (OUTPATIENT)
Dept: ORTHOPEDICS | Facility: CLINIC | Age: 75
End: 2020-09-10
Payer: MEDICARE

## 2020-09-10 VITALS
HEART RATE: 71 BPM | DIASTOLIC BLOOD PRESSURE: 63 MMHG | BODY MASS INDEX: 42.85 KG/M2 | SYSTOLIC BLOOD PRESSURE: 120 MMHG | WEIGHT: 251 LBS | HEIGHT: 64 IN

## 2020-09-10 DIAGNOSIS — M25.552 PAIN OF BOTH HIP JOINTS: ICD-10-CM

## 2020-09-10 DIAGNOSIS — M70.61 GREATER TROCHANTERIC BURSITIS OF RIGHT HIP: Primary | ICD-10-CM

## 2020-09-10 DIAGNOSIS — M25.551 PAIN OF BOTH HIP JOINTS: ICD-10-CM

## 2020-09-10 DIAGNOSIS — M70.62 GREATER TROCHANTERIC BURSITIS OF LEFT HIP: ICD-10-CM

## 2020-09-10 PROCEDURE — 20610 LARGE JOINT ASPIRATION/INJECTION: BILATERAL GREATER TROCHANTERIC BURSA: ICD-10-PCS | Mod: 50,S$PBB,, | Performed by: ORTHOPAEDIC SURGERY

## 2020-09-10 PROCEDURE — 99204 PR OFFICE/OUTPT VISIT, NEW, LEVL IV, 45-59 MIN: ICD-10-PCS | Mod: S$PBB,25,, | Performed by: ORTHOPAEDIC SURGERY

## 2020-09-10 PROCEDURE — 99214 OFFICE O/P EST MOD 30 MIN: CPT | Mod: PBBFAC,25,PN | Performed by: ORTHOPAEDIC SURGERY

## 2020-09-10 PROCEDURE — 99204 OFFICE O/P NEW MOD 45 MIN: CPT | Mod: S$PBB,25,, | Performed by: ORTHOPAEDIC SURGERY

## 2020-09-10 PROCEDURE — 99999 PR PBB SHADOW E&M-EST. PATIENT-LVL IV: ICD-10-PCS | Mod: PBBFAC,,, | Performed by: ORTHOPAEDIC SURGERY

## 2020-09-10 PROCEDURE — 73521 XR HIPS BILATERAL 2 VIEW INCL AP PELVIS: ICD-10-PCS | Mod: 26,,, | Performed by: RADIOLOGY

## 2020-09-10 PROCEDURE — 73521 X-RAY EXAM HIPS BI 2 VIEWS: CPT | Mod: 26,,, | Performed by: RADIOLOGY

## 2020-09-10 PROCEDURE — 73521 X-RAY EXAM HIPS BI 2 VIEWS: CPT | Mod: TC,PO

## 2020-09-10 PROCEDURE — 20610 DRAIN/INJ JOINT/BURSA W/O US: CPT | Mod: 50,PBBFAC,PN | Performed by: ORTHOPAEDIC SURGERY

## 2020-09-10 PROCEDURE — 99999 PR PBB SHADOW E&M-EST. PATIENT-LVL IV: CPT | Mod: PBBFAC,,, | Performed by: ORTHOPAEDIC SURGERY

## 2020-09-10 RX ADMIN — TRIAMCINOLONE ACETONIDE 40 MG: 40 INJECTION, SUSPENSION INTRA-ARTICULAR; INTRAMUSCULAR at 01:09

## 2020-09-10 NOTE — PROCEDURES
Large Joint Aspiration/Injection: bilateral greater trochanteric bursa    Date/Time: 9/10/2020 1:00 PM  Performed by: Enrrique Wynn MD  Authorized by: Enrrique Wynn MD     Consent Done?:  Yes (Verbal)  Indications:  Pain  Timeout: prior to procedure the correct patient, procedure, and site was verified    Prep: patient was prepped and draped in usual sterile fashion      Local anesthesia used?: Yes    Local anesthetic:  Lidocaine 1% without epinephrine  Anesthetic total (ml):  5      Details:  Needle Size:  21 G  Approach:  Lateral  Location:  Hip  Laterality:  Bilateral  Site:  Bilateral greater trochanteric bursa  Medications (Right):  40 mg triamcinolone acetonide 40 mg/mL  Medications (Left):  40 mg triamcinolone acetonide 40 mg/mL  Patient tolerance:  Patient tolerated the procedure well with no immediate complications

## 2020-09-10 NOTE — PROGRESS NOTES
Chief Complaint   Patient presents with    Left Hip - Pain    Right Hip - Pain      HPI:   This is a 75 y.o. who presents to clinic today for bilateral hip pain for years after no known trauma. Complains of pain while sleeping on side and when descending stairs. Pain is dull. No numbness or tingling. No associated signs or symptoms.      Past Medical History:   Diagnosis Date    Asthma     COPD (chronic obstructive pulmonary disease)     Degenerative disc disease     Diabetes mellitus     Diabetes mellitus, type 2     Fibromyalgia     Hypertension     Rheumatoid arthritis     Rheumatoid arthritis(714.0)     Rheumatoid arthritis     Past Surgical History:   Procedure Laterality Date    BREAST SURGERY  1986    CATARACT EXTRACTION      EYE SURGERY  2013    Catatracts    ORIF FEMUR FRACTURE      right knee ligament rpeair  2005    right shoulder surgery  4/18/07    Dr. Gao     Current Outpatient Medications on File Prior to Visit   Medication Sig Dispense Refill    acetaminophen (TYLENOL) 325 MG tablet Take 650 mg by mouth as needed.      albuterol (PROVENTIL) 2.5 mg /3 mL (0.083 %) nebulizer solution USE 1 VIAL IN NEBULIZER EVERY 6 HOURS AS NEEDED FOR WHEEZING 180 mL 3    albuterol (PROVENTIL/VENTOLIN HFA) 90 mcg/actuation inhaler Inhale 2 puffs into the lungs every 6 (six) hours as needed for Wheezing. 18 g 6    amlodipine-benazepril 5-20 mg (LOTREL) 5-20 mg per capsule Take 1 capsule by mouth 2 (two) times daily. 180 capsule 1    blood sugar diagnostic (TRUETEST TEST STRIPS) Strp Inject 1 strip into the skin once daily. 300 strip 3    clotrimazole-betamethasone 1-0.05% (LOTRISONE) cream Apply topically 2 (two) times daily. 45 g 6    dicyclomine (BENTYL) 20 mg tablet Take 1 tablet (20 mg total) by mouth every 6 (six) hours. 120 tablet 3    famotidine (PEPCID) 40 MG tablet Take 1 tablet (40 mg total) by mouth once daily. 30 tablet 6    folic acid-vit B6-vit B12 (FOLBEE) 2.5-25-1 mg Tab  Take 1 tablet by mouth once daily. 90 each 3    furosemide (LASIX) 40 MG tablet Take 1 tablet (40 mg total) by mouth once daily. 90 tablet 1    gabapentin (NEURONTIN) 300 MG capsule Take 1 capsule (300 mg total) by mouth 3 (three) times daily. 90 capsule 3    GLUCOTROL XL 5 mg TR24 Take 1 tablet (5 mg total) by mouth daily with breakfast. 90 tablet 3    HYDROcodone-acetaminophen (NORCO)  mg per tablet TAKE 1 TABLET BY MOUTH EVERY 8 HOURS AS NEEDED FOR PAIN 90 tablet 0    hydroxychloroquine (PLAQUENIL) 200 mg tablet Take 1 tablet (200 mg total) by mouth 2 (two) times daily. 180 tablet 1    ibuprofen (ADVIL,MOTRIN) 600 MG tablet TK 1 T PO Q 8 H PRN P FOR UP TO 5 DAYS  0    leflunomide (ARAVA) 10 MG Tab Take 1 tablet (10 mg total) by mouth once daily. 90 tablet 1    levocetirizine (XYZAL) 5 MG tablet Take 5 mg by mouth.       methylPREDNISolone (MEDROL DOSEPACK) 4 mg tablet use as directed 1 Package 0    potassium chloride SA (K-DUR,KLOR-CON) 20 MEQ tablet Take 1 tablet (20 mEq total) by mouth 2 (two) times daily. 180 tablet 1    predniSONE (DELTASONE) 5 MG tablet TAKE THREE TABLETS BY MOUTH EVERY MORNING AS NEEDED 90 tablet 3    sucralfate (CARAFATE) 1 gram tablet TAKE 1 TABLET BY MOUTH FOUR TIMES DAILY 120 tablet 5    traZODone (DESYREL) 50 MG tablet Take 50 mg by mouth every evening.  5    simvastatin (ZOCOR) 5 MG tablet Take 1 tablet (5 mg total) by mouth every evening. 90 tablet 3     No current facility-administered medications on file prior to visit.      Review of patient's allergies indicates:   Allergen Reactions    Codeine     Dairy digestive ultra      Dairy products      Imuran [azathioprine sodium] Diarrhea    Morphine      DOESN'T FEEL RIGHT     Plaquenil [hydroxychloroquine] Other (See Comments)     headaches    Milk containing products Diarrhea     History reviewed. No pertinent family history.  Social History     Socioeconomic History    Marital status:      Spouse  name: Not on file    Number of children: 8    Years of education: Not on file    Highest education level: Not on file   Occupational History    Not on file   Social Needs    Financial resource strain: Not on file    Food insecurity     Worry: Not on file     Inability: Not on file    Transportation needs     Medical: Not on file     Non-medical: Not on file   Tobacco Use    Smoking status: Never Smoker    Smokeless tobacco: Never Used   Substance and Sexual Activity    Alcohol use: Not on file    Drug use: No    Sexual activity: Not on file   Lifestyle    Physical activity     Days per week: Not on file     Minutes per session: Not on file    Stress: Not at all   Relationships    Social connections     Talks on phone: Not on file     Gets together: Not on file     Attends [employer] service: Not on file     Active member of club or organization: Not on file     Attends meetings of clubs or organizations: Not on file     Relationship status: Not on file   Other Topics Concern    Not on file   Social History Narrative    Not on file       Review of Systems:  Constitutional:  Denies fever or chills   Eyes:  Denies change in visual acuity   HENT:  Denies nasal congestion or sore throat   Respiratory:  Denies cough or shortness of breath   Cardiovascular:  Denies chest pain or edema   GI:  Denies abdominal pain, nausea, vomiting, bloody stools or diarrhea   :  Denies dysuria   Integument:  Denies rash   Neurologic:  Denies headache, focal weakness or sensory changes   Endocrine:  Denies polyuria or polydipsia   Lymphatic:  Denies swollen glands   Psychiatric:  Denies depression or anxiety     Physical Exam:   Constitutional:  Well developed, well nourished, no acute distress, non-toxic appearance   Integument:  Well hydrated, no rash   Lymphatic:  No lymphadenopathy noted   Neurologic:  Alert & oriented x 3  Psychiatric:  Speech and behavior appropriate     Bilateral Hip Exam    Tenderness   The patient  is experiencing tenderness in the greater trochanter.    Range of Motion   The patient has normal hip ROM.    Muscle Strength   Abduction: 4/5     Other   Erythema: absent  Sensation: normal  Pulse: present    X-rays were personally reviewed by me and findings discussed with the patient.  3 views of the bilateral hip show no fractures or dislocations    Greater trochanteric bursitis of right hip  -     Large Joint Aspiration/Injection: bilateral greater trochanteric bursa    Greater trochanteric bursitis of left hip  -     Large Joint Aspiration/Injection: bilateral greater trochanteric bursa           Using an aseptic technique, I injected 5 cc of lidocaine 1% without and 1 cc of kenalog 40mg into the bilateral Hip. The patient tolerated this well. I will have them return to clinic in 6 weeks.

## 2020-09-10 NOTE — PROGRESS NOTES
Working A1c Report:     Spoke with pt today. Rescheduled Mammogram to 09/17/2020 with lab for A1c on same day.

## 2020-09-14 DIAGNOSIS — G89.4 CHRONIC PAIN SYNDROME: ICD-10-CM

## 2020-09-14 DIAGNOSIS — M05.9 RHEUMATOID ARTHRITIS WITH POSITIVE RHEUMATOID FACTOR, INVOLVING UNSPECIFIED SITE: ICD-10-CM

## 2020-09-14 DIAGNOSIS — M35.00 SJOGREN'S SYNDROME, WITH UNSPECIFIED ORGAN INVOLVEMENT: ICD-10-CM

## 2020-09-14 NOTE — TELEPHONE ENCOUNTER
----- Message from Maryjane Garcia sent at 9/14/2020  7:34 AM CDT -----  Regarding: med Refill  Contact: pt  Type:  RX Refill Request    Who Called: pt  Refill or New Rx:  refill   RX Name and Strength:  HYDROcodone-acetaminophen (NORCO)  mg per tablet  How is the patient currently taking it? (ex. 1XDay):    Is this a 30 day or 90 day RX:  30  Preferred Pharmacy with phone number:  - [facility] - [place], [place] - [address] [place] [address];  Local or Mail Order:  local   Ordering Provider:  Dr. Elizabeth Mercado Call Back Number:  [phone]  Additional Information:

## 2020-09-15 RX ORDER — TRIAMCINOLONE ACETONIDE 40 MG/ML
40 INJECTION, SUSPENSION INTRA-ARTICULAR; INTRAMUSCULAR
Status: DISCONTINUED | OUTPATIENT
Start: 2020-09-10 | End: 2020-09-15 | Stop reason: HOSPADM

## 2020-09-15 RX ORDER — HYDROCODONE BITARTRATE AND ACETAMINOPHEN 10; 325 MG/1; MG/1
TABLET ORAL
Qty: 90 TABLET | Refills: 0 | OUTPATIENT
Start: 2020-09-15

## 2020-09-15 RX ORDER — HYDROCODONE BITARTRATE AND ACETAMINOPHEN 10; 325 MG/1; MG/1
1 TABLET ORAL EVERY 8 HOURS PRN
Qty: 90 TABLET | Refills: 0 | Status: SHIPPED | OUTPATIENT
Start: 2020-09-15 | End: 2020-10-15 | Stop reason: SDUPTHER

## 2020-09-15 NOTE — TELEPHONE ENCOUNTER
Contacted patient and informed Dr Johnson just sent in pain medication to pharmacy. Verbalized understanding.

## 2020-09-15 NOTE — TELEPHONE ENCOUNTER
----- Message from Gladys eNwton sent at 9/15/2020  8:03 AM CDT -----  Contact: self  She has called several times and messages sent.  Patient has been trying to get her refill for her HYDROcodone-acetaminophen (NORCO)  mg per tablet since 9/9 and still has not been sent. Please call her back to advise at [phone] (home).  She saw Ariana in July and has another appt w/the doctor in December.  Thanks

## 2020-09-16 ENCOUNTER — TELEPHONE (OUTPATIENT)
Dept: ADMINISTRATIVE | Facility: HOSPITAL | Age: 75
End: 2020-09-16

## 2020-09-17 ENCOUNTER — HOSPITAL ENCOUNTER (OUTPATIENT)
Dept: RADIOLOGY | Facility: HOSPITAL | Age: 75
Discharge: HOME | End: 2020-09-17
Attending: FAMILY MEDICINE
Payer: MEDICAID

## 2020-09-17 DIAGNOSIS — Z12.31 ENCOUNTER FOR SCREENING MAMMOGRAM FOR MALIGNANT NEOPLASM OF BREAST: ICD-10-CM

## 2020-09-21 ENCOUNTER — TELEPHONE (OUTPATIENT)
Dept: FAMILY MEDICINE | Facility: CLINIC | Age: 75
End: 2020-09-21

## 2020-09-21 NOTE — TELEPHONE ENCOUNTER
----- Message from Cecile Cabrales sent at 9/21/2020  8:42 AM CDT -----  Regarding: Missed call  Contact: Patient  .Type:  Patient Returning Call    Who Called:Patient  Who Left Message for Patient:   Does the patient know what this is regarding?:Missed call   Would the patient rather a call back or a response via [facility]? Call  Best Call Back Number: .[phone] (home)     Additional Information:

## 2020-09-29 DIAGNOSIS — M05.9 RHEUMATOID ARTHRITIS WITH POSITIVE RHEUMATOID FACTOR, INVOLVING UNSPECIFIED SITE: ICD-10-CM

## 2020-09-30 RX ORDER — GABAPENTIN 300 MG/1
300 CAPSULE ORAL 3 TIMES DAILY
Qty: 90 CAPSULE | Refills: 3 | Status: SHIPPED | OUTPATIENT
Start: 2020-09-30 | End: 2021-06-07

## 2020-10-08 ENCOUNTER — TELEPHONE (OUTPATIENT)
Dept: FAMILY MEDICINE | Facility: CLINIC | Age: 75
End: 2020-10-08

## 2020-10-08 ENCOUNTER — OFFICE VISIT (OUTPATIENT)
Dept: FAMILY MEDICINE | Facility: CLINIC | Age: 75
End: 2020-10-08
Payer: MEDICARE

## 2020-10-08 VITALS
HEIGHT: 65 IN | SYSTOLIC BLOOD PRESSURE: 111 MMHG | WEIGHT: 251 LBS | DIASTOLIC BLOOD PRESSURE: 60 MMHG | HEART RATE: 68 BPM | BODY MASS INDEX: 41.82 KG/M2 | TEMPERATURE: 97 F

## 2020-10-08 DIAGNOSIS — M05.9 RHEUMATOID ARTHRITIS WITH POSITIVE RHEUMATOID FACTOR, INVOLVING UNSPECIFIED SITE: ICD-10-CM

## 2020-10-08 DIAGNOSIS — J45.40 MODERATE PERSISTENT ASTHMA WITHOUT COMPLICATION: ICD-10-CM

## 2020-10-08 DIAGNOSIS — E11.69 TYPE 2 DIABETES MELLITUS WITH HYPERLIPIDEMIA: ICD-10-CM

## 2020-10-08 DIAGNOSIS — K21.9 GASTROESOPHAGEAL REFLUX DISEASE, UNSPECIFIED WHETHER ESOPHAGITIS PRESENT: Primary | ICD-10-CM

## 2020-10-08 DIAGNOSIS — N28.89 RENAL MASS: ICD-10-CM

## 2020-10-08 DIAGNOSIS — E11.59 HYPERTENSION ASSOCIATED WITH DIABETES: ICD-10-CM

## 2020-10-08 DIAGNOSIS — E11.9 TYPE 2 DIABETES MELLITUS WITHOUT COMPLICATION: ICD-10-CM

## 2020-10-08 DIAGNOSIS — M35.00 SJOGREN'S SYNDROME, WITH UNSPECIFIED ORGAN INVOLVEMENT: ICD-10-CM

## 2020-10-08 DIAGNOSIS — I10 HYPERTENSION, UNSPECIFIED TYPE: ICD-10-CM

## 2020-10-08 DIAGNOSIS — D50.9 IRON DEFICIENCY ANEMIA, UNSPECIFIED IRON DEFICIENCY ANEMIA TYPE: ICD-10-CM

## 2020-10-08 DIAGNOSIS — N32.81 OVERACTIVE BLADDER: ICD-10-CM

## 2020-10-08 DIAGNOSIS — Z78.0 ASYMPTOMATIC AGE-RELATED POSTMENOPAUSAL STATE: ICD-10-CM

## 2020-10-08 DIAGNOSIS — G47.33 OSA ON CPAP: ICD-10-CM

## 2020-10-08 DIAGNOSIS — E78.5 TYPE 2 DIABETES MELLITUS WITH HYPERLIPIDEMIA: ICD-10-CM

## 2020-10-08 DIAGNOSIS — Z13.220 ENCOUNTER FOR LIPID SCREENING FOR CARDIOVASCULAR DISEASE: ICD-10-CM

## 2020-10-08 DIAGNOSIS — M05.79 RHEUMATOID ARTHRITIS INVOLVING MULTIPLE SITES WITH POSITIVE RHEUMATOID FACTOR: Primary | ICD-10-CM

## 2020-10-08 DIAGNOSIS — I15.2 HYPERTENSION ASSOCIATED WITH DIABETES: ICD-10-CM

## 2020-10-08 DIAGNOSIS — Z13.6 ENCOUNTER FOR LIPID SCREENING FOR CARDIOVASCULAR DISEASE: ICD-10-CM

## 2020-10-08 DIAGNOSIS — Z23 NEED FOR PNEUMOCOCCAL VACCINE: ICD-10-CM

## 2020-10-08 DIAGNOSIS — E78.2 MIXED HYPERLIPIDEMIA: ICD-10-CM

## 2020-10-08 PROCEDURE — 99214 OFFICE O/P EST MOD 30 MIN: CPT | Mod: S$PBB,,, | Performed by: INTERNAL MEDICINE

## 2020-10-08 PROCEDURE — 99999 PR PBB SHADOW E&M-EST. PATIENT-LVL V: ICD-10-PCS | Mod: PBBFAC,,, | Performed by: INTERNAL MEDICINE

## 2020-10-08 PROCEDURE — 99214 PR OFFICE/OUTPT VISIT, EST, LEVL IV, 30-39 MIN: ICD-10-PCS | Mod: S$PBB,,, | Performed by: INTERNAL MEDICINE

## 2020-10-08 PROCEDURE — G0009 ADMIN PNEUMOCOCCAL VACCINE: HCPCS | Mod: PBBFAC,PO

## 2020-10-08 PROCEDURE — 99999 PR PBB SHADOW E&M-EST. PATIENT-LVL V: CPT | Mod: PBBFAC,,, | Performed by: INTERNAL MEDICINE

## 2020-10-08 PROCEDURE — 99215 OFFICE O/P EST HI 40 MIN: CPT | Mod: PBBFAC,PO | Performed by: INTERNAL MEDICINE

## 2020-10-08 RX ORDER — BUDESONIDE AND FORMOTEROL FUMARATE DIHYDRATE 160; 4.5 UG/1; UG/1
AEROSOL RESPIRATORY (INHALATION)
COMMUNITY
End: 2022-08-22 | Stop reason: SDUPTHER

## 2020-10-08 RX ORDER — AMLODIPINE AND BENAZEPRIL HYDROCHLORIDE 5; 20 MG/1; MG/1
1 CAPSULE ORAL 2 TIMES DAILY
Qty: 180 CAPSULE | Refills: 3 | Status: SHIPPED | OUTPATIENT
Start: 2020-10-08 | End: 2021-09-27 | Stop reason: SDUPTHER

## 2020-10-08 RX ORDER — MINERAL OIL
1 ENEMA (ML) RECTAL DAILY
COMMUNITY
End: 2023-02-22 | Stop reason: ALTCHOICE

## 2020-10-08 RX ORDER — BENAZEPRIL HYDROCHLORIDE 40 MG/1
1 TABLET ORAL 2 TIMES DAILY
COMMUNITY
End: 2020-10-08 | Stop reason: ALTCHOICE

## 2020-10-08 RX ORDER — SIMVASTATIN 5 MG/1
5 TABLET, FILM COATED ORAL NIGHTLY
Qty: 90 TABLET | Refills: 3 | Status: SHIPPED | OUTPATIENT
Start: 2020-10-08 | End: 2021-06-07 | Stop reason: ALTCHOICE

## 2020-10-08 RX ORDER — AMLODIPINE BESYLATE 5 MG/1
5 TABLET ORAL DAILY
Qty: 90 TABLET | Refills: 3 | Status: SHIPPED | OUTPATIENT
Start: 2020-10-08 | End: 2020-10-08 | Stop reason: SDUPTHER

## 2020-10-08 RX ORDER — DICLOFENAC SODIUM 10 MG/G
GEL TOPICAL
COMMUNITY
End: 2021-06-28

## 2020-10-08 RX ORDER — SUCRALFATE 1 G/1
1 TABLET ORAL 4 TIMES DAILY
Qty: 360 TABLET | Refills: 0 | Status: SHIPPED | OUTPATIENT
Start: 2020-10-08 | End: 2023-06-13

## 2020-10-08 RX ORDER — SODIUM CHLORIDE 50 MG/ML
SOLUTION/ DROPS OPHTHALMIC
COMMUNITY

## 2020-10-08 NOTE — PROGRESS NOTES
Assessment/Plan:    Rheumatoid arthritis with positive rheumatoid factor  -managed by rheumatology, Dr. Johnson  -currently on prednisone, leflunomide and plaquenil  -yearly eye exam up to date  -on chronic opiate therapy for pain. Has gabapentin that she uses PRN    Type 2 diabetes mellitus with hyperlipidemia  -condition is currently controlled  -current meds: Glucotrol XL 5 mg QD  -plan to continued current medications  -see diabetic health maintenance listed below  -counseling provided on importance of diabetic diet and medication compliance in order to treat diabetes  -discussed diabetes disease course and potential complications    Hypertension associated with diabetes  -at goal today  -currently on benazepril 40 mg and amlodipine 5-20 mg BID Discussed stopping extra benazepril and only taking combo pill. Continue BID  -continue lifestyle modification with low sodium diet and exercise   -discussed hypertension disease course and importance of treating high blood pressure  -patient understood and advised of risk of untreated blood pressure.  ER precautions were given   for symptoms of hypertensive urgency and emergency.    Moderate persistent asthma without complication  -previously followed by pulmonology, is no longer  -using symbicort and albuterol but only as needed  -denies current/recent symptoms    Mixed hyperlipidemia  Lab Results   Component Value Date    LDLCALC 80.6 10/08/2020   -currently on zocor 5 mg QD    Renal mass  -newly diagnosed L renal mass  -unable to see most recent urology notes  -patient reports plan to repeat CT soon prior to decision on further treatment    Anemia   -hx of NITESH  -patient reports increased fatigue and interested in what her iron levels are  -not currently taking iron supplementation  -plan to check iron studies with upcoming  labs    _____________________________________________________________________________________________________________________________________________________    Orders this visit:    Rheumatoid arthritis involving multiple sites with positive rheumatoid factor    Type 2 diabetes mellitus with hyperlipidemia  -     Microalbumin/Creatinine Ratio, Urine; Standing    Hypertension associated with diabetes  -     Discontinue: amLODIPine (NORVASC) 5 MG tablet; Take 1 tablet (5 mg total) by mouth once daily.  Dispense: 90 tablet; Refill: 3    Hypertension, unspecified type  -     amlodipine-benazepril 5-20 mg (LOTREL) 5-20 mg per capsule; Take 1 capsule by mouth 2 (two) times daily.  Dispense: 180 capsule; Refill: 3    Overactive bladder  -     Cancel: Urinalysis, Reflex to Urine Culture Urine, Clean Catch    Mixed hyperlipidemia  -     simvastatin (ZOCOR) 5 MG tablet; Take 1 tablet (5 mg total) by mouth every evening.  Dispense: 90 tablet; Refill: 3    Iron deficiency anemia, unspecified iron deficiency anemia type  -     Iron and TIBC; Future; Expected date: 10/08/2020  -     Ferritin; Future; Expected date: 10/08/2020    Encounter for lipid screening for cardiovascular disease  -     Lipid Panel; Standing    Asymptomatic age-related postmenopausal state  -     DXA Bone Density Spine And Hip; Future; Expected date: 10/08/2020    Renal mass    AMOL on CPAP    Need for pneumococcal vaccine  -     Pneumococcal Conjugate Vaccine (13 Valent) (IM)    Moderate persistent asthma without complication      Follow up in about 3 months (around 1/8/2021).    Divina Silva MD  _____________________________________________________________________________________________________________________________________________________    HPI:    Patient is in clinic today as an established patient of clinic, new to me, here to establish care. Patient is a 76 yo black female with a PMH of RA, sjogren's, HTN, HLD, DM2, asthma, AMOL and  NITESH.    Previous PCP: Dr. Roberto Hernandez    RA/Sjogren's: chronic. Stable. Managed by rheumatology. Currently on prednisone, leflunomide and plaquenil. Has had yearly eye exam. Plan to get DEXA considering chronic steroid use.    Renal mass: newly diagnosed. Renal cysts noted on MRI. Referred to Urology and dedicated CT ordered. CT revealing L-sided renal mass. Patient recently followed up with urology, unable to see note. She reports that their plan is to repeat her CT and then decide on further management.    HTN: The patient is currently being treated for essential hypertension. This condition is chronic and stable. The patient is tolerating their medication well with good compliance.  Denies any adverse effects of medications.  Counseling was offered regarding low sodium diet.  The patient has a reduced salt intake. Routine exercise recommended. The patient denies headache, vision changes, chest pain, palpitations, shortness of breath, or lower extremity edema.    DM2: Patient presents for follow up of diabetes. Condition is chronic and stable. Patient denies symptoms, including foot ulcerations, hyperglycemia, hypoglycemia , nausea, paresthesia of the feet, polydipsia, polyuria and visual disturbances.  Evaluation to date has been included: fasting blood sugar, fasting lipid panel, hemoglobin A1C and microalbuminuria.  Home fasting sugars: . Treatment to date:  Glucotrol but only using PRN. Denies adverse effects of medications.     Diabetes Management Status    Statin: Taking  ACE/ARB: Taking    Screening or Prevention Patient's value Goal Complete/Controlled?   HgA1C Testing and Control   Lab Results   Component Value Date    HGBA1C 5.8 (H) 09/17/2020      Annually/Less than 8% Yes   Lipid profile : 10/08/2020 Annually No   LDL control Lab Results   Component Value Date    LDLCALC 80.6 10/08/2020    Annually/Less than 100 mg/dl  No   Nephropathy screening Lab Results   Component Value Date    LABMICR 25.0  08/06/2019     No results found for: PROTEINUA Annually No   Blood pressure BP Readings from Last 1 Encounters:   10/08/20 111/60    Less than 140/90 Yes   Dilated retinal exam : 07/09/2020 Annually Yes   Foot exam   : 10/30/2019 Annually Yes     No other complaints today. Routine health maintenance reviewed. Annual labs ordered. Plans to have MMG through GYN. DEXA ordered. Prevnar today.    Past Medical History:  Past Medical History:   Diagnosis Date    Asthma     COPD (chronic obstructive pulmonary disease)     Degenerative disc disease     Diabetes mellitus     Diabetes mellitus, type 2     Fibromyalgia     Hypertension     Rheumatoid arthritis     Rheumatoid arthritis(714.0)     Rheumatoid arthritis     Past Surgical History:   Procedure Laterality Date    BREAST SURGERY  1986    CATARACT EXTRACTION      EYE SURGERY  2013    Catatracts    ORIF FEMUR FRACTURE      right knee ligament rpeair  2005    right shoulder surgery  4/18/07    Dr. Gao     Review of patient's allergies indicates:   Allergen Reactions    Codeine     Dairy digestive ultra      Dairy products      Imuran [azathioprine sodium] Diarrhea    Morphine      DOESN'T FEEL RIGHT     Plaquenil [hydroxychloroquine] Other (See Comments)     headaches    Milk containing products Diarrhea     Social History     Tobacco Use    Smoking status: Never Smoker    Smokeless tobacco: Never Used   Substance Use Topics    Alcohol use: Never     Frequency: Never    Drug use: No     Family History   Problem Relation Age of Onset    Cancer Mother     Anemia Mother     Alcohol abuse Father     Anemia Maternal Grandmother     Hypertension Paternal Grandmother     Arthritis Paternal Grandmother      Current Outpatient Medications on File Prior to Visit   Medication Sig Dispense Refill    acetaminophen (TYLENOL) 325 MG tablet Take 650 mg by mouth as needed.      albuterol (PROVENTIL) 2.5 mg /3 mL (0.083 %) nebulizer solution USE 1 VIAL IN  NEBULIZER EVERY 6 HOURS AS NEEDED FOR WHEEZING 180 mL 3    albuterol (PROVENTIL/VENTOLIN HFA) 90 mcg/actuation inhaler Inhale 2 puffs into the lungs every 6 (six) hours as needed for Wheezing. 18 g 6    blood sugar diagnostic (TRUETEST TEST STRIPS) Strp Inject 1 strip into the skin once daily. 300 strip 3    budesonide-formoterol 160-4.5 mcg (SYMBICORT) 160-4.5 mcg/actuation HFAA Symbicort 160 mcg-4.5 mcg/actuation HFA aerosol inhaler   INHALE TWO PUFFS TWICE DAILY      clotrimazole-betamethasone 1-0.05% (LOTRISONE) cream Apply topically 2 (two) times daily. 45 g 6    diclofenac sodium (VOLTAREN) 1 % Gel Voltaren 1 % topical gel   Apply FOUR GRAM topically once daily      dicyclomine (BENTYL) 20 mg tablet Take 1 tablet (20 mg total) by mouth every 6 (six) hours. 120 tablet 3    famotidine (PEPCID) 40 MG tablet Take 1 tablet (40 mg total) by mouth once daily. 30 tablet 6    fexofenadine (ALLEGRA) 180 MG tablet Take 1 tablet by mouth once daily.      folic acid-vit B6-vit B12 (FOLBEE) 2.5-25-1 mg Tab Take 1 tablet by mouth once daily. 90 each 3    gabapentin (NEURONTIN) 300 MG capsule Take 1 capsule (300 mg total) by mouth 3 (three) times daily. 90 capsule 3    GLUCOTROL XL 5 mg TR24 Take 1 tablet (5 mg total) by mouth daily with breakfast. 90 tablet 3    HYDROcodone-acetaminophen (NORCO)  mg per tablet Take 1 tablet by mouth every 8 (eight) hours as needed. 90 tablet 0    hydroxychloroquine (PLAQUENIL) 200 mg tablet Take 1 tablet (200 mg total) by mouth 2 (two) times daily. 180 tablet 1    leflunomide (ARAVA) 10 MG Tab Take 1 tablet (10 mg total) by mouth once daily. 90 tablet 1    levocetirizine (XYZAL) 5 MG tablet Take 5 mg by mouth.       potassium chloride SA (K-DUR,KLOR-CON) 20 MEQ tablet Take 1 tablet (20 mEq total) by mouth 2 (two) times daily. 180 tablet 1    predniSONE (DELTASONE) 5 MG tablet TAKE THREE TABLETS BY MOUTH EVERY MORNING AS NEEDED 90 tablet 3    sodium chloride 5% (LALITHA  "128) 5 % ophthalmic solution sodium chloride 5 % eye drops   INSTILL ONE DROP INTO EACH EYE FOUR TIMES DAILY      traZODone (DESYREL) 50 MG tablet Take 50 mg by mouth every evening.  5     No current facility-administered medications on file prior to visit.        Review of Systems   Constitutional: Negative for chills, diaphoresis, fatigue and fever.   HENT: Negative for congestion, ear pain, postnasal drip, sinus pain and sore throat.    Eyes: Negative for pain and redness.   Respiratory: Negative for cough, chest tightness and shortness of breath.    Cardiovascular: Negative for chest pain and leg swelling.   Gastrointestinal: Negative for abdominal pain, constipation, diarrhea, nausea and vomiting.   Genitourinary: Negative for dysuria and hematuria.   Musculoskeletal: Positive for arthralgias. Negative for joint swelling.   Skin: Negative for rash.   Neurological: Negative for dizziness, syncope and headaches.       Vitals:    10/08/20 0855   BP: 111/60   Pulse: 68   Temp: 97.3 °F (36.3 °C)   Weight: 113.9 kg (251 lb)   Height: 5' 5" (1.651 m)       Wt Readings from Last 3 Encounters:   10/08/20 113.9 kg (251 lb)   09/10/20 113.9 kg (251 lb)   12/30/19 114 kg (251 lb 5.2 oz)       Physical Exam  Constitutional:       General: She is not in acute distress.     Appearance: Normal appearance. She is well-developed. She is obese.   HENT:      Head: Normocephalic and atraumatic.   Eyes:      Conjunctiva/sclera: Conjunctivae normal.   Neck:      Musculoskeletal: Normal range of motion and neck supple.   Cardiovascular:      Rate and Rhythm: Normal rate and regular rhythm.      Pulses: Normal pulses.      Heart sounds: Normal heart sounds. No murmur.   Pulmonary:      Effort: Pulmonary effort is normal. No respiratory distress.      Breath sounds: Normal breath sounds.   Abdominal:      General: Bowel sounds are normal. There is no distension.      Palpations: Abdomen is soft.      Tenderness: There is no abdominal " tenderness.   Musculoskeletal: Normal range of motion.      Comments: Trace LE edema b/l ankles   Skin:     General: Skin is warm and dry.      Findings: No rash.   Neurological:      General: No focal deficit present.      Mental Status: She is alert and oriented to person, place, and time.         Health Maintenance   Topic Date Due    DEXA SCAN  02/28/1987    Mammogram  04/01/2017    Foot Exam  10/30/2020    Hemoglobin A1c  03/17/2021    Eye Exam  07/09/2021    Low Dose Statin  10/08/2021    Lipid Panel  10/08/2021    TETANUS VACCINE  03/09/2026    Hepatitis C Screening  Completed    Pneumococcal Vaccine (65+ Low/Medium Risk)  Completed

## 2020-10-08 NOTE — PATIENT INSTRUCTIONS
Overactive Bladder Syndrome (OAB)     Normally, urine stays in the bladder until a person decides to release it. With OAB, the bladder muscles contract involuntarily, causing a sudden urge to urinate and even urine leakage.   When the bladder muscle contract or squeeze involuntarily, it is called overactive bladder syndrome. This causes an intense urge to urinate, known as urgency. Urgency can occur many times during the day and night. If urine leaks with the urgency, it is called urge incontinence.  A disease that affects the bladder nerves, such as multiple sclerosis, can cause overactive bladder syndrome. Other conditions, such as urinary tract infection (UTI) or prostate problems in men, can also lead to OAB. But the exact cause is often not known.  How is overactive bladder syndrome diagnosed?  Your healthcare provider will examine you and ask about your symptoms and health history. You may also have one or more of the following:  · Urine test to take samples of urine and have them checked for problems.  · Urinary diary to record how much fluid you take in and urinate out in a 3 day period.  · Bladder ultrasound to study the bladder as it empties. Ultrasound uses sound waves to create detailed images of the inside of the body.  · Cystoscopy to allow the healthcare provider to look for problems in the urinary tract. The test uses a thin, flexible scope called a cystoscope with a light and camera on the end. The scope is inserted into the urethra (the tube that carries urine out of the body).  · Urodynamic studies, a battery of tests designed to measure and record many aspects of urinary bladder function, including pressures, volume, and urine flow.  How is overactive bladder syndrome treated?  Treatment depends on the cause and severity of your OAB. Treatments may include the following:  · Changing urination habits may be suggested. For instance, your healthcare provider may suggest that you urinate as soon as  you feel the urge. You may also need to limit how much fluid you have during the day.  · Exercising your pelvic muscles can help strengthen muscles used during urination. These exercises are called Kegels. They involve javed as if you were stopping your urine stream and tightening your rectum as if trying not to pass gas. Your healthcare provider can help you learn how to do Kegels.  · Biofeedback to help you learn to control the movement of your bladder muscles. Sensors are placed on your abdomen. They turn signals given off by your muscles into lines on a computer screen.  · Medicine may be given to relax the bladder muscle. Medicine can also help ease bladder contractions, which reduces the urge to urinate.  · Neuromodulation may be done if medicine and behavioral changes dont work. Electrical pulses are sent to the sacral nerves (nerves that affect the pelvic area). These pulses help relieve OAB and urge incontinence.  · Surgery to make the bladder larger may be done in severe cases.  With treatment, OAB can be managed. A condition, such as UTI, that has caused you to have OAB will be treated. Treatment may involve taking medicine for months or years. You may also need to make changes in your daily routine. This may include going to the bathroom more often than you think you need to. Or, you may need to cut back on caffeine and alcohol because these can make symptoms worse. Your healthcare provider can tell you more.     When to call your healthcare provider  Call the healthcare provider right away if you have any of the following:  · Fever of 100.4°F (38.0 °C) or higher   · No improvement with treatment  · Trouble urinating because of pain  · Back or abdominal pain   Date Last Reviewed: 1/1/2017 © 2000-2017 [employer]. [address] [place] [address], [place] [address]. All rights reserved. This information is not intended as a substitute for professional medical care. Always follow your  healthcare professional's instructions.      Treating Incontinence in Women: Nonsurgical Methods    The best treatment for you will depend on the type of incontinence you have. Your symptoms, age, and any underlying problems that are found also affect your treatment. While some types of incontinence may eventually require surgery, nonsurgical treatments may be effective in many cases. Nonsurgical treatments include lifestyle changes, muscle-strengthening exercises, and medicines.  Nonsurgical Treatments  Treatment for stress urinary incontinence includes:  · Bladder training  · Lifestyle changes such as weight loss and increased activity if incontinence is due to being overweight  · Medicines, if bladder training has not helped  · Pelvic floor muscle exercises  Lifestyle changes  · Losing weight. Excess weight puts extra pressure on the pelvic floor muscles. Exercising and eating right can help you lose weight. This helps other treatments work better.  · Making certain diet changes. Some foods may make you need to urinate more, so it may be good to avoid them. These include caffeinated drinks and alcohol. Ask your healthcare provider whether these or other diet changes might be helpful.  · Quitting smoking. Smoking can lead to a chronic cough that strains pelvic floor muscles. Smoking may also damage the bladder and urethra.  Pelvic floor musle exercises  There are exercises you can do to help strengthen your pelvic floor muscles. The pelvic floor muscles act as a sling to help hold the bladder and urethra in place. These muscles also help keep the urethra closed. Weak pelvic floor muscles may allow urine to leak. To strengthen the pelvic floor muscles, do the exercises daily. In a few months, the muscles will be stronger and tighter. This can help prevent urine leakage.  Date Last Reviewed: 1/1/2017  © 2853-8066 [employer]. [address] [place] [address], [place] [address]. All rights reserved. This  information is not intended as a substitute for professional medical care. Always follow your healthcare professional's instructions.

## 2020-10-08 NOTE — ASSESSMENT & PLAN NOTE
-condition is currently controlled  -current meds: Glucotrol XL 5 mg QD  -plan to continued current medications  -see diabetic health maintenance listed below  -counseling provided on importance of diabetic diet and medication compliance in order to treat diabetes  -discussed diabetes disease course and potential complications

## 2020-10-08 NOTE — ASSESSMENT & PLAN NOTE
-at goal today  -currently on benazepril 40 mg and amlodipine 5-20 mg BID Discussed stopping extra benazepril and only taking combo pill. Continue BID  -continue lifestyle modification with low sodium diet and exercise   -discussed hypertension disease course and importance of treating high blood pressure  -patient understood and advised of risk of untreated blood pressure.  ER precautions were given   for symptoms of hypertensive urgency and emergency.

## 2020-10-08 NOTE — ASSESSMENT & PLAN NOTE
-managed by rheumatology, Dr. Johnson  -currently on prednisone, leflunomide and plaquenil  -yearly eye exam up to date  -on chronic opiate therapy for pain. Has gabapentin that she uses PRN

## 2020-10-09 PROBLEM — E78.2 MIXED HYPERLIPIDEMIA: Status: ACTIVE | Noted: 2020-10-09

## 2020-10-09 NOTE — ASSESSMENT & PLAN NOTE
-newly diagnosed L renal mass  -unable to see most recent urology notes  -patient reports plan to repeat CT soon prior to decision on further treatment

## 2020-10-09 NOTE — ASSESSMENT & PLAN NOTE
-hx of NTIESH  -patient reports increased fatigue and interested in what her iron levels are  -not currently taking iron supplementation  -plan to check iron studies with upcoming labs

## 2020-10-09 NOTE — ASSESSMENT & PLAN NOTE
-previously followed by pulmonology, is no longer  -using symbicort and albuterol but only as needed  -denies current/recent symptoms

## 2020-10-13 ENCOUNTER — LAB VISIT (OUTPATIENT)
Dept: LAB | Facility: HOSPITAL | Age: 75
End: 2020-10-13
Attending: INTERNAL MEDICINE
Payer: MEDICARE

## 2020-10-13 DIAGNOSIS — E78.5 TYPE 2 DIABETES MELLITUS WITH HYPERLIPIDEMIA: ICD-10-CM

## 2020-10-13 DIAGNOSIS — E11.69 TYPE 2 DIABETES MELLITUS WITH HYPERLIPIDEMIA: ICD-10-CM

## 2020-10-13 PROCEDURE — 82043 UR ALBUMIN QUANTITATIVE: CPT

## 2020-10-14 LAB
ALBUMIN/CREAT UR: 3.5 UG/MG (ref 0–30)
CREAT UR-MCNC: 113 MG/DL (ref 15–325)
MICROALBUMIN UR DL<=1MG/L-MCNC: 4 UG/ML

## 2020-10-15 DIAGNOSIS — G89.4 CHRONIC PAIN SYNDROME: ICD-10-CM

## 2020-10-15 DIAGNOSIS — M05.9 RHEUMATOID ARTHRITIS WITH POSITIVE RHEUMATOID FACTOR, INVOLVING UNSPECIFIED SITE: ICD-10-CM

## 2020-10-15 DIAGNOSIS — M35.00 SJOGREN'S SYNDROME, WITH UNSPECIFIED ORGAN INVOLVEMENT: ICD-10-CM

## 2020-10-15 NOTE — TELEPHONE ENCOUNTER
----- Message from Shyanne Landrum sent at 10/15/2020 12:46 PM CDT -----  Contact: self  Type:  RX Refill Request    Who Called:  patient   Refill or New Rx:  refill  RX Name and Strength:  HYDROcodone-acetaminophen (NORCO)  mg per tablet  How is the patient currently taking it? (ex. 1XDay):  every 8 hours  Is this a 30 day or 90 day RX:  30  Preferred Pharmacy with phone number:    [facility] - [place], [place] - [id number] [facility] [address]  Phone: [phone] Fax: [phone]  Local or Mail Order:  local  Ordering Provider:  Pedro Mercado Call Back Number:  [phone] (home)     Additional Information:

## 2020-10-16 DIAGNOSIS — M35.00 SJOGREN'S SYNDROME, WITH UNSPECIFIED ORGAN INVOLVEMENT: ICD-10-CM

## 2020-10-16 DIAGNOSIS — M05.9 RHEUMATOID ARTHRITIS WITH POSITIVE RHEUMATOID FACTOR, INVOLVING UNSPECIFIED SITE: ICD-10-CM

## 2020-10-16 DIAGNOSIS — G89.4 CHRONIC PAIN SYNDROME: ICD-10-CM

## 2020-10-16 NOTE — TELEPHONE ENCOUNTER
----- Message from Terri Fuller sent at 10/16/2020  9:11 AM CDT -----  Regarding: rx  Contact: self  Type:  RX Refill Request    Who Called:  self   Refill or New Rx:  new rx   RX Name and Strength:  hydrocodone   How is the patient currently taking it? (ex. 1XDay):   Is this a 30 day or 90 day RX:  30 day supply  Preferred Pharmacy with phone number:  [facility]  Local or Mail Order:  local   Ordering Provider: Dr Elizabeth Mercado Call Back Number:  [phone]  Additional Information:

## 2020-10-16 NOTE — TELEPHONE ENCOUNTER
----- Message from Genevieve Osorio sent at 10/16/2020  2:24 PM CDT -----  Regarding: refill  Contact: pt  Type: Needs Medical Advice    Who Called:      Best Call Back Number:     Additional Information: Requesting a call back from Nurse, regarding a refill for Rx HYDROcodone-acetaminophen (NORCO)  mg per tablet 90 tablet be called in today before weekend ,please call back per pt

## 2020-10-19 DIAGNOSIS — M05.9 RHEUMATOID ARTHRITIS WITH POSITIVE RHEUMATOID FACTOR, INVOLVING UNSPECIFIED SITE: ICD-10-CM

## 2020-10-19 DIAGNOSIS — M35.00 SJOGREN'S SYNDROME, WITH UNSPECIFIED ORGAN INVOLVEMENT: ICD-10-CM

## 2020-10-19 DIAGNOSIS — G89.4 CHRONIC PAIN SYNDROME: ICD-10-CM

## 2020-10-19 NOTE — TELEPHONE ENCOUNTER
----- Message from Amna Gonzalez sent at 10/19/2020 10:07 AM CDT -----  Regarding: Status on refill  Type: Needs Medical Advice    Who Called:  SARAH TORRES [[id number]]    Best Call Back Number: [phone]    Additional Information: Patient states she requested her Norco refill on 10/15 and 10/16 and syayes she is still waiting on a refill. Patient is requesting an update. Please contact to further discuss and advise.

## 2020-10-20 NOTE — TELEPHONE ENCOUNTER
----- Message from Anamaria Seals sent at 10/20/2020  9:42 AM CDT -----  Regarding: refill  Contact: patient  Type:  RX Refill Request    Who Called:  patient  Refill or New Rx:  refill  RX Name and Strength:  HYDROcodone-acetaminophen (NORCO)  mg per tablet  How is the patient currently taking it? (ex. 1XDay):  2xday  Is this a 30 day or 90 day RX:  30  Preferred Pharmacy with phone number:    [facility] - [place], [place] - [address] [facility] [address]  Phone: [phone] Fax: [phone]  Local or Mail Order:  local  Ordering Provider:  Dr Elizabeth Mercado Call Back Number:  [phone] (home)   Additional Information:  Patient states she has sent messages since Thursday 10/15/20 and has no response. Please call patient to advise. Thanks!

## 2020-10-21 ENCOUNTER — PATIENT OUTREACH (OUTPATIENT)
Dept: ADMINISTRATIVE | Facility: OTHER | Age: 75
End: 2020-10-21

## 2020-10-21 RX ORDER — HYDROCODONE BITARTRATE AND ACETAMINOPHEN 10; 325 MG/1; MG/1
1 TABLET ORAL EVERY 8 HOURS PRN
Qty: 90 TABLET | Refills: 0 | Status: SHIPPED | OUTPATIENT
Start: 2020-10-21 | End: 2020-11-18

## 2020-10-21 RX ORDER — HYDROCODONE BITARTRATE AND ACETAMINOPHEN 10; 325 MG/1; MG/1
1 TABLET ORAL EVERY 8 HOURS PRN
Qty: 90 TABLET | Refills: 0 | OUTPATIENT
Start: 2020-10-21

## 2020-10-21 NOTE — PROGRESS NOTES
LINKS immunization registry not responding  Care Everywhere updated  Health Maintenance updated  DIS/Chart reviewed for overdue Proactive [facility] Encounters (CHET) health maintenance testing (CRS, Breast Ca, Diabetic Eye Exam)   Orders entered:N/A

## 2020-10-22 ENCOUNTER — OFFICE VISIT (OUTPATIENT)
Dept: ORTHOPEDICS | Facility: CLINIC | Age: 75
End: 2020-10-22
Payer: MEDICARE

## 2020-10-22 VITALS
HEIGHT: 65 IN | BODY MASS INDEX: 18.98 KG/M2 | DIASTOLIC BLOOD PRESSURE: 65 MMHG | SYSTOLIC BLOOD PRESSURE: 134 MMHG | WEIGHT: 113.88 LBS | HEART RATE: 79 BPM

## 2020-10-22 DIAGNOSIS — M70.61 GREATER TROCHANTERIC BURSITIS OF RIGHT HIP: Primary | ICD-10-CM

## 2020-10-22 DIAGNOSIS — E11.69 TYPE 2 DIABETES MELLITUS WITH HYPERLIPIDEMIA: ICD-10-CM

## 2020-10-22 DIAGNOSIS — E78.5 TYPE 2 DIABETES MELLITUS WITH HYPERLIPIDEMIA: ICD-10-CM

## 2020-10-22 DIAGNOSIS — M70.62 GREATER TROCHANTERIC BURSITIS OF LEFT HIP: ICD-10-CM

## 2020-10-22 PROCEDURE — 20610 DRAIN/INJ JOINT/BURSA W/O US: CPT | Mod: 50,PBBFAC,PN | Performed by: ORTHOPAEDIC SURGERY

## 2020-10-22 PROCEDURE — 99214 PR OFFICE/OUTPT VISIT, EST, LEVL IV, 30-39 MIN: ICD-10-PCS | Mod: S$PBB,25,, | Performed by: ORTHOPAEDIC SURGERY

## 2020-10-22 PROCEDURE — 99999 PR PBB SHADOW E&M-EST. PATIENT-LVL V: ICD-10-PCS | Mod: PBBFAC,,, | Performed by: ORTHOPAEDIC SURGERY

## 2020-10-22 PROCEDURE — 99215 OFFICE O/P EST HI 40 MIN: CPT | Mod: PBBFAC,PN | Performed by: ORTHOPAEDIC SURGERY

## 2020-10-22 PROCEDURE — 99999 PR PBB SHADOW E&M-EST. PATIENT-LVL V: CPT | Mod: PBBFAC,,, | Performed by: ORTHOPAEDIC SURGERY

## 2020-10-22 PROCEDURE — 99214 OFFICE O/P EST MOD 30 MIN: CPT | Mod: S$PBB,25,, | Performed by: ORTHOPAEDIC SURGERY

## 2020-10-22 PROCEDURE — 20610 LARGE JOINT ASPIRATION/INJECTION: BILATERAL GREATER TROCHANTERIC BURSA: ICD-10-PCS | Mod: 50,S$PBB,, | Performed by: ORTHOPAEDIC SURGERY

## 2020-10-22 RX ORDER — HYDROCODONE BITARTRATE AND ACETAMINOPHEN 10; 325 MG/1; MG/1
1 TABLET ORAL EVERY 8 HOURS PRN
Qty: 90 TABLET | Refills: 0 | OUTPATIENT
Start: 2020-10-22

## 2020-10-22 RX ADMIN — TRIAMCINOLONE ACETONIDE 40 MG: 40 INJECTION, SUSPENSION INTRA-ARTICULAR; INTRAMUSCULAR at 01:10

## 2020-10-22 NOTE — PROCEDURES
Large Joint Aspiration/Injection: bilateral greater trochanteric bursa    Date/Time: 10/22/2020 1:15 PM  Performed by: Enrrique Wynn MD  Authorized by: Enrrique Wynn MD     Consent Done?:  Yes (Verbal)  Indications:  Pain  Timeout: prior to procedure the correct patient, procedure, and site was verified    Prep: patient was prepped and draped in usual sterile fashion      Local anesthesia used?: Yes    Local anesthetic:  Lidocaine 1% without epinephrine  Anesthetic total (ml):  5      Details:  Needle Size:  21 G  Approach:  Lateral  Location:  Hip  Laterality:  Bilateral  Site:  Bilateral greater trochanteric bursa  Medications (Right):  40 mg triamcinolone acetonide 40 mg/mL  Medications (Left):  40 mg triamcinolone acetonide 40 mg/mL  Patient tolerance:  Patient tolerated the procedure well with no immediate complications

## 2020-10-22 NOTE — PROGRESS NOTES
Chief Complaint   Patient presents with    Right Hip - Pain      HPI:   This is a 75 y.o. who presents to clinic today for bilateral hip pain for years after no known trauma. Complains of pain while sleeping on side and when descending stairs. Pain is dull. No numbness or tingling. No associated signs or symptoms.      Past Medical History:   Diagnosis Date    Asthma     COPD (chronic obstructive pulmonary disease)     Degenerative disc disease     Diabetes mellitus     Diabetes mellitus, type 2     Fibromyalgia     Hypertension     Rheumatoid arthritis     Rheumatoid arthritis(714.0)     Rheumatoid arthritis     Past Surgical History:   Procedure Laterality Date    BREAST SURGERY  1986    CATARACT EXTRACTION      EYE SURGERY  2013    Catatracts    ORIF FEMUR FRACTURE      right knee ligament rpeair  2005    right shoulder surgery  4/18/07    Dr. Gao     Current Outpatient Medications on File Prior to Visit   Medication Sig Dispense Refill    acetaminophen (TYLENOL) 325 MG tablet Take 650 mg by mouth as needed.      albuterol (PROVENTIL) 2.5 mg /3 mL (0.083 %) nebulizer solution USE 1 VIAL IN NEBULIZER EVERY 6 HOURS AS NEEDED FOR WHEEZING 180 mL 3    albuterol (PROVENTIL/VENTOLIN HFA) 90 mcg/actuation inhaler Inhale 2 puffs into the lungs every 6 (six) hours as needed for Wheezing. 18 g 6    amlodipine-benazepril 5-20 mg (LOTREL) 5-20 mg per capsule Take 1 capsule by mouth 2 (two) times daily. 180 capsule 3    blood sugar diagnostic (TRUETEST TEST STRIPS) Strp Inject 1 strip into the skin once daily. 300 strip 3    budesonide-formoterol 160-4.5 mcg (SYMBICORT) 160-4.5 mcg/actuation HFAA Symbicort 160 mcg-4.5 mcg/actuation HFA aerosol inhaler   INHALE TWO PUFFS TWICE DAILY      clotrimazole-betamethasone 1-0.05% (LOTRISONE) cream Apply topically 2 (two) times daily. 45 g 6    diclofenac sodium (VOLTAREN) 1 % Gel Voltaren 1 % topical gel   Apply FOUR GRAM topically once daily       dicyclomine (BENTYL) 20 mg tablet Take 1 tablet (20 mg total) by mouth every 6 (six) hours. 120 tablet 3    famotidine (PEPCID) 40 MG tablet Take 1 tablet (40 mg total) by mouth once daily. 30 tablet 6    fexofenadine (ALLEGRA) 180 MG tablet Take 1 tablet by mouth once daily.      folic acid-vit B6-vit B12 (FOLBEE) 2.5-25-1 mg Tab Take 1 tablet by mouth once daily. 90 each 3    gabapentin (NEURONTIN) 300 MG capsule Take 1 capsule (300 mg total) by mouth 3 (three) times daily. 90 capsule 3    GLUCOTROL XL 5 mg TR24 Take 1 tablet (5 mg total) by mouth daily with breakfast. 90 tablet 3    HYDROcodone-acetaminophen (NORCO)  mg per tablet Take 1 tablet by mouth every 8 (eight) hours as needed. 90 tablet 0    hydroxychloroquine (PLAQUENIL) 200 mg tablet Take 1 tablet (200 mg total) by mouth 2 (two) times daily. 180 tablet 1    levocetirizine (XYZAL) 5 MG tablet Take 5 mg by mouth.       potassium chloride SA (K-DUR,KLOR-CON) 20 MEQ tablet Take 1 tablet (20 mEq total) by mouth 2 (two) times daily. 180 tablet 1    predniSONE (DELTASONE) 5 MG tablet TAKE THREE TABLETS BY MOUTH EVERY MORNING AS NEEDED 90 tablet 3    simvastatin (ZOCOR) 5 MG tablet Take 1 tablet (5 mg total) by mouth every evening. 90 tablet 3    sodium chloride 5% (LALITHA 128) 5 % ophthalmic solution sodium chloride 5 % eye drops   INSTILL ONE DROP INTO EACH EYE FOUR TIMES DAILY      sucralfate (CARAFATE) 1 gram tablet Take 1 tablet (1 g total) by mouth 4 (four) times daily. 360 tablet 0    traZODone (DESYREL) 50 MG tablet Take 50 mg by mouth every evening.  5    leflunomide (ARAVA) 10 MG Tab Take 1 tablet (10 mg total) by mouth once daily. 90 tablet 1     No current facility-administered medications on file prior to visit.      Review of patient's allergies indicates:   Allergen Reactions    Codeine     Dairy digestive ultra      Dairy products      Imuran [azathioprine sodium] Diarrhea    Morphine      DOESN'T FEEL RIGHT      Plaquenil [hydroxychloroquine] Other (See Comments)     headaches    Milk containing products Diarrhea     Family History   Problem Relation Age of Onset    Cancer Mother     Anemia Mother     Alcohol abuse Father     Anemia Maternal Grandmother     Hypertension Paternal Grandmother     Arthritis Paternal Grandmother      Social History     Socioeconomic History    Marital status:      Spouse name: Not on file    Number of children: 8    Years of education: Not on file    Highest education level: Not on file   Occupational History    Not on file   Social Needs    Financial resource strain: Not on file    Food insecurity     Worry: Not on file     Inability: Not on file    Transportation needs     Medical: Not on file     Non-medical: Not on file   Tobacco Use    Smoking status: Never Smoker    Smokeless tobacco: Never Used   Substance and Sexual Activity    Alcohol use: Never     Frequency: Never    Drug use: No    Sexual activity: Not on file   Lifestyle    Physical activity     Days per week: Not on file     Minutes per session: Not on file    Stress: Not at all   Relationships    Social connections     Talks on phone: Not on file     Gets together: Not on file     Attends Sabianist service: Not on file     Active member of club or organization: Not on file     Attends meetings of clubs or organizations: Not on file     Relationship status: Not on file   Other Topics Concern    Not on file   Social History Narrative    Not on file       Review of Systems:  Constitutional:  Denies fever or chills   Eyes:  Denies change in visual acuity   HENT:  Denies nasal congestion or sore throat   Respiratory:  Denies cough or shortness of breath   Cardiovascular:  Denies chest pain or edema   GI:  Denies abdominal pain, nausea, vomiting, bloody stools or diarrhea   :  Denies dysuria   Integument:  Denies rash   Neurologic:  Denies headache, focal weakness or sensory changes   Endocrine:   Denies polyuria or polydipsia   Lymphatic:  Denies swollen glands   Psychiatric:  Denies depression or anxiety     Physical Exam:   Constitutional:  Well developed, well nourished, no acute distress, non-toxic appearance   Integument:  Well hydrated, no rash   Lymphatic:  No lymphadenopathy noted   Neurologic:  Alert & oriented x 3  Psychiatric:  Speech and behavior appropriate     Bilateral Hip Exam     Tenderness   The patient is experiencing tenderness in the greater trochanter.    Range of Motion   The patient has normal hip ROM.    Muscle Strength   Abduction: 4/5   Other   Erythema: absent  Sensation: normal  Pulse: present        Greater trochanteric bursitis of right hip  -     Large Joint Aspiration/Injection: bilateral greater trochanteric bursa  -     COMMODE FOR HOME USE    Greater trochanteric bursitis of left hip  -     Large Joint Aspiration/Injection: bilateral greater trochanteric bursa  -     COMMODE FOR HOME USE    Type 2 diabetes mellitus with hyperlipidemia  -     Ambulatory referral/consult to Podiatry; Future; Expected date: 10/29/2020           Requesting to be fitted for diabetic shoes and also bedside commode.   Using an aseptic technique, I injected 5 cc of lidocaine 1% without and 1 cc of kenalog 40mg into the bilateral Hip. The patient tolerated this well. I will have them return to clinic as needed.

## 2020-10-27 ENCOUNTER — TELEPHONE (OUTPATIENT)
Dept: PODIATRY | Facility: CLINIC | Age: 75
End: 2020-10-27

## 2020-10-27 ENCOUNTER — TELEPHONE (OUTPATIENT)
Dept: ORTHOPEDICS | Facility: CLINIC | Age: 75
End: 2020-10-27

## 2020-10-27 NOTE — TELEPHONE ENCOUNTER
----- Message from Melida Motley sent at 10/27/2020 11:12 AM CDT -----  Regarding: FW: Referral  Contact: Patient  Please contact this patient to get scheduled at his next available. Thanks, Melida   ----- Message -----  From: Aba Wynn  Sent: 10/27/2020  10:33 AM CDT  To: Tianna Osuna Staff  Subject: Referral                                         Patient want to speak with a nurse regarding what podiatry she suppose to be see please call back at [phone] (home)

## 2020-10-27 NOTE — TELEPHONE ENCOUNTER
----- Message from Aba Wynn sent at 10/27/2020 10:33 AM CDT -----  Regarding: Referral  Contact: Patient  Patient want to speak with a nurse regarding what podiatry she suppose to be see please call back at [phone] (home)

## 2020-10-31 NOTE — TELEPHONE ENCOUNTER
Ileana Cunningham  Female, 56 year old, 1963    Colonoscopy  Received: Today  Message Contents   STEPHEN Lyle  Nurse Msg [place]if[place]   Cc: Cathy Perry,        I called and spoke with the above patient in attempt to schedule colonoscopy. Patient first stated that katie PCP didn't place an ordered, then stated that she wasn't interested and wanted the removed. Just an FYI.     ThanksCathy         ----- Message from Demetrio Ny sent at 6/5/2019  3:46 PM CDT -----  Type:  Patient Call Back    Who Called:  pt  Does the patient know what this is regarding?lasix refill     [facility] - [place] - [address] [address] [facility] [address]  Phone: [phone] Fax: [phone]     Pharmacy - [place], [place] - [address] AT [facility]  [address]  [facility] [address]  Phone: [phone] Fax: [phone]  Best Call Back Number:  [phone]  Additional Information:  Please call pt and leave a detailed message if there is no answer.

## 2020-11-06 RX ORDER — TRIAMCINOLONE ACETONIDE 40 MG/ML
40 INJECTION, SUSPENSION INTRA-ARTICULAR; INTRAMUSCULAR
Status: DISCONTINUED | OUTPATIENT
Start: 2020-10-22 | End: 2020-11-06 | Stop reason: HOSPADM

## 2020-11-09 ENCOUNTER — PATIENT OUTREACH (OUTPATIENT)
Dept: ADMINISTRATIVE | Facility: HOSPITAL | Age: 75
End: 2020-11-09

## 2020-11-09 NOTE — PROGRESS NOTES
I am working the over due mammogram report. I have contacted patient to schedule appointment. [facility] did not want to give the doctor's name.

## 2020-11-17 DIAGNOSIS — G89.4 CHRONIC PAIN SYNDROME: ICD-10-CM

## 2020-11-17 DIAGNOSIS — M35.00 SJOGREN'S SYNDROME, WITH UNSPECIFIED ORGAN INVOLVEMENT: ICD-10-CM

## 2020-11-17 DIAGNOSIS — M05.9 RHEUMATOID ARTHRITIS WITH POSITIVE RHEUMATOID FACTOR, INVOLVING UNSPECIFIED SITE: ICD-10-CM

## 2020-11-17 NOTE — TELEPHONE ENCOUNTER
----- Message from Ping Bassett sent at 11/17/2020  8:36 AM CST -----  Regarding: Refill  Contact: patient  Type:  RX Refill Request    Who Called:  patient  Refill or New Rx:  refill  RX Name and Strength:  HYDROcodone-acetaminophen (NORCO)  mg per tablet  How is the patient currently taking it? (ex. 1XDay):  1 every 8 hrs  Is this a 30 day or 90 day RX:  90    Preferred Pharmacy with phone number:    [facility] - [place], [place] - [address] [address] [facility] [address]  Phone: [phone] Fax: [phone]    Local or Mail Order:  local  Ordering Provider:  Pedro Johnson  Best Call Back Number:  [phone] (home)     Additional Information:  refill due on 11/21

## 2020-11-18 RX ORDER — HYDROCODONE BITARTRATE AND ACETAMINOPHEN 10; 325 MG/1; MG/1
1 TABLET ORAL EVERY 8 HOURS PRN
Qty: 90 TABLET | Refills: 0 | Status: SHIPPED | OUTPATIENT
Start: 2020-11-18 | End: 2020-12-22

## 2020-11-19 RX ORDER — HYDROCODONE BITARTRATE AND ACETAMINOPHEN 10; 325 MG/1; MG/1
1 TABLET ORAL EVERY 8 HOURS PRN
Qty: 90 TABLET | Refills: 0 | OUTPATIENT
Start: 2020-11-19

## 2020-11-23 ENCOUNTER — OFFICE VISIT (OUTPATIENT)
Dept: FAMILY MEDICINE | Facility: CLINIC | Age: 75
End: 2020-11-23
Payer: MEDICARE

## 2020-11-23 VITALS
HEART RATE: 78 BPM | BODY MASS INDEX: 45.07 KG/M2 | HEIGHT: 64 IN | DIASTOLIC BLOOD PRESSURE: 60 MMHG | TEMPERATURE: 98 F | SYSTOLIC BLOOD PRESSURE: 132 MMHG | WEIGHT: 264 LBS

## 2020-11-23 DIAGNOSIS — L60.8 TOENAIL DEFORMITY: ICD-10-CM

## 2020-11-23 DIAGNOSIS — E11.59 HYPERTENSION ASSOCIATED WITH DIABETES: ICD-10-CM

## 2020-11-23 DIAGNOSIS — I15.2 HYPERTENSION ASSOCIATED WITH DIABETES: ICD-10-CM

## 2020-11-23 DIAGNOSIS — M16.0 OSTEOARTHRITIS OF BOTH HIPS, UNSPECIFIED OSTEOARTHRITIS TYPE: Primary | ICD-10-CM

## 2020-11-23 DIAGNOSIS — M05.79 RHEUMATOID ARTHRITIS INVOLVING MULTIPLE SITES WITH POSITIVE RHEUMATOID FACTOR: ICD-10-CM

## 2020-11-23 PROCEDURE — 99999 PR PBB SHADOW E&M-EST. PATIENT-LVL V: ICD-10-PCS | Mod: PBBFAC,,, | Performed by: INTERNAL MEDICINE

## 2020-11-23 PROCEDURE — 99999 PR PBB SHADOW E&M-EST. PATIENT-LVL V: CPT | Mod: PBBFAC,,, | Performed by: INTERNAL MEDICINE

## 2020-11-23 PROCEDURE — 99212 PR OFFICE/OUTPT VISIT, EST, LEVL II, 10-19 MIN: ICD-10-PCS | Mod: S$PBB,,, | Performed by: INTERNAL MEDICINE

## 2020-11-23 PROCEDURE — 99212 OFFICE O/P EST SF 10 MIN: CPT | Mod: S$PBB,,, | Performed by: INTERNAL MEDICINE

## 2020-11-23 PROCEDURE — 99215 OFFICE O/P EST HI 40 MIN: CPT | Mod: PBBFAC,PO | Performed by: INTERNAL MEDICINE

## 2020-11-23 NOTE — PROGRESS NOTES
Assessment/Plan:    Rheumatoid arthritis with positive rheumatoid factor  -managed by rheumatology, Dr. Johnson  -currently on prednisone, leflunomide and plaquenil  -yearly eye exam up to date  -on chronic opiate therapy for pain. Has gabapentin that she uses PRN  -requesting new cane, shower seat and toilet riser due to decreased mobility from arthritis    HTN  -at goal today  -currently on amlodipine 5-20 mg BID  -reports low BP after recent procedure but has not checked since then. Plan to start log at home. If low, then decrease to QD dosing  -continue lifestyle modification with low sodium diet and exercise   -discussed hypertension disease course and importance of treating high blood pressure  -patient understood and advised of risk of untreated blood pressure.  ER precautions were given   for symptoms of hypertensive urgency and emergency.  _____________________________________________________________________________________________________________________________________________________    Orders this visit:    Osteoarthritis of both hips, unspecified osteoarthritis type  -     BATH/SHOWER CHAIR FOR HOME USE    Rheumatoid arthritis involving multiple sites with positive rheumatoid factor  -     CANE FOR HOME USE  -     BATH/SHOWER CHAIR FOR HOME USE    Toenail deformity  -     Ambulatory referral/consult to Podiatry; Future; Expected date: 11/30/2020    Hypertension associated with diabetes      Follow up if symptoms worsen or fail to improve.    Divina Silva MD  _____________________________________________________________________________________________________________________________________________________    HPI:    Patient is in clinic today as an established patient with a new complaint of    HTN: The patient is currently being treated for essential hypertension. This condition is chronic. Having low readings. The patient is tolerating their medication well with good compliance.  Denies any adverse  effects of medications.  Counseling was offered regarding low sodium diet.  The patient has a reduced salt intake. Routine exercise recommended. The patient denies headache, vision changes, chest pain, palpitations, shortness of breath, or lower extremity edema.      Past Medical History:  Past Medical History:   Diagnosis Date    Asthma     COPD (chronic obstructive pulmonary disease)     Degenerative disc disease     Diabetes mellitus     Diabetes mellitus, type 2     Fibromyalgia     Hypertension     Rheumatoid arthritis     Rheumatoid arthritis(714.0)     Rheumatoid arthritis     Past Surgical History:   Procedure Laterality Date    BREAST SURGERY  1986    CATARACT EXTRACTION      EYE SURGERY  2013    Catatracts    ORIF FEMUR FRACTURE      right knee ligament rpeair  2005    right shoulder surgery  4/18/07    Dr. Gao     Review of patient's allergies indicates:   Allergen Reactions    Codeine     Dairy digestive ultra      Dairy products      Imuran [azathioprine sodium] Diarrhea    Morphine      DOESN'T FEEL RIGHT     Plaquenil [hydroxychloroquine] Other (See Comments)     headaches    Milk containing products Diarrhea     Social History     Tobacco Use    Smoking status: Never Smoker    Smokeless tobacco: Never Used   Substance Use Topics    Alcohol use: Never     Frequency: Never    Drug use: No     Family History   Problem Relation Age of Onset    Cancer Mother     Anemia Mother     Alcohol abuse Father     Anemia Maternal Grandmother     Hypertension Paternal Grandmother     Arthritis Paternal Grandmother      Current Outpatient Medications on File Prior to Visit   Medication Sig Dispense Refill    albuterol (PROVENTIL) 2.5 mg /3 mL (0.083 %) nebulizer solution USE 1 VIAL IN NEBULIZER EVERY 6 HOURS AS NEEDED FOR WHEEZING 180 mL 3    albuterol (PROVENTIL/VENTOLIN HFA) 90 mcg/actuation inhaler Inhale 2 puffs into the lungs every 6 (six) hours as needed for Wheezing. 18 g 6     amlodipine-benazepril 5-20 mg (LOTREL) 5-20 mg per capsule Take 1 capsule by mouth 2 (two) times daily. 180 capsule 3    blood sugar diagnostic (TRUETEST TEST STRIPS) Strp Inject 1 strip into the skin once daily. 300 strip 3    budesonide-formoterol 160-4.5 mcg (SYMBICORT) 160-4.5 mcg/actuation HFAA Symbicort 160 mcg-4.5 mcg/actuation HFA aerosol inhaler   INHALE TWO PUFFS TWICE DAILY      clotrimazole-betamethasone 1-0.05% (LOTRISONE) cream Apply topically 2 (two) times daily. 45 g 6    dicyclomine (BENTYL) 20 mg tablet Take 1 tablet (20 mg total) by mouth every 6 (six) hours. 120 tablet 3    famotidine (PEPCID) 40 MG tablet Take 1 tablet (40 mg total) by mouth once daily. 30 tablet 6    fexofenadine (ALLEGRA) 180 MG tablet Take 1 tablet by mouth once daily.      gabapentin (NEURONTIN) 300 MG capsule Take 1 capsule (300 mg total) by mouth 3 (three) times daily. 90 capsule 3    GLUCOTROL XL 5 mg TR24 Take 1 tablet (5 mg total) by mouth daily with breakfast. 90 tablet 3    HYDROcodone-acetaminophen (NORCO)  mg per tablet Take 1 tablet by mouth every 8 (eight) hours as needed. 90 tablet 0    hydroxychloroquine (PLAQUENIL) 200 mg tablet Take 1 tablet (200 mg total) by mouth 2 (two) times daily. 180 tablet 1    leflunomide (ARAVA) 10 MG Tab Take 1 tablet (10 mg total) by mouth once daily. 90 tablet 1    levocetirizine (XYZAL) 5 MG tablet Take 5 mg by mouth.       potassium chloride SA (K-DUR,KLOR-CON) 20 MEQ tablet Take 1 tablet (20 mEq total) by mouth 2 (two) times daily. 180 tablet 1    predniSONE (DELTASONE) 5 MG tablet TAKE THREE TABLETS BY MOUTH EVERY MORNING AS NEEDED 90 tablet 3    simvastatin (ZOCOR) 5 MG tablet Take 1 tablet (5 mg total) by mouth every evening. 90 tablet 3    sodium chloride 5% (LALITHA 128) 5 % ophthalmic solution sodium chloride 5 % eye drops   INSTILL ONE DROP INTO EACH EYE FOUR TIMES DAILY      sucralfate (CARAFATE) 1 gram tablet Take 1 tablet (1 g total) by mouth 4  "(four) times daily. 360 tablet 0    traZODone (DESYREL) 50 MG tablet Take 50 mg by mouth every evening.  5    acetaminophen (TYLENOL) 325 MG tablet Take 650 mg by mouth as needed.      diclofenac sodium (VOLTAREN) 1 % Gel Voltaren 1 % topical gel   Apply FOUR GRAM topically once daily      folic acid-vit B6-vit B12 (FOLBEE) 2.5-25-1 mg Tab Take 1 tablet by mouth once daily. (Patient not taking: Reported on 11/23/2020) 90 each 3     No current facility-administered medications on file prior to visit.        Review of Systems   Constitutional: Negative for chills, diaphoresis, fatigue and fever.   HENT: Negative for congestion, ear pain, postnasal drip, sinus pain and sore throat.    Eyes: Negative for pain and redness.   Respiratory: Negative for cough, chest tightness and shortness of breath.    Cardiovascular: Negative for chest pain and leg swelling.   Gastrointestinal: Negative for abdominal pain, constipation, diarrhea, nausea and vomiting.   Genitourinary: Negative for dysuria and hematuria.   Musculoskeletal: Positive for arthralgias, back pain and myalgias. Negative for joint swelling.   Skin: Negative for rash.   Neurological: Negative for dizziness, syncope and headaches.       Vitals:    11/23/20 1254   BP: 132/60   Pulse: 78   Temp: 97.8 °F (36.6 °C)   Weight: 119.7 kg (264 lb)   Height: 5' 4" (1.626 m)       Wt Readings from Last 3 Encounters:   11/23/20 119.7 kg (264 lb)   10/22/20 51.7 kg (113 lb 14.4 oz)   10/08/20 113.9 kg (251 lb)       Physical Exam  Constitutional:       General: She is not in acute distress.     Appearance: Normal appearance. She is well-developed. She is obese.   HENT:      Head: Normocephalic and atraumatic.   Eyes:      Conjunctiva/sclera: Conjunctivae normal.   Neck:      Musculoskeletal: Normal range of motion and neck supple.   Cardiovascular:      Rate and Rhythm: Normal rate and regular rhythm.      Pulses: Normal pulses.      Heart sounds: Normal heart sounds. No " murmur.   Pulmonary:      Effort: Pulmonary effort is normal. No respiratory distress.      Breath sounds: Normal breath sounds.   Abdominal:      General: Bowel sounds are normal. There is no distension.      Palpations: Abdomen is soft.      Tenderness: There is no abdominal tenderness.   Musculoskeletal: Normal range of motion.   Skin:     General: Skin is warm and dry.      Findings: No rash.   Neurological:      General: No focal deficit present.      Mental Status: She is alert and oriented to person, place, and time.         Health Maintenance   Topic Date Due    DEXA SCAN  02/28/1987    Mammogram  04/01/2017    Foot Exam  10/30/2020    Hemoglobin A1c  03/17/2021    Eye Exam  07/09/2021    Lipid Panel  10/08/2021    Low Dose Statin  11/23/2021    TETANUS VACCINE  03/09/2026    Hepatitis C Screening  Completed    Pneumococcal Vaccine (65+ Low/Medium Risk)  Completed

## 2020-11-24 ENCOUNTER — TELEPHONE (OUTPATIENT)
Dept: FAMILY MEDICINE | Facility: CLINIC | Age: 75
End: 2020-11-24

## 2020-11-24 DIAGNOSIS — M05.79 RHEUMATOID ARTHRITIS INVOLVING MULTIPLE SITES WITH POSITIVE RHEUMATOID FACTOR: Primary | ICD-10-CM

## 2020-11-24 NOTE — TELEPHONE ENCOUNTER
----- Message from Magaly Macedo sent at 11/24/2020 10:21 AM CST -----  Contact: Rekha Da Silva is calling in regards to giving you information for a company for home health. May you please give her a call back at [phone].    Thanks  KT

## 2020-11-24 NOTE — TELEPHONE ENCOUNTER
Patient requests referral to:    [facility]  P: [phone]  F: [phone]

## 2020-11-24 NOTE — TELEPHONE ENCOUNTER
I have signed for the following orders AND/OR meds.  Please call the patient and ask the patient to schedule the testing AND/OR inform about any medications that were sent. Medications have been sent to pharmacy listed below      Orders Placed This Encounter   Procedures    Ambulatory referral/consult to Home Health     Standing Status:   Future     Standing Expiration Date:   12/24/2021     Referral Priority:   Routine     Referral Type:   Home Health     Referral Reason:   Specialty Services Required     Requested Specialty:   Home Health Services     Number of Visits Requested:   1              [facility] [place] - [address] [address] [facility]  [place] [address]  Phone: [phone] Fax: [phone]    [id number] - [place] - [address] AT SEC OF [facility] [address]  [address]  [place] [address]  Phone: [phone] Fax: [phone]

## 2020-11-25 NOTE — ASSESSMENT & PLAN NOTE
-at goal today  -currently on amlodipine 5-20 mg BID  -reports low BP after recent procedure but has not checked since then. Plan to start log at home. If low, then decrease to QD dosing  -continue lifestyle modification with low sodium diet and exercise   -discussed hypertension disease course and importance of treating high blood pressure  -patient understood and advised of risk of untreated blood pressure.  ER precautions were given   for symptoms of hypertensive urgency and emergency.

## 2020-12-02 ENCOUNTER — TELEPHONE (OUTPATIENT)
Dept: FAMILY MEDICINE | Facility: CLINIC | Age: 75
End: 2020-12-02

## 2020-12-02 DIAGNOSIS — N18.31 STAGE 3A CHRONIC KIDNEY DISEASE: Primary | ICD-10-CM

## 2020-12-02 NOTE — TELEPHONE ENCOUNTER
Attempted to call patient to obtain more information on provider and what referral is needed for. No answer. Voicemail left.

## 2020-12-02 NOTE — TELEPHONE ENCOUNTER
I have signed for the following orders AND/OR meds.  Please call the patient and ask the patient to schedule the testing AND/OR inform about any medications that were sent. Medications have been sent to pharmacy listed below      Orders Placed This Encounter   Procedures    Ambulatory referral/consult to Nephrology     Standing Status:   Future     Standing Expiration Date:   1/2/2022     Referral Priority:   Routine     Referral Type:   Consultation     Referral Reason:   Specialty Services Required     Referred to Provider:   Trino Skaggs MD     Requested Specialty:   Nephrology     Number of Visits Requested:   1              [facility] [place] - [address] [facility] [address]  Phone: [phone] Fax: [phone]    [id number] - [place] - [address] AT [address] OF [facility] [address] [facility]  [address]  [place] [address]  Phone: [phone] Fax: [phone]

## 2020-12-02 NOTE — TELEPHONE ENCOUNTER
----- Message from Sharlene Shields sent at 12/2/2020 10:38 AM CST -----  Patient needs a referral sent to Dr. Long office to be seen.     Patient contact information: [phone]- Fax to dr gill

## 2020-12-02 NOTE — TELEPHONE ENCOUNTER
----- Message from Corrina Fuller sent at 12/2/2020 12:43 PM CST -----  Contact: pt  The pt request a return call, no additional info given and can be reached at [phone]///thxMW

## 2020-12-07 ENCOUNTER — TELEPHONE (OUTPATIENT)
Dept: FAMILY MEDICINE | Facility: CLINIC | Age: 75
End: 2020-12-07

## 2020-12-07 DIAGNOSIS — N28.89 RENAL MASS: Primary | ICD-10-CM

## 2020-12-07 NOTE — TELEPHONE ENCOUNTER
----- Message from Brenda Cazares sent at 12/7/2020 10:12 AM CST -----  Type:  Patient Requesting Referral    Who Called: Ki Da Silva   Does the patient already have the specialty appointment scheduled?:  no  If yes, what is the date of that appointment?:  Referral to What Specialty:  Nephro  ([address])  Reason for Referral:    Does the patient want the referral with a specific physician?:  Dr Skaggs   [phone]  Is the specialist an [facility]?:  [facility]   Patient Requesting a Response?:   yes  Would the patient rather a call back or a response via [facility]?  Call back   Best Call Back Number:    [phone]  Additional Information:  please call///thanks/carlos

## 2020-12-07 NOTE — TELEPHONE ENCOUNTER
----- Message from Soraida Whitaker sent at 12/7/2020  2:09 PM CST -----  Type:  Patient Returning Call    Who Called:pt  Who Left Message for Patient:nurse  Does the patient know what this is regarding?:she left a message earlier today regarding a referral  Would the patient rather a call back or a response via [facility]? Call back  Best Call Back Number:[phone]  Additional Information: .    Thank you

## 2020-12-07 NOTE — TELEPHONE ENCOUNTER
I have signed for the following orders AND/OR meds.  Please call the patient and ask the patient to schedule the testing AND/OR inform about any medications that were sent.      Orders Placed This Encounter   Procedures    Ambulatory referral/consult to Nephrology     Standing Status:   Future     Standing Expiration Date:   1/7/2022     Referral Priority:   Routine     Referral Type:   Consultation     Referral Reason:   Specialty Services Required     Requested Specialty:   Nephrology     Number of Visits Requested:   1

## 2020-12-07 NOTE — TELEPHONE ENCOUNTER
Attempted to notify patient of referral placed. Referral placed by Dr. Silva faxed on 12/02.  Voicemail left for patient to return call.

## 2020-12-09 ENCOUNTER — TELEPHONE (OUTPATIENT)
Dept: FAMILY MEDICINE | Facility: CLINIC | Age: 75
End: 2020-12-09

## 2020-12-09 NOTE — TELEPHONE ENCOUNTER
Called patient back to let her know she can come  a copy of the referral to bring to her appointment with her to Dr. Mathur office3.

## 2020-12-09 NOTE — TELEPHONE ENCOUNTER
----- Message from Alfonso Cabrera sent at 12/9/2020  8:25 AM CST -----  Contact: self  Would like to consult with nurse regarding a referral.  Please contact Rekha Miller @ [phone].  Thanks/As

## 2020-12-09 NOTE — TELEPHONE ENCOUNTER
Returned patient call and let her know that Holly Ferreira LPN faxed referral to Dr. Skaggs's office. Patient verbalized understanding.

## 2020-12-09 NOTE — TELEPHONE ENCOUNTER
----- Message from Corrina Fuller sent at 12/9/2020  8:33 AM CST -----  Contact: pt  The pt request a return call, no additional info given and can be reached at [phone]///thxMW

## 2020-12-11 ENCOUNTER — TELEPHONE (OUTPATIENT)
Dept: FAMILY MEDICINE | Facility: CLINIC | Age: 75
End: 2020-12-11

## 2020-12-11 NOTE — TELEPHONE ENCOUNTER
----- Message from Lizzeth Brown sent at 12/11/2020 11:44 AM CST -----  Pt would like return call, pt is wanting to know if she can  copy of referral.  Please call back at [phone].  Md Roel

## 2020-12-14 DIAGNOSIS — M35.00 SJOGREN'S SYNDROME, WITH UNSPECIFIED ORGAN INVOLVEMENT: ICD-10-CM

## 2020-12-14 DIAGNOSIS — K21.9 GASTROESOPHAGEAL REFLUX DISEASE: ICD-10-CM

## 2020-12-14 DIAGNOSIS — M05.9 RHEUMATOID ARTHRITIS WITH POSITIVE RHEUMATOID FACTOR, INVOLVING UNSPECIFIED SITE: ICD-10-CM

## 2020-12-14 DIAGNOSIS — G89.4 CHRONIC PAIN SYNDROME: ICD-10-CM

## 2020-12-18 DIAGNOSIS — M35.00 SJOGREN'S SYNDROME, WITH UNSPECIFIED ORGAN INVOLVEMENT: ICD-10-CM

## 2020-12-18 DIAGNOSIS — G89.4 CHRONIC PAIN SYNDROME: ICD-10-CM

## 2020-12-18 DIAGNOSIS — M05.9 RHEUMATOID ARTHRITIS WITH POSITIVE RHEUMATOID FACTOR, INVOLVING UNSPECIFIED SITE: ICD-10-CM

## 2020-12-18 NOTE — TELEPHONE ENCOUNTER
----- Message from Bea Meier sent at 12/18/2020  4:16 PM CST -----  Regarding: Meds Refill  Type: Needs Medical Advice  Who Called:  Pt    Best Call Back Number: [phone]  Additional Information: pt is requesting a call back in regards to meds being filled HYDROcodone-acetaminophen (NORCO)  mg per tablet

## 2020-12-18 NOTE — TELEPHONE ENCOUNTER
----- Message from Bita Vazquez sent at 12/18/2020  9:15 AM CST -----  Regarding: refill  Contact: [phone]  Type:  RX Refill Request    Who Called: pt  Refill or New Rx:refill   RX Name and Strength:HYDROcodone-acetaminophen (NORCO)  mg per tablet 90 tablet  Preferred Pharmacy with phone number:[facility]  [place] - [address] [facility] [phone] (Phone)  [phone] (Fax)  Local or Mail Order:local  Ordering Provider:  Would the patient rather a call back or a response via [facility]?  Call back  Best Call Back Number:[phone]  Additional Information

## 2020-12-21 NOTE — TELEPHONE ENCOUNTER
----- Message from Ameya Lopes sent at 12/21/2020  3:06 PM CST -----  Type: Needs Medical Advice  Who Called: Patient    Pharmacy name and phone #:    [facility] [place] - [address] [facility]  [address] [facility]  [place] [address]  Phone: [phone] Fax: [phone]        Best Call Back Number: [phone]  Additional Information: Patient stats that her refill isn't at the pharmacy:  HYDROcodone-acetaminophen (NORCO)  mg per tablet    Please call to advise

## 2020-12-22 RX ORDER — HYDROCODONE BITARTRATE AND ACETAMINOPHEN 10; 325 MG/1; MG/1
1 TABLET ORAL EVERY 8 HOURS PRN
Qty: 90 TABLET | Refills: 0 | OUTPATIENT
Start: 2020-12-22

## 2020-12-22 RX ORDER — DICYCLOMINE HYDROCHLORIDE 20 MG/1
20 TABLET ORAL EVERY 6 HOURS
Qty: 120 TABLET | Refills: 3 | Status: SHIPPED | OUTPATIENT
Start: 2020-12-22 | End: 2022-03-23 | Stop reason: SDUPTHER

## 2020-12-22 RX ORDER — HYDROCODONE BITARTRATE AND ACETAMINOPHEN 10; 325 MG/1; MG/1
TABLET ORAL
Qty: 90 TABLET | Refills: 0 | Status: SHIPPED | OUTPATIENT
Start: 2020-12-22 | End: 2020-12-30 | Stop reason: SDUPTHER

## 2020-12-23 ENCOUNTER — PATIENT OUTREACH (OUTPATIENT)
Dept: ADMINISTRATIVE | Facility: OTHER | Age: 75
End: 2020-12-23

## 2020-12-23 NOTE — PROGRESS NOTES
Health Maintenance Due   Topic Date Due    Shingles Vaccine (1 of 2) 02/28/1997    Mammogram  04/01/2017     Updates were requested from care everywhere.  Chart was reviewed for overdue [facility]active [facility] Encounters (CHET) topics (CRS, Breast Cancer Screening, Eye exam)  Health Maintenance has been updated.  LINKS immunization registry triggered.  Immunizations were reconciled.

## 2020-12-24 NOTE — TELEPHONE ENCOUNTER
"----- Message from Shashi Tai sent at 12/23/2020  4:26 PM CST -----  Contact: Self  Type:  RX Refill Request    Who Called:  Patient  Refill or New Rx:  refill  RX Name and Strength:  GLUCOTROL XL 5 mg TR24  Is this a 30 day or 90 day RX:  90  Preferred Pharmacy with phone number:    [facility] - [place] - [address] [facility]  [address] [facility] [address]  Phone: [phone] Fax: [phone]  Local or Mail Order:  local  Ordering Provider:  Elizabeth  Best Call Back Number:  [phone] (home)     Additional Information:   Patient states Dr Johnson must write "medically necessary" for insurance to cover      "

## 2020-12-27 RX ORDER — GLIPIZIDE 5 MG/1
5 TABLET, FILM COATED, EXTENDED RELEASE ORAL
Qty: 90 TABLET | Refills: 3 | OUTPATIENT
Start: 2020-12-27

## 2020-12-28 ENCOUNTER — TELEPHONE (OUTPATIENT)
Dept: RHEUMATOLOGY | Facility: CLINIC | Age: 75
End: 2020-12-28

## 2020-12-28 RX ORDER — GLIPIZIDE 5 MG/1
5 TABLET, FILM COATED, EXTENDED RELEASE ORAL
Qty: 90 TABLET | Refills: 3 | Status: SHIPPED | OUTPATIENT
Start: 2020-12-28 | End: 2021-04-22 | Stop reason: SDUPTHER

## 2020-12-28 NOTE — TELEPHONE ENCOUNTER
----- Message from Genevieve Osorio sent at 12/28/2020  9:21 AM CST -----  Regarding: refill  Contact: pt  Type: Needs Medical Advice    Who Called:      Best Call Back Number:     Additional Information: Requesting a call back from Nurse, regarding a refill for Rx GLUCOTROL XL 5 mg Tr24 still has not been called in yet and pt is out and pt has been calling since last week twice this is her 3rd time she needs a PA with why it is medically needed and pharmacy has been requesting it as well ,please call back with advice

## 2020-12-28 NOTE — TELEPHONE ENCOUNTER
Attempted to contact patient to let her know she needs to request refill from her PCP for the Glucotrol. No answer so left voice message

## 2020-12-28 NOTE — TELEPHONE ENCOUNTER
----- Message from Apoorva Kruger sent at 12/28/2020  9:34 AM CST -----  Regarding: pt  .Type:  RX Refill Request    Who Called: pt   Refill or New Rx:refill   RX Name and Strength:GLUCOTROL XL 5 mg TR24  How is the patient currently taking it? (ex. 1XDay):2xday   Is this a 30 day or 90 day Rx:90day   Preferred Pharmacy with phone number:.  [place] - [address] [address]  Phone: [phone] Fax: [phone]      Local or Mail Order:local   Ordering Provider:   Would the patient rather a call back or a response via [facility]? Call back   Best Call Back Number:.[phone] (home)   Additional Information:   A prior Authorization has to be attached to the medication       Thank You ,   Apoorva Kruegr

## 2020-12-28 NOTE — TELEPHONE ENCOUNTER
Attempted to contact patient back. In regards to her Glucotrol refill to let know that she has to request that from her PCP per Ariana

## 2020-12-29 DIAGNOSIS — E78.5 TYPE 2 DIABETES MELLITUS WITH HYPERLIPIDEMIA: Primary | ICD-10-CM

## 2020-12-29 DIAGNOSIS — E11.69 TYPE 2 DIABETES MELLITUS WITH HYPERLIPIDEMIA: Primary | ICD-10-CM

## 2020-12-29 RX ORDER — GLIPIZIDE 5 MG/1
5 TABLET, FILM COATED, EXTENDED RELEASE ORAL
Qty: 90 TABLET | Refills: 3 | OUTPATIENT
Start: 2020-12-29

## 2020-12-30 ENCOUNTER — OFFICE VISIT (OUTPATIENT)
Dept: RHEUMATOLOGY | Facility: CLINIC | Age: 75
End: 2020-12-30
Payer: MEDICARE

## 2020-12-30 VITALS
WEIGHT: 264 LBS | DIASTOLIC BLOOD PRESSURE: 70 MMHG | HEART RATE: 91 BPM | BODY MASS INDEX: 45.07 KG/M2 | SYSTOLIC BLOOD PRESSURE: 138 MMHG | HEIGHT: 64 IN

## 2020-12-30 DIAGNOSIS — E21.3 HYPERPARATHYROIDISM, UNSPECIFIED: ICD-10-CM

## 2020-12-30 DIAGNOSIS — M70.61 TROCHANTERIC BURSITIS OF RIGHT HIP: ICD-10-CM

## 2020-12-30 DIAGNOSIS — I10 HYPERTENSION, UNSPECIFIED TYPE: ICD-10-CM

## 2020-12-30 DIAGNOSIS — E11.9 TYPE 2 DIABETES MELLITUS WITHOUT COMPLICATION, WITHOUT LONG-TERM CURRENT USE OF INSULIN: ICD-10-CM

## 2020-12-30 DIAGNOSIS — M05.9 RHEUMATOID ARTHRITIS WITH POSITIVE RHEUMATOID FACTOR, INVOLVING UNSPECIFIED SITE: Primary | ICD-10-CM

## 2020-12-30 DIAGNOSIS — M05.9 RHEUMATOID ARTHRITIS WITH POSITIVE RHEUMATOID FACTOR, INVOLVING UNSPECIFIED SITE: ICD-10-CM

## 2020-12-30 DIAGNOSIS — M17.0 PRIMARY OSTEOARTHRITIS OF BOTH KNEES: ICD-10-CM

## 2020-12-30 DIAGNOSIS — M35.00 SJOGREN'S SYNDROME, WITH UNSPECIFIED ORGAN INVOLVEMENT: ICD-10-CM

## 2020-12-30 DIAGNOSIS — L40.9 PSORIASIS: ICD-10-CM

## 2020-12-30 DIAGNOSIS — M47.817 LUMBAR AND SACRAL ARTHRITIS: ICD-10-CM

## 2020-12-30 DIAGNOSIS — N28.89 RENAL MASS: ICD-10-CM

## 2020-12-30 DIAGNOSIS — M47.818 SI JOINT ARTHRITIS: ICD-10-CM

## 2020-12-30 DIAGNOSIS — G89.4 CHRONIC PAIN SYNDROME: ICD-10-CM

## 2020-12-30 PROCEDURE — 99999 PR PBB SHADOW E&M-EST. PATIENT-LVL III: CPT | Mod: PBBFAC,,, | Performed by: INTERNAL MEDICINE

## 2020-12-30 PROCEDURE — 99999 PR PBB SHADOW E&M-EST. PATIENT-LVL III: ICD-10-PCS | Mod: PBBFAC,,, | Performed by: INTERNAL MEDICINE

## 2020-12-30 PROCEDURE — 20610 DRAIN/INJ JOINT/BURSA W/O US: CPT | Mod: PBBFAC,PO | Performed by: INTERNAL MEDICINE

## 2020-12-30 PROCEDURE — 99213 OFFICE O/P EST LOW 20 MIN: CPT | Mod: PBBFAC,PO,25 | Performed by: INTERNAL MEDICINE

## 2020-12-30 PROCEDURE — 99215 PR OFFICE/OUTPT VISIT, EST, LEVL V, 40-54 MIN: ICD-10-PCS | Mod: 25,S$PBB,, | Performed by: INTERNAL MEDICINE

## 2020-12-30 PROCEDURE — 99215 OFFICE O/P EST HI 40 MIN: CPT | Mod: 25,S$PBB,, | Performed by: INTERNAL MEDICINE

## 2020-12-30 PROCEDURE — 20610 LARGE JOINT ASPIRATION/INJECTION: R GREATER TROCHANTERIC BURSA: ICD-10-PCS | Mod: S$PBB,59,RT, | Performed by: INTERNAL MEDICINE

## 2020-12-30 RX ORDER — HYDROCODONE BITARTRATE AND ACETAMINOPHEN 10; 325 MG/1; MG/1
TABLET ORAL
Qty: 90 TABLET | Refills: 0 | Status: SHIPPED | OUTPATIENT
Start: 2021-02-19 | End: 2020-12-30 | Stop reason: SDUPTHER

## 2020-12-30 RX ORDER — HYDROCODONE BITARTRATE AND ACETAMINOPHEN 10; 325 MG/1; MG/1
TABLET ORAL
Qty: 90 TABLET | Refills: 0 | Status: SHIPPED | OUTPATIENT
Start: 2021-02-19 | End: 2021-04-15 | Stop reason: SDUPTHER

## 2020-12-30 RX ORDER — METHYLPREDNISOLONE ACETATE 80 MG/ML
80 INJECTION, SUSPENSION INTRA-ARTICULAR; INTRALESIONAL; INTRAMUSCULAR; SOFT TISSUE
Status: COMPLETED | OUTPATIENT
Start: 2020-12-30 | End: 2020-12-30

## 2020-12-30 RX ORDER — HYDROCODONE BITARTRATE AND ACETAMINOPHEN 10; 325 MG/1; MG/1
TABLET ORAL
Qty: 90 TABLET | Refills: 0 | Status: SHIPPED | OUTPATIENT
Start: 2021-03-19 | End: 2020-12-30 | Stop reason: SDUPTHER

## 2020-12-30 RX ORDER — FLUCONAZOLE 150 MG/1
150 TABLET ORAL DAILY
Qty: 7 TABLET | Refills: 3 | Status: SHIPPED | OUTPATIENT
Start: 2020-12-30 | End: 2021-01-06

## 2020-12-30 RX ORDER — HYDROCODONE BITARTRATE AND ACETAMINOPHEN 10; 325 MG/1; MG/1
TABLET ORAL
Qty: 90 TABLET | Refills: 0 | Status: SHIPPED | OUTPATIENT
Start: 2021-01-19 | End: 2020-12-30 | Stop reason: SDUPTHER

## 2020-12-30 RX ORDER — KETOROLAC TROMETHAMINE 30 MG/ML
60 INJECTION, SOLUTION INTRAMUSCULAR; INTRAVENOUS
Status: COMPLETED | OUTPATIENT
Start: 2020-12-30 | End: 2020-12-30

## 2020-12-30 RX ORDER — AZITHROMYCIN 250 MG/1
TABLET, FILM COATED ORAL
Qty: 10 TABLET | Refills: 3 | Status: SHIPPED | OUTPATIENT
Start: 2020-12-30 | End: 2021-06-07

## 2020-12-30 RX ORDER — TRIAMCINOLONE ACETONIDE 1 MG/G
OINTMENT TOPICAL 2 TIMES DAILY
Qty: 80 G | Refills: 0 | Status: SHIPPED | OUTPATIENT
Start: 2020-12-30

## 2020-12-30 RX ADMIN — METHYLPREDNISOLONE ACETATE 80 MG: 80 INJECTION, SUSPENSION INTRA-ARTICULAR; INTRALESIONAL; INTRAMUSCULAR; SOFT TISSUE at 01:12

## 2020-12-30 RX ADMIN — TRIAMCINOLONE ACETONIDE 40 MG: 40 INJECTION, SUSPENSION INTRA-ARTICULAR; INTRAMUSCULAR at 03:12

## 2020-12-30 RX ADMIN — KETOROLAC TROMETHAMINE 60 MG: 60 INJECTION, SOLUTION INTRAMUSCULAR at 01:12

## 2020-12-30 NOTE — PROGRESS NOTES
Subjective:       Patient ID: Rekha Miller is a 75 y.o. female.    Chief Complaint: Rheumatoid Arthritis    Follow up: 75 year old female  RA.   R hip pain and sciatic pain and newly dx renal mass. She complains of increased swelling of big toe L>R, toes, and ankles. She is concerned about possible gout. She also continues to have intermittent pain involving her hands. She has been compliant with LEF and plaquenil. Overall, AM stiffness is typically 30 minutes. She has intermittent dry mouth and dry eyes (which is followed by ophthalmology).     Current tx:  1.arava 10 mg- herld  2. Plaquenil 200 mg bid  3. norco    Review of Systems   Constitutional: Positive for activity change. Negative for appetite change, chills, fatigue and fever.   HENT:        Dry mouth   Eyes: Negative for visual disturbance.        Dry eyes   Respiratory: Negative for cough and shortness of breath.    Cardiovascular: Negative for chest pain, palpitations and leg swelling.   Gastrointestinal: Negative for abdominal pain and constipation.   Musculoskeletal: Positive for arthralgias, joint swelling, neck pain and neck stiffness.   Allergic/Immunologic: Positive for immunocompromised state.   Neurological: Negative for dizziness, weakness, light-headedness and headaches.         Objective:     Vitals:    12/30/20 1152   BP: 138/70   Pulse: 91       Past Medical History:   Diagnosis Date    Asthma     COPD (chronic obstructive pulmonary disease)     Degenerative disc disease     Diabetes mellitus     Diabetes mellitus, type 2     Fibromyalgia     Hypertension     Rheumatoid arthritis     Rheumatoid arthritis(714.0)     Rheumatoid arthritis     Past Surgical History:   Procedure Laterality Date    BREAST SURGERY  1986    CATARACT EXTRACTION      EYE SURGERY  2013    Catatracts    ORIF FEMUR FRACTURE      right knee ligament rpeair  2005    right shoulder surgery  4/18/07    Dr. Gao          Physical Exam   Nursing note and  vitals reviewed.  Constitutional: She is oriented to person, place, and time and well-developed, well-nourished, and in no distress.   HENT:   Head: Normocephalic and atraumatic.   Mouth/Throat: Oropharynx is clear and moist.   Eyes: EOM are normal. Pupils are equal, round, and reactive to light.   Neck: Neck supple. No thyromegaly present.   Cardiovascular: Normal rate, regular rhythm and normal heart sounds.  Exam reveals no gallop and no friction rub.    No murmur heard.  Pulmonary/Chest: Breath sounds normal. She has no wheezes. She has no rales. She exhibits no tenderness.   Abdominal: There is no abdominal tenderness. There is no rebound and no guarding.       Right Side Rheumatological Exam     Examination finds the elbow normal.    The patient is tender to palpation of the shoulder, wrist, knee, 1st PIP, 1st MCP, 2nd PIP, 2nd MCP, 3rd PIP, 3rd MCP, 4th PIP, 4th MCP, 5th PIP and 5th MCP    She has swelling of the 1st PIP, 1st MCP, 2nd PIP, 2nd MCP, 3rd PIP, 3rd MCP, 4th PIP, 4th MCP, 5th PIP and 5th MCP    Shoulder Exam   Tenderness Location: no tenderness    Range of Motion   Active abduction: abnormal   Adduction: abnormal  Sensation: normal    Knee Exam   Patellofemoral Crepitus: positive  Effusion: negative  Sensation: normal    Hip Exam   Tenderness Location: posterior  Sensation: normal    Elbow/Wrist Exam   Tenderness Location: no tenderness  Sensation: normal    Left Side Rheumatological Exam     Examination finds the elbow normal.    The patient is tender to palpation of the shoulder, wrist, knee, 1st PIP, 1st MCP, 2nd PIP, 2nd MCP, 3rd PIP, 3rd MCP, 4th PIP, 4th MCP, 5th PIP and 5th MCP.    She has swelling of the 1st PIP, 1st MCP, 2nd PIP, 2nd MCP, 3rd PIP, 3rd MCP, 4th PIP, 4th MCP, 5th PIP and 5th MCP    Shoulder Exam   Tenderness Location: no tenderness    Range of Motion   Active abduction: abnormal   Sensation: normal    Knee Exam     Patellofemoral Crepitus: positive  Effusion:  negative  Sensation: normal    Hip Exam   Tenderness Location: posterior  Sensation: normal    Elbow/Wrist Exam   Sensation: normal      Back/Neck Exam   General Inspection   Gait: normal         Lymphadenopathy:     She has no cervical adenopathy.   Neurological: She is alert and oriented to person, place, and time. Gait normal.   Skin: No rash noted. No erythema. No pallor.     Psychiatric: Mood and affect normal.   Musculoskeletal: Tenderness and deformity present.           Results for orders placed or performed in visit on 10/13/20   Microalbumin/Creatinine Ratio, Urine   Result Value Ref Range    Microalbumin, Urine 4.0 ug/mL    Creatinine, Urine 113.0 15.0 - 325.0 mg/dL    Microalb/Creat Ratio 3.5 0.0 - 30.0 ug/mg     CCP Antibodies<5.0 U/mL47.6High  [facility]   REASON FOR EXAM: [N28.1]-Cyst of kidney, acquired / Renal mass, normal renal function     TECHNICAL FACTORS: Multiple contiguous axial CT images were obtained of the abdomen at the level of the kidneys before and after administration of intravenous contrast. Contrast-enhanced images were obtained at 70 seconds and 5 minutes following   injection. Automated exposure control was utilized for radiation dose reduction.     COMPARISON: CT of the abdomen pelvis from 2/28/2010; MRI of the lumbar spine without contrast from 7/7/2020    FINDINGS: 21 x 19 x 20 mm (AP by TV by CC) heterogeneously enhancing round mass is identified within the posterior cortex of the left mid renal pole seen to advantage on series 5, axial image 26, concerning for renal cell carcinoma.     14 mm hypoattenuating/hypoenhancing lesion is identified within the left inferior renal pole on series 5, image 34, favored to represent a benign renal cyst. 5 mm indeterminate, nonenhancing focus identified within the anterior cortex of the left   inferior renal pole on series 5, image 40. Indeterminate 7 mm hypoattenuating/nonenhancing lesion is identified within the posterior  lateral left inferior renal pole cortex on series 5, image 38. 3 mm indeterminate, hypoattenuating focus identified   within the left mid renal pole on series 5, image 24.     Indeterminate 3 mm hypoattenuating focus identified within the posterior right inferior renal cortex on series 5, image 53. Indeterminate 3 mm focus identified within the anterior lateral right inferior renal pole cortex on series 5, image 45. 10 x 4 mm   ovoid hyperattenuating focus identified within the posterior medial cortex of the right mid renal pole.    No hydronephrosis or obstructing stone.    Visualized portions of the liver and spleen are unremarkable. Pancreas and bilateral adrenal glands are unremarkable. Visualized bowel is unremarkable. Mild to moderate degenerative changes are identified throughout the visualized spine.    IMPRESSION:   1.  21 mm heterogeneously enhancing soft tissue mass identified within the posterior cortex of the left mid renal pole, concerning for renal cell carcinoma. This would be amenable to percutaneous cryoablation.   2.  Additional numerous hypoattenuating/hypoenhancing foci seen scattered throughout the renal cortices bilaterally, some of which appear to represent benign renal cysts and others are too small for further characterization. Attention on follow-up   examination recommended.     Electronically signed by Hunter Torres MD on 8/4/2020 1:27 PM   Other Result Information       Assessment:       1. Rheumatoid arthritis with positive rheumatoid factor, involving unspecified site    2. Sjogren's syndrome, with unspecified organ involvement    3. Primary osteoarthritis of both knees    4. Chronic pain syndrome    5. Type 2 diabetes mellitus without complication, without long-term current use of insulin    6. Lumbar and sacral arthritis    7. Hyperparathyroidism, unspecified     8. Renal mass    9. Psoriasis    10. SI joint arthritis    11. Trochanteric bursitis of right hip            Plan:        Rheumatoid arthritis with positive rheumatoid factor, involving unspecified site  -     azithromycin (Z-ANDREWS) 250 MG tablet; Take1 tablet by mouth x 10 days  Dispense: 10 tablet; Refill: 3  -     fluconazole (DIFLUCAN) 150 MG Tab; Take 1 tablet (150 mg total) by mouth once daily. for 7 days  Dispense: 7 tablet; Refill: 3  -     Discontinue: HYDROcodone-acetaminophen (NORCO)  mg per tablet; TAKE 1 TABLET BY MOUTH EVERY EIGHT HOURS AS NEEDED *MEDICALLY NECESSARY*  Dispense: 90 tablet; Refill: 0  -     Discontinue: HYDROcodone-acetaminophen (NORCO)  mg per tablet; TAKE 1 TABLET BY MOUTH EVERY EIGHT HOURS AS NEEDED *MEDICALLY NECESSARY*  Dispense: 90 tablet; Refill: 0  -     Discontinue: HYDROcodone-acetaminophen (NORCO)  mg per tablet; TAKE 1 TABLET BY MOUTH EVERY EIGHT HOURS AS NEEDED *MEDICALLY NECESSARY*  Dispense: 90 tablet; Refill: 0  -     Cancel: CBC Auto Differential; Future; Expected date: 12/30/2020  -     Cancel: Comprehensive Metabolic Panel; Future; Expected date: 12/30/2020  -     Cancel: Sedimentation rate; Future; Expected date: 12/30/2020  -     Cancel: Rheumatoid Factor; Future; Expected date: 12/30/2020  -     Cancel: C-Reactive Protein; Future; Expected date: 12/30/2020  -     Cancel: Cyclic Citrullinated Peptide Antibody, IgG; Future; Expected date: 12/30/2020  -     Cancel: TSH; Future; Expected date: 12/30/2020  -     Cancel: T4, Free; Future; Expected date: 12/30/2020  -     Cancel: T3, Free; Future; Expected date: 12/30/2020  -     Cancel: Vitamin D; Future; Expected date: 12/30/2020  -     methylPREDNISolone acetate injection 80 mg  -     ketorolac injection 60 mg  -     Discontinue: HYDROcodone-acetaminophen (NORCO)  mg per tablet; TAKE 1 TABLET BY MOUTH EVERY EIGHT HOURS AS NEEDED *MEDICALLY NECESSARY*  Dispense: 90 tablet; Refill: 0  -     HYDROcodone-acetaminophen (NORCO)  mg per tablet; TAKE 1 TABLET BY MOUTH EVERY EIGHT HOURS AS NEEDED *MEDICALLY NECESSARY*   Dispense: 90 tablet; Refill: 0  -     Vitamin D; Future; Expected date: 12/30/2020  -     T3, Free; Future; Expected date: 12/30/2020  -     CBC Auto Differential; Future; Expected date: 12/30/2020  -     Comprehensive Metabolic Panel; Future; Expected date: 12/30/2020  -     Sedimentation rate; Future; Expected date: 12/30/2020  -     Rheumatoid Factor; Future; Expected date: 12/30/2020  -     C-Reactive Protein; Future; Expected date: 12/30/2020  -     Cyclic Citrullinated Peptide Antibody, IgG; Future; Expected date: 12/30/2020  -     TSH; Future; Expected date: 12/30/2020  -     T4, Free; Future; Expected date: 12/30/2020    Sjogren's syndrome, with unspecified organ involvement  -     azithromycin (Z-ANDREWS) 250 MG tablet; Take1 tablet by mouth x 10 days  Dispense: 10 tablet; Refill: 3  -     fluconazole (DIFLUCAN) 150 MG Tab; Take 1 tablet (150 mg total) by mouth once daily. for 7 days  Dispense: 7 tablet; Refill: 3  -     Discontinue: HYDROcodone-acetaminophen (NORCO)  mg per tablet; TAKE 1 TABLET BY MOUTH EVERY EIGHT HOURS AS NEEDED *MEDICALLY NECESSARY*  Dispense: 90 tablet; Refill: 0  -     Discontinue: HYDROcodone-acetaminophen (NORCO)  mg per tablet; TAKE 1 TABLET BY MOUTH EVERY EIGHT HOURS AS NEEDED *MEDICALLY NECESSARY*  Dispense: 90 tablet; Refill: 0  -     Discontinue: HYDROcodone-acetaminophen (NORCO)  mg per tablet; TAKE 1 TABLET BY MOUTH EVERY EIGHT HOURS AS NEEDED *MEDICALLY NECESSARY*  Dispense: 90 tablet; Refill: 0  -     Cancel: CBC Auto Differential; Future; Expected date: 12/30/2020  -     Cancel: Comprehensive Metabolic Panel; Future; Expected date: 12/30/2020  -     Cancel: Sedimentation rate; Future; Expected date: 12/30/2020  -     Cancel: Rheumatoid Factor; Future; Expected date: 12/30/2020  -     Cancel: C-Reactive Protein; Future; Expected date: 12/30/2020  -     Cancel: Cyclic Citrullinated Peptide Antibody, IgG; Future; Expected date: 12/30/2020  -     Cancel: TSH;  Future; Expected date: 12/30/2020  -     Cancel: T4, Free; Future; Expected date: 12/30/2020  -     Cancel: T3, Free; Future; Expected date: 12/30/2020  -     Cancel: Vitamin D; Future; Expected date: 12/30/2020  -     methylPREDNISolone acetate injection 80 mg  -     ketorolac injection 60 mg  -     Discontinue: HYDROcodone-acetaminophen (NORCO)  mg per tablet; TAKE 1 TABLET BY MOUTH EVERY EIGHT HOURS AS NEEDED *MEDICALLY NECESSARY*  Dispense: 90 tablet; Refill: 0  -     HYDROcodone-acetaminophen (NORCO)  mg per tablet; TAKE 1 TABLET BY MOUTH EVERY EIGHT HOURS AS NEEDED *MEDICALLY NECESSARY*  Dispense: 90 tablet; Refill: 0  -     Vitamin D; Future; Expected date: 12/30/2020  -     T3, Free; Future; Expected date: 12/30/2020  -     CBC Auto Differential; Future; Expected date: 12/30/2020  -     Comprehensive Metabolic Panel; Future; Expected date: 12/30/2020  -     Sedimentation rate; Future; Expected date: 12/30/2020  -     Rheumatoid Factor; Future; Expected date: 12/30/2020  -     C-Reactive Protein; Future; Expected date: 12/30/2020  -     Cyclic Citrullinated Peptide Antibody, IgG; Future; Expected date: 12/30/2020  -     TSH; Future; Expected date: 12/30/2020  -     T4, Free; Future; Expected date: 12/30/2020    Primary osteoarthritis of both knees  -     azithromycin (Z-ANDREWS) 250 MG tablet; Take1 tablet by mouth x 10 days  Dispense: 10 tablet; Refill: 3  -     fluconazole (DIFLUCAN) 150 MG Tab; Take 1 tablet (150 mg total) by mouth once daily. for 7 days  Dispense: 7 tablet; Refill: 3  -     Cancel: CBC Auto Differential; Future; Expected date: 12/30/2020  -     Cancel: Comprehensive Metabolic Panel; Future; Expected date: 12/30/2020  -     Cancel: Sedimentation rate; Future; Expected date: 12/30/2020  -     Cancel: Rheumatoid Factor; Future; Expected date: 12/30/2020  -     Cancel: C-Reactive Protein; Future; Expected date: 12/30/2020  -     Cancel: Cyclic Citrullinated Peptide Antibody, IgG;  Future; Expected date: 12/30/2020  -     Cancel: TSH; Future; Expected date: 12/30/2020  -     Cancel: T4, Free; Future; Expected date: 12/30/2020  -     Cancel: T3, Free; Future; Expected date: 12/30/2020  -     Cancel: Vitamin D; Future; Expected date: 12/30/2020  -     methylPREDNISolone acetate injection 80 mg  -     ketorolac injection 60 mg  -     Vitamin D; Future; Expected date: 12/30/2020  -     T3, Free; Future; Expected date: 12/30/2020  -     CBC Auto Differential; Future; Expected date: 12/30/2020  -     Comprehensive Metabolic Panel; Future; Expected date: 12/30/2020  -     Sedimentation rate; Future; Expected date: 12/30/2020  -     Rheumatoid Factor; Future; Expected date: 12/30/2020  -     C-Reactive Protein; Future; Expected date: 12/30/2020  -     Cyclic Citrullinated Peptide Antibody, IgG; Future; Expected date: 12/30/2020  -     TSH; Future; Expected date: 12/30/2020  -     T4, Free; Future; Expected date: 12/30/2020    Chronic pain syndrome  -     azithromycin (Z-ANDREWS) 250 MG tablet; Take1 tablet by mouth x 10 days  Dispense: 10 tablet; Refill: 3  -     fluconazole (DIFLUCAN) 150 MG Tab; Take 1 tablet (150 mg total) by mouth once daily. for 7 days  Dispense: 7 tablet; Refill: 3  -     Discontinue: HYDROcodone-acetaminophen (NORCO)  mg per tablet; TAKE 1 TABLET BY MOUTH EVERY EIGHT HOURS AS NEEDED *MEDICALLY NECESSARY*  Dispense: 90 tablet; Refill: 0  -     Discontinue: HYDROcodone-acetaminophen (NORCO)  mg per tablet; TAKE 1 TABLET BY MOUTH EVERY EIGHT HOURS AS NEEDED *MEDICALLY NECESSARY*  Dispense: 90 tablet; Refill: 0  -     Discontinue: HYDROcodone-acetaminophen (NORCO)  mg per tablet; TAKE 1 TABLET BY MOUTH EVERY EIGHT HOURS AS NEEDED *MEDICALLY NECESSARY*  Dispense: 90 tablet; Refill: 0  -     Cancel: CBC Auto Differential; Future; Expected date: 12/30/2020  -     Cancel: Comprehensive Metabolic Panel; Future; Expected date: 12/30/2020  -     Cancel: Sedimentation rate;  Future; Expected date: 12/30/2020  -     Cancel: Rheumatoid Factor; Future; Expected date: 12/30/2020  -     Cancel: C-Reactive Protein; Future; Expected date: 12/30/2020  -     Cancel: Cyclic Citrullinated Peptide Antibody, IgG; Future; Expected date: 12/30/2020  -     Cancel: TSH; Future; Expected date: 12/30/2020  -     Cancel: T4, Free; Future; Expected date: 12/30/2020  -     Cancel: T3, Free; Future; Expected date: 12/30/2020  -     Cancel: Vitamin D; Future; Expected date: 12/30/2020  -     methylPREDNISolone acetate injection 80 mg  -     ketorolac injection 60 mg  -     Discontinue: HYDROcodone-acetaminophen (NORCO)  mg per tablet; TAKE 1 TABLET BY MOUTH EVERY EIGHT HOURS AS NEEDED *MEDICALLY NECESSARY*  Dispense: 90 tablet; Refill: 0  -     HYDROcodone-acetaminophen (NORCO)  mg per tablet; TAKE 1 TABLET BY MOUTH EVERY EIGHT HOURS AS NEEDED *MEDICALLY NECESSARY*  Dispense: 90 tablet; Refill: 0  -     Vitamin D; Future; Expected date: 12/30/2020  -     T3, Free; Future; Expected date: 12/30/2020  -     CBC Auto Differential; Future; Expected date: 12/30/2020  -     Comprehensive Metabolic Panel; Future; Expected date: 12/30/2020  -     Sedimentation rate; Future; Expected date: 12/30/2020  -     Rheumatoid Factor; Future; Expected date: 12/30/2020  -     C-Reactive Protein; Future; Expected date: 12/30/2020  -     Cyclic Citrullinated Peptide Antibody, IgG; Future; Expected date: 12/30/2020  -     TSH; Future; Expected date: 12/30/2020  -     T4, Free; Future; Expected date: 12/30/2020    Type 2 diabetes mellitus without complication, without long-term current use of insulin  -     azithromycin (Z-ANDREWS) 250 MG tablet; Take1 tablet by mouth x 10 days  Dispense: 10 tablet; Refill: 3  -     fluconazole (DIFLUCAN) 150 MG Tab; Take 1 tablet (150 mg total) by mouth once daily. for 7 days  Dispense: 7 tablet; Refill: 3  -     Cancel: CBC Auto Differential; Future; Expected date: 12/30/2020  -     Cancel:  Comprehensive Metabolic Panel; Future; Expected date: 12/30/2020  -     Cancel: Sedimentation rate; Future; Expected date: 12/30/2020  -     Cancel: Rheumatoid Factor; Future; Expected date: 12/30/2020  -     Cancel: C-Reactive Protein; Future; Expected date: 12/30/2020  -     Cancel: Cyclic Citrullinated Peptide Antibody, IgG; Future; Expected date: 12/30/2020  -     Cancel: TSH; Future; Expected date: 12/30/2020  -     Cancel: T4, Free; Future; Expected date: 12/30/2020  -     Cancel: T3, Free; Future; Expected date: 12/30/2020  -     Cancel: Vitamin D; Future; Expected date: 12/30/2020  -     methylPREDNISolone acetate injection 80 mg  -     ketorolac injection 60 mg  -     Vitamin D; Future; Expected date: 12/30/2020  -     T3, Free; Future; Expected date: 12/30/2020  -     CBC Auto Differential; Future; Expected date: 12/30/2020  -     Comprehensive Metabolic Panel; Future; Expected date: 12/30/2020  -     Sedimentation rate; Future; Expected date: 12/30/2020  -     Rheumatoid Factor; Future; Expected date: 12/30/2020  -     C-Reactive Protein; Future; Expected date: 12/30/2020  -     Cyclic Citrullinated Peptide Antibody, IgG; Future; Expected date: 12/30/2020  -     TSH; Future; Expected date: 12/30/2020  -     T4, Free; Future; Expected date: 12/30/2020    Lumbar and sacral arthritis  -     azithromycin (Z-ANDREWS) 250 MG tablet; Take1 tablet by mouth x 10 days  Dispense: 10 tablet; Refill: 3  -     fluconazole (DIFLUCAN) 150 MG Tab; Take 1 tablet (150 mg total) by mouth once daily. for 7 days  Dispense: 7 tablet; Refill: 3  -     Cancel: CBC Auto Differential; Future; Expected date: 12/30/2020  -     Cancel: Comprehensive Metabolic Panel; Future; Expected date: 12/30/2020  -     Cancel: Sedimentation rate; Future; Expected date: 12/30/2020  -     Cancel: Rheumatoid Factor; Future; Expected date: 12/30/2020  -     Cancel: C-Reactive Protein; Future; Expected date: 12/30/2020  -     Cancel: Cyclic Citrullinated  Peptide Antibody, IgG; Future; Expected date: 12/30/2020  -     Cancel: TSH; Future; Expected date: 12/30/2020  -     Cancel: T4, Free; Future; Expected date: 12/30/2020  -     Cancel: T3, Free; Future; Expected date: 12/30/2020  -     Cancel: Vitamin D; Future; Expected date: 12/30/2020  -     methylPREDNISolone acetate injection 80 mg  -     ketorolac injection 60 mg  -     Vitamin D; Future; Expected date: 12/30/2020  -     T3, Free; Future; Expected date: 12/30/2020  -     CBC Auto Differential; Future; Expected date: 12/30/2020  -     Comprehensive Metabolic Panel; Future; Expected date: 12/30/2020  -     Sedimentation rate; Future; Expected date: 12/30/2020  -     Rheumatoid Factor; Future; Expected date: 12/30/2020  -     C-Reactive Protein; Future; Expected date: 12/30/2020  -     Cyclic Citrullinated Peptide Antibody, IgG; Future; Expected date: 12/30/2020  -     TSH; Future; Expected date: 12/30/2020  -     T4, Free; Future; Expected date: 12/30/2020    Hyperparathyroidism, unspecified   -     Cancel: Vitamin D; Future; Expected date: 12/30/2020  -     methylPREDNISolone acetate injection 80 mg  -     ketorolac injection 60 mg  -     Vitamin D; Future; Expected date: 12/30/2020    Renal mass  -     methylPREDNISolone acetate injection 80 mg  -     ketorolac injection 60 mg    Psoriasis  -     triamcinolone acetonide 0.1% (KENALOG) 0.1 % ointment; Apply topically 2 (two) times daily.  Dispense: 80 g; Refill: 0    SI joint arthritis  -     Large Joint Aspiration/Injection: R iliopsoas bursa    Trochanteric bursitis of right hip  -     Large Joint Aspiration/Injection: R greater trochanteric bursa        Assessment:  75 year old female with  Seropositive (+RF/+CCP) rheumatoid arthritis, Sjogren's syndrome (+SSB) on LEF and plaquenil  --chronic pain syndrome on norco  --GERD, d/c zantac start famotidine  --HTN  --asthma  --controlled type 2 diabetes  --chronic steroid use    1.  Hold arava for now pt already  did  2. Continue plaquenil  3. norco refills     R sciatic joint and R trochanteric IA

## 2020-12-31 RX ORDER — TRIAMCINOLONE ACETONIDE 40 MG/ML
40 INJECTION, SUSPENSION INTRA-ARTICULAR; INTRAMUSCULAR
Status: DISCONTINUED | OUTPATIENT
Start: 2020-12-30 | End: 2020-12-31 | Stop reason: HOSPADM

## 2020-12-31 NOTE — PROCEDURES
Large Joint Aspiration/Injection: R iliopsoas bursa    Date/Time: 12/30/2020 3:00 PM  Performed by: Pedro Johnson MD  Authorized by: Pedro Johnson MD     Consent Done?:  Yes (Verbal)  Indications:  Arthritis  Site marked: the procedure site was marked      Local anesthesia used?: Yes    Anesthesia:  Local infiltration  Local anesthetic:  Lidocaine 1% without epinephrine and co-phenylcaine spray  Anesthetic total (ml):  5      Details:  Needle Size:  25 G  Approach:  Posterior  Location:  Hip  Site:  R iliopsoas bursa  Medications:  40 mg triamcinolone acetonide 40 mg/mL      After verbal consent and cleansing with Chloraprep the Right SI bursa injected with Kenalog 40mg with 1 ml 1 % lidocaine. Patient tolerated procedure well.

## 2020-12-31 NOTE — PROCEDURES
Large Joint Aspiration/Injection: R greater trochanteric bursa    Date/Time: 12/30/2020 3:00 PM  Performed by: Pedro Johnson MD  Authorized by: Pedro Johnson MD     Consent Done?:  Yes (Verbal)  Indications:  Arthritis    Local anesthesia used?: Yes    Anesthesia:  Local infiltration  Local anesthetic:  Co-phenylcaine spray and lidocaine 1% without epinephrine  Anesthetic total (ml):  5      Details:  Needle Size:  25 G  Approach:  Lateral  Location:  Hip  Site:  R greater trochanteric bursa  Medications:  40 mg triamcinolone acetonide 40 mg/mL      After verbal consent and cleansing with Chloraprep the right Trochanteric bursa injected with Kenalog 40mg with 1 ml 1 % lidocaine. Patient tolerated procedure well.

## 2021-01-04 RX ORDER — FUROSEMIDE 40 MG/1
40 TABLET ORAL DAILY PRN
Qty: 30 TABLET | Refills: 3 | Status: SHIPPED | OUTPATIENT
Start: 2021-01-04 | End: 2021-06-28

## 2021-01-04 RX ORDER — LEFLUNOMIDE 10 MG/1
10 TABLET ORAL DAILY
Qty: 90 TABLET | Refills: 1 | Status: SHIPPED | OUTPATIENT
Start: 2021-01-04 | End: 2021-08-05 | Stop reason: SDUPTHER

## 2021-01-04 RX ORDER — POTASSIUM CHLORIDE 20 MEQ/1
20 TABLET, EXTENDED RELEASE ORAL 2 TIMES DAILY
Qty: 180 TABLET | Refills: 1 | Status: SHIPPED | OUTPATIENT
Start: 2021-01-04 | End: 2021-06-28

## 2021-01-13 RX ORDER — INSULIN PUMP SYRINGE, 3 ML
EACH MISCELLANEOUS
Qty: 1 EACH | Refills: 0 | Status: SHIPPED | OUTPATIENT
Start: 2021-01-13 | End: 2022-04-13 | Stop reason: SDUPTHER

## 2021-01-13 RX ORDER — LANCETS
EACH MISCELLANEOUS
Qty: 100 EACH | Refills: 11 | Status: SHIPPED | OUTPATIENT
Start: 2021-01-13 | End: 2022-04-13 | Stop reason: SDUPTHER

## 2021-01-19 DIAGNOSIS — M05.9 RHEUMATOID ARTHRITIS WITH POSITIVE RHEUMATOID FACTOR, INVOLVING UNSPECIFIED SITE: ICD-10-CM

## 2021-01-19 DIAGNOSIS — M35.00 SJOGREN'S SYNDROME, WITH UNSPECIFIED ORGAN INVOLVEMENT: ICD-10-CM

## 2021-01-20 DIAGNOSIS — E78.5 TYPE 2 DIABETES MELLITUS WITH HYPERLIPIDEMIA: Primary | ICD-10-CM

## 2021-01-20 DIAGNOSIS — E11.69 TYPE 2 DIABETES MELLITUS WITH HYPERLIPIDEMIA: Primary | ICD-10-CM

## 2021-01-20 RX ORDER — ISOPROPYL ALCOHOL 70 ML/100ML
1 SWAB TOPICAL
Qty: 100 EACH | Refills: 11 | COMMUNITY
Start: 2021-01-20 | End: 2021-06-28

## 2021-01-20 RX ORDER — HYDROXYCHLOROQUINE SULFATE 200 MG/1
TABLET, FILM COATED ORAL
Qty: 180 TABLET | Refills: 1 | Status: SHIPPED | OUTPATIENT
Start: 2021-01-20 | End: 2021-07-15 | Stop reason: SDUPTHER

## 2021-02-23 ENCOUNTER — TELEPHONE (OUTPATIENT)
Dept: FAMILY MEDICINE | Facility: CLINIC | Age: 76
End: 2021-02-23

## 2021-02-23 ENCOUNTER — PATIENT OUTREACH (OUTPATIENT)
Dept: ADMINISTRATIVE | Facility: HOSPITAL | Age: 76
End: 2021-02-23

## 2021-02-23 DIAGNOSIS — R60.0 LEG EDEMA: Primary | ICD-10-CM

## 2021-03-30 ENCOUNTER — TELEPHONE (OUTPATIENT)
Dept: FAMILY MEDICINE | Facility: CLINIC | Age: 76
End: 2021-03-30

## 2021-04-01 ENCOUNTER — TELEPHONE (OUTPATIENT)
Dept: FAMILY MEDICINE | Facility: CLINIC | Age: 76
End: 2021-04-01

## 2021-04-08 ENCOUNTER — TELEPHONE (OUTPATIENT)
Dept: FAMILY MEDICINE | Facility: CLINIC | Age: 76
End: 2021-04-08

## 2021-04-14 ENCOUNTER — TELEPHONE (OUTPATIENT)
Dept: FAMILY MEDICINE | Facility: CLINIC | Age: 76
End: 2021-04-14

## 2021-04-14 DIAGNOSIS — K52.9 GASTROENTERITIS: Primary | ICD-10-CM

## 2021-04-14 RX ORDER — DIPHENOXYLATE HYDROCHLORIDE AND ATROPINE SULFATE 2.5; .025 MG/1; MG/1
1 TABLET ORAL 4 TIMES DAILY PRN
Qty: 20 TABLET | Refills: 0 | Status: SHIPPED | OUTPATIENT
Start: 2021-04-14 | End: 2021-04-24

## 2021-04-15 DIAGNOSIS — G89.4 CHRONIC PAIN SYNDROME: ICD-10-CM

## 2021-04-15 DIAGNOSIS — M05.9 RHEUMATOID ARTHRITIS WITH POSITIVE RHEUMATOID FACTOR, INVOLVING UNSPECIFIED SITE: ICD-10-CM

## 2021-04-15 DIAGNOSIS — M35.00 SJOGREN'S SYNDROME, WITH UNSPECIFIED ORGAN INVOLVEMENT: ICD-10-CM

## 2021-04-18 RX ORDER — HYDROCODONE BITARTRATE AND ACETAMINOPHEN 10; 325 MG/1; MG/1
TABLET ORAL
Qty: 90 TABLET | Refills: 0 | Status: SHIPPED | OUTPATIENT
Start: 2021-04-18 | End: 2021-05-14 | Stop reason: SDUPTHER

## 2021-04-22 ENCOUNTER — PATIENT OUTREACH (OUTPATIENT)
Dept: ADMINISTRATIVE | Facility: CLINIC | Age: 76
End: 2021-04-22

## 2021-04-22 ENCOUNTER — TELEPHONE (OUTPATIENT)
Dept: FAMILY MEDICINE | Facility: CLINIC | Age: 76
End: 2021-04-22

## 2021-04-22 ENCOUNTER — EXTERNAL HOSPITAL ADMISSION (OUTPATIENT)
Dept: ADMINISTRATIVE | Facility: CLINIC | Age: 76
End: 2021-04-22

## 2021-04-22 DIAGNOSIS — E11.69 TYPE 2 DIABETES MELLITUS WITH HYPERLIPIDEMIA: Primary | ICD-10-CM

## 2021-04-22 DIAGNOSIS — E78.5 TYPE 2 DIABETES MELLITUS WITH HYPERLIPIDEMIA: Primary | ICD-10-CM

## 2021-04-22 RX ORDER — GLIPIZIDE 5 MG/1
5 TABLET, FILM COATED, EXTENDED RELEASE ORAL
Qty: 90 TABLET | Refills: 3 | Status: SHIPPED | OUTPATIENT
Start: 2021-04-22 | End: 2021-06-07 | Stop reason: SDUPTHER

## 2021-04-23 ENCOUNTER — TELEPHONE (OUTPATIENT)
Dept: RHEUMATOLOGY | Facility: CLINIC | Age: 76
End: 2021-04-23

## 2021-04-26 ENCOUNTER — TELEPHONE (OUTPATIENT)
Dept: FAMILY MEDICINE | Facility: CLINIC | Age: 76
End: 2021-04-26

## 2021-04-26 DIAGNOSIS — Z23 NEED FOR PNEUMOCOCCAL VACCINE: Primary | ICD-10-CM

## 2021-04-29 ENCOUNTER — EXTERNAL HOME HEALTH (OUTPATIENT)
Dept: HOME HEALTH SERVICES | Facility: HOSPITAL | Age: 76
End: 2021-04-29
Payer: MEDICAID

## 2021-05-07 ENCOUNTER — TELEPHONE (OUTPATIENT)
Dept: FAMILY MEDICINE | Facility: CLINIC | Age: 76
End: 2021-05-07

## 2021-05-14 DIAGNOSIS — M05.9 RHEUMATOID ARTHRITIS WITH POSITIVE RHEUMATOID FACTOR, INVOLVING UNSPECIFIED SITE: ICD-10-CM

## 2021-05-14 DIAGNOSIS — G89.4 CHRONIC PAIN SYNDROME: ICD-10-CM

## 2021-05-14 DIAGNOSIS — M35.00 SJOGREN'S SYNDROME, WITH UNSPECIFIED ORGAN INVOLVEMENT: ICD-10-CM

## 2021-05-14 NOTE — TELEPHONE ENCOUNTER
----- Message from Aba Wynn sent at 5/14/2021 12:04 PM CDT -----  Regarding: refill  Contact: patient  Type:  RX Refill Request    Who Called:  Patient  Refill or New Rx:  Refill  RX Name and Strength:  Norco  How is the patient currently taking it? (ex. 1XDay):  twice a day  Is this a 30 day or 90 day RX:  30day  Preferred Pharmacy with phone number:    [facility] - [place], [place] - [address] [address]  Phone: [phone] Fax: [phone]    Local or Mail Order:  Local  Ordering Provider:  Dr. Elizabeth Mercado Call Back Number:  [phone] (home)     Case number [id number]

## 2021-05-16 RX ORDER — HYDROCODONE BITARTRATE AND ACETAMINOPHEN 10; 325 MG/1; MG/1
TABLET ORAL
Qty: 90 TABLET | Refills: 0 | Status: SHIPPED | OUTPATIENT
Start: 2021-05-16 | End: 2021-06-19

## 2021-05-17 ENCOUNTER — TELEPHONE (OUTPATIENT)
Dept: FAMILY MEDICINE | Facility: CLINIC | Age: 76
End: 2021-05-17

## 2021-05-27 ENCOUNTER — PATIENT OUTREACH (OUTPATIENT)
Dept: ADMINISTRATIVE | Facility: OTHER | Age: 76
End: 2021-05-27

## 2021-06-07 ENCOUNTER — OFFICE VISIT (OUTPATIENT)
Dept: FAMILY MEDICINE | Facility: CLINIC | Age: 76
End: 2021-06-07
Payer: MEDICARE

## 2021-06-07 ENCOUNTER — TELEPHONE (OUTPATIENT)
Dept: RHEUMATOLOGY | Facility: CLINIC | Age: 76
End: 2021-06-07

## 2021-06-07 VITALS
BODY MASS INDEX: 40.18 KG/M2 | HEIGHT: 66 IN | HEART RATE: 72 BPM | TEMPERATURE: 98 F | SYSTOLIC BLOOD PRESSURE: 125 MMHG | DIASTOLIC BLOOD PRESSURE: 66 MMHG | WEIGHT: 250 LBS

## 2021-06-07 DIAGNOSIS — E78.5 TYPE 2 DIABETES MELLITUS WITH HYPERLIPIDEMIA: ICD-10-CM

## 2021-06-07 DIAGNOSIS — N28.89 RENAL MASS: ICD-10-CM

## 2021-06-07 DIAGNOSIS — E11.59 HYPERTENSION ASSOCIATED WITH DIABETES: ICD-10-CM

## 2021-06-07 DIAGNOSIS — E11.69 TYPE 2 DIABETES MELLITUS WITH HYPERLIPIDEMIA: ICD-10-CM

## 2021-06-07 DIAGNOSIS — E78.2 MIXED HYPERLIPIDEMIA: ICD-10-CM

## 2021-06-07 DIAGNOSIS — I15.2 HYPERTENSION ASSOCIATED WITH DIABETES: ICD-10-CM

## 2021-06-07 DIAGNOSIS — D64.9 ANEMIA, UNSPECIFIED TYPE: ICD-10-CM

## 2021-06-07 DIAGNOSIS — R60.0 BILATERAL LOWER EXTREMITY EDEMA: Primary | ICD-10-CM

## 2021-06-07 DIAGNOSIS — Z86.16 HISTORY OF COVID-19: ICD-10-CM

## 2021-06-07 PROCEDURE — 3008F BODY MASS INDEX DOCD: CPT | Mod: CPTII,S$GLB,, | Performed by: INTERNAL MEDICINE

## 2021-06-07 PROCEDURE — 99499 UNLISTED E&M SERVICE: CPT | Mod: S$GLB,,, | Performed by: INTERNAL MEDICINE

## 2021-06-07 PROCEDURE — 1126F AMNT PAIN NOTED NONE PRSNT: CPT | Mod: S$GLB,,, | Performed by: INTERNAL MEDICINE

## 2021-06-07 PROCEDURE — 1159F MED LIST DOCD IN RCRD: CPT | Mod: S$GLB,,, | Performed by: INTERNAL MEDICINE

## 2021-06-07 PROCEDURE — 1159F PR MEDICATION LIST DOCUMENTED IN MEDICAL RECORD: ICD-10-PCS | Mod: S$GLB,,, | Performed by: INTERNAL MEDICINE

## 2021-06-07 PROCEDURE — 3008F PR BODY MASS INDEX (BMI) DOCUMENTED: ICD-10-PCS | Mod: CPTII,S$GLB,, | Performed by: INTERNAL MEDICINE

## 2021-06-07 PROCEDURE — 99499 RISK ADDL DX/OHS AUDIT: ICD-10-PCS | Mod: HCNC,S$GLB,, | Performed by: INTERNAL MEDICINE

## 2021-06-07 PROCEDURE — 1101F PR PT FALLS ASSESS DOC 0-1 FALLS W/OUT INJ PAST YR: ICD-10-PCS | Mod: CPTII,S$GLB,, | Performed by: INTERNAL MEDICINE

## 2021-06-07 PROCEDURE — 1126F PR PAIN SEVERITY QUANTIFIED, NO PAIN PRESENT: ICD-10-PCS | Mod: S$GLB,,, | Performed by: INTERNAL MEDICINE

## 2021-06-07 PROCEDURE — 99214 OFFICE O/P EST MOD 30 MIN: CPT | Mod: S$GLB,,, | Performed by: INTERNAL MEDICINE

## 2021-06-07 PROCEDURE — 99999 PR PBB SHADOW E&M-EST. PATIENT-LVL V: CPT | Mod: PBBFAC,,, | Performed by: INTERNAL MEDICINE

## 2021-06-07 PROCEDURE — 99214 PR OFFICE/OUTPT VISIT, EST, LEVL IV, 30-39 MIN: ICD-10-PCS | Mod: S$GLB,,, | Performed by: INTERNAL MEDICINE

## 2021-06-07 PROCEDURE — 1101F PT FALLS ASSESS-DOCD LE1/YR: CPT | Mod: CPTII,S$GLB,, | Performed by: INTERNAL MEDICINE

## 2021-06-07 PROCEDURE — 99999 PR PBB SHADOW E&M-EST. PATIENT-LVL V: ICD-10-PCS | Mod: PBBFAC,,, | Performed by: INTERNAL MEDICINE

## 2021-06-07 PROCEDURE — 3288F PR FALLS RISK ASSESSMENT DOCUMENTED: ICD-10-PCS | Mod: CPTII,S$GLB,, | Performed by: INTERNAL MEDICINE

## 2021-06-07 PROCEDURE — 3288F FALL RISK ASSESSMENT DOCD: CPT | Mod: CPTII,S$GLB,, | Performed by: INTERNAL MEDICINE

## 2021-06-07 RX ORDER — GLIPIZIDE 5 MG/1
5 TABLET, FILM COATED, EXTENDED RELEASE ORAL
Qty: 90 TABLET | Refills: 3 | Status: SHIPPED | OUTPATIENT
Start: 2021-06-07 | End: 2022-04-13 | Stop reason: SDUPTHER

## 2021-06-07 RX ORDER — PRAVASTATIN SODIUM 20 MG/1
20 TABLET ORAL DAILY
Qty: 90 TABLET | Refills: 3 | Status: SHIPPED | OUTPATIENT
Start: 2021-06-07 | End: 2022-04-13 | Stop reason: SDUPTHER

## 2021-06-07 RX ORDER — MULTIVITAMIN WITH IRON
1 TABLET ORAL DAILY
Qty: 30 EACH | Refills: 11 | Status: SHIPPED | OUTPATIENT
Start: 2021-06-07

## 2021-06-10 DIAGNOSIS — M05.9 RHEUMATOID ARTHRITIS WITH POSITIVE RHEUMATOID FACTOR, INVOLVING UNSPECIFIED SITE: ICD-10-CM

## 2021-06-10 DIAGNOSIS — M35.00 SJOGREN'S SYNDROME, WITH UNSPECIFIED ORGAN INVOLVEMENT: ICD-10-CM

## 2021-06-10 RX ORDER — PREDNISONE 5 MG/1
TABLET ORAL
Qty: 90 TABLET | Refills: 3 | Status: SHIPPED | OUTPATIENT
Start: 2021-06-10 | End: 2022-01-20 | Stop reason: SDUPTHER

## 2021-06-18 ENCOUNTER — TELEPHONE (OUTPATIENT)
Dept: RHEUMATOLOGY | Facility: CLINIC | Age: 76
End: 2021-06-18

## 2021-06-21 ENCOUNTER — TELEPHONE (OUTPATIENT)
Dept: RHEUMATOLOGY | Facility: CLINIC | Age: 76
End: 2021-06-21

## 2021-06-25 ENCOUNTER — TELEPHONE (OUTPATIENT)
Dept: FAMILY MEDICINE | Facility: CLINIC | Age: 76
End: 2021-06-25

## 2021-06-28 ENCOUNTER — OFFICE VISIT (OUTPATIENT)
Dept: FAMILY MEDICINE | Facility: CLINIC | Age: 76
End: 2021-06-28
Payer: MEDICARE

## 2021-06-28 VITALS
SYSTOLIC BLOOD PRESSURE: 132 MMHG | HEART RATE: 78 BPM | HEIGHT: 66 IN | TEMPERATURE: 99 F | WEIGHT: 276 LBS | BODY MASS INDEX: 44.36 KG/M2 | DIASTOLIC BLOOD PRESSURE: 63 MMHG

## 2021-06-28 DIAGNOSIS — R60.0 PERIPHERAL EDEMA: ICD-10-CM

## 2021-06-28 DIAGNOSIS — I15.2 HYPERTENSION ASSOCIATED WITH DIABETES: Primary | ICD-10-CM

## 2021-06-28 DIAGNOSIS — E11.59 HYPERTENSION ASSOCIATED WITH DIABETES: Primary | ICD-10-CM

## 2021-06-28 DIAGNOSIS — E78.5 TYPE 2 DIABETES MELLITUS WITH HYPERLIPIDEMIA: ICD-10-CM

## 2021-06-28 DIAGNOSIS — E11.69 TYPE 2 DIABETES MELLITUS WITH HYPERLIPIDEMIA: ICD-10-CM

## 2021-06-28 DIAGNOSIS — E87.6 HYPOKALEMIA: ICD-10-CM

## 2021-06-28 DIAGNOSIS — I10 HYPERTENSION, UNSPECIFIED TYPE: ICD-10-CM

## 2021-06-28 PROCEDURE — 99999 PR PBB SHADOW E&M-EST. PATIENT-LVL IV: CPT | Mod: PBBFAC,,, | Performed by: FAMILY MEDICINE

## 2021-06-28 PROCEDURE — 1101F PR PT FALLS ASSESS DOC 0-1 FALLS W/OUT INJ PAST YR: ICD-10-PCS | Mod: CPTII,S$GLB,, | Performed by: FAMILY MEDICINE

## 2021-06-28 PROCEDURE — 1126F PR PAIN SEVERITY QUANTIFIED, NO PAIN PRESENT: ICD-10-PCS | Mod: S$GLB,,, | Performed by: FAMILY MEDICINE

## 2021-06-28 PROCEDURE — 1126F AMNT PAIN NOTED NONE PRSNT: CPT | Mod: S$GLB,,, | Performed by: FAMILY MEDICINE

## 2021-06-28 PROCEDURE — 3288F PR FALLS RISK ASSESSMENT DOCUMENTED: ICD-10-PCS | Mod: CPTII,S$GLB,, | Performed by: FAMILY MEDICINE

## 2021-06-28 PROCEDURE — 99214 OFFICE O/P EST MOD 30 MIN: CPT | Mod: S$GLB,,, | Performed by: FAMILY MEDICINE

## 2021-06-28 PROCEDURE — 3075F PR MOST RECENT SYSTOLIC BLOOD PRESS GE 130-139MM HG: ICD-10-PCS | Mod: CPTII,S$GLB,, | Performed by: FAMILY MEDICINE

## 2021-06-28 PROCEDURE — 3288F FALL RISK ASSESSMENT DOCD: CPT | Mod: CPTII,S$GLB,, | Performed by: FAMILY MEDICINE

## 2021-06-28 PROCEDURE — 3008F BODY MASS INDEX DOCD: CPT | Mod: CPTII,S$GLB,, | Performed by: FAMILY MEDICINE

## 2021-06-28 PROCEDURE — 1159F MED LIST DOCD IN RCRD: CPT | Mod: S$GLB,,, | Performed by: FAMILY MEDICINE

## 2021-06-28 PROCEDURE — 3075F SYST BP GE 130 - 139MM HG: CPT | Mod: CPTII,S$GLB,, | Performed by: FAMILY MEDICINE

## 2021-06-28 PROCEDURE — 3008F PR BODY MASS INDEX (BMI) DOCUMENTED: ICD-10-PCS | Mod: CPTII,S$GLB,, | Performed by: FAMILY MEDICINE

## 2021-06-28 PROCEDURE — 1101F PT FALLS ASSESS-DOCD LE1/YR: CPT | Mod: CPTII,S$GLB,, | Performed by: FAMILY MEDICINE

## 2021-06-28 PROCEDURE — 1159F PR MEDICATION LIST DOCUMENTED IN MEDICAL RECORD: ICD-10-PCS | Mod: S$GLB,,, | Performed by: FAMILY MEDICINE

## 2021-06-28 PROCEDURE — 3078F DIAST BP <80 MM HG: CPT | Mod: CPTII,S$GLB,, | Performed by: FAMILY MEDICINE

## 2021-06-28 PROCEDURE — 99214 PR OFFICE/OUTPT VISIT, EST, LEVL IV, 30-39 MIN: ICD-10-PCS | Mod: S$GLB,,, | Performed by: FAMILY MEDICINE

## 2021-06-28 PROCEDURE — 3078F PR MOST RECENT DIASTOLIC BLOOD PRESSURE < 80 MM HG: ICD-10-PCS | Mod: CPTII,S$GLB,, | Performed by: FAMILY MEDICINE

## 2021-06-28 PROCEDURE — 99999 PR PBB SHADOW E&M-EST. PATIENT-LVL IV: ICD-10-PCS | Mod: PBBFAC,,, | Performed by: FAMILY MEDICINE

## 2021-06-28 RX ORDER — MUPIROCIN 20 MG/G
OINTMENT TOPICAL 3 TIMES DAILY
Qty: 30 G | Refills: 6 | Status: SHIPPED | OUTPATIENT
Start: 2021-06-28

## 2021-06-28 RX ORDER — POTASSIUM CHLORIDE 20 MEQ/1
40 TABLET, EXTENDED RELEASE ORAL 2 TIMES DAILY
Qty: 360 TABLET | Refills: 1 | Status: SHIPPED | OUTPATIENT
Start: 2021-06-28 | End: 2021-11-23

## 2021-06-28 RX ORDER — FUROSEMIDE 40 MG/1
80 TABLET ORAL DAILY PRN
Qty: 180 TABLET | Refills: 1 | Status: SHIPPED | OUTPATIENT
Start: 2021-06-28 | End: 2021-11-23

## 2021-06-28 RX ORDER — AMOXICILLIN AND CLAVULANATE POTASSIUM 875; 125 MG/1; MG/1
1 TABLET, FILM COATED ORAL 2 TIMES DAILY
Qty: 20 TABLET | Refills: 0 | Status: SHIPPED | OUTPATIENT
Start: 2021-06-28 | End: 2021-07-29

## 2021-07-14 DIAGNOSIS — M35.00 SJOGREN'S SYNDROME, WITH UNSPECIFIED ORGAN INVOLVEMENT: ICD-10-CM

## 2021-07-14 DIAGNOSIS — M05.9 RHEUMATOID ARTHRITIS WITH POSITIVE RHEUMATOID FACTOR, INVOLVING UNSPECIFIED SITE: ICD-10-CM

## 2021-07-14 DIAGNOSIS — G89.4 CHRONIC PAIN SYNDROME: ICD-10-CM

## 2021-07-15 DIAGNOSIS — M35.00 SJOGREN'S SYNDROME, WITH UNSPECIFIED ORGAN INVOLVEMENT: ICD-10-CM

## 2021-07-15 DIAGNOSIS — M05.9 RHEUMATOID ARTHRITIS WITH POSITIVE RHEUMATOID FACTOR, INVOLVING UNSPECIFIED SITE: ICD-10-CM

## 2021-07-16 RX ORDER — HYDROXYCHLOROQUINE SULFATE 200 MG/1
TABLET, FILM COATED ORAL
Qty: 180 TABLET | Refills: 1 | Status: SHIPPED | OUTPATIENT
Start: 2021-07-16 | End: 2021-11-23 | Stop reason: SDUPTHER

## 2021-07-18 RX ORDER — HYDROCODONE BITARTRATE AND ACETAMINOPHEN 10; 325 MG/1; MG/1
1 TABLET ORAL EVERY 8 HOURS PRN
Qty: 90 TABLET | Refills: 0 | Status: SHIPPED | OUTPATIENT
Start: 2021-07-18 | End: 2021-08-18

## 2021-07-27 ENCOUNTER — TELEPHONE (OUTPATIENT)
Dept: FAMILY MEDICINE | Facility: CLINIC | Age: 76
End: 2021-07-27

## 2021-07-29 ENCOUNTER — OFFICE VISIT (OUTPATIENT)
Dept: FAMILY MEDICINE | Facility: CLINIC | Age: 76
End: 2021-07-29
Payer: MEDICARE

## 2021-07-29 ENCOUNTER — LAB VISIT (OUTPATIENT)
Dept: LAB | Facility: HOSPITAL | Age: 76
End: 2021-07-29
Attending: INTERNAL MEDICINE
Payer: MEDICARE

## 2021-07-29 ENCOUNTER — TELEPHONE (OUTPATIENT)
Dept: FAMILY MEDICINE | Facility: CLINIC | Age: 76
End: 2021-07-29

## 2021-07-29 VITALS
BODY MASS INDEX: 44.46 KG/M2 | HEART RATE: 80 BPM | DIASTOLIC BLOOD PRESSURE: 67 MMHG | HEIGHT: 65 IN | TEMPERATURE: 99 F | WEIGHT: 266.88 LBS | SYSTOLIC BLOOD PRESSURE: 147 MMHG

## 2021-07-29 DIAGNOSIS — J40 BRONCHITIS: Primary | ICD-10-CM

## 2021-07-29 DIAGNOSIS — R60.0 BILATERAL LOWER EXTREMITY EDEMA: ICD-10-CM

## 2021-07-29 DIAGNOSIS — E11.69 TYPE 2 DIABETES MELLITUS WITH HYPERLIPIDEMIA: ICD-10-CM

## 2021-07-29 DIAGNOSIS — J45.909 UNCOMPLICATED ASTHMA: ICD-10-CM

## 2021-07-29 DIAGNOSIS — E78.5 TYPE 2 DIABETES MELLITUS WITH HYPERLIPIDEMIA: ICD-10-CM

## 2021-07-29 LAB
ALBUMIN SERPL BCP-MCNC: 3.5 G/DL (ref 3.5–5.2)
ALP SERPL-CCNC: 112 U/L (ref 55–135)
ALT SERPL W/O P-5'-P-CCNC: 18 U/L (ref 10–44)
ANION GAP SERPL CALC-SCNC: 14 MMOL/L (ref 8–16)
AST SERPL-CCNC: 17 U/L (ref 10–40)
BASOPHILS # BLD AUTO: 0.1 K/UL (ref 0–0.2)
BASOPHILS NFR BLD: 0.7 % (ref 0–1.9)
BILIRUB SERPL-MCNC: 0.3 MG/DL (ref 0.1–1)
BNP SERPL-MCNC: 12 PG/ML (ref 0–99)
BUN SERPL-MCNC: 13 MG/DL (ref 8–23)
CALCIUM SERPL-MCNC: 10.3 MG/DL (ref 8.7–10.5)
CHLORIDE SERPL-SCNC: 104 MMOL/L (ref 95–110)
CO2 SERPL-SCNC: 24 MMOL/L (ref 23–29)
CREAT SERPL-MCNC: 1 MG/DL (ref 0.5–1.4)
DIFFERENTIAL METHOD: ABNORMAL
EOSINOPHIL # BLD AUTO: 0 K/UL (ref 0–0.5)
EOSINOPHIL NFR BLD: 0.3 % (ref 0–8)
ERYTHROCYTE [DISTWIDTH] IN BLOOD BY AUTOMATED COUNT: 15.7 % (ref 11.5–14.5)
EST. GFR  (AFRICAN AMERICAN): >60 ML/MIN/1.73 M^2
EST. GFR  (NON AFRICAN AMERICAN): 55.6 ML/MIN/1.73 M^2
GLUCOSE SERPL-MCNC: 120 MG/DL (ref 70–110)
HCT VFR BLD AUTO: 38.1 % (ref 37–48.5)
HGB BLD-MCNC: 11.7 G/DL (ref 12–16)
IMM GRANULOCYTES # BLD AUTO: 0.07 K/UL (ref 0–0.04)
IMM GRANULOCYTES NFR BLD AUTO: 0.5 % (ref 0–0.5)
LYMPHOCYTES # BLD AUTO: 2.6 K/UL (ref 1–4.8)
LYMPHOCYTES NFR BLD: 19 % (ref 18–48)
MCH RBC QN AUTO: 25.6 PG (ref 27–31)
MCHC RBC AUTO-ENTMCNC: 30.7 G/DL (ref 32–36)
MCV RBC AUTO: 83 FL (ref 82–98)
MONOCYTES # BLD AUTO: 0.5 K/UL (ref 0.3–1)
MONOCYTES NFR BLD: 3.6 % (ref 4–15)
NEUTROPHILS # BLD AUTO: 10.5 K/UL (ref 1.8–7.7)
NEUTROPHILS NFR BLD: 75.9 % (ref 38–73)
NRBC BLD-RTO: 0 /100 WBC
PLATELET # BLD AUTO: 343 K/UL (ref 150–450)
PMV BLD AUTO: 11.9 FL (ref 9.2–12.9)
POTASSIUM SERPL-SCNC: 4.1 MMOL/L (ref 3.5–5.1)
PROT SERPL-MCNC: 7.6 G/DL (ref 6–8.4)
RBC # BLD AUTO: 4.57 M/UL (ref 4–5.4)
SODIUM SERPL-SCNC: 142 MMOL/L (ref 136–145)
WBC # BLD AUTO: 13.83 K/UL (ref 3.9–12.7)

## 2021-07-29 PROCEDURE — 3078F PR MOST RECENT DIASTOLIC BLOOD PRESSURE < 80 MM HG: ICD-10-PCS | Mod: CPTII,S$GLB,, | Performed by: FAMILY MEDICINE

## 2021-07-29 PROCEDURE — 96372 PR INJECTION,THERAP/PROPH/DIAG2ST, IM OR SUBCUT: ICD-10-PCS | Mod: S$GLB,,, | Performed by: FAMILY MEDICINE

## 2021-07-29 PROCEDURE — 1101F PR PT FALLS ASSESS DOC 0-1 FALLS W/OUT INJ PAST YR: ICD-10-PCS | Mod: CPTII,S$GLB,, | Performed by: FAMILY MEDICINE

## 2021-07-29 PROCEDURE — 1159F PR MEDICATION LIST DOCUMENTED IN MEDICAL RECORD: ICD-10-PCS | Mod: CPTII,S$GLB,, | Performed by: FAMILY MEDICINE

## 2021-07-29 PROCEDURE — 36415 COLL VENOUS BLD VENIPUNCTURE: CPT | Mod: PO | Performed by: INTERNAL MEDICINE

## 2021-07-29 PROCEDURE — 99999 PR PBB SHADOW E&M-EST. PATIENT-LVL IV: ICD-10-PCS | Mod: PBBFAC,,, | Performed by: FAMILY MEDICINE

## 2021-07-29 PROCEDURE — 99499 UNLISTED E&M SERVICE: CPT | Mod: HCNC,S$GLB,, | Performed by: FAMILY MEDICINE

## 2021-07-29 PROCEDURE — 3008F BODY MASS INDEX DOCD: CPT | Mod: CPTII,S$GLB,, | Performed by: FAMILY MEDICINE

## 2021-07-29 PROCEDURE — 3288F FALL RISK ASSESSMENT DOCD: CPT | Mod: CPTII,S$GLB,, | Performed by: FAMILY MEDICINE

## 2021-07-29 PROCEDURE — 1126F PR PAIN SEVERITY QUANTIFIED, NO PAIN PRESENT: ICD-10-PCS | Mod: CPTII,S$GLB,, | Performed by: FAMILY MEDICINE

## 2021-07-29 PROCEDURE — 3008F PR BODY MASS INDEX (BMI) DOCUMENTED: ICD-10-PCS | Mod: CPTII,S$GLB,, | Performed by: FAMILY MEDICINE

## 2021-07-29 PROCEDURE — 85025 COMPLETE CBC W/AUTO DIFF WBC: CPT | Performed by: INTERNAL MEDICINE

## 2021-07-29 PROCEDURE — 3077F SYST BP >= 140 MM HG: CPT | Mod: CPTII,S$GLB,, | Performed by: FAMILY MEDICINE

## 2021-07-29 PROCEDURE — 1159F MED LIST DOCD IN RCRD: CPT | Mod: CPTII,S$GLB,, | Performed by: FAMILY MEDICINE

## 2021-07-29 PROCEDURE — 3077F PR MOST RECENT SYSTOLIC BLOOD PRESSURE >= 140 MM HG: ICD-10-PCS | Mod: CPTII,S$GLB,, | Performed by: FAMILY MEDICINE

## 2021-07-29 PROCEDURE — 83036 HEMOGLOBIN GLYCOSYLATED A1C: CPT | Performed by: INTERNAL MEDICINE

## 2021-07-29 PROCEDURE — 3288F PR FALLS RISK ASSESSMENT DOCUMENTED: ICD-10-PCS | Mod: CPTII,S$GLB,, | Performed by: FAMILY MEDICINE

## 2021-07-29 PROCEDURE — 99999 PR PBB SHADOW E&M-EST. PATIENT-LVL IV: CPT | Mod: PBBFAC,,, | Performed by: FAMILY MEDICINE

## 2021-07-29 PROCEDURE — 99499 RISK ADDL DX/OHS AUDIT: ICD-10-PCS | Mod: HCNC,S$GLB,, | Performed by: FAMILY MEDICINE

## 2021-07-29 PROCEDURE — 1101F PT FALLS ASSESS-DOCD LE1/YR: CPT | Mod: CPTII,S$GLB,, | Performed by: FAMILY MEDICINE

## 2021-07-29 PROCEDURE — 80053 COMPREHEN METABOLIC PANEL: CPT | Performed by: INTERNAL MEDICINE

## 2021-07-29 PROCEDURE — 99213 OFFICE O/P EST LOW 20 MIN: CPT | Mod: 25,S$GLB,, | Performed by: FAMILY MEDICINE

## 2021-07-29 PROCEDURE — 3078F DIAST BP <80 MM HG: CPT | Mod: CPTII,S$GLB,, | Performed by: FAMILY MEDICINE

## 2021-07-29 PROCEDURE — 1126F AMNT PAIN NOTED NONE PRSNT: CPT | Mod: CPTII,S$GLB,, | Performed by: FAMILY MEDICINE

## 2021-07-29 PROCEDURE — 96372 THER/PROPH/DIAG INJ SC/IM: CPT | Mod: S$GLB,,, | Performed by: FAMILY MEDICINE

## 2021-07-29 PROCEDURE — 99213 PR OFFICE/OUTPT VISIT, EST, LEVL III, 20-29 MIN: ICD-10-PCS | Mod: 25,S$GLB,, | Performed by: FAMILY MEDICINE

## 2021-07-29 PROCEDURE — 83880 ASSAY OF NATRIURETIC PEPTIDE: CPT | Performed by: INTERNAL MEDICINE

## 2021-07-29 RX ORDER — DEXAMETHASONE SODIUM PHOSPHATE 4 MG/ML
8 INJECTION, SOLUTION INTRA-ARTICULAR; INTRALESIONAL; INTRAMUSCULAR; INTRAVENOUS; SOFT TISSUE ONCE
Status: COMPLETED | OUTPATIENT
Start: 2021-07-29 | End: 2021-07-29

## 2021-07-29 RX ORDER — AMOXICILLIN AND CLAVULANATE POTASSIUM 875; 125 MG/1; MG/1
1 TABLET, FILM COATED ORAL 2 TIMES DAILY
Qty: 20 TABLET | Refills: 1 | Status: SHIPPED | OUTPATIENT
Start: 2021-07-29 | End: 2021-10-07

## 2021-07-29 RX ORDER — ALBUTEROL SULFATE 0.83 MG/ML
SOLUTION RESPIRATORY (INHALATION)
Qty: 180 ML | Refills: 3 | Status: SHIPPED | OUTPATIENT
Start: 2021-07-29 | End: 2022-08-22 | Stop reason: SDUPTHER

## 2021-07-29 RX ORDER — METHYLPREDNISOLONE 4 MG/1
TABLET ORAL
Qty: 1 PACKAGE | Refills: 0 | Status: SHIPPED | OUTPATIENT
Start: 2021-07-29 | End: 2021-08-19

## 2021-07-29 RX ADMIN — DEXAMETHASONE SODIUM PHOSPHATE 8 MG: 4 INJECTION, SOLUTION INTRA-ARTICULAR; INTRALESIONAL; INTRAMUSCULAR; INTRAVENOUS; SOFT TISSUE at 10:07

## 2021-07-30 LAB
ESTIMATED AVG GLUCOSE: 126 MG/DL (ref 68–131)
HBA1C MFR BLD: 6 % (ref 4–5.6)

## 2021-08-02 LAB
LEFT EYE DM RETINOPATHY: NEGATIVE
RIGHT EYE DM RETINOPATHY: NEGATIVE

## 2021-08-04 ENCOUNTER — LAB VISIT (OUTPATIENT)
Dept: LAB | Facility: HOSPITAL | Age: 76
End: 2021-08-04
Attending: INTERNAL MEDICINE
Payer: MEDICARE

## 2021-08-04 DIAGNOSIS — E78.5 TYPE 2 DIABETES MELLITUS WITH HYPERLIPIDEMIA: ICD-10-CM

## 2021-08-04 DIAGNOSIS — E11.69 TYPE 2 DIABETES MELLITUS WITH HYPERLIPIDEMIA: ICD-10-CM

## 2021-08-04 LAB
ALBUMIN/CREAT UR: NORMAL UG/MG (ref 0–30)
CREAT UR-MCNC: 83 MG/DL (ref 15–325)
MICROALBUMIN UR DL<=1MG/L-MCNC: <5 UG/ML

## 2021-08-04 PROCEDURE — 82043 UR ALBUMIN QUANTITATIVE: CPT | Performed by: INTERNAL MEDICINE

## 2021-08-04 PROCEDURE — 82570 ASSAY OF URINE CREATININE: CPT | Performed by: INTERNAL MEDICINE

## 2021-08-04 RX ORDER — BLOOD GLUCOSE CONTROL HIGH,LOW
EACH MISCELLANEOUS
Qty: 1 EACH | Refills: 0 | Status: SHIPPED | OUTPATIENT
Start: 2021-08-04

## 2021-08-05 DIAGNOSIS — M35.00 SJOGREN'S SYNDROME, WITH UNSPECIFIED ORGAN INVOLVEMENT: ICD-10-CM

## 2021-08-05 DIAGNOSIS — M05.9 RHEUMATOID ARTHRITIS WITH POSITIVE RHEUMATOID FACTOR, INVOLVING UNSPECIFIED SITE: ICD-10-CM

## 2021-08-05 RX ORDER — LEFLUNOMIDE 10 MG/1
10 TABLET ORAL DAILY
Qty: 90 TABLET | Refills: 0 | Status: SHIPPED | OUTPATIENT
Start: 2021-08-05 | End: 2022-03-23 | Stop reason: SDUPTHER

## 2021-08-20 ENCOUNTER — TELEPHONE (OUTPATIENT)
Dept: FAMILY MEDICINE | Facility: CLINIC | Age: 76
End: 2021-08-20

## 2021-08-26 ENCOUNTER — TELEPHONE (OUTPATIENT)
Dept: RADIOLOGY | Facility: HOSPITAL | Age: 76
End: 2021-08-26

## 2021-09-09 ENCOUNTER — TELEPHONE (OUTPATIENT)
Dept: RADIOLOGY | Facility: HOSPITAL | Age: 76
End: 2021-09-09

## 2021-09-15 DIAGNOSIS — M05.9 RHEUMATOID ARTHRITIS WITH POSITIVE RHEUMATOID FACTOR, INVOLVING UNSPECIFIED SITE: ICD-10-CM

## 2021-09-15 DIAGNOSIS — G89.4 CHRONIC PAIN SYNDROME: ICD-10-CM

## 2021-09-15 DIAGNOSIS — M35.00 SJOGREN'S SYNDROME, WITH UNSPECIFIED ORGAN INVOLVEMENT: ICD-10-CM

## 2021-09-19 RX ORDER — HYDROCODONE BITARTRATE AND ACETAMINOPHEN 10; 325 MG/1; MG/1
1 TABLET ORAL EVERY 8 HOURS PRN
Qty: 90 TABLET | Refills: 0 | Status: SHIPPED | OUTPATIENT
Start: 2021-09-19 | End: 2021-10-15

## 2021-09-20 ENCOUNTER — TELEPHONE (OUTPATIENT)
Dept: FAMILY MEDICINE | Facility: CLINIC | Age: 76
End: 2021-09-20

## 2021-09-20 ENCOUNTER — PATIENT OUTREACH (OUTPATIENT)
Dept: ADMINISTRATIVE | Facility: HOSPITAL | Age: 76
End: 2021-09-20

## 2021-09-27 DIAGNOSIS — I10 HYPERTENSION, UNSPECIFIED TYPE: ICD-10-CM

## 2021-09-27 RX ORDER — AMLODIPINE AND BENAZEPRIL HYDROCHLORIDE 5; 20 MG/1; MG/1
1 CAPSULE ORAL 2 TIMES DAILY
Qty: 180 CAPSULE | Refills: 3 | Status: SHIPPED | OUTPATIENT
Start: 2021-09-27 | End: 2022-04-13 | Stop reason: SDUPTHER

## 2021-09-30 ENCOUNTER — CLINICAL SUPPORT (OUTPATIENT)
Dept: FAMILY MEDICINE | Facility: CLINIC | Age: 76
End: 2021-09-30
Payer: MEDICARE

## 2021-09-30 ENCOUNTER — HOSPITAL ENCOUNTER (OUTPATIENT)
Dept: RADIOLOGY | Facility: HOSPITAL | Age: 76
Discharge: HOME | End: 2021-09-30
Attending: INTERNAL MEDICINE
Payer: MEDICARE

## 2021-09-30 VITALS — HEART RATE: 72 BPM | DIASTOLIC BLOOD PRESSURE: 60 MMHG | SYSTOLIC BLOOD PRESSURE: 120 MMHG

## 2021-09-30 DIAGNOSIS — R60.0 BILATERAL LOWER EXTREMITY EDEMA: ICD-10-CM

## 2021-09-30 DIAGNOSIS — Z01.30 BP CHECK: Primary | ICD-10-CM

## 2021-09-30 PROCEDURE — 99999 PR PBB SHADOW E&M-EST. PATIENT-LVL III: CPT | Mod: PBBFAC,HCNC,,

## 2021-09-30 PROCEDURE — 93970 US LOWER EXTREMITY VEINS BILATERAL INSUFFICIENCY: ICD-10-PCS | Mod: 26,HCNC,, | Performed by: RADIOLOGY

## 2021-09-30 PROCEDURE — 93970 EXTREMITY STUDY: CPT | Mod: TC,HCNC,PO

## 2021-09-30 PROCEDURE — 93970 EXTREMITY STUDY: CPT | Mod: 26,HCNC,, | Performed by: RADIOLOGY

## 2021-09-30 PROCEDURE — 99999 PR PBB SHADOW E&M-EST. PATIENT-LVL III: ICD-10-PCS | Mod: PBBFAC,HCNC,,

## 2021-10-01 ENCOUNTER — PATIENT OUTREACH (OUTPATIENT)
Dept: ADMINISTRATIVE | Facility: HOSPITAL | Age: 76
End: 2021-10-01

## 2021-10-07 ENCOUNTER — TELEPHONE (OUTPATIENT)
Dept: FAMILY MEDICINE | Facility: CLINIC | Age: 76
End: 2021-10-07

## 2021-10-07 RX ORDER — CIPROFLOXACIN 500 MG/1
500 TABLET ORAL 2 TIMES DAILY
Qty: 10 TABLET | Refills: 0 | Status: SHIPPED | OUTPATIENT
Start: 2021-10-07 | End: 2021-10-12

## 2021-10-07 RX ORDER — METRONIDAZOLE 500 MG/1
500 TABLET ORAL 2 TIMES DAILY
Qty: 10 TABLET | Refills: 0 | Status: SHIPPED | OUTPATIENT
Start: 2021-10-07 | End: 2021-10-12

## 2021-10-13 ENCOUNTER — PATIENT OUTREACH (OUTPATIENT)
Dept: ADMINISTRATIVE | Facility: HOSPITAL | Age: 76
End: 2021-10-13

## 2021-10-15 ENCOUNTER — OFFICE VISIT (OUTPATIENT)
Dept: FAMILY MEDICINE | Facility: CLINIC | Age: 76
End: 2021-10-15
Payer: MEDICARE

## 2021-10-15 VITALS
HEART RATE: 76 BPM | WEIGHT: 264 LBS | SYSTOLIC BLOOD PRESSURE: 128 MMHG | RESPIRATION RATE: 18 BRPM | HEIGHT: 66 IN | DIASTOLIC BLOOD PRESSURE: 69 MMHG | BODY MASS INDEX: 42.43 KG/M2 | TEMPERATURE: 99 F

## 2021-10-15 DIAGNOSIS — Z09 HOSPITAL DISCHARGE FOLLOW-UP: ICD-10-CM

## 2021-10-15 DIAGNOSIS — J40 BRONCHITIS: Primary | ICD-10-CM

## 2021-10-15 PROCEDURE — 1101F PR PT FALLS ASSESS DOC 0-1 FALLS W/OUT INJ PAST YR: ICD-10-PCS | Mod: HCNC,CPTII,S$GLB, | Performed by: NURSE PRACTITIONER

## 2021-10-15 PROCEDURE — 3074F PR MOST RECENT SYSTOLIC BLOOD PRESSURE < 130 MM HG: ICD-10-PCS | Mod: HCNC,CPTII,S$GLB, | Performed by: NURSE PRACTITIONER

## 2021-10-15 PROCEDURE — 1101F PT FALLS ASSESS-DOCD LE1/YR: CPT | Mod: HCNC,CPTII,S$GLB, | Performed by: NURSE PRACTITIONER

## 2021-10-15 PROCEDURE — 1160F PR REVIEW ALL MEDS BY PRESCRIBER/CLIN PHARMACIST DOCUMENTED: ICD-10-PCS | Mod: HCNC,CPTII,S$GLB, | Performed by: NURSE PRACTITIONER

## 2021-10-15 PROCEDURE — 3008F PR BODY MASS INDEX (BMI) DOCUMENTED: ICD-10-PCS | Mod: HCNC,CPTII,S$GLB, | Performed by: NURSE PRACTITIONER

## 2021-10-15 PROCEDURE — 99999 PR PBB SHADOW E&M-EST. PATIENT-LVL V: ICD-10-PCS | Mod: PBBFAC,HCNC,, | Performed by: NURSE PRACTITIONER

## 2021-10-15 PROCEDURE — 3288F FALL RISK ASSESSMENT DOCD: CPT | Mod: HCNC,CPTII,S$GLB, | Performed by: NURSE PRACTITIONER

## 2021-10-15 PROCEDURE — 3066F PR DOCUMENTATION OF TREATMENT FOR NEPHROPATHY: ICD-10-PCS | Mod: HCNC,CPTII,S$GLB, | Performed by: NURSE PRACTITIONER

## 2021-10-15 PROCEDURE — 1159F MED LIST DOCD IN RCRD: CPT | Mod: HCNC,CPTII,S$GLB, | Performed by: NURSE PRACTITIONER

## 2021-10-15 PROCEDURE — 1125F PR PAIN SEVERITY QUANTIFIED, PAIN PRESENT: ICD-10-PCS | Mod: HCNC,CPTII,S$GLB, | Performed by: NURSE PRACTITIONER

## 2021-10-15 PROCEDURE — 1125F AMNT PAIN NOTED PAIN PRSNT: CPT | Mod: HCNC,CPTII,S$GLB, | Performed by: NURSE PRACTITIONER

## 2021-10-15 PROCEDURE — 3061F NEG MICROALBUMINURIA REV: CPT | Mod: HCNC,CPTII,S$GLB, | Performed by: NURSE PRACTITIONER

## 2021-10-15 PROCEDURE — 3044F HG A1C LEVEL LT 7.0%: CPT | Mod: HCNC,CPTII,S$GLB, | Performed by: NURSE PRACTITIONER

## 2021-10-15 PROCEDURE — 99999 PR PBB SHADOW E&M-EST. PATIENT-LVL V: CPT | Mod: PBBFAC,HCNC,, | Performed by: NURSE PRACTITIONER

## 2021-10-15 PROCEDURE — 3008F BODY MASS INDEX DOCD: CPT | Mod: HCNC,CPTII,S$GLB, | Performed by: NURSE PRACTITIONER

## 2021-10-15 PROCEDURE — 3288F PR FALLS RISK ASSESSMENT DOCUMENTED: ICD-10-PCS | Mod: HCNC,CPTII,S$GLB, | Performed by: NURSE PRACTITIONER

## 2021-10-15 PROCEDURE — 3044F PR MOST RECENT HEMOGLOBIN A1C LEVEL <7.0%: ICD-10-PCS | Mod: HCNC,CPTII,S$GLB, | Performed by: NURSE PRACTITIONER

## 2021-10-15 PROCEDURE — 4010F PR ACE/ARB THEARPY RXD/TAKEN: ICD-10-PCS | Mod: HCNC,CPTII,S$GLB, | Performed by: NURSE PRACTITIONER

## 2021-10-15 PROCEDURE — 1160F RVW MEDS BY RX/DR IN RCRD: CPT | Mod: HCNC,CPTII,S$GLB, | Performed by: NURSE PRACTITIONER

## 2021-10-15 PROCEDURE — 3066F NEPHROPATHY DOC TX: CPT | Mod: HCNC,CPTII,S$GLB, | Performed by: NURSE PRACTITIONER

## 2021-10-15 PROCEDURE — 4010F ACE/ARB THERAPY RXD/TAKEN: CPT | Mod: HCNC,CPTII,S$GLB, | Performed by: NURSE PRACTITIONER

## 2021-10-15 PROCEDURE — 3078F DIAST BP <80 MM HG: CPT | Mod: HCNC,CPTII,S$GLB, | Performed by: NURSE PRACTITIONER

## 2021-10-15 PROCEDURE — 3074F SYST BP LT 130 MM HG: CPT | Mod: HCNC,CPTII,S$GLB, | Performed by: NURSE PRACTITIONER

## 2021-10-15 PROCEDURE — 99213 PR OFFICE/OUTPT VISIT, EST, LEVL III, 20-29 MIN: ICD-10-PCS | Mod: HCNC,S$GLB,, | Performed by: NURSE PRACTITIONER

## 2021-10-15 PROCEDURE — 99213 OFFICE O/P EST LOW 20 MIN: CPT | Mod: HCNC,S$GLB,, | Performed by: NURSE PRACTITIONER

## 2021-10-15 PROCEDURE — 1159F PR MEDICATION LIST DOCUMENTED IN MEDICAL RECORD: ICD-10-PCS | Mod: HCNC,CPTII,S$GLB, | Performed by: NURSE PRACTITIONER

## 2021-10-15 PROCEDURE — 3078F PR MOST RECENT DIASTOLIC BLOOD PRESSURE < 80 MM HG: ICD-10-PCS | Mod: HCNC,CPTII,S$GLB, | Performed by: NURSE PRACTITIONER

## 2021-10-15 PROCEDURE — 3061F PR NEG MICROALBUMINURIA RESULT DOCUMENTED/REVIEW: ICD-10-PCS | Mod: HCNC,CPTII,S$GLB, | Performed by: NURSE PRACTITIONER

## 2021-10-15 RX ORDER — GUAIFENESIN/DEXTROMETHORPHAN 100-10MG/5
5 SYRUP ORAL EVERY 6 HOURS PRN
Refills: 0 | COMMUNITY
Start: 2021-10-15 | End: 2021-10-25

## 2021-10-15 RX ORDER — ALBUTEROL SULFATE 90 UG/1
2 AEROSOL, METERED RESPIRATORY (INHALATION) EVERY 6 HOURS PRN
Qty: 18 G | Refills: 6 | Status: SHIPPED | OUTPATIENT
Start: 2021-10-15 | End: 2022-04-13 | Stop reason: SDUPTHER

## 2021-10-15 RX ORDER — LEVOFLOXACIN 750 MG/1
750 TABLET ORAL DAILY
Qty: 10 TABLET | Refills: 0 | Status: SHIPPED | OUTPATIENT
Start: 2021-10-15 | End: 2022-08-22

## 2021-10-15 RX ORDER — AMOXICILLIN AND CLAVULANATE POTASSIUM 875; 125 MG/1; MG/1
1 TABLET, FILM COATED ORAL EVERY 12 HOURS
Qty: 20 TABLET | Refills: 0 | Status: SHIPPED | OUTPATIENT
Start: 2021-10-15 | End: 2021-10-15 | Stop reason: ALTCHOICE

## 2021-10-18 NOTE — TELEPHONE ENCOUNTER
----- Message from Symone Vela sent at 10/18/2021 10:59 AM CDT -----  Type:  Patient Returning Call    Who Called:  PT  Does the patient know what this is regarding?:  Pt would like to discuss medications  Best Call Back Number:  [phone]  Additional Information:  Please call pt and advise

## 2021-10-19 DIAGNOSIS — G89.4 CHRONIC PAIN SYNDROME: ICD-10-CM

## 2021-10-19 DIAGNOSIS — M35.00 SJOGREN'S SYNDROME, WITH UNSPECIFIED ORGAN INVOLVEMENT: ICD-10-CM

## 2021-10-19 DIAGNOSIS — M05.9 RHEUMATOID ARTHRITIS WITH POSITIVE RHEUMATOID FACTOR, INVOLVING UNSPECIFIED SITE: ICD-10-CM

## 2021-10-21 ENCOUNTER — TELEPHONE (OUTPATIENT)
Dept: RHEUMATOLOGY | Facility: CLINIC | Age: 76
End: 2021-10-21

## 2021-10-21 RX ORDER — HYDROCODONE BITARTRATE AND ACETAMINOPHEN 10; 325 MG/1; MG/1
1 TABLET ORAL EVERY 8 HOURS PRN
Qty: 90 TABLET | Refills: 0 | OUTPATIENT
Start: 2021-10-21

## 2021-10-23 RX ORDER — HYDROCODONE BITARTRATE AND ACETAMINOPHEN 10; 325 MG/1; MG/1
1 TABLET ORAL EVERY 8 HOURS PRN
Qty: 90 TABLET | Refills: 0 | OUTPATIENT
Start: 2021-10-23 | End: 2021-11-22

## 2021-10-23 RX ORDER — HYDROCODONE BITARTRATE AND ACETAMINOPHEN 10; 325 MG/1; MG/1
1 TABLET ORAL EVERY 8 HOURS PRN
Qty: 90 TABLET | Refills: 0 | Status: SHIPPED | OUTPATIENT
Start: 2021-10-23 | End: 2021-10-26 | Stop reason: SDUPTHER

## 2021-10-25 ENCOUNTER — OFFICE VISIT (OUTPATIENT)
Dept: RHEUMATOLOGY | Facility: CLINIC | Age: 76
End: 2021-10-25
Payer: MEDICARE

## 2021-10-25 DIAGNOSIS — N28.89 RENAL MASS: ICD-10-CM

## 2021-10-25 DIAGNOSIS — M47.817 LUMBAR AND SACRAL ARTHRITIS: ICD-10-CM

## 2021-10-25 DIAGNOSIS — E11.9 TYPE 2 DIABETES MELLITUS WITHOUT COMPLICATION, WITHOUT LONG-TERM CURRENT USE OF INSULIN: ICD-10-CM

## 2021-10-25 DIAGNOSIS — Z79.899 HIGH RISK MEDICATION USE: ICD-10-CM

## 2021-10-25 DIAGNOSIS — L40.9 PSORIASIS: ICD-10-CM

## 2021-10-25 DIAGNOSIS — M35.00 SJOGREN'S SYNDROME, WITH UNSPECIFIED ORGAN INVOLVEMENT: ICD-10-CM

## 2021-10-25 DIAGNOSIS — D64.9 ANEMIA, UNSPECIFIED TYPE: ICD-10-CM

## 2021-10-25 DIAGNOSIS — M17.0 PRIMARY OSTEOARTHRITIS OF BOTH KNEES: ICD-10-CM

## 2021-10-25 DIAGNOSIS — E11.9 TYPE 2 DIABETES MELLITUS WITHOUT COMPLICATION, UNSPECIFIED WHETHER LONG TERM INSULIN USE: ICD-10-CM

## 2021-10-25 DIAGNOSIS — G89.4 CHRONIC PAIN SYNDROME: ICD-10-CM

## 2021-10-25 DIAGNOSIS — M47.818 SI JOINT ARTHRITIS: ICD-10-CM

## 2021-10-25 DIAGNOSIS — M05.9 RHEUMATOID ARTHRITIS WITH POSITIVE RHEUMATOID FACTOR, INVOLVING UNSPECIFIED SITE: Primary | ICD-10-CM

## 2021-10-25 DIAGNOSIS — M70.61 TROCHANTERIC BURSITIS OF RIGHT HIP: ICD-10-CM

## 2021-10-25 DIAGNOSIS — E21.3 HYPERPARATHYROIDISM, UNSPECIFIED: ICD-10-CM

## 2021-10-25 PROCEDURE — 3066F PR DOCUMENTATION OF TREATMENT FOR NEPHROPATHY: ICD-10-PCS | Mod: HCNC,CPTII,95, | Performed by: INTERNAL MEDICINE

## 2021-10-25 PROCEDURE — 3061F NEG MICROALBUMINURIA REV: CPT | Mod: HCNC,CPTII,95, | Performed by: INTERNAL MEDICINE

## 2021-10-25 PROCEDURE — 99215 PR OFFICE/OUTPT VISIT, EST, LEVL V, 40-54 MIN: ICD-10-PCS | Mod: HCNC,95,, | Performed by: INTERNAL MEDICINE

## 2021-10-25 PROCEDURE — 4010F ACE/ARB THERAPY RXD/TAKEN: CPT | Mod: HCNC,CPTII,95, | Performed by: INTERNAL MEDICINE

## 2021-10-25 PROCEDURE — 3061F PR NEG MICROALBUMINURIA RESULT DOCUMENTED/REVIEW: ICD-10-PCS | Mod: HCNC,CPTII,95, | Performed by: INTERNAL MEDICINE

## 2021-10-25 PROCEDURE — 3044F PR MOST RECENT HEMOGLOBIN A1C LEVEL <7.0%: ICD-10-PCS | Mod: HCNC,CPTII,95, | Performed by: INTERNAL MEDICINE

## 2021-10-25 PROCEDURE — 4010F PR ACE/ARB THEARPY RXD/TAKEN: ICD-10-PCS | Mod: HCNC,CPTII,95, | Performed by: INTERNAL MEDICINE

## 2021-10-25 PROCEDURE — 99499 RISK ADDL DX/OHS AUDIT: ICD-10-PCS | Mod: HCNC,95,, | Performed by: INTERNAL MEDICINE

## 2021-10-25 PROCEDURE — 3066F NEPHROPATHY DOC TX: CPT | Mod: HCNC,CPTII,95, | Performed by: INTERNAL MEDICINE

## 2021-10-25 PROCEDURE — 99499 UNLISTED E&M SERVICE: CPT | Mod: HCNC,95,, | Performed by: INTERNAL MEDICINE

## 2021-10-25 PROCEDURE — 99215 OFFICE O/P EST HI 40 MIN: CPT | Mod: HCNC,95,, | Performed by: INTERNAL MEDICINE

## 2021-10-25 PROCEDURE — 3044F HG A1C LEVEL LT 7.0%: CPT | Mod: HCNC,CPTII,95, | Performed by: INTERNAL MEDICINE

## 2021-10-26 ENCOUNTER — TELEPHONE (OUTPATIENT)
Dept: RHEUMATOLOGY | Facility: CLINIC | Age: 76
End: 2021-10-26
Payer: MEDICAID

## 2021-10-26 LAB — HBA1C MFR BLD: 5.9 %

## 2021-10-26 RX ORDER — HYDROCODONE BITARTRATE AND ACETAMINOPHEN 10; 325 MG/1; MG/1
1 TABLET ORAL EVERY 8 HOURS PRN
Qty: 90 TABLET | Refills: 0 | Status: SHIPPED | OUTPATIENT
Start: 2021-12-21 | End: 2022-01-21 | Stop reason: SDUPTHER

## 2021-10-26 RX ORDER — CYANOCOBALAMIN/FOLIC AC/VIT B6 1-2.5-25MG
1 TABLET ORAL DAILY
Qty: 90 EACH | Refills: 3 | Status: SHIPPED | OUTPATIENT
Start: 2021-10-26 | End: 2022-03-23 | Stop reason: SDUPTHER

## 2021-10-26 RX ORDER — HYDROCODONE BITARTRATE AND ACETAMINOPHEN 10; 325 MG/1; MG/1
1 TABLET ORAL EVERY 8 HOURS PRN
Qty: 90 TABLET | Refills: 0 | Status: SHIPPED | OUTPATIENT
Start: 2021-11-22 | End: 2021-12-22

## 2021-10-27 ENCOUNTER — DOCUMENTATION ONLY (OUTPATIENT)
Dept: RHEUMATOLOGY | Facility: CLINIC | Age: 76
End: 2021-10-27
Payer: MEDICAID

## 2021-10-28 DIAGNOSIS — E78.5 TYPE 2 DIABETES MELLITUS WITH HYPERLIPIDEMIA: Primary | ICD-10-CM

## 2021-10-28 DIAGNOSIS — E11.69 TYPE 2 DIABETES MELLITUS WITH HYPERLIPIDEMIA: Primary | ICD-10-CM

## 2021-11-16 ENCOUNTER — TELEPHONE (OUTPATIENT)
Dept: FAMILY MEDICINE | Facility: CLINIC | Age: 76
End: 2021-11-16
Payer: MEDICAID

## 2021-11-16 DIAGNOSIS — R53.1 GENERALIZED WEAKNESS: Primary | ICD-10-CM

## 2021-11-18 ENCOUNTER — TELEPHONE (OUTPATIENT)
Dept: FAMILY MEDICINE | Facility: CLINIC | Age: 76
End: 2021-11-18
Payer: MEDICAID

## 2021-11-22 PROCEDURE — G0180 MD CERTIFICATION HHA PATIENT: HCPCS | Mod: ,,, | Performed by: NURSE PRACTITIONER

## 2021-11-22 PROCEDURE — G0180 PR HOME HEALTH MD CERTIFICATION: ICD-10-PCS | Mod: ,,, | Performed by: NURSE PRACTITIONER

## 2021-11-23 DIAGNOSIS — M35.00 SJOGREN'S SYNDROME, WITH UNSPECIFIED ORGAN INVOLVEMENT: ICD-10-CM

## 2021-11-23 DIAGNOSIS — M05.9 RHEUMATOID ARTHRITIS WITH POSITIVE RHEUMATOID FACTOR, INVOLVING UNSPECIFIED SITE: ICD-10-CM

## 2021-11-23 RX ORDER — HYDROXYCHLOROQUINE SULFATE 200 MG/1
TABLET, FILM COATED ORAL
Qty: 180 TABLET | Refills: 1 | Status: SHIPPED | OUTPATIENT
Start: 2021-11-23 | End: 2022-07-20

## 2021-11-26 ENCOUNTER — TELEPHONE (OUTPATIENT)
Dept: FAMILY MEDICINE | Facility: CLINIC | Age: 76
End: 2021-11-26
Payer: MEDICAID

## 2021-11-29 ENCOUNTER — TELEPHONE (OUTPATIENT)
Dept: FAMILY MEDICINE | Facility: CLINIC | Age: 76
End: 2021-11-29
Payer: MEDICAID

## 2021-11-30 ENCOUNTER — TELEPHONE (OUTPATIENT)
Dept: RHEUMATOLOGY | Facility: CLINIC | Age: 76
End: 2021-11-30
Payer: MEDICAID

## 2021-12-01 ENCOUNTER — TELEPHONE (OUTPATIENT)
Dept: FAMILY MEDICINE | Facility: CLINIC | Age: 76
End: 2021-12-01
Payer: MEDICAID

## 2021-12-01 ENCOUNTER — DOCUMENT SCAN (OUTPATIENT)
Dept: HOME HEALTH SERVICES | Facility: HOSPITAL | Age: 76
End: 2021-12-01
Payer: MEDICAID

## 2021-12-01 DIAGNOSIS — R05.9 COUGH: Primary | ICD-10-CM

## 2021-12-02 RX ORDER — PROMETHAZINE HYDROCHLORIDE AND DEXTROMETHORPHAN HYDROBROMIDE 6.25; 15 MG/5ML; MG/5ML
5 SYRUP ORAL EVERY 6 HOURS PRN
Qty: 200 ML | Refills: 0 | Status: SHIPPED | OUTPATIENT
Start: 2021-12-02 | End: 2021-12-12

## 2021-12-03 ENCOUNTER — EXTERNAL HOME HEALTH (OUTPATIENT)
Dept: HOME HEALTH SERVICES | Facility: HOSPITAL | Age: 76
End: 2021-12-03
Payer: MEDICARE

## 2021-12-09 NOTE — LETTER
DUE FOR COLONOSCOPY.  PATIENT AGREES TO SCHEDULE.  PLEASE SEE PENDED COLONOSCOPY CASE REQUEST   June 22, 2017    Rekha Miller  [address] [facility] [address]             [facility]  [address]  [facility] [address]  Phone: [phone] Dear Mrs. Miller:    [facility] is committed to your overall health.  To help you get the most out of each of your visits, we will review your information to make sure you are up to date on all of your recommended tests and/or procedures.      Roberto Hernandez MD has found that you may be due for   Health Maintenance Due   Topic    DEXA SCAN     Pneumococcal (65+) (2 of 2 - PCV13)    Eye Exam     Mammogram       If you have had any of the above done at another facility, please bring the records or information with you so that your record at [facility] will be complete.    If you are currently taking medication, please bring it with you to your appointment for review.    We will be happy to assist you with scheduling any necessary appointments or you may contact the [facility] appointment desk at [phone] to schedule at your convenience.     Thank you for choosing [facility] for your healthcare needs,      If you have any questions or concerns, please don't hesitate to call.    Sincerely,    ROSANNE Jean-Baptiste  Care Coordination Department  [facility]  [phone]

## 2021-12-14 RX ORDER — FLUTICASONE PROPIONATE 50 MCG
1 SPRAY, SUSPENSION (ML) NASAL DAILY
Qty: 16 G | Refills: 0 | Status: SHIPPED | OUTPATIENT
Start: 2021-12-14 | End: 2023-10-19 | Stop reason: ALTCHOICE

## 2021-12-15 ENCOUNTER — TELEPHONE (OUTPATIENT)
Dept: RHEUMATOLOGY | Facility: CLINIC | Age: 76
End: 2021-12-15
Payer: MEDICAID

## 2021-12-27 ENCOUNTER — DOCUMENT SCAN (OUTPATIENT)
Dept: HOME HEALTH SERVICES | Facility: HOSPITAL | Age: 76
End: 2021-12-27
Payer: MEDICAID

## 2021-12-28 ENCOUNTER — DOCUMENT SCAN (OUTPATIENT)
Dept: HOME HEALTH SERVICES | Facility: HOSPITAL | Age: 76
End: 2021-12-28
Payer: MEDICAID

## 2022-01-20 DIAGNOSIS — M35.00 SJOGREN'S SYNDROME, WITH UNSPECIFIED ORGAN INVOLVEMENT: ICD-10-CM

## 2022-01-20 DIAGNOSIS — M05.9 RHEUMATOID ARTHRITIS WITH POSITIVE RHEUMATOID FACTOR, INVOLVING UNSPECIFIED SITE: ICD-10-CM

## 2022-01-20 RX ORDER — PREDNISONE 5 MG/1
TABLET ORAL
Qty: 90 TABLET | Refills: 3 | Status: SHIPPED | OUTPATIENT
Start: 2022-01-20 | End: 2022-03-23 | Stop reason: SDUPTHER

## 2022-01-20 NOTE — TELEPHONE ENCOUNTER
Pharmacy requesting refill on Prednisone 5mg  Pt's LOV 10/25/2021  Pt's NOV 03/23/2022  Medication pending

## 2022-01-21 DIAGNOSIS — M47.817 LUMBAR AND SACRAL ARTHRITIS: ICD-10-CM

## 2022-01-21 DIAGNOSIS — E11.9 TYPE 2 DIABETES MELLITUS WITHOUT COMPLICATION, WITHOUT LONG-TERM CURRENT USE OF INSULIN: ICD-10-CM

## 2022-01-21 DIAGNOSIS — E21.3 HYPERPARATHYROIDISM, UNSPECIFIED: ICD-10-CM

## 2022-01-21 DIAGNOSIS — G89.4 CHRONIC PAIN SYNDROME: ICD-10-CM

## 2022-01-21 DIAGNOSIS — M05.9 RHEUMATOID ARTHRITIS WITH POSITIVE RHEUMATOID FACTOR, INVOLVING UNSPECIFIED SITE: ICD-10-CM

## 2022-01-21 DIAGNOSIS — N28.89 RENAL MASS: ICD-10-CM

## 2022-01-21 DIAGNOSIS — M35.00 SJOGREN'S SYNDROME, WITH UNSPECIFIED ORGAN INVOLVEMENT: ICD-10-CM

## 2022-01-21 DIAGNOSIS — M17.0 PRIMARY OSTEOARTHRITIS OF BOTH KNEES: ICD-10-CM

## 2022-01-21 NOTE — TELEPHONE ENCOUNTER
----- Message from Arcelia Guthrie sent at 2022 12:33 PM CST -----  .Type:  RX Refill Request    Who Called:  Pt  Refill or New Rx:  Refill  RX Name and Strength:  HYDROcodone-acetaminophen (NORCO)  mg per tablet ()  How is the patient currently taking it? 2 or 3 x day  Is this a 30 day or 90 day RX: 30 day  Preferred Pharmacy with phone number:  .  [facility] [place] - [address] [facility] [address]  Phone: [phone] Fax: [phone]  Local or Mail Order:  Local  Ordering Provider:  Dr Elizabeth Mercado Call Back Number:  [phone]  Additional Information:  Requesting  refill,  Please advise  Thanks

## 2022-01-21 NOTE — TELEPHONE ENCOUNTER
Pharmacy requesting refill on Hydrocodone Acetaminophen 10-325mg  Pt's LOV 10/25/2021  Pt's NOV 03/23/2022  Medication pending   aware verified last refill 12/21/2021

## 2022-01-22 RX ORDER — HYDROCODONE BITARTRATE AND ACETAMINOPHEN 10; 325 MG/1; MG/1
1 TABLET ORAL EVERY 8 HOURS PRN
Qty: 90 TABLET | Refills: 0 | Status: SHIPPED | OUTPATIENT
Start: 2022-01-22 | End: 2022-02-27

## 2022-02-03 ENCOUNTER — DOCUMENT SCAN (OUTPATIENT)
Dept: HOME HEALTH SERVICES | Facility: HOSPITAL | Age: 77
End: 2022-02-03
Payer: MEDICAID

## 2022-02-10 ENCOUNTER — TELEPHONE (OUTPATIENT)
Dept: FAMILY MEDICINE | Facility: CLINIC | Age: 77
End: 2022-02-10
Payer: MEDICAID

## 2022-02-10 DIAGNOSIS — E11.69 TYPE 2 DIABETES MELLITUS WITH HYPERLIPIDEMIA: Primary | ICD-10-CM

## 2022-02-10 DIAGNOSIS — E78.5 TYPE 2 DIABETES MELLITUS WITH HYPERLIPIDEMIA: Primary | ICD-10-CM

## 2022-02-10 NOTE — TELEPHONE ENCOUNTER
----- Message from Kymberly Pinto sent at 2/10/2022  2:15 PM CST -----  Contact: [phone]  Patient called, requested a courtesy call from Dr Silva in regards the podiatry referral that she asked last time that she was seen. Patient stated that no one has reach out to her. Please call and advise. Thank you

## 2022-02-11 NOTE — TELEPHONE ENCOUNTER
I have signed for the following orders AND/OR meds.  Please call the patient and ask the patient to schedule the testing AND/OR inform about any medications that were sent. Medications have been sent to pharmacy listed below      Orders Placed This Encounter   Procedures    Ambulatory referral/consult to Podiatry     Standing Status:   Future     Standing Expiration Date:   3/10/2023     Referral Priority:   Routine     Referral Type:   Consultation     Referral Reason:   Specialty Services Required     Requested Specialty:   Podiatry     Number of Visits Requested:   1              [facility] [place] - [address] [facility]  [place] [address]  Phone: [phone] Fax: [phone]    [id number] - [place] - [address] [facility] [address] [facility]  [address]  [place] [address]  Phone: [phone] Fax: [phone]    [facility] - [place], [place] - [address]  [address]  [facility] [address]  Phone: [phone] Fax: [phone]

## 2022-03-09 ENCOUNTER — PATIENT OUTREACH (OUTPATIENT)
Dept: ADMINISTRATIVE | Facility: HOSPITAL | Age: 77
End: 2022-03-09
Payer: MEDICAID

## 2022-03-22 ENCOUNTER — OFFICE VISIT (OUTPATIENT)
Dept: PODIATRY | Facility: CLINIC | Age: 77
End: 2022-03-22
Payer: MEDICARE

## 2022-03-22 DIAGNOSIS — M21.70 LOWER LIMB LENGTH DIFFERENCE: ICD-10-CM

## 2022-03-22 DIAGNOSIS — E11.69 TYPE 2 DIABETES MELLITUS WITH HYPERLIPIDEMIA: ICD-10-CM

## 2022-03-22 DIAGNOSIS — Z87.81 HISTORY OF FRACTURE OF FEMUR: ICD-10-CM

## 2022-03-22 DIAGNOSIS — E78.5 TYPE 2 DIABETES MELLITUS WITH HYPERLIPIDEMIA: ICD-10-CM

## 2022-03-22 DIAGNOSIS — E11.9 ENCOUNTER FOR COMPREHENSIVE DIABETIC FOOT EXAMINATION, TYPE 2 DIABETES MELLITUS: Primary | ICD-10-CM

## 2022-03-22 DIAGNOSIS — E11.69 TYPE 2 DIABETES MELLITUS WITH OTHER SPECIFIED COMPLICATION, WITHOUT LONG-TERM CURRENT USE OF INSULIN: ICD-10-CM

## 2022-03-22 PROCEDURE — 1159F MED LIST DOCD IN RCRD: CPT | Mod: CPTII,S$GLB,, | Performed by: PODIATRIST

## 2022-03-22 PROCEDURE — 99999 PR PBB SHADOW E&M-EST. PATIENT-LVL IV: ICD-10-PCS | Mod: PBBFAC,,, | Performed by: PODIATRIST

## 2022-03-22 PROCEDURE — 99499 RISK ADDL DX/OHS AUDIT: ICD-10-PCS | Mod: HCNC,S$GLB,, | Performed by: PODIATRIST

## 2022-03-22 PROCEDURE — 1101F PT FALLS ASSESS-DOCD LE1/YR: CPT | Mod: CPTII,S$GLB,, | Performed by: PODIATRIST

## 2022-03-22 PROCEDURE — 1160F RVW MEDS BY RX/DR IN RCRD: CPT | Mod: CPTII,S$GLB,, | Performed by: PODIATRIST

## 2022-03-22 PROCEDURE — 3288F FALL RISK ASSESSMENT DOCD: CPT | Mod: CPTII,S$GLB,, | Performed by: PODIATRIST

## 2022-03-22 PROCEDURE — 99499 UNLISTED E&M SERVICE: CPT | Mod: HCNC,S$GLB,, | Performed by: PODIATRIST

## 2022-03-22 PROCEDURE — 99204 PR OFFICE/OUTPT VISIT, NEW, LEVL IV, 45-59 MIN: ICD-10-PCS | Mod: 25,S$GLB,, | Performed by: PODIATRIST

## 2022-03-22 PROCEDURE — 1160F PR REVIEW ALL MEDS BY PRESCRIBER/CLIN PHARMACIST DOCUMENTED: ICD-10-PCS | Mod: CPTII,S$GLB,, | Performed by: PODIATRIST

## 2022-03-22 PROCEDURE — 99999 PR PBB SHADOW E&M-EST. PATIENT-LVL IV: CPT | Mod: PBBFAC,,, | Performed by: PODIATRIST

## 2022-03-22 PROCEDURE — 11721 PR DEBRIDEMENT OF NAILS, 6 OR MORE: ICD-10-PCS | Mod: Q9,S$GLB,, | Performed by: PODIATRIST

## 2022-03-22 PROCEDURE — 1159F PR MEDICATION LIST DOCUMENTED IN MEDICAL RECORD: ICD-10-PCS | Mod: CPTII,S$GLB,, | Performed by: PODIATRIST

## 2022-03-22 PROCEDURE — 3288F PR FALLS RISK ASSESSMENT DOCUMENTED: ICD-10-PCS | Mod: CPTII,S$GLB,, | Performed by: PODIATRIST

## 2022-03-22 PROCEDURE — 99204 OFFICE O/P NEW MOD 45 MIN: CPT | Mod: 25,S$GLB,, | Performed by: PODIATRIST

## 2022-03-22 PROCEDURE — 11721 DEBRIDE NAIL 6 OR MORE: CPT | Mod: Q9,S$GLB,, | Performed by: PODIATRIST

## 2022-03-22 PROCEDURE — 1101F PR PT FALLS ASSESS DOC 0-1 FALLS W/OUT INJ PAST YR: ICD-10-PCS | Mod: CPTII,S$GLB,, | Performed by: PODIATRIST

## 2022-03-22 NOTE — PROGRESS NOTES
Subjective:       Patient ID: Rekha Miller is a 77 y.o. female.    Chief Complaint: Diabetic Foot Exam (Patient is a diabetic and was last seen on 10.15.21 by ZEINAB Macedo. She denies pain at present. Patient ambulates with rolling walker and is wearing socks and velcro strap shoes. )      HPI: Rekha Miller presents to the office today, under referral by , Divina Silva MD, for her annual diabetic foot assessment and risk evaluation.  Patient is a DMII. This patient last saw his/her internal/family medicine physician on 07/15/2021.  Patient relates that she did have history of trauma several years ago which ultimately resulted in fracture to her hip as well as her femur.  States that since then, she has had a kneeling gait because of the significant limb length discrepancy.  States her right lower extremity is shorter than her left.  Has attempted to utilize multiple inserts in the right shoe in hopes that this will help build up the difference.  Patient continues to ambulate with an abnormal gait secondary to the limb length discrepancy.      Hemoglobin A1C   Date Value Ref Range Status   10/26/2021 5.9 <5.7 % Final   07/29/2021 6.0 (H) 4.0 - 5.6 % Final     Comment:     ADA Screening Guidelines:  5.7-6.4%  Consistent with prediabetes  >or=6.5%  Consistent with diabetes    High levels of fetal hemoglobin interfere with the HbA1C  assay. Heterozygous hemoglobin variants (HbS, HgC, etc)do  not significantly interfere with this assay.   However, presence of multiple variants may affect accuracy.     09/17/2020 5.8 (H) 4.0 - 5.6 % Final     Comment:     ADA Screening Guidelines:  5.7-6.4%  Consistent with prediabetes  >or=6.5%  Consistent with diabetes  High levels of fetal hemoglobin interfere with the HbA1C  assay. Heterozygous hemoglobin variants (HbS, HgC, etc)do  not significantly interfere with this assay.   However, presence of multiple variants may affect accuracy.     .    Review of patient's  allergies indicates:   Allergen Reactions    Latex, natural rubber Swelling and Rash    Codeine     Dairy digestive ultra      Dairy products      Imuran [azathioprine sodium] Diarrhea    Lactobacillus acidoph-lactase Other (See Comments)    Morphine      DOESN'T FEEL RIGHT     Plaquenil [hydroxychloroquine] Other (See Comments)     headaches    Milk containing products Diarrhea       Past Medical History:   Diagnosis Date    Asthma     COPD (chronic obstructive pulmonary disease)     Degenerative disc disease     Diabetes mellitus     Diabetes mellitus, type 2     Fibromyalgia     Hypertension     Rheumatoid arthritis     Rheumatoid arthritis(714.0)     Rheumatoid arthritis       Family History   Problem Relation Age of Onset    Cancer Mother     Anemia Mother     Alcohol abuse Father     Anemia Maternal Grandmother     Hypertension Paternal Grandmother     Arthritis Paternal Grandmother        Social History     Socioeconomic History    Marital status:     Number of children: 8   Tobacco Use    Smoking status: Never Smoker    Smokeless tobacco: Never Used   Substance and Sexual Activity    Alcohol use: Never    Drug use: No       Past Surgical History:   Procedure Laterality Date    BREAST SURGERY  1986    CATARACT EXTRACTION      EYE SURGERY  2013    Catatracts    ORIF FEMUR FRACTURE      right knee ligament rpeair  2005    right shoulder surgery  4/18/07    Dr. Gao       Review of Systems      Objective:   There were no vitals taken for this visit.    Physical Exam  LOWER EXTREMITY PHYSICAL EXAMINATION    ORTHOPEDIC:  No gross or structural anatomic deformity noted to the right foot of the left foot.  When comparison of the 2 lower extremities, the right lower extremities approximately 1 in shorter than the left.  Due to this, there is an abnormal limb length secondary to history of femur fracture and trauma.  There is no pain on palpation of the right foot left foot  separately.  She does have moderate sensitivity bilaterally.    VASCULAR:  The right dorsalis pedis pulse 2/4 and the right posterior tibial pulse 1/4.  The left dorsalis pedis pulse 2/4 and posterior tibial pulse on the left is 1/4.  Capillary refill is intact.  Pedal hair growth decreased.  Swelling noted to bilateral pretibial region and dorsal foot    NEUROLOGY:  Protective sensation is intact with Brush Juno monofilament. Proprioception is intact. Intact sensation to light touch.  Vibratory sensation is diminished to the 1st metatarsal phalangeal joint.     DERMATOLOGY:  Skin is supple, moist, intact.  There is no signs of callusing, ulcerations, other lesions identified to the dorsal or plantar aspect of the right or left foot.  The R1, 2, 5 and left L1,2, 5 are thickened, discolored dystrophic.  There is subungual debris.  Nail plates have area of dark discoloration.  The remaining nails 3-4 on the right foot and the left foot are elongated but of normal color, thickness, and texture.   There is no signs of ingrowing into the medial or lateral borders.  There is no evidence of wounds or skin breakdown.  No edema or erythema.  No obvious lacerations or fissuring.  Interdigital spaces are clean, dry, intact.  No rashes or scars appreciated.    Assessment:     1. Encounter for comprehensive diabetic foot examination, type 2 diabetes mellitus    2. Type 2 diabetes mellitus with hyperlipidemia    3. Type 2 diabetes mellitus with other specified complication, without long-term current use of insulin    4. Lower limb length difference    5. History of fracture of femur        Plan:     Encounter for comprehensive diabetic foot examination, type 2 diabetes mellitus    Type 2 diabetes mellitus with hyperlipidemia  -     Ambulatory referral/consult to Podiatry    Type 2 diabetes mellitus with other specified complication, without long-term current use of insulin    Lower limb length difference  -     HEEL LIFTS  FOR HOME USE    History of fracture of femur       I counseled the patient on his/her Diabetic Mellitus regarding today's clinical examination and objection findings. We did also discuss recent medication changes, pertinent labs and imaging evaluations and other medical consultation notes and progress notes. Greater than 50% of this visit was spent on counseling and coordination of care. Greater than 20 minutes of this appt. was spent on education about the diabetic foot, in relation to PVD and/or neuropathy, and the prevention of limb loss.     Shoe gear is inspected and wear and proper fit/type. Patient is reminded of the importance of good nutrition and blood sugar control to help prevent podiatric complications of diabetes. Patient instructed on proper foot hygeine. We discussed wearing proper shoe gear, daily foot inspections, never walking without protective shoe gear, never putting sharp instruments to feet.  Patient  will continue to monitor the areas daily, inspect feet, wear protective shoe gear when ambulatory, moisturizer to maintain skin integrity.     Patient's DMI/DMII is managed by Internal/Family Medicine Physician and/or Endocrinology Advanced Practice Provider.      Recommend patient utilize shoe lift to elevate the right shoe in comparison to left.  As patient is having increased pain and difficulties  associated  the abnormal limb length and allow the patient to be more level at her hips for ambulation    Dystrophic nail plates, as outlined above (R#1,2,5  ; L#1,2,5 ), are sharply debrided with double action nail nipper, and/or with the assistance of a mechanical rotary sandeep, with removal of all offending nail and nail border(s), for reduction of pains. Nails are reduced in terms of length, width and girth with removal of subungual debris to facilitate pain free weight bearing and ambulation. The elongated nails as outlined in the objective portion of this note, were trimmed to appropriate  length, with a double action nail nipper, for alleviation/reduction of pains as well. Follow up in approx. 3-4 months.

## 2022-03-23 ENCOUNTER — OFFICE VISIT (OUTPATIENT)
Dept: RHEUMATOLOGY | Facility: CLINIC | Age: 77
End: 2022-03-23
Payer: MEDICARE

## 2022-03-23 VITALS
WEIGHT: 259.5 LBS | BODY MASS INDEX: 41.71 KG/M2 | SYSTOLIC BLOOD PRESSURE: 151 MMHG | HEIGHT: 66 IN | DIASTOLIC BLOOD PRESSURE: 73 MMHG | HEART RATE: 89 BPM

## 2022-03-23 DIAGNOSIS — K21.9 GASTROESOPHAGEAL REFLUX DISEASE WITHOUT ESOPHAGITIS: ICD-10-CM

## 2022-03-23 DIAGNOSIS — M05.9 RHEUMATOID ARTHRITIS WITH POSITIVE RHEUMATOID FACTOR, INVOLVING UNSPECIFIED SITE: Primary | ICD-10-CM

## 2022-03-23 DIAGNOSIS — E11.9 TYPE 2 DIABETES MELLITUS WITHOUT COMPLICATION, WITHOUT LONG-TERM CURRENT USE OF INSULIN: ICD-10-CM

## 2022-03-23 DIAGNOSIS — G89.4 CHRONIC PAIN SYNDROME: ICD-10-CM

## 2022-03-23 DIAGNOSIS — D64.9 ANEMIA, UNSPECIFIED TYPE: ICD-10-CM

## 2022-03-23 DIAGNOSIS — L40.9 PSORIASIS: ICD-10-CM

## 2022-03-23 DIAGNOSIS — M17.0 PRIMARY OSTEOARTHRITIS OF BOTH KNEES: ICD-10-CM

## 2022-03-23 DIAGNOSIS — M47.817 LUMBAR AND SACRAL ARTHRITIS: ICD-10-CM

## 2022-03-23 DIAGNOSIS — M35.00 SJOGREN'S SYNDROME, WITH UNSPECIFIED ORGAN INVOLVEMENT: ICD-10-CM

## 2022-03-23 DIAGNOSIS — E11.9 TYPE 2 DIABETES MELLITUS WITHOUT COMPLICATION, UNSPECIFIED WHETHER LONG TERM INSULIN USE: ICD-10-CM

## 2022-03-23 DIAGNOSIS — E21.3 HYPERPARATHYROIDISM, UNSPECIFIED: ICD-10-CM

## 2022-03-23 DIAGNOSIS — N28.89 RENAL MASS: ICD-10-CM

## 2022-03-23 PROCEDURE — 99499 UNLISTED E&M SERVICE: CPT | Mod: S$GLB,,, | Performed by: INTERNAL MEDICINE

## 2022-03-23 PROCEDURE — 96372 THER/PROPH/DIAG INJ SC/IM: CPT | Mod: PBBFAC,59,PN

## 2022-03-23 PROCEDURE — 4010F PR ACE/ARB THEARPY RXD/TAKEN: ICD-10-PCS | Mod: CPTII,S$GLB,, | Performed by: INTERNAL MEDICINE

## 2022-03-23 PROCEDURE — 99999 PR PBB SHADOW E&M-EST. PATIENT-LVL IV: CPT | Mod: PBBFAC,,, | Performed by: INTERNAL MEDICINE

## 2022-03-23 PROCEDURE — 99499 RISK ADDL DX/OHS AUDIT: ICD-10-PCS | Mod: S$GLB,,, | Performed by: INTERNAL MEDICINE

## 2022-03-23 PROCEDURE — 99215 OFFICE O/P EST HI 40 MIN: CPT | Mod: 25,S$GLB,, | Performed by: INTERNAL MEDICINE

## 2022-03-23 PROCEDURE — 4010F ACE/ARB THERAPY RXD/TAKEN: CPT | Mod: CPTII,S$GLB,, | Performed by: INTERNAL MEDICINE

## 2022-03-23 PROCEDURE — 99215 PR OFFICE/OUTPT VISIT, EST, LEVL V, 40-54 MIN: ICD-10-PCS | Mod: 25,S$GLB,, | Performed by: INTERNAL MEDICINE

## 2022-03-23 PROCEDURE — 99214 OFFICE O/P EST MOD 30 MIN: CPT | Mod: PBBFAC,PN | Performed by: INTERNAL MEDICINE

## 2022-03-23 PROCEDURE — 99999 PR PBB SHADOW E&M-EST. PATIENT-LVL IV: ICD-10-PCS | Mod: PBBFAC,,, | Performed by: INTERNAL MEDICINE

## 2022-03-23 RX ORDER — LEFLUNOMIDE 10 MG/1
10 TABLET ORAL DAILY
Qty: 90 TABLET | Refills: 6 | Status: SHIPPED | OUTPATIENT
Start: 2022-03-23 | End: 2022-07-20 | Stop reason: SDUPTHER

## 2022-03-23 RX ORDER — PREDNISONE 5 MG/1
TABLET ORAL
Qty: 90 TABLET | Refills: 3 | Status: SHIPPED | OUTPATIENT
Start: 2022-03-23 | End: 2022-07-20 | Stop reason: SDUPTHER

## 2022-03-23 RX ORDER — HYDROXYCHLOROQUINE SULFATE 200 MG/1
200 TABLET, FILM COATED ORAL 2 TIMES DAILY
Qty: 60 TABLET | Refills: 11 | Status: SHIPPED | OUTPATIENT
Start: 2022-03-23 | End: 2022-04-13 | Stop reason: SDUPTHER

## 2022-03-23 RX ORDER — HYDROCODONE BITARTRATE AND ACETAMINOPHEN 10; 325 MG/1; MG/1
1 TABLET ORAL EVERY 8 HOURS PRN
Qty: 90 TABLET | Refills: 0 | Status: SHIPPED | OUTPATIENT
Start: 2022-04-23 | End: 2022-05-23

## 2022-03-23 RX ORDER — METHYLPREDNISOLONE ACETATE 80 MG/ML
80 INJECTION, SUSPENSION INTRA-ARTICULAR; INTRALESIONAL; INTRAMUSCULAR; SOFT TISSUE
Status: COMPLETED | OUTPATIENT
Start: 2022-03-23 | End: 2022-03-23

## 2022-03-23 RX ORDER — HYDROCODONE BITARTRATE AND ACETAMINOPHEN 10; 325 MG/1; MG/1
1 TABLET ORAL EVERY 8 HOURS PRN
Qty: 90 TABLET | Refills: 0 | Status: SHIPPED | OUTPATIENT
Start: 2022-03-23 | End: 2022-04-22

## 2022-03-23 RX ORDER — KETOROLAC TROMETHAMINE 30 MG/ML
60 INJECTION, SOLUTION INTRAMUSCULAR; INTRAVENOUS
Status: COMPLETED | OUTPATIENT
Start: 2022-03-23 | End: 2022-03-23

## 2022-03-23 RX ORDER — HYDROCODONE BITARTRATE AND ACETAMINOPHEN 10; 325 MG/1; MG/1
1 TABLET ORAL EVERY 8 HOURS PRN
Qty: 90 TABLET | Refills: 0 | Status: SHIPPED | OUTPATIENT
Start: 2022-05-23 | End: 2022-06-28

## 2022-03-23 RX ORDER — CYANOCOBALAMIN 1000 UG/ML
1000 INJECTION, SOLUTION INTRAMUSCULAR; SUBCUTANEOUS
Status: COMPLETED | OUTPATIENT
Start: 2022-03-23 | End: 2022-03-23

## 2022-03-23 RX ORDER — CYANOCOBALAMIN/FOLIC AC/VIT B6 1-2.5-25MG
1 TABLET ORAL DAILY
Qty: 90 EACH | Refills: 3 | Status: SHIPPED | OUTPATIENT
Start: 2022-03-23 | End: 2023-02-22

## 2022-03-23 RX ORDER — DICYCLOMINE HYDROCHLORIDE 20 MG/1
20 TABLET ORAL EVERY 6 HOURS
Qty: 120 TABLET | Refills: 3 | Status: SHIPPED | OUTPATIENT
Start: 2022-03-23 | End: 2023-02-19

## 2022-03-23 RX ADMIN — KETOROLAC TROMETHAMINE 60 MG: 60 INJECTION, SOLUTION INTRAMUSCULAR at 04:03

## 2022-03-23 RX ADMIN — METHYLPREDNISOLONE ACETATE 80 MG: 80 INJECTION, SUSPENSION INTRA-ARTICULAR; INTRALESIONAL; INTRAMUSCULAR; SOFT TISSUE at 04:03

## 2022-03-23 RX ADMIN — CYANOCOBALAMIN 1000 MCG: 1000 INJECTION INTRAMUSCULAR; SUBCUTANEOUS at 04:03

## 2022-03-23 ASSESSMENT — ROUTINE ASSESSMENT OF PATIENT INDEX DATA (RAPID3)
PSYCHOLOGICAL DISTRESS SCORE: 0
MDHAQ FUNCTION SCORE: 0.3
FATIGUE SCORE: 0
PATIENT GLOBAL ASSESSMENT SCORE: 6
PAIN SCORE: 10
TOTAL RAPID3 SCORE: 5.67

## 2022-03-23 NOTE — PROGRESS NOTES
Administered 1 cc ( 1000 mcg/ml ) of b12 to the right upper outer gluteal. Informed of s/s to report verbalized understanding. No adverse reactions noted.        Administered 1 cc ( 80 mg/ml ) of depomedrol to the right upper outer gluteal. Informed of s/s to report verbalized understanding. No adverse reactions noted.        Administered 2 cc ( 30 mg/ml ) of toradol to the left upper outer gluteal. Informed of s/s to report verbalized understanding. No adverse reactions noted.

## 2022-03-28 ENCOUNTER — PATIENT OUTREACH (OUTPATIENT)
Dept: ADMINISTRATIVE | Facility: HOSPITAL | Age: 77
End: 2022-03-28
Payer: MEDICARE

## 2022-03-28 NOTE — LETTER
AUTHORIZATION FOR RELEASE OF   CONFIDENTIAL INFORMATION      We are seeing Rekha Miller, date of birth 1945, in the clinic at [facility]. Divina Silva MD is the patient's PCP. Rekha Miller has an outstanding lab/procedure at the time we reviewed her chart. In order to help keep her health information updated, she has authorized us to request the following medical record(s):        (  )  MAMMOGRAM                                      (  )  COLONOSCOPY      (  )  PAP SMEAR                                          (  )  OUTSIDE LAB RESULTS     (  )  DEXA SCAN                                          (  )  EYE EXAM            (  )  FOOT EXAM                                          (  )  ENTIRE RECORD     (  )  OUTSIDE IMMUNIZATIONS                 ( XX )  HEMOGLOBIN A1C         Please fax records to [facility], Kacie S Watts, MD, [phone]     If you have any questions, please contact Shahida Campos at [phone].           Patient Name: Rekha Miller  : 1945  Patient Phone #: [phone]

## 2022-04-07 ENCOUNTER — LAB VISIT (OUTPATIENT)
Dept: LAB | Facility: HOSPITAL | Age: 77
End: 2022-04-07
Attending: INTERNAL MEDICINE
Payer: MEDICARE

## 2022-04-07 DIAGNOSIS — M35.00 SJOGREN'S SYNDROME, WITH UNSPECIFIED ORGAN INVOLVEMENT: ICD-10-CM

## 2022-04-07 DIAGNOSIS — E11.9 TYPE 2 DIABETES MELLITUS WITHOUT COMPLICATION, WITHOUT LONG-TERM CURRENT USE OF INSULIN: ICD-10-CM

## 2022-04-07 DIAGNOSIS — M17.0 PRIMARY OSTEOARTHRITIS OF BOTH KNEES: ICD-10-CM

## 2022-04-07 DIAGNOSIS — L40.9 PSORIASIS: ICD-10-CM

## 2022-04-07 DIAGNOSIS — E11.9 TYPE 2 DIABETES MELLITUS WITHOUT COMPLICATION, UNSPECIFIED WHETHER LONG TERM INSULIN USE: ICD-10-CM

## 2022-04-07 LAB
25(OH)D3+25(OH)D2 SERPL-MCNC: 39 NG/ML (ref 30–96)
ALBUMIN SERPL BCP-MCNC: 3.5 G/DL (ref 3.5–5.2)
ALP SERPL-CCNC: 104 U/L (ref 55–135)
ALT SERPL W/O P-5'-P-CCNC: 14 U/L (ref 10–44)
ANION GAP SERPL CALC-SCNC: 13 MMOL/L (ref 8–16)
AST SERPL-CCNC: 14 U/L (ref 10–40)
BASOPHILS # BLD AUTO: 0.06 K/UL (ref 0–0.2)
BASOPHILS NFR BLD: 0.4 % (ref 0–1.9)
BILIRUB SERPL-MCNC: 0.3 MG/DL (ref 0.1–1)
BUN SERPL-MCNC: 17 MG/DL (ref 8–23)
CALCIUM SERPL-MCNC: 9.9 MG/DL (ref 8.7–10.5)
CCP AB SER IA-ACNC: 86.6 U/ML
CHLORIDE SERPL-SCNC: 108 MMOL/L (ref 95–110)
CO2 SERPL-SCNC: 22 MMOL/L (ref 23–29)
CREAT SERPL-MCNC: 1.1 MG/DL (ref 0.5–1.4)
CRP SERPL-MCNC: 10.1 MG/L (ref 0–8.2)
DIFFERENTIAL METHOD: ABNORMAL
EOSINOPHIL # BLD AUTO: 0 K/UL (ref 0–0.5)
EOSINOPHIL NFR BLD: 0.1 % (ref 0–8)
ERYTHROCYTE [DISTWIDTH] IN BLOOD BY AUTOMATED COUNT: 17.1 % (ref 11.5–14.5)
ERYTHROCYTE [SEDIMENTATION RATE] IN BLOOD BY WESTERGREN METHOD: 26 MM/HR (ref 0–20)
EST. GFR  (AFRICAN AMERICAN): 56.8 ML/MIN/1.73 M^2
EST. GFR  (NON AFRICAN AMERICAN): 49.2 ML/MIN/1.73 M^2
GLUCOSE SERPL-MCNC: 119 MG/DL (ref 70–110)
HCT VFR BLD AUTO: 37 % (ref 37–48.5)
HGB BLD-MCNC: 11.7 G/DL (ref 12–16)
IMM GRANULOCYTES # BLD AUTO: 0.06 K/UL (ref 0–0.04)
IMM GRANULOCYTES NFR BLD AUTO: 0.4 % (ref 0–0.5)
LYMPHOCYTES # BLD AUTO: 2.7 K/UL (ref 1–4.8)
LYMPHOCYTES NFR BLD: 19.5 % (ref 18–48)
MCH RBC QN AUTO: 26.4 PG (ref 27–31)
MCHC RBC AUTO-ENTMCNC: 31.6 G/DL (ref 32–36)
MCV RBC AUTO: 83 FL (ref 82–98)
MONOCYTES # BLD AUTO: 0.5 K/UL (ref 0.3–1)
MONOCYTES NFR BLD: 3.6 % (ref 4–15)
NEUTROPHILS # BLD AUTO: 10.5 K/UL (ref 1.8–7.7)
NEUTROPHILS NFR BLD: 76 % (ref 38–73)
NRBC BLD-RTO: 0 /100 WBC
PLATELET # BLD AUTO: 285 K/UL (ref 150–450)
PMV BLD AUTO: 11.8 FL (ref 9.2–12.9)
POTASSIUM SERPL-SCNC: 4.1 MMOL/L (ref 3.5–5.1)
PROT SERPL-MCNC: 7.3 G/DL (ref 6–8.4)
RBC # BLD AUTO: 4.44 M/UL (ref 4–5.4)
SARS-COV-2 IGG SERPL IA-ACNC: 6643.4 AU/ML
SARS-COV-2 IGG SERPL QL IA: POSITIVE
SODIUM SERPL-SCNC: 143 MMOL/L (ref 136–145)
T4 FREE SERPL-MCNC: 0.9 NG/DL (ref 0.71–1.51)
TSH SERPL DL<=0.005 MIU/L-ACNC: 0.28 UIU/ML (ref 0.4–4)
WBC # BLD AUTO: 13.86 K/UL (ref 3.9–12.7)

## 2022-04-07 PROCEDURE — 86769 SARS-COV-2 COVID-19 ANTIBODY: CPT | Performed by: INTERNAL MEDICINE

## 2022-04-07 PROCEDURE — 85025 COMPLETE CBC W/AUTO DIFF WBC: CPT | Performed by: INTERNAL MEDICINE

## 2022-04-07 PROCEDURE — 80053 COMPREHEN METABOLIC PANEL: CPT | Performed by: INTERNAL MEDICINE

## 2022-04-07 PROCEDURE — 86140 C-REACTIVE PROTEIN: CPT | Performed by: INTERNAL MEDICINE

## 2022-04-07 PROCEDURE — 36415 COLL VENOUS BLD VENIPUNCTURE: CPT | Mod: PO | Performed by: INTERNAL MEDICINE

## 2022-04-07 PROCEDURE — 82306 VITAMIN D 25 HYDROXY: CPT | Performed by: INTERNAL MEDICINE

## 2022-04-07 PROCEDURE — 86200 CCP ANTIBODY: CPT | Performed by: INTERNAL MEDICINE

## 2022-04-07 PROCEDURE — 86235 NUCLEAR ANTIGEN ANTIBODY: CPT | Performed by: INTERNAL MEDICINE

## 2022-04-07 PROCEDURE — 86431 RHEUMATOID FACTOR QUANT: CPT | Performed by: INTERNAL MEDICINE

## 2022-04-07 PROCEDURE — 84443 ASSAY THYROID STIM HORMONE: CPT | Performed by: INTERNAL MEDICINE

## 2022-04-07 PROCEDURE — 84439 ASSAY OF FREE THYROXINE: CPT | Performed by: INTERNAL MEDICINE

## 2022-04-07 PROCEDURE — 86235 NUCLEAR ANTIGEN ANTIBODY: CPT | Mod: 59 | Performed by: INTERNAL MEDICINE

## 2022-04-07 PROCEDURE — 85651 RBC SED RATE NONAUTOMATED: CPT | Mod: PO | Performed by: INTERNAL MEDICINE

## 2022-04-08 LAB — RHEUMATOID FACT SERPL-ACNC: 75 IU/ML (ref 0–15)

## 2022-04-12 LAB
ANTI-SSA ANTIBODY: 0.05 RATIO (ref 0–0.99)
ANTI-SSA INTERPRETATION: NEGATIVE
ANTI-SSB ANTIBODY: 0.17 RATIO (ref 0–0.99)
ANTI-SSB INTERPRETATION: NEGATIVE

## 2022-04-13 ENCOUNTER — TELEPHONE (OUTPATIENT)
Dept: FAMILY MEDICINE | Facility: CLINIC | Age: 77
End: 2022-04-13
Payer: MEDICARE

## 2022-04-13 DIAGNOSIS — M47.817 LUMBAR AND SACRAL ARTHRITIS: ICD-10-CM

## 2022-04-13 DIAGNOSIS — E11.69 TYPE 2 DIABETES MELLITUS WITH HYPERLIPIDEMIA: ICD-10-CM

## 2022-04-13 DIAGNOSIS — E11.9 TYPE 2 DIABETES MELLITUS WITHOUT COMPLICATION, UNSPECIFIED WHETHER LONG TERM INSULIN USE: ICD-10-CM

## 2022-04-13 DIAGNOSIS — L40.9 PSORIASIS: ICD-10-CM

## 2022-04-13 DIAGNOSIS — N28.89 RENAL MASS: ICD-10-CM

## 2022-04-13 DIAGNOSIS — J40 BRONCHITIS: ICD-10-CM

## 2022-04-13 DIAGNOSIS — I10 HYPERTENSION, UNSPECIFIED TYPE: ICD-10-CM

## 2022-04-13 DIAGNOSIS — M05.9 RHEUMATOID ARTHRITIS WITH POSITIVE RHEUMATOID FACTOR, INVOLVING UNSPECIFIED SITE: ICD-10-CM

## 2022-04-13 DIAGNOSIS — G89.4 CHRONIC PAIN SYNDROME: ICD-10-CM

## 2022-04-13 DIAGNOSIS — M35.00 SJOGREN'S SYNDROME, WITH UNSPECIFIED ORGAN INVOLVEMENT: ICD-10-CM

## 2022-04-13 DIAGNOSIS — E78.2 MIXED HYPERLIPIDEMIA: ICD-10-CM

## 2022-04-13 DIAGNOSIS — E11.9 TYPE 2 DIABETES MELLITUS WITHOUT COMPLICATION, WITHOUT LONG-TERM CURRENT USE OF INSULIN: ICD-10-CM

## 2022-04-13 DIAGNOSIS — E21.3 HYPERPARATHYROIDISM, UNSPECIFIED: ICD-10-CM

## 2022-04-13 DIAGNOSIS — E78.5 TYPE 2 DIABETES MELLITUS WITH HYPERLIPIDEMIA: ICD-10-CM

## 2022-04-13 DIAGNOSIS — M17.0 PRIMARY OSTEOARTHRITIS OF BOTH KNEES: ICD-10-CM

## 2022-04-13 RX ORDER — LANCETS
EACH MISCELLANEOUS
Qty: 100 EACH | Refills: 11 | Status: SHIPPED | OUTPATIENT
Start: 2022-04-13 | End: 2022-12-08

## 2022-04-13 RX ORDER — PRAVASTATIN SODIUM 20 MG/1
20 TABLET ORAL DAILY
Qty: 90 TABLET | Refills: 3 | Status: SHIPPED | OUTPATIENT
Start: 2022-04-13 | End: 2022-05-17

## 2022-04-13 RX ORDER — PREDNISONE 5 MG/1
TABLET ORAL
Qty: 90 TABLET | Refills: 3 | OUTPATIENT
Start: 2022-04-13

## 2022-04-13 RX ORDER — ALBUTEROL SULFATE 90 UG/1
2 AEROSOL, METERED RESPIRATORY (INHALATION) EVERY 6 HOURS PRN
Qty: 18 G | Refills: 6 | Status: SHIPPED | OUTPATIENT
Start: 2022-04-13 | End: 2022-08-22 | Stop reason: SDUPTHER

## 2022-04-13 RX ORDER — INSULIN PUMP SYRINGE, 3 ML
EACH MISCELLANEOUS
Qty: 1 EACH | Refills: 0 | Status: SHIPPED | OUTPATIENT
Start: 2022-04-13 | End: 2022-12-08

## 2022-04-13 RX ORDER — FUROSEMIDE 40 MG/1
TABLET ORAL
Qty: 180 TABLET | Refills: 1 | Status: SHIPPED | OUTPATIENT
Start: 2022-04-13 | End: 2022-12-08

## 2022-04-13 RX ORDER — POTASSIUM CHLORIDE 20 MEQ/1
40 TABLET, EXTENDED RELEASE ORAL 2 TIMES DAILY
Qty: 360 TABLET | Refills: 1 | Status: SHIPPED | OUTPATIENT
Start: 2022-04-13 | End: 2022-08-22 | Stop reason: SDUPTHER

## 2022-04-13 RX ORDER — GLIPIZIDE 5 MG/1
5 TABLET, FILM COATED, EXTENDED RELEASE ORAL
Qty: 90 TABLET | Refills: 3 | Status: SHIPPED | OUTPATIENT
Start: 2022-04-13 | End: 2022-05-17

## 2022-04-13 RX ORDER — AMLODIPINE AND BENAZEPRIL HYDROCHLORIDE 5; 20 MG/1; MG/1
1 CAPSULE ORAL 2 TIMES DAILY
Qty: 180 CAPSULE | Refills: 3 | Status: SHIPPED | OUTPATIENT
Start: 2022-04-13 | End: 2022-10-17

## 2022-04-13 RX ORDER — HYDROXYCHLOROQUINE SULFATE 200 MG/1
200 TABLET, FILM COATED ORAL 2 TIMES DAILY
Qty: 60 TABLET | Refills: 11 | Status: SHIPPED | OUTPATIENT
Start: 2022-04-13 | End: 2022-07-20 | Stop reason: SDUPTHER

## 2022-04-13 NOTE — TELEPHONE ENCOUNTER
----- Message from Ayaka Valenzuela sent at 4/13/2022  9:09 AM CDT -----  Contact: Rekha  Rekha called regarding her right side painful when she coughs and breaths.  She would like to be seen today if possible, please give her a call back at [phone]      Thanks  Kb

## 2022-04-13 NOTE — TELEPHONE ENCOUNTER
Care Due:                  Date            Visit Type   Department     Provider  --------------------------------------------------------------------------------                                EP -                              PRIMARY      [facility] FAMILY  Last Visit: 07-      [facility]   MEDICINE       Roberto Hernandez  Next Visit: None Scheduled  None         None Found                                                            Last  Test          Frequency    Reason                     Performed    Due Date  --------------------------------------------------------------------------------    HBA1C.......  6 months...  glipiZIDE................  10-   04-    Lipid Panel.  12 months..  pravastatin..............  10-   10-    Powered by [employer]. Reference number: [id number].   4/13/2022 7:52:34 AM CDT

## 2022-04-18 NOTE — PROGRESS NOTES
Patient, Rekha Miller (MRN #[id number]), presented with a recorded BMI of 41.9 kg/m^2 consistent with the definition of morbid obesity (ICD-10 E66.01). The patient's morbid obesity was monitored, evaluated, addressed and/or treated. This addendum to the medical record is made on 04/18/2022.

## 2022-04-20 DIAGNOSIS — G89.4 CHRONIC PAIN SYNDROME: ICD-10-CM

## 2022-04-20 DIAGNOSIS — L40.9 PSORIASIS: ICD-10-CM

## 2022-04-20 DIAGNOSIS — M47.817 LUMBAR AND SACRAL ARTHRITIS: ICD-10-CM

## 2022-04-20 DIAGNOSIS — M35.00 SJOGREN'S SYNDROME, WITH UNSPECIFIED ORGAN INVOLVEMENT: ICD-10-CM

## 2022-04-20 DIAGNOSIS — M17.0 PRIMARY OSTEOARTHRITIS OF BOTH KNEES: ICD-10-CM

## 2022-04-20 DIAGNOSIS — N28.89 RENAL MASS: ICD-10-CM

## 2022-04-20 DIAGNOSIS — M05.9 RHEUMATOID ARTHRITIS WITH POSITIVE RHEUMATOID FACTOR, INVOLVING UNSPECIFIED SITE: ICD-10-CM

## 2022-04-20 DIAGNOSIS — E11.9 TYPE 2 DIABETES MELLITUS WITHOUT COMPLICATION, WITHOUT LONG-TERM CURRENT USE OF INSULIN: ICD-10-CM

## 2022-04-20 DIAGNOSIS — E11.9 TYPE 2 DIABETES MELLITUS WITHOUT COMPLICATION, UNSPECIFIED WHETHER LONG TERM INSULIN USE: ICD-10-CM

## 2022-04-20 DIAGNOSIS — E21.3 HYPERPARATHYROIDISM, UNSPECIFIED: ICD-10-CM

## 2022-04-20 RX ORDER — LEFLUNOMIDE 10 MG/1
10 TABLET ORAL DAILY
Qty: 90 TABLET | Refills: 3 | OUTPATIENT
Start: 2022-04-20

## 2022-05-17 DIAGNOSIS — E78.2 MIXED HYPERLIPIDEMIA: ICD-10-CM

## 2022-05-17 DIAGNOSIS — E78.5 TYPE 2 DIABETES MELLITUS WITH HYPERLIPIDEMIA: ICD-10-CM

## 2022-05-17 DIAGNOSIS — E11.69 TYPE 2 DIABETES MELLITUS WITH HYPERLIPIDEMIA: ICD-10-CM

## 2022-05-17 NOTE — TELEPHONE ENCOUNTER
No new care gaps identified.  [facility] Embedded Care Gaps. Reference number: [id number]. 5/17/2022   8:23:32 AM CDT

## 2022-05-17 NOTE — TELEPHONE ENCOUNTER
Refill Routing Note   Medication(s) are not appropriate for processing by [facility] for the following reason(s):      - Required laboratory values are outdated  - Required laboratory values are abnormal    [facility] action(s):  Defer          Medication reconciliation completed: No     Appointments  past 12m or future 3m with PCP    Date Provider   Last Visit   6/7/2021 Divina Silva MD   Next Visit   6/30/2022 Divina Silva MD   ED visits in past 90 days: 0        Note composed:6:09 PM 05/17/2022

## 2022-05-18 RX ORDER — GLIPIZIDE 5 MG/1
5 TABLET, FILM COATED, EXTENDED RELEASE ORAL
Qty: 90 TABLET | Refills: 0 | Status: SHIPPED | OUTPATIENT
Start: 2022-05-18 | End: 2022-08-22 | Stop reason: SDUPTHER

## 2022-05-18 RX ORDER — PRAVASTATIN SODIUM 20 MG/1
20 TABLET ORAL DAILY
Qty: 90 TABLET | Refills: 0 | Status: SHIPPED | OUTPATIENT
Start: 2022-05-18 | End: 2022-08-22 | Stop reason: SDUPTHER

## 2022-05-27 ENCOUNTER — PATIENT MESSAGE (OUTPATIENT)
Dept: FAMILY MEDICINE | Facility: CLINIC | Age: 77
End: 2022-05-27
Payer: MEDICARE

## 2022-06-17 ENCOUNTER — LAB VISIT (OUTPATIENT)
Dept: LAB | Facility: HOSPITAL | Age: 77
End: 2022-06-17
Attending: INTERNAL MEDICINE
Payer: MEDICARE

## 2022-06-17 DIAGNOSIS — Z13.220 ENCOUNTER FOR LIPID SCREENING FOR CARDIOVASCULAR DISEASE: ICD-10-CM

## 2022-06-17 DIAGNOSIS — Z13.6 ENCOUNTER FOR LIPID SCREENING FOR CARDIOVASCULAR DISEASE: ICD-10-CM

## 2022-06-17 DIAGNOSIS — E78.5 TYPE 2 DIABETES MELLITUS WITH HYPERLIPIDEMIA: ICD-10-CM

## 2022-06-17 DIAGNOSIS — E11.69 TYPE 2 DIABETES MELLITUS WITH HYPERLIPIDEMIA: ICD-10-CM

## 2022-06-17 LAB
CHOLEST SERPL-MCNC: 209 MG/DL (ref 120–199)
CHOLEST/HDLC SERPL: 3.7 {RATIO} (ref 2–5)
ESTIMATED AVG GLUCOSE: 120 MG/DL (ref 68–131)
HBA1C MFR BLD: 5.8 % (ref 4–5.6)
HDLC SERPL-MCNC: 57 MG/DL (ref 40–75)
HDLC SERPL: 27.3 % (ref 20–50)
LDLC SERPL CALC-MCNC: 115.8 MG/DL (ref 63–159)
NONHDLC SERPL-MCNC: 152 MG/DL
TRIGL SERPL-MCNC: 181 MG/DL (ref 30–150)

## 2022-06-17 PROCEDURE — 83036 HEMOGLOBIN GLYCOSYLATED A1C: CPT | Performed by: INTERNAL MEDICINE

## 2022-06-17 PROCEDURE — 36415 COLL VENOUS BLD VENIPUNCTURE: CPT | Mod: PO | Performed by: INTERNAL MEDICINE

## 2022-06-17 PROCEDURE — 80061 LIPID PANEL: CPT | Performed by: INTERNAL MEDICINE

## 2022-06-18 NOTE — PROGRESS NOTES
Results have been released via [employer]. Please verify that these have been viewed by patient. If not, please call patient with results.    I have sent a message to them with the following interpretation (see below).    I have reviewed your recent lab results.     A1C improved to 5.8. Remains in prediabetes range.    Cholesterol increased some. We may need to discuss increasing dose of medication at upcoming visit.    Please do not hesitate to call or message with any additional questions or concerns.    Divina Silva MD

## 2022-06-25 DIAGNOSIS — E11.9 TYPE 2 DIABETES MELLITUS WITHOUT COMPLICATION, WITHOUT LONG-TERM CURRENT USE OF INSULIN: ICD-10-CM

## 2022-06-25 DIAGNOSIS — M17.0 PRIMARY OSTEOARTHRITIS OF BOTH KNEES: ICD-10-CM

## 2022-06-25 DIAGNOSIS — E11.9 TYPE 2 DIABETES MELLITUS WITHOUT COMPLICATION, UNSPECIFIED WHETHER LONG TERM INSULIN USE: ICD-10-CM

## 2022-06-25 DIAGNOSIS — L40.9 PSORIASIS: ICD-10-CM

## 2022-06-25 DIAGNOSIS — M47.817 LUMBAR AND SACRAL ARTHRITIS: ICD-10-CM

## 2022-06-25 DIAGNOSIS — G89.4 CHRONIC PAIN SYNDROME: ICD-10-CM

## 2022-06-25 DIAGNOSIS — E21.3 HYPERPARATHYROIDISM, UNSPECIFIED: ICD-10-CM

## 2022-06-25 DIAGNOSIS — M05.9 RHEUMATOID ARTHRITIS WITH POSITIVE RHEUMATOID FACTOR, INVOLVING UNSPECIFIED SITE: ICD-10-CM

## 2022-06-25 DIAGNOSIS — N28.89 RENAL MASS: ICD-10-CM

## 2022-06-25 DIAGNOSIS — M35.00 SJOGREN'S SYNDROME, WITH UNSPECIFIED ORGAN INVOLVEMENT: ICD-10-CM

## 2022-06-27 ENCOUNTER — TELEPHONE (OUTPATIENT)
Dept: RHEUMATOLOGY | Facility: CLINIC | Age: 77
End: 2022-06-27

## 2022-06-27 RX ORDER — HYDROCODONE BITARTRATE AND ACETAMINOPHEN 10; 325 MG/1; MG/1
1 TABLET ORAL EVERY 6 HOURS PRN
Refills: 0 | Status: CANCELLED | OUTPATIENT
Start: 2022-06-27

## 2022-06-27 NOTE — TELEPHONE ENCOUNTER
----- Message from Libertad Keith sent at 6/27/2022  8:30 AM CDT -----  Type:  RX Refill Request    Who Called: Patient    Refill or New Rx: refill    RX Name and Strength: HYDROcodone-acetaminophen (NORCO)  mg per tablet    How is the patient currently taking it? (ex. 1XDay)    Is this a 30 day or 90 day RX:    Preferred Pharmacy with phone number:   [facility] - [place], [place] - [address] [address] [facility] [address]  Phone: [phone] Fax: [phone]    Local or Mail Order:    Ordering Provider:    Would the patient rather a call back or a response via [facility]? call    Best Call Back Number:  [phone]    Additional Information:  state she is calling in advance

## 2022-06-28 RX ORDER — HYDROCODONE BITARTRATE AND ACETAMINOPHEN 10; 325 MG/1; MG/1
TABLET ORAL
Qty: 90 TABLET | Refills: 0 | Status: SHIPPED | OUTPATIENT
Start: 2022-06-28 | End: 2022-07-20 | Stop reason: SDUPTHER

## 2022-07-12 ENCOUNTER — OFFICE VISIT (OUTPATIENT)
Dept: PODIATRY | Facility: CLINIC | Age: 77
End: 2022-07-12
Payer: MEDICARE

## 2022-07-12 VITALS — BODY MASS INDEX: 43.24 KG/M2 | HEIGHT: 65 IN | WEIGHT: 259.5 LBS

## 2022-07-12 DIAGNOSIS — E11.69 TYPE 2 DIABETES MELLITUS WITH HYPERLIPIDEMIA: Primary | ICD-10-CM

## 2022-07-12 DIAGNOSIS — R60.0 BILATERAL LOWER EXTREMITY EDEMA: ICD-10-CM

## 2022-07-12 DIAGNOSIS — E78.5 TYPE 2 DIABETES MELLITUS WITH HYPERLIPIDEMIA: Primary | ICD-10-CM

## 2022-07-12 DIAGNOSIS — L60.3 ONYCHODYSTROPHY: ICD-10-CM

## 2022-07-12 DIAGNOSIS — E11.69 TYPE 2 DIABETES MELLITUS WITH OTHER SPECIFIED COMPLICATION, WITHOUT LONG-TERM CURRENT USE OF INSULIN: ICD-10-CM

## 2022-07-12 PROCEDURE — 1160F RVW MEDS BY RX/DR IN RCRD: CPT | Mod: HCNC,CPTII,S$GLB, | Performed by: PODIATRIST

## 2022-07-12 PROCEDURE — 99999 PR PBB SHADOW E&M-EST. PATIENT-LVL V: CPT | Mod: PBBFAC,HCNC,, | Performed by: PODIATRIST

## 2022-07-12 PROCEDURE — 1101F PR PT FALLS ASSESS DOC 0-1 FALLS W/OUT INJ PAST YR: ICD-10-PCS | Mod: HCNC,CPTII,S$GLB, | Performed by: PODIATRIST

## 2022-07-12 PROCEDURE — 1101F PT FALLS ASSESS-DOCD LE1/YR: CPT | Mod: HCNC,CPTII,S$GLB, | Performed by: PODIATRIST

## 2022-07-12 PROCEDURE — 1160F PR REVIEW ALL MEDS BY PRESCRIBER/CLIN PHARMACIST DOCUMENTED: ICD-10-PCS | Mod: HCNC,CPTII,S$GLB, | Performed by: PODIATRIST

## 2022-07-12 PROCEDURE — 1159F PR MEDICATION LIST DOCUMENTED IN MEDICAL RECORD: ICD-10-PCS | Mod: HCNC,CPTII,S$GLB, | Performed by: PODIATRIST

## 2022-07-12 PROCEDURE — 99999 PR PBB SHADOW E&M-EST. PATIENT-LVL V: ICD-10-PCS | Mod: PBBFAC,HCNC,, | Performed by: PODIATRIST

## 2022-07-12 PROCEDURE — 11721 PR DEBRIDEMENT OF NAILS, 6 OR MORE: ICD-10-PCS | Mod: HCNC,S$PBB,, | Performed by: PODIATRIST

## 2022-07-12 PROCEDURE — 3044F HG A1C LEVEL LT 7.0%: CPT | Mod: HCNC,CPTII,S$GLB, | Performed by: PODIATRIST

## 2022-07-12 PROCEDURE — 99213 PR OFFICE/OUTPT VISIT, EST, LEVL III, 20-29 MIN: ICD-10-PCS | Mod: 25,HCNC,S$PBB, | Performed by: PODIATRIST

## 2022-07-12 PROCEDURE — 4010F PR ACE/ARB THEARPY RXD/TAKEN: ICD-10-PCS | Mod: HCNC,CPTII,S$GLB, | Performed by: PODIATRIST

## 2022-07-12 PROCEDURE — 1126F PR PAIN SEVERITY QUANTIFIED, NO PAIN PRESENT: ICD-10-PCS | Mod: HCNC,CPTII,S$GLB, | Performed by: PODIATRIST

## 2022-07-12 PROCEDURE — 99213 OFFICE O/P EST LOW 20 MIN: CPT | Mod: 25,HCNC,S$PBB, | Performed by: PODIATRIST

## 2022-07-12 PROCEDURE — 4010F ACE/ARB THERAPY RXD/TAKEN: CPT | Mod: HCNC,CPTII,S$GLB, | Performed by: PODIATRIST

## 2022-07-12 PROCEDURE — 3288F FALL RISK ASSESSMENT DOCD: CPT | Mod: HCNC,CPTII,S$GLB, | Performed by: PODIATRIST

## 2022-07-12 PROCEDURE — 3288F PR FALLS RISK ASSESSMENT DOCUMENTED: ICD-10-PCS | Mod: HCNC,CPTII,S$GLB, | Performed by: PODIATRIST

## 2022-07-12 PROCEDURE — 3044F PR MOST RECENT HEMOGLOBIN A1C LEVEL <7.0%: ICD-10-PCS | Mod: HCNC,CPTII,S$GLB, | Performed by: PODIATRIST

## 2022-07-12 PROCEDURE — 1126F AMNT PAIN NOTED NONE PRSNT: CPT | Mod: HCNC,CPTII,S$GLB, | Performed by: PODIATRIST

## 2022-07-12 PROCEDURE — 1159F MED LIST DOCD IN RCRD: CPT | Mod: HCNC,CPTII,S$GLB, | Performed by: PODIATRIST

## 2022-07-12 PROCEDURE — 11721 DEBRIDE NAIL 6 OR MORE: CPT | Mod: HCNC,S$PBB,, | Performed by: PODIATRIST

## 2022-07-12 NOTE — PROGRESS NOTES
Subjective:       Patient ID: Rekha Miller is a 77 y.o. female.    Chief Complaint: Nail Care (Coming in for 4 month DNC, c/o soreness and swelling in feet, rates pain 0/10, diabetic, wearing socks and tennis shoes, last seen PCP Dr. Silva last year some time but she's going to her on the 28th of this month.)      HPI: Patient presents to the office today with the chief complaint of elongated, thickened and dystrophic nail plates to the B/L foot. This patient is a Diabetic Type II. Patient does follow with Primary Care and/or Endocrinology for management of Diabetes Mellitus. This patient's PMD is Divina Silva MD. This patient will see her primary care provider on 07/28/2022.  She relates increase in swelling to the bilateral lower extremities.  States she does not routinely wear compression socks.  Interested in foot soaks.    Hemoglobin A1C   Date Value Ref Range Status   06/17/2022 5.8 (H) 4.0 - 5.6 % Final     Comment:     ADA Screening Guidelines:  5.7-6.4%  Consistent with prediabetes  >or=6.5%  Consistent with diabetes    High levels of fetal hemoglobin interfere with the HbA1C  assay. Heterozygous hemoglobin variants (HbS, HgC, etc)do  not significantly interfere with this assay.   However, presence of multiple variants may affect accuracy.     10/26/2021 5.9 <5.7 % Final   07/29/2021 6.0 (H) 4.0 - 5.6 % Final     Comment:     ADA Screening Guidelines:  5.7-6.4%  Consistent with prediabetes  >or=6.5%  Consistent with diabetes    High levels of fetal hemoglobin interfere with the HbA1C  assay. Heterozygous hemoglobin variants (HbS, HgC, etc)do  not significantly interfere with this assay.   However, presence of multiple variants may affect accuracy.     .     Review of patient's allergies indicates:   Allergen Reactions    Latex, natural rubber Swelling and Rash    Codeine     Dairy digestive ultra      Dairy products      Imuran [azathioprine sodium] Diarrhea    Lactobacillus     Lactobacillus  "acidoph-lactase Other (See Comments)    Morphine      DOESN'T FEEL RIGHT     Plaquenil [hydroxychloroquine] Other (See Comments)     headaches    Milk containing products Diarrhea       Past Medical History:   Diagnosis Date    Asthma     COPD (chronic obstructive pulmonary disease)     Degenerative disc disease     Diabetes mellitus     Diabetes mellitus, type 2     Fibromyalgia     Hypertension     Rheumatoid arthritis     Rheumatoid arthritis(714.0)     Rheumatoid arthritis       Family History   Problem Relation Age of Onset    Cancer Mother     Anemia Mother     Alcohol abuse Father     Anemia Maternal Grandmother     Hypertension Paternal Grandmother     Arthritis Paternal Grandmother        Social History     Socioeconomic History    Marital status:     Number of children: 8   Tobacco Use    Smoking status: Never Smoker    Smokeless tobacco: Never Used   Substance and Sexual Activity    Alcohol use: Never    Drug use: No       Past Surgical History:   Procedure Laterality Date    BREAST SURGERY  1986    CATARACT EXTRACTION      EYE SURGERY  2013    Catatracts    ORIF FEMUR FRACTURE      right knee ligament rpeair  2005    right shoulder surgery  4/18/07    Dr. Gao       Review of Systems       Objective:   Ht 5' 5" (1.651 m)   Wt 117.7 kg (259 lb 7.7 oz)   BMI 43.18 kg/m²     Physical Exam  LOWER EXTREMITY PHYSICAL EXAMINATION    VASCULAR:  The right dorsalis pedis pulse 2/4 and the right posterior tibial pulse 2/4.  The left dorsalis pedis pulse 2/4 and posterior tibial pulse on the left is 2/4.  Capillary refill is intact.  Pedal hair growth intact    NEUROLOGY: Protective sensation is not intact to the left and right plantar surfaces of the foot and digits, as the patient has no sensation/detection at greater than 4 distinct points of contact with 5.07 Juliette Juno monofilament. Sensation to light touch is intact on the left and right foot. Proprioception is " intact, bilateral. Sensation to pin prick is reduced to absent. Vibratory sensation is diminished.    DERMATOLOGY:  Skin is supple, moist, intact.  There is no signs of callusing, ulcerations, other lesions identified to the dorsal or plantar aspect of the right or left foot.  The R1, 2, 5 and left L1,2, 5 are thickened, discolored dystrophic.  There is subungual debris.  Nail plates have area of dark discoloration.  The remaining nails 3-4 on the right foot and the left foot are elongated but of normal color, thickness, and texture.   There is no signs of ingrowing into the medial or lateral borders.  There is no evidence of wounds or skin breakdown.  No edema or erythema.  No obvious lacerations or fissuring.  Interdigital spaces are clean, dry, intact.  No rashes or scars appreciated.    ORTHOPEDIC: Manual Muscle Testing is 5/5 in all planes on the left and right, without pains, with and without resistance. Gait pattern is non-antalgic.    Assessment:     1. Type 2 diabetes mellitus with hyperlipidemia    2. Type 2 diabetes mellitus with other specified complication, without long-term current use of insulin    3. Onychodystrophy    4. Bilateral lower extremity edema        Plan:     Type 2 diabetes mellitus with hyperlipidemia    Type 2 diabetes mellitus with other specified complication, without long-term current use of insulin    Onychodystrophy    Bilateral lower extremity edema        Thorough discussion is had with the patient this afternoon, concerning the diagnosis, its etiology, and the treatment algorithm at present.  Greater than 50% of this visit spent on counseling and coordination of care. Greater than 15 minutes of a 20 minute appointment spent on education about the diabetic foot, neuropathy, and prevention of limb loss.  Shoe inspection. Diabetic Foot Education. Patient reminded of the importance of good nutrition and blood sugar control to help prevent podiatric complications of diabetes. Patient  instructed on proper foot hygeine. We discussed wearing proper and supportive shoe gear, daily foot inspections, never walking barefooted or sock footed, never putting sharp instruments to feet which can cause major complications associated with infection, ulcers, lacerations.      Dystrophic nail plates, as outlined above (R#1,2,5  ; L#1,2,5 ), are sharply debrided with double action nail nipper, and/or with the assistance of a mechanical rotary sandeep, with removal of all offending nail and nail border(s), for reduction of pains. Nails are reduced in terms of length, width and girth with removal of subungual debris to facilitate pain free weight bearing and ambulation. The elongated nails as outlined in the objective portion of this note, were trimmed to appropriate length, with a double action nail nipper, for alleviation/reduction of pains as well. Follow up in approx. 3-4 months.    Discussed the importance of bilateral lower leg compression decrease fluid accumulation to lower extremity.  I do recommend patient consider 20-30 mmHg compression to be applied from toes to the knees.  This will be gradual compression to be worn daily.  She is to remove these at night when lying in bed.  We discussed importance of elevation as well as this is essential to reducing fluid accumulation to bilateral lower extremities.    Future Appointments   Date Time Provider Department Center   7/20/2022  1:00 PM Pedro Johnson MD [facility]   7/28/2022 10:40 AM Divina Silva MD [facility] [place]   11/22/2022 10:20 AM Rossi Uribe DPM [facility] POD [place]

## 2022-07-20 ENCOUNTER — OFFICE VISIT (OUTPATIENT)
Dept: RHEUMATOLOGY | Facility: CLINIC | Age: 77
End: 2022-07-20
Payer: MEDICARE

## 2022-07-20 VITALS
SYSTOLIC BLOOD PRESSURE: 165 MMHG | BODY MASS INDEX: 43.24 KG/M2 | HEIGHT: 65 IN | DIASTOLIC BLOOD PRESSURE: 79 MMHG | WEIGHT: 259.5 LBS | HEART RATE: 79 BPM

## 2022-07-20 DIAGNOSIS — M05.9 RHEUMATOID ARTHRITIS WITH POSITIVE RHEUMATOID FACTOR, INVOLVING UNSPECIFIED SITE: ICD-10-CM

## 2022-07-20 DIAGNOSIS — M25.551 HIP PAIN, RIGHT: ICD-10-CM

## 2022-07-20 DIAGNOSIS — L40.9 PSORIASIS: ICD-10-CM

## 2022-07-20 DIAGNOSIS — L81.1 MELASMA: Primary | ICD-10-CM

## 2022-07-20 DIAGNOSIS — M47.817 LUMBAR AND SACRAL ARTHRITIS: ICD-10-CM

## 2022-07-20 DIAGNOSIS — G89.4 CHRONIC PAIN SYNDROME: ICD-10-CM

## 2022-07-20 DIAGNOSIS — E11.9 TYPE 2 DIABETES MELLITUS WITHOUT COMPLICATION, WITHOUT LONG-TERM CURRENT USE OF INSULIN: ICD-10-CM

## 2022-07-20 DIAGNOSIS — M35.00 SJOGREN'S SYNDROME, WITH UNSPECIFIED ORGAN INVOLVEMENT: ICD-10-CM

## 2022-07-20 DIAGNOSIS — M17.0 PRIMARY OSTEOARTHRITIS OF BOTH KNEES: ICD-10-CM

## 2022-07-20 DIAGNOSIS — E11.9 TYPE 2 DIABETES MELLITUS WITHOUT COMPLICATION, UNSPECIFIED WHETHER LONG TERM INSULIN USE: ICD-10-CM

## 2022-07-20 DIAGNOSIS — N28.89 RENAL MASS: ICD-10-CM

## 2022-07-20 DIAGNOSIS — E21.3 HYPERPARATHYROIDISM, UNSPECIFIED: ICD-10-CM

## 2022-07-20 DIAGNOSIS — M81.0 OSTEOPOROSIS, UNSPECIFIED OSTEOPOROSIS TYPE, UNSPECIFIED PATHOLOGICAL FRACTURE PRESENCE: ICD-10-CM

## 2022-07-20 DIAGNOSIS — M16.0 OSTEOARTHRITIS OF BOTH HIPS, UNSPECIFIED OSTEOARTHRITIS TYPE: ICD-10-CM

## 2022-07-20 PROCEDURE — 3078F DIAST BP <80 MM HG: CPT | Mod: CPTII,S$GLB,, | Performed by: INTERNAL MEDICINE

## 2022-07-20 PROCEDURE — 99999 PR PBB SHADOW E&M-EST. PATIENT-LVL V: CPT | Mod: PBBFAC,,, | Performed by: INTERNAL MEDICINE

## 2022-07-20 PROCEDURE — 4010F ACE/ARB THERAPY RXD/TAKEN: CPT | Mod: CPTII,S$GLB,, | Performed by: INTERNAL MEDICINE

## 2022-07-20 PROCEDURE — 3044F PR MOST RECENT HEMOGLOBIN A1C LEVEL <7.0%: ICD-10-PCS | Mod: CPTII,S$GLB,, | Performed by: INTERNAL MEDICINE

## 2022-07-20 PROCEDURE — 99499 UNLISTED E&M SERVICE: CPT | Mod: S$GLB,,, | Performed by: INTERNAL MEDICINE

## 2022-07-20 PROCEDURE — 3288F FALL RISK ASSESSMENT DOCD: CPT | Mod: CPTII,S$GLB,, | Performed by: INTERNAL MEDICINE

## 2022-07-20 PROCEDURE — 4010F PR ACE/ARB THEARPY RXD/TAKEN: ICD-10-PCS | Mod: CPTII,S$GLB,, | Performed by: INTERNAL MEDICINE

## 2022-07-20 PROCEDURE — 1101F PT FALLS ASSESS-DOCD LE1/YR: CPT | Mod: CPTII,S$GLB,, | Performed by: INTERNAL MEDICINE

## 2022-07-20 PROCEDURE — 3078F PR MOST RECENT DIASTOLIC BLOOD PRESSURE < 80 MM HG: ICD-10-PCS | Mod: CPTII,S$GLB,, | Performed by: INTERNAL MEDICINE

## 2022-07-20 PROCEDURE — 99999 PR PBB SHADOW E&M-EST. PATIENT-LVL V: ICD-10-PCS | Mod: PBBFAC,,, | Performed by: INTERNAL MEDICINE

## 2022-07-20 PROCEDURE — 20610 DRAIN/INJ JOINT/BURSA W/O US: CPT | Mod: RT,S$GLB,, | Performed by: INTERNAL MEDICINE

## 2022-07-20 PROCEDURE — 3077F SYST BP >= 140 MM HG: CPT | Mod: CPTII,S$GLB,, | Performed by: INTERNAL MEDICINE

## 2022-07-20 PROCEDURE — 99499 RISK ADDL DX/OHS AUDIT: ICD-10-PCS | Mod: S$GLB,,, | Performed by: INTERNAL MEDICINE

## 2022-07-20 PROCEDURE — 99215 OFFICE O/P EST HI 40 MIN: CPT | Mod: 25,S$GLB,, | Performed by: INTERNAL MEDICINE

## 2022-07-20 PROCEDURE — 1159F MED LIST DOCD IN RCRD: CPT | Mod: CPTII,S$GLB,, | Performed by: INTERNAL MEDICINE

## 2022-07-20 PROCEDURE — 1101F PR PT FALLS ASSESS DOC 0-1 FALLS W/OUT INJ PAST YR: ICD-10-PCS | Mod: CPTII,S$GLB,, | Performed by: INTERNAL MEDICINE

## 2022-07-20 PROCEDURE — 20610 LARGE JOINT ASPIRATION/INJECTION: R GREATER TROCHANTERIC BURSA: ICD-10-PCS | Mod: RT,S$GLB,, | Performed by: INTERNAL MEDICINE

## 2022-07-20 PROCEDURE — 3077F PR MOST RECENT SYSTOLIC BLOOD PRESSURE >= 140 MM HG: ICD-10-PCS | Mod: CPTII,S$GLB,, | Performed by: INTERNAL MEDICINE

## 2022-07-20 PROCEDURE — 1125F PR PAIN SEVERITY QUANTIFIED, PAIN PRESENT: ICD-10-PCS | Mod: CPTII,S$GLB,, | Performed by: INTERNAL MEDICINE

## 2022-07-20 PROCEDURE — 99215 PR OFFICE/OUTPT VISIT, EST, LEVL V, 40-54 MIN: ICD-10-PCS | Mod: 25,S$GLB,, | Performed by: INTERNAL MEDICINE

## 2022-07-20 PROCEDURE — 1159F PR MEDICATION LIST DOCUMENTED IN MEDICAL RECORD: ICD-10-PCS | Mod: CPTII,S$GLB,, | Performed by: INTERNAL MEDICINE

## 2022-07-20 PROCEDURE — 3288F PR FALLS RISK ASSESSMENT DOCUMENTED: ICD-10-PCS | Mod: CPTII,S$GLB,, | Performed by: INTERNAL MEDICINE

## 2022-07-20 PROCEDURE — 3044F HG A1C LEVEL LT 7.0%: CPT | Mod: CPTII,S$GLB,, | Performed by: INTERNAL MEDICINE

## 2022-07-20 PROCEDURE — 1125F AMNT PAIN NOTED PAIN PRSNT: CPT | Mod: CPTII,S$GLB,, | Performed by: INTERNAL MEDICINE

## 2022-07-20 RX ORDER — HYDROXYCHLOROQUINE SULFATE 200 MG/1
200 TABLET, FILM COATED ORAL 2 TIMES DAILY
Qty: 180 TABLET | Refills: 3 | Status: SHIPPED | OUTPATIENT
Start: 2022-07-20 | End: 2022-12-08

## 2022-07-20 RX ORDER — HYDROCODONE BITARTRATE AND ACETAMINOPHEN 10; 325 MG/1; MG/1
1 TABLET ORAL EVERY 8 HOURS PRN
Qty: 90 TABLET | Refills: 0 | Status: SHIPPED | OUTPATIENT
Start: 2022-09-20 | End: 2022-10-20

## 2022-07-20 RX ORDER — KETOROLAC TROMETHAMINE 30 MG/ML
60 INJECTION, SOLUTION INTRAMUSCULAR; INTRAVENOUS
Status: COMPLETED | OUTPATIENT
Start: 2022-07-20 | End: 2022-07-20

## 2022-07-20 RX ORDER — PREDNISONE 5 MG/1
TABLET ORAL
Qty: 90 TABLET | Refills: 3 | Status: SHIPPED | OUTPATIENT
Start: 2022-07-20 | End: 2023-01-02

## 2022-07-20 RX ORDER — HYDROCODONE BITARTRATE AND ACETAMINOPHEN 10; 325 MG/1; MG/1
1 TABLET ORAL EVERY 8 HOURS PRN
Qty: 90 TABLET | Refills: 0 | Status: SHIPPED | OUTPATIENT
Start: 2022-08-20 | End: 2022-07-20 | Stop reason: SDUPTHER

## 2022-07-20 RX ORDER — IBANDRONATE SODIUM 150 MG/1
150 TABLET, FILM COATED ORAL
Qty: 3 TABLET | Refills: 3 | Status: SHIPPED | OUTPATIENT
Start: 2022-07-20 | End: 2023-03-09

## 2022-07-20 RX ORDER — LEFLUNOMIDE 10 MG/1
10 TABLET ORAL DAILY
Qty: 90 TABLET | Refills: 1 | Status: SHIPPED | OUTPATIENT
Start: 2022-07-20 | End: 2023-02-15

## 2022-07-20 RX ORDER — CYANOCOBALAMIN 1000 UG/ML
1000 INJECTION, SOLUTION INTRAMUSCULAR; SUBCUTANEOUS
Status: COMPLETED | OUTPATIENT
Start: 2022-07-20 | End: 2022-07-20

## 2022-07-20 RX ORDER — HYDROCODONE BITARTRATE AND ACETAMINOPHEN 10; 325 MG/1; MG/1
1 TABLET ORAL EVERY 8 HOURS PRN
Qty: 90 TABLET | Refills: 0 | Status: SHIPPED | OUTPATIENT
Start: 2022-07-20 | End: 2022-07-20 | Stop reason: SDUPTHER

## 2022-07-20 RX ADMIN — TRIAMCINOLONE ACETONIDE 40 MG: 40 INJECTION, SUSPENSION INTRA-ARTICULAR; INTRAMUSCULAR at 01:07

## 2022-07-20 RX ADMIN — CYANOCOBALAMIN 1000 MCG: 1000 INJECTION, SOLUTION INTRAMUSCULAR; SUBCUTANEOUS at 02:07

## 2022-07-20 RX ADMIN — KETOROLAC TROMETHAMINE 60 MG: 30 INJECTION, SOLUTION INTRAMUSCULAR; INTRAVENOUS at 02:07

## 2022-07-20 NOTE — PROGRESS NOTES
2 pt identifiers used  Allergies reviewed      Administered 2 cc ( 30 mg/ml ) of toradol to the left upper outer gluteal. Patient tolerated injections well. Informed of s/s to report verbalized understanding. No adverse reactions noted.       Administered 1 cc ( 1000 mcg/ml ) of b12 to the right deltoid. Informed of s/s to report verbalized understanding. No adverse reactions noted.    Left facility in stable condition

## 2022-07-20 NOTE — PROGRESS NOTES
Subjective:       Patient ID: Rekha Miller is a 77 y.o. female.    Chief Complaint: Disease Management    Follow up: 75 year old female  RA.  She was admitted for diverticulitis  Ct/abd, she has a L kidney mass. R hip pain and sciatic pain and newly dx renal mass She had cryotherapy for renal mass. . She complains of increased swelling of big toe L>R, toes, and ankles. She is concerned about possible gout. She also continues to have intermittent pain involving her hands. She has been compliant with LEF and plaquenil. Overall, AM stiffness is typically 30 minutes. She has intermittent dry mouth and dry eyes (which is followed by ophthalmology). She had Covid in June 2021    Current tx:  1.arava 10 mg- herld  2. Plaquenil 200 mg bid  3. norco            She complains of joint swelling. Associated symptoms include fatigue. Pertinent negatives include no fever or headaches.         Review of Systems   Constitutional: Positive for activity change and fatigue. Negative for appetite change, chills and fever.   HENT:        Dry mouth   Eyes: Negative for visual disturbance.        Dry eyes   Respiratory: Negative for cough and shortness of breath.    Cardiovascular: Negative for chest pain, palpitations and leg swelling.   Gastrointestinal: Negative for abdominal pain and constipation.   Musculoskeletal: Positive for arthralgias, joint swelling, neck pain and neck stiffness.   Allergic/Immunologic: Positive for immunocompromised state.   Neurological: Negative for dizziness, weakness, light-headedness and headaches.         Objective:     Vitals:    07/20/22 1321   BP: (!) 165/79   Pulse: 79       Past Medical History:   Diagnosis Date    Asthma     COPD (chronic obstructive pulmonary disease)     Degenerative disc disease     Diabetes mellitus     Diabetes mellitus, type 2     Fibromyalgia     Hypertension     Rheumatoid arthritis     Rheumatoid arthritis(714.0)     Rheumatoid arthritis     Past Surgical  History:   Procedure Laterality Date    BREAST SURGERY  1986    CATARACT EXTRACTION      EYE SURGERY  2013    Catatracts    ORIF FEMUR FRACTURE      right knee ligament rpeair  2005    right shoulder surgery  4/18/07    Dr. Gao          Physical Exam   Constitutional: She is oriented to person, place, and time.   HENT:   Head: Normocephalic and atraumatic.   Mouth/Throat: Oropharynx is clear and moist.   Eyes: Pupils are equal, round, and reactive to light.   Neck: No thyromegaly present.   Cardiovascular: Normal rate, regular rhythm and normal heart sounds. Exam reveals no gallop and no friction rub.   No murmur heard.  Pulmonary/Chest: Breath sounds normal. She has no wheezes. She has no rales. She exhibits no tenderness.   Abdominal: There is no abdominal tenderness. There is no rebound and no guarding.   Musculoskeletal:         General: Swelling, tenderness and deformity present.      Right shoulder: Tenderness present.      Left shoulder: Tenderness present.      Right elbow: Normal.      Left elbow: Normal.      Right wrist: Tenderness present.      Left wrist: Tenderness present.      Cervical back: Neck supple.      Right knee: No effusion. Tenderness present.      Left knee: No effusion. Tenderness present.   Lymphadenopathy:     She has no cervical adenopathy.   Neurological: She is alert and oriented to person, place, and time. Gait normal.   Skin: No rash noted. No erythema. No pallor.   Psychiatric: Mood, affect and judgment normal.   Nursing note and vitals reviewed.      Right Side Rheumatological Exam     Examination finds the elbow normal.    The patient is tender to palpation of the shoulder, wrist, knee, 1st PIP, 1st MCP, 2nd PIP, 2nd MCP, 3rd PIP, 3rd MCP, 4th PIP, 4th MCP, 5th PIP and 5th MCP    She has swelling of the 1st PIP, 1st MCP, 2nd PIP, 2nd MCP, 3rd PIP, 3rd MCP, 4th PIP, 4th MCP, 5th PIP and 5th MCP    Shoulder Exam   Tenderness Location: no tenderness    Range of Motion    Active abduction: abnormal   Adduction: abnormal  Sensation: normal    Knee Exam   Patellofemoral Crepitus: positive  Effusion: negative  Sensation: normal    Hip Exam   Tenderness Location: posterior  Sensation: normal    Elbow/Wrist Exam   Tenderness Location: no tenderness  Sensation: normal    Left Side Rheumatological Exam     Examination finds the elbow normal.    The patient is tender to palpation of the shoulder, wrist, knee, 1st PIP, 1st MCP, 2nd PIP, 2nd MCP, 3rd PIP, 3rd MCP, 4th PIP, 4th MCP, 5th PIP and 5th MCP.    She has swelling of the 1st PIP, 1st MCP, 2nd PIP, 2nd MCP, 3rd PIP, 3rd MCP, 4th PIP, 4th MCP, 5th PIP and 5th MCP    Shoulder Exam   Tenderness Location: no tenderness    Range of Motion   Active abduction: abnormal   Sensation: normal    Knee Exam     Patellofemoral Crepitus: positive  Effusion: negative  Sensation: normal    Hip Exam   Tenderness Location: posterior  Sensation: normal    Elbow/Wrist Exam   Sensation: normal      Back/Neck Exam   General Inspection   Gait: normal               Results for orders placed or performed in visit on 06/17/22   Lipid Panel   Result Value Ref Range    Cholesterol 209 (H) 120 - 199 mg/dL    Triglycerides 181 (H) 30 - 150 mg/dL    HDL 57 40 - 75 mg/dL    LDL Cholesterol 115.8 63.0 - 159.0 mg/dL    HDL/Cholesterol Ratio 27.3 20.0 - 50.0 %    Total Cholesterol/HDL Ratio 3.7 2.0 - 5.0    Non-HDL Cholesterol 152 mg/dL   Hemoglobin A1C   Result Value Ref Range    Hemoglobin A1C 5.8 (H) 4.0 - 5.6 %    Estimated Avg Glucose 120 68 - 131 mg/dL     Collected Updated Procedure    06/17/2022 1025 06/17/2022 1728 Hemoglobin A1C [[id number]]   (Abnormal)   Blood    Component Value Units   Hemoglobin A1C 5.8 High   %   Estimated Avg Glucose 120 mg/dL          06/17/2022 1025 06/17/2022 1730 Lipid Panel [[id number]]   (Abnormal)   Blood    Component Value Units   Cholesterol 209 High   mg/dL   Triglycerides 181 High   mg/dL   HDL 57  mg/dL   LDL Cholesterol 115.8   mg/dL   HDL/Cholesterol Ratio 27.3 %   Total Cholesterol/HDL Ratio 3.7    Non-HDL Cholesterol 152  mg/dL          04/07/2022 1515 04/07/2022 2146 Vitamin D [[id number]]    Blood    Component Value Units   Vit D, 25-Hydroxy 39  ng/mL          04/07/2022 1515 04/07/2022 2146 T4, Free [[id number]]    Blood    Component Value Units   Free T4 0.90 ng/dL          04/07/2022 1515 04/07/2022 2146 TSH [[id number]]    (Abnormal)   Blood    Component Value Units   TSH 0.282 Low  uIU/mL          04/07/2022 1515 04/12/2022 1623 Sjogrens syndrome-B extractable nuclear antibody [[id number]]    Blood    Component Value Units   Anti-SSB Antibody 0.17 Ratio   Anti-SSB Interpretation Negative           04/07/2022 1515 04/12/2022 1623 Sjogrens syndrome-A extractable nuclear antibody [[id number]]    Blood    Component Value Units   Anti-SSA Antibody 0.05 Ratio   Anti-SSA Interpretation Negative           04/07/2022 1515 04/07/2022 2155 Cyclic Citrullinated Peptide Antibody, IgG [[id number]]    (Abnormal)   Blood    Component Value Units   CCP Antibodies 86.6 High  U/mL          04/07/2022 1515 04/07/2022 1702 Sedimentation rate [[id number]]    (Abnormal)   Blood    Component Value Units   Sed Rate 26 High  mm/Hr          04/07/2022 1515 04/07/2022 2130 C-Reactive Protein [[id number]]    (Abnormal)   Blood    Component Value Units   CRP 10.1 High  mg/L          04/07/2022 1515 04/08/2022 1729 Rheumatoid Factor [[id number]]    (Abnormal)   Blood    Component Value Units   Rheumatoid Factor 75.0 High  IU/mL          04/07/2022 1515 04/07/2022 2215 CBC Auto Differential [[id number]]    (Abnormal)   Blood    Component Value Units   WBC 13.86 High  K/uL   RBC 4.44 M/uL   Hemoglobin 11.7 Low  g/dL   Hematocrit 37.0 %   MCV 83 fL   MCH 26.4 Low  pg   MCHC 31.6 Low  g/dL   RDW 17.1 High  %   Platelets 285 K/uL   MPV 11.8 fL   Immature Granulocytes 0.4 %   Gran # (ANC) 10.5 High  K/uL   Immature Grans (Abs) 0.06 High   K/uL   Lymph # 2.7 K/uL   Mono # 0.5  K/uL   Eos # 0.0 K/uL   Baso # 0.06 K/uL   nRBC 0 /100 WBC   Gran % 76.0 High  %   Lymph % 19.5 %   Mono % 3.6 Low  %   Eosinophil % 0.1 %   Basophil % 0.4 %   Differential Method Automated           04/07/2022 1515 04/07/2022 2130 Comprehensive Metabolic Panel [[id number]]    (Abnormal)   Blood    Component Value Units   Sodium 143 mmol/L   Potassium 4.1 mmol/L   Chloride 108 mmol/L   CO2 22 Low  mmol/L   Glucose 119 High  mg/dL   BUN 17 mg/dL   Creatinine 1.1 mg/dL   Calcium 9.9 mg/dL   Total Protein 7.3 g/dL   Albumin 3.5 g/dL   Total Bilirubin 0.3  mg/dL   Alkaline Phosphatase 104 U/L   AST 14 U/L   ALT 14 U/L   Anion Gap 13 mmol/L   eGFR if  56.8 Abnormal  mL/min/1.73 m^2   eGFR if non  49.2 Abnormal   mL/min/1.73 m^2          04/07/2022 1515 04/07/2022 2147 COVID-19 (SARS CoV-2) IgG Antibody Quant [[id number]]    Blood    Component Value Units   COVID-19 (SARS CoV-2) IgG Antibody Quantitative 6643.4 AU/ml   COVID-19 (SARS CoV-2) IgG Antibody Interpretation Positive              Anatomical Region Laterality Modality   Breast bilateral Mammography       Narrative    REASON FOR EXAM: [Z12.31]-Encounter for screening mammogram for malignant neoplasm of breast     TECHNICAL FACTORS: Digital mammography with tomosynthesis was performed of the breasts in the mediolateral oblique and craniocaudal views. CAD was utilized. Exaggerated lateral craniocaudal views were obtained of the breasts. Cleavage view was also   obtained.     CLINICAL INFORMATION: This is a female patient for screening mammogram. According to the National Cancer [place] Model risk assessment tool, her lifetime breast cancer risk is 3.7% .     COMPARISON: 12/31/2020, 04/01/2016     FINDINGS: There are scattered areas of fibroglandular density. There is no evidence of suspicious mass, calcifications or architectural distortion. Benign calcifications and benign intramammary lymph nodes are present in the breasts.  There is no adverse   interval change observed.     IMPRESSION:   BI-RADS 2 - Benign     No mammographic findings of malignancy are identified. Annual mammography is recommended.     BREAST DENSITY: Scattered Fibroglandular     The patient has been entered into our radiology information system, and she will receive notification approximately 30 days prior to the due date of her next annual screening mammogram.     Electronically signed by Alec Zurita MD on 7/14/2022 7:44 AM  Exam End: 07/13/22  2:03 PM    Specimen Collected: 07/14/22  7:42 AM Last Resulted: 07/14/22  7:44 AM   Received From: [facility]  Result Received: 07/20/22 12:51 PM        Anatomical Region Laterality Modality   Hip -- Mammography   C-spine -- --   T-spine -- --   L-spine -- --       Impression    Osteoporosis     Electronically signed by Felipe Alvarez MD on 7/13/2022 2:23 PM    Narrative    REASON FOR EXAM: [M81.0]-Age-related osteoporosis without current pathological fracture / [M89.9]-Disorder of bone, unspecified / [N95.9]-Unspecified menopausal and perimenopausal disorder / [Z78.0]-Asymptomatic menopausal state     TECHNICAL FACTORS: Readings are obtained over the lumbar spine and bilateral hips using a GE Lunar Prodigy scanner.     COMPARISON: None     LUMBAR SPINE FINDINGS: Bone mineral density from L1 to L4 is 1.126 g/cm2. The T score is -0.6. The Z score is 0.5 .     LEFT HIP FINDINGS: Bone mineral density over the femoral neck is 0.706 g/cm2. The T score is -2.4. The Z score is -1.4. Bone mineral density over the total hip is 0.675 g/cm2. The T score is -2.6. The Z score is -1.9.     The 10-year probability of fracture utilizing FRAX is 11.7% for a major osteoporotic fracture and 3.2% for a hip fracture.    Procedure Note    Felipe Alvarez MD - 07/13/2022   Formatting of this note might be different from the original.   REASON FOR EXAM: [M81.0]-Age-related osteoporosis without current pathological fracture /  [M89.9]-Disorder of bone, unspecified / [N95.9]-Unspecified menopausal and perimenopausal disorder / [Z78.0]-Asymptomatic menopausal state     TECHNICAL FACTORS: Readings are obtained over the lumbar spine and bilateral hips using a GE Lunar Prodigy scanner.     COMPARISON: None     LUMBAR SPINE FINDINGS: Bone mineral density from L1 to L4 is 1.126 g/cm2. The T score is -0.6. The Z score is 0.5 .     LEFT HIP FINDINGS: Bone mineral density over the femoral neck is 0.706 g/cm2. The T score is -2.4. The Z score is -1.4. Bone mineral density over the total hip is 0.675 g/cm2. The T score is -2.6. The Z score is -1.9.     The 10-year probability of fracture utilizing FRAX is 11.7% for a major osteoporotic fracture and 3.2% for a hip fracture.     IMPRESSION:   Osteoporosis     Electronically signed by Felipe Alvarez MD on 7/13/2022 2:23 PM  Exam End: 07/13/22  2:00 PM    Specimen Collected: 07/13/22  2:22 PM Last Resulted: 07/13/22  2:23 PM   Received From: [facility]  Result Received: 07/20/22 12:51 PM          Assessment:       1. Melasma    2. Rheumatoid arthritis with positive rheumatoid factor, involving unspecified site    3. Sjogren's syndrome, with unspecified organ involvement    4. Primary osteoarthritis of both knees    5. Type 2 diabetes mellitus without complication, without long-term current use of insulin    6. Osteoarthritis of both hips, unspecified osteoarthritis type    7. Hyperparathyroidism, unspecified     8. Osteoporosis, unspecified osteoporosis type, unspecified pathological fracture presence    9. Psoriasis    10. Type 2 diabetes mellitus without complication, unspecified whether long term insulin use    11. Lumbar and sacral arthritis    12. Chronic pain syndrome    13. Renal mass    14. Hip pain, right            Plan:       Melasma  -     hydroquin-tretinoin-hydrocort 4-0.025-0.5 % Emul; Apply 1 applicator topically every evening.  Dispense: 30 g; Refill: 3  -      fluocinolone-hydroq.-tretinoin (TRI-VITALY) 0.01-4-0.05 % Crea; Apply 1 application topically every evening.  Dispense: 30 g; Refill: 1    Rheumatoid arthritis with positive rheumatoid factor, involving unspecified site  -     Discontinue: HYDROcodone-acetaminophen (NORCO)  mg per tablet; Take 1 tablet by mouth every 8 (eight) hours as needed for Pain.  Dispense: 90 tablet; Refill: 0  -     Discontinue: HYDROcodone-acetaminophen (NORCO)  mg per tablet; Take 1 tablet by mouth every 8 (eight) hours as needed for Pain.  Dispense: 90 tablet; Refill: 0  -     HYDROcodone-acetaminophen (NORCO)  mg per tablet; Take 1 tablet by mouth every 8 (eight) hours as needed for Pain.  Dispense: 90 tablet; Refill: 0  -     hydrOXYchloroQUINE (PLAQUENIL) 200 mg tablet; Take 1 tablet (200 mg total) by mouth 2 (two) times daily.  Dispense: 180 tablet; Refill: 3  -     leflunomide (ARAVA) 10 MG Tab; Take 1 tablet (10 mg total) by mouth once daily.  Dispense: 90 tablet; Refill: 1  -     CBC Auto Differential; Future; Expected date: 07/20/2022  -     Comprehensive Metabolic Panel; Future; Expected date: 07/20/2022  -     C-Reactive Protein; Future; Expected date: 07/20/2022  -     Sedimentation rate; Future; Expected date: 07/20/2022  -     BRAD Screen w/Reflex; Future; Expected date: 07/20/2022  -     Quantiferon Gold TB; Future; Expected date: 07/20/2022  -     Hepatitis B Core Antibody, IgM; Future; Expected date: 07/20/2022  -     Hepatitis B Surface Ab, Qualitative; Future; Expected date: 07/20/2022  -     Hepatitis B Surface Antigen; Future; Expected date: 07/20/2022  -     Hepatitis C Antibody; Future; Expected date: 07/20/2022  -     predniSONE (DELTASONE) 5 MG tablet; TAKE THREE TABLETS BY MOUTH EVERY MORNING AS NEEDED  Dispense: 90 tablet; Refill: 3  -     ketorolac injection 60 mg  -     cyanocobalamin injection 1,000 mcg    Sjogren's syndrome, with unspecified organ involvement  -     Discontinue:  HYDROcodone-acetaminophen (NORCO)  mg per tablet; Take 1 tablet by mouth every 8 (eight) hours as needed for Pain.  Dispense: 90 tablet; Refill: 0  -     Discontinue: HYDROcodone-acetaminophen (NORCO)  mg per tablet; Take 1 tablet by mouth every 8 (eight) hours as needed for Pain.  Dispense: 90 tablet; Refill: 0  -     HYDROcodone-acetaminophen (NORCO)  mg per tablet; Take 1 tablet by mouth every 8 (eight) hours as needed for Pain.  Dispense: 90 tablet; Refill: 0  -     hydrOXYchloroQUINE (PLAQUENIL) 200 mg tablet; Take 1 tablet (200 mg total) by mouth 2 (two) times daily.  Dispense: 180 tablet; Refill: 3  -     leflunomide (ARAVA) 10 MG Tab; Take 1 tablet (10 mg total) by mouth once daily.  Dispense: 90 tablet; Refill: 1  -     CBC Auto Differential; Future; Expected date: 07/20/2022  -     Comprehensive Metabolic Panel; Future; Expected date: 07/20/2022  -     C-Reactive Protein; Future; Expected date: 07/20/2022  -     Sedimentation rate; Future; Expected date: 07/20/2022  -     BRAD Screen w/Reflex; Future; Expected date: 07/20/2022  -     Quantiferon Gold TB; Future; Expected date: 07/20/2022  -     Hepatitis B Core Antibody, IgM; Future; Expected date: 07/20/2022  -     Hepatitis B Surface Ab, Qualitative; Future; Expected date: 07/20/2022  -     Hepatitis B Surface Antigen; Future; Expected date: 07/20/2022  -     Hepatitis C Antibody; Future; Expected date: 07/20/2022  -     predniSONE (DELTASONE) 5 MG tablet; TAKE THREE TABLETS BY MOUTH EVERY MORNING AS NEEDED  Dispense: 90 tablet; Refill: 3  -     ketorolac injection 60 mg  -     cyanocobalamin injection 1,000 mcg    Primary osteoarthritis of both knees  -     Discontinue: HYDROcodone-acetaminophen (NORCO)  mg per tablet; Take 1 tablet by mouth every 8 (eight) hours as needed for Pain.  Dispense: 90 tablet; Refill: 0  -     Discontinue: HYDROcodone-acetaminophen (NORCO)  mg per tablet; Take 1 tablet by mouth every 8 (eight)  hours as needed for Pain.  Dispense: 90 tablet; Refill: 0  -     HYDROcodone-acetaminophen (NORCO)  mg per tablet; Take 1 tablet by mouth every 8 (eight) hours as needed for Pain.  Dispense: 90 tablet; Refill: 0  -     hydrOXYchloroQUINE (PLAQUENIL) 200 mg tablet; Take 1 tablet (200 mg total) by mouth 2 (two) times daily.  Dispense: 180 tablet; Refill: 3  -     leflunomide (ARAVA) 10 MG Tab; Take 1 tablet (10 mg total) by mouth once daily.  Dispense: 90 tablet; Refill: 1  -     CBC Auto Differential; Future; Expected date: 07/20/2022  -     Comprehensive Metabolic Panel; Future; Expected date: 07/20/2022  -     C-Reactive Protein; Future; Expected date: 07/20/2022  -     Sedimentation rate; Future; Expected date: 07/20/2022  -     BRAD Screen w/Reflex; Future; Expected date: 07/20/2022  -     Quantiferon Gold TB; Future; Expected date: 07/20/2022  -     Hepatitis B Core Antibody, IgM; Future; Expected date: 07/20/2022  -     Hepatitis B Surface Ab, Qualitative; Future; Expected date: 07/20/2022  -     Hepatitis B Surface Antigen; Future; Expected date: 07/20/2022  -     Hepatitis C Antibody; Future; Expected date: 07/20/2022  -     predniSONE (DELTASONE) 5 MG tablet; TAKE THREE TABLETS BY MOUTH EVERY MORNING AS NEEDED  Dispense: 90 tablet; Refill: 3  -     ketorolac injection 60 mg  -     cyanocobalamin injection 1,000 mcg    Type 2 diabetes mellitus without complication, without long-term current use of insulin  -     Discontinue: HYDROcodone-acetaminophen (NORCO)  mg per tablet; Take 1 tablet by mouth every 8 (eight) hours as needed for Pain.  Dispense: 90 tablet; Refill: 0  -     Discontinue: HYDROcodone-acetaminophen (NORCO)  mg per tablet; Take 1 tablet by mouth every 8 (eight) hours as needed for Pain.  Dispense: 90 tablet; Refill: 0  -     HYDROcodone-acetaminophen (NORCO)  mg per tablet; Take 1 tablet by mouth every 8 (eight) hours as needed for Pain.  Dispense: 90 tablet; Refill: 0  -      hydrOXYchloroQUINE (PLAQUENIL) 200 mg tablet; Take 1 tablet (200 mg total) by mouth 2 (two) times daily.  Dispense: 180 tablet; Refill: 3  -     leflunomide (ARAVA) 10 MG Tab; Take 1 tablet (10 mg total) by mouth once daily.  Dispense: 90 tablet; Refill: 1  -     CBC Auto Differential; Future; Expected date: 07/20/2022  -     Comprehensive Metabolic Panel; Future; Expected date: 07/20/2022  -     C-Reactive Protein; Future; Expected date: 07/20/2022  -     Sedimentation rate; Future; Expected date: 07/20/2022  -     BRAD Screen w/Reflex; Future; Expected date: 07/20/2022  -     Quantiferon Gold TB; Future; Expected date: 07/20/2022  -     Hepatitis B Core Antibody, IgM; Future; Expected date: 07/20/2022  -     Hepatitis B Surface Ab, Qualitative; Future; Expected date: 07/20/2022  -     Hepatitis B Surface Antigen; Future; Expected date: 07/20/2022  -     Hepatitis C Antibody; Future; Expected date: 07/20/2022  -     predniSONE (DELTASONE) 5 MG tablet; TAKE THREE TABLETS BY MOUTH EVERY MORNING AS NEEDED  Dispense: 90 tablet; Refill: 3  -     ketorolac injection 60 mg  -     cyanocobalamin injection 1,000 mcg    Osteoarthritis of both hips, unspecified osteoarthritis type  -     hydrOXYchloroQUINE (PLAQUENIL) 200 mg tablet; Take 1 tablet (200 mg total) by mouth 2 (two) times daily.  Dispense: 180 tablet; Refill: 3  -     leflunomide (ARAVA) 10 MG Tab; Take 1 tablet (10 mg total) by mouth once daily.  Dispense: 90 tablet; Refill: 1  -     CBC Auto Differential; Future; Expected date: 07/20/2022  -     Comprehensive Metabolic Panel; Future; Expected date: 07/20/2022  -     C-Reactive Protein; Future; Expected date: 07/20/2022  -     Sedimentation rate; Future; Expected date: 07/20/2022  -     BRAD Screen w/Reflex; Future; Expected date: 07/20/2022  -     Quantiferon Gold TB; Future; Expected date: 07/20/2022  -     Hepatitis B Core Antibody, IgM; Future; Expected date: 07/20/2022  -     Hepatitis B Surface Ab,  Qualitative; Future; Expected date: 07/20/2022  -     Hepatitis B Surface Antigen; Future; Expected date: 07/20/2022  -     Hepatitis C Antibody; Future; Expected date: 07/20/2022  -     ketorolac injection 60 mg  -     cyanocobalamin injection 1,000 mcg    Hyperparathyroidism, unspecified   -     Discontinue: HYDROcodone-acetaminophen (NORCO)  mg per tablet; Take 1 tablet by mouth every 8 (eight) hours as needed for Pain.  Dispense: 90 tablet; Refill: 0  -     Discontinue: HYDROcodone-acetaminophen (NORCO)  mg per tablet; Take 1 tablet by mouth every 8 (eight) hours as needed for Pain.  Dispense: 90 tablet; Refill: 0  -     HYDROcodone-acetaminophen (NORCO)  mg per tablet; Take 1 tablet by mouth every 8 (eight) hours as needed for Pain.  Dispense: 90 tablet; Refill: 0  -     hydrOXYchloroQUINE (PLAQUENIL) 200 mg tablet; Take 1 tablet (200 mg total) by mouth 2 (two) times daily.  Dispense: 180 tablet; Refill: 3  -     leflunomide (ARAVA) 10 MG Tab; Take 1 tablet (10 mg total) by mouth once daily.  Dispense: 90 tablet; Refill: 1  -     CBC Auto Differential; Future; Expected date: 07/20/2022  -     Comprehensive Metabolic Panel; Future; Expected date: 07/20/2022  -     C-Reactive Protein; Future; Expected date: 07/20/2022  -     Sedimentation rate; Future; Expected date: 07/20/2022  -     BRAD Screen w/Reflex; Future; Expected date: 07/20/2022  -     Quantiferon Gold TB; Future; Expected date: 07/20/2022  -     Hepatitis B Core Antibody, IgM; Future; Expected date: 07/20/2022  -     Hepatitis B Surface Ab, Qualitative; Future; Expected date: 07/20/2022  -     Hepatitis B Surface Antigen; Future; Expected date: 07/20/2022  -     Hepatitis C Antibody; Future; Expected date: 07/20/2022  -     predniSONE (DELTASONE) 5 MG tablet; TAKE THREE TABLETS BY MOUTH EVERY MORNING AS NEEDED  Dispense: 90 tablet; Refill: 3  -     ketorolac injection 60 mg  -     cyanocobalamin injection 1,000 mcg    Osteoporosis,  unspecified osteoporosis type, unspecified pathological fracture presence  -     ibandronate (BONIVA) 150 mg tablet; Take 1 tablet (150 mg total) by mouth every 30 days.  Dispense: 3 tablet; Refill: 3  -     hydrOXYchloroQUINE (PLAQUENIL) 200 mg tablet; Take 1 tablet (200 mg total) by mouth 2 (two) times daily.  Dispense: 180 tablet; Refill: 3  -     leflunomide (ARAVA) 10 MG Tab; Take 1 tablet (10 mg total) by mouth once daily.  Dispense: 90 tablet; Refill: 1  -     CBC Auto Differential; Future; Expected date: 07/20/2022  -     Comprehensive Metabolic Panel; Future; Expected date: 07/20/2022  -     C-Reactive Protein; Future; Expected date: 07/20/2022  -     Sedimentation rate; Future; Expected date: 07/20/2022  -     BRAD Screen w/Reflex; Future; Expected date: 07/20/2022  -     Quantiferon Gold TB; Future; Expected date: 07/20/2022  -     Hepatitis B Core Antibody, IgM; Future; Expected date: 07/20/2022  -     Hepatitis B Surface Ab, Qualitative; Future; Expected date: 07/20/2022  -     Hepatitis B Surface Antigen; Future; Expected date: 07/20/2022  -     Hepatitis C Antibody; Future; Expected date: 07/20/2022  -     ketorolac injection 60 mg  -     cyanocobalamin injection 1,000 mcg    Psoriasis  -     Discontinue: HYDROcodone-acetaminophen (NORCO)  mg per tablet; Take 1 tablet by mouth every 8 (eight) hours as needed for Pain.  Dispense: 90 tablet; Refill: 0  -     Discontinue: HYDROcodone-acetaminophen (NORCO)  mg per tablet; Take 1 tablet by mouth every 8 (eight) hours as needed for Pain.  Dispense: 90 tablet; Refill: 0  -     HYDROcodone-acetaminophen (NORCO)  mg per tablet; Take 1 tablet by mouth every 8 (eight) hours as needed for Pain.  Dispense: 90 tablet; Refill: 0  -     hydrOXYchloroQUINE (PLAQUENIL) 200 mg tablet; Take 1 tablet (200 mg total) by mouth 2 (two) times daily.  Dispense: 180 tablet; Refill: 3  -     leflunomide (ARAVA) 10 MG Tab; Take 1 tablet (10 mg total) by mouth once  daily.  Dispense: 90 tablet; Refill: 1  -     CBC Auto Differential; Future; Expected date: 07/20/2022  -     Comprehensive Metabolic Panel; Future; Expected date: 07/20/2022  -     C-Reactive Protein; Future; Expected date: 07/20/2022  -     Sedimentation rate; Future; Expected date: 07/20/2022  -     BRAD Screen w/Reflex; Future; Expected date: 07/20/2022  -     Quantiferon Gold TB; Future; Expected date: 07/20/2022  -     Hepatitis B Core Antibody, IgM; Future; Expected date: 07/20/2022  -     Hepatitis B Surface Ab, Qualitative; Future; Expected date: 07/20/2022  -     Hepatitis B Surface Antigen; Future; Expected date: 07/20/2022  -     Hepatitis C Antibody; Future; Expected date: 07/20/2022  -     predniSONE (DELTASONE) 5 MG tablet; TAKE THREE TABLETS BY MOUTH EVERY MORNING AS NEEDED  Dispense: 90 tablet; Refill: 3  -     ketorolac injection 60 mg  -     cyanocobalamin injection 1,000 mcg    Type 2 diabetes mellitus without complication, unspecified whether long term insulin use  -     Discontinue: HYDROcodone-acetaminophen (NORCO)  mg per tablet; Take 1 tablet by mouth every 8 (eight) hours as needed for Pain.  Dispense: 90 tablet; Refill: 0  -     Discontinue: HYDROcodone-acetaminophen (NORCO)  mg per tablet; Take 1 tablet by mouth every 8 (eight) hours as needed for Pain.  Dispense: 90 tablet; Refill: 0  -     HYDROcodone-acetaminophen (NORCO)  mg per tablet; Take 1 tablet by mouth every 8 (eight) hours as needed for Pain.  Dispense: 90 tablet; Refill: 0  -     hydrOXYchloroQUINE (PLAQUENIL) 200 mg tablet; Take 1 tablet (200 mg total) by mouth 2 (two) times daily.  Dispense: 180 tablet; Refill: 3  -     leflunomide (ARAVA) 10 MG Tab; Take 1 tablet (10 mg total) by mouth once daily.  Dispense: 90 tablet; Refill: 1  -     CBC Auto Differential; Future; Expected date: 07/20/2022  -     Comprehensive Metabolic Panel; Future; Expected date: 07/20/2022  -     C-Reactive Protein; Future; Expected  date: 07/20/2022  -     Sedimentation rate; Future; Expected date: 07/20/2022  -     BRAD Screen w/Reflex; Future; Expected date: 07/20/2022  -     Quantiferon Gold TB; Future; Expected date: 07/20/2022  -     Hepatitis B Core Antibody, IgM; Future; Expected date: 07/20/2022  -     Hepatitis B Surface Ab, Qualitative; Future; Expected date: 07/20/2022  -     Hepatitis B Surface Antigen; Future; Expected date: 07/20/2022  -     Hepatitis C Antibody; Future; Expected date: 07/20/2022  -     predniSONE (DELTASONE) 5 MG tablet; TAKE THREE TABLETS BY MOUTH EVERY MORNING AS NEEDED  Dispense: 90 tablet; Refill: 3  -     ketorolac injection 60 mg  -     cyanocobalamin injection 1,000 mcg    Lumbar and sacral arthritis  -     Discontinue: HYDROcodone-acetaminophen (NORCO)  mg per tablet; Take 1 tablet by mouth every 8 (eight) hours as needed for Pain.  Dispense: 90 tablet; Refill: 0  -     Discontinue: HYDROcodone-acetaminophen (NORCO)  mg per tablet; Take 1 tablet by mouth every 8 (eight) hours as needed for Pain.  Dispense: 90 tablet; Refill: 0  -     HYDROcodone-acetaminophen (NORCO)  mg per tablet; Take 1 tablet by mouth every 8 (eight) hours as needed for Pain.  Dispense: 90 tablet; Refill: 0  -     hydrOXYchloroQUINE (PLAQUENIL) 200 mg tablet; Take 1 tablet (200 mg total) by mouth 2 (two) times daily.  Dispense: 180 tablet; Refill: 3  -     leflunomide (ARAVA) 10 MG Tab; Take 1 tablet (10 mg total) by mouth once daily.  Dispense: 90 tablet; Refill: 1  -     CBC Auto Differential; Future; Expected date: 07/20/2022  -     Comprehensive Metabolic Panel; Future; Expected date: 07/20/2022  -     C-Reactive Protein; Future; Expected date: 07/20/2022  -     Sedimentation rate; Future; Expected date: 07/20/2022  -     BRAD Screen w/Reflex; Future; Expected date: 07/20/2022  -     Quantiferon Gold TB; Future; Expected date: 07/20/2022  -     Hepatitis B Core Antibody, IgM; Future; Expected date: 07/20/2022  -      Hepatitis B Surface Ab, Qualitative; Future; Expected date: 07/20/2022  -     Hepatitis B Surface Antigen; Future; Expected date: 07/20/2022  -     Hepatitis C Antibody; Future; Expected date: 07/20/2022  -     predniSONE (DELTASONE) 5 MG tablet; TAKE THREE TABLETS BY MOUTH EVERY MORNING AS NEEDED  Dispense: 90 tablet; Refill: 3    Chronic pain syndrome  -     Discontinue: HYDROcodone-acetaminophen (NORCO)  mg per tablet; Take 1 tablet by mouth every 8 (eight) hours as needed for Pain.  Dispense: 90 tablet; Refill: 0  -     Discontinue: HYDROcodone-acetaminophen (NORCO)  mg per tablet; Take 1 tablet by mouth every 8 (eight) hours as needed for Pain.  Dispense: 90 tablet; Refill: 0  -     HYDROcodone-acetaminophen (NORCO)  mg per tablet; Take 1 tablet by mouth every 8 (eight) hours as needed for Pain.  Dispense: 90 tablet; Refill: 0  -     hydrOXYchloroQUINE (PLAQUENIL) 200 mg tablet; Take 1 tablet (200 mg total) by mouth 2 (two) times daily.  Dispense: 180 tablet; Refill: 3  -     leflunomide (ARAVA) 10 MG Tab; Take 1 tablet (10 mg total) by mouth once daily.  Dispense: 90 tablet; Refill: 1  -     predniSONE (DELTASONE) 5 MG tablet; TAKE THREE TABLETS BY MOUTH EVERY MORNING AS NEEDED  Dispense: 90 tablet; Refill: 3    Renal mass  -     Discontinue: HYDROcodone-acetaminophen (NORCO)  mg per tablet; Take 1 tablet by mouth every 8 (eight) hours as needed for Pain.  Dispense: 90 tablet; Refill: 0  -     Discontinue: HYDROcodone-acetaminophen (NORCO)  mg per tablet; Take 1 tablet by mouth every 8 (eight) hours as needed for Pain.  Dispense: 90 tablet; Refill: 0  -     HYDROcodone-acetaminophen (NORCO)  mg per tablet; Take 1 tablet by mouth every 8 (eight) hours as needed for Pain.  Dispense: 90 tablet; Refill: 0  -     hydrOXYchloroQUINE (PLAQUENIL) 200 mg tablet; Take 1 tablet (200 mg total) by mouth 2 (two) times daily.  Dispense: 180 tablet; Refill: 3  -     leflunomide (ARAVA) 10  MG Tab; Take 1 tablet (10 mg total) by mouth once daily.  Dispense: 90 tablet; Refill: 1  -     predniSONE (DELTASONE) 5 MG tablet; TAKE THREE TABLETS BY MOUTH EVERY MORNING AS NEEDED  Dispense: 90 tablet; Refill: 3    Hip pain, right  -     Large Joint Aspiration/Injection: R greater trochanteric bursa        Assessment:  75 year old female with  Seropositive (+RF/+CCP) rheumatoid arthritis, Sjogren's syndrome (+SSB) on LEF and plaquenil  --chronic pain syndrome on norco  --GERD,  start famotidine  --HTN  --asthma  --controlled type 2 diabetes  --chronic steroid use    1. Restart arava  2. Continue plaquenil  3. norco refills   I have  check [place] prescription monitoring program site and no unusual or abnormal behavior has occurred pt understand the risk and benefits of taking opioid medications and has decided to continue the  medication     r trochanteric bursa

## 2022-07-25 ENCOUNTER — TELEPHONE (OUTPATIENT)
Dept: ADMINISTRATIVE | Facility: HOSPITAL | Age: 77
End: 2022-07-25
Payer: MEDICARE

## 2022-07-27 ENCOUNTER — TELEPHONE (OUTPATIENT)
Dept: RHEUMATOLOGY | Facility: CLINIC | Age: 77
End: 2022-07-27
Payer: MEDICARE

## 2022-07-27 RX ORDER — TRIAMCINOLONE ACETONIDE 40 MG/ML
40 INJECTION, SUSPENSION INTRA-ARTICULAR; INTRAMUSCULAR
Status: DISCONTINUED | OUTPATIENT
Start: 2022-07-20 | End: 2022-07-27 | Stop reason: HOSPADM

## 2022-07-27 NOTE — TELEPHONE ENCOUNTER
Secure chat sent to provider to confirm base of the medication per pharmacy. Will call pharmacy back after provider responds.  ----- Message from Gladys Strattontom sent at 7/27/2022  2:18 PM CDT -----  Contact: [address]  Type:  Pharmacy Calling to Clarify an RX    Name of Caller:  Jase   Pharmacy Name:  [facility]  Prescription Name:  hydroquin-tretinoin-hydrocort 4-0.025-0.5 % Emul  What do they need to clarify?:  just wants to confirm the base for this compound  Best Call Back Number:  [phone]  Additional Information:  different pharm for the compound

## 2022-07-27 NOTE — PROCEDURES
Large Joint Aspiration/Injection: R greater trochanteric bursa    Date/Time: 7/20/2022 1:00 PM  Performed by: Pedro Johnson MD  Authorized by: Pedro Johnson MD     Consent Done?:  Yes (Verbal)  Indications:  Joint swelling and pain  Site marked: the procedure site was marked      Local anesthesia used?: Yes    Anesthesia:  Local infiltration  Local anesthetic:  Lidocaine 1% without epinephrine  Anesthetic total (ml):  5      Details:  Needle Size:  25 G  Approach:  Lateral  Location:  Hip  Site:  R greater trochanteric bursa  Medications:  40 mg triamcinolone acetonide 40 mg/mL      After verbal consent and cleansing with Chloraprep the right. Trochanteric bursa injected with Kenalog 40mg with 1 ml 1 % lidocaine. Patient tolerated procedure well.

## 2022-07-28 ENCOUNTER — TELEPHONE (OUTPATIENT)
Dept: FAMILY MEDICINE | Facility: CLINIC | Age: 77
End: 2022-07-28
Payer: MEDICARE

## 2022-07-28 NOTE — TELEPHONE ENCOUNTER
----- Message from Amilcar Watt sent at 7/28/2022  8:52 AM CDT -----  Contact: [phone]  Type:  Sooner Apoointment Request    Caller is requesting a sooner appointment.  Caller declined first available appointment listed below.  Caller will not accept being placed on the waitlist and is requesting a message be sent to doctor.  Name of Caller: rj  When is the first available appointment?n/a  Symptoms:annual  Would the patient rather a call back or [facility]? Call back  Best Call Back Number:[phone]  Additional Information: n/a      Thanks  KB

## 2022-08-16 LAB
LEFT EYE DM RETINOPATHY: NEGATIVE
RIGHT EYE DM RETINOPATHY: NEGATIVE

## 2022-08-22 ENCOUNTER — OFFICE VISIT (OUTPATIENT)
Dept: FAMILY MEDICINE | Facility: CLINIC | Age: 77
End: 2022-08-22
Payer: MEDICARE

## 2022-08-22 ENCOUNTER — PATIENT OUTREACH (OUTPATIENT)
Dept: ADMINISTRATIVE | Facility: HOSPITAL | Age: 77
End: 2022-08-22
Payer: MEDICARE

## 2022-08-22 VITALS
SYSTOLIC BLOOD PRESSURE: 139 MMHG | TEMPERATURE: 98 F | BODY MASS INDEX: 43.39 KG/M2 | HEIGHT: 66 IN | DIASTOLIC BLOOD PRESSURE: 72 MMHG | WEIGHT: 270 LBS | HEART RATE: 79 BPM

## 2022-08-22 DIAGNOSIS — E11.69 TYPE 2 DIABETES MELLITUS WITH HYPERLIPIDEMIA: ICD-10-CM

## 2022-08-22 DIAGNOSIS — E11.59 HYPERTENSION ASSOCIATED WITH DIABETES: ICD-10-CM

## 2022-08-22 DIAGNOSIS — I15.2 HYPERTENSION ASSOCIATED WITH DIABETES: ICD-10-CM

## 2022-08-22 DIAGNOSIS — F41.9 ANXIETY: ICD-10-CM

## 2022-08-22 DIAGNOSIS — J45.40 MODERATE PERSISTENT ASTHMA WITHOUT COMPLICATION: ICD-10-CM

## 2022-08-22 DIAGNOSIS — E78.2 MIXED HYPERLIPIDEMIA: ICD-10-CM

## 2022-08-22 DIAGNOSIS — N28.89 RENAL MASS: ICD-10-CM

## 2022-08-22 DIAGNOSIS — E78.5 TYPE 2 DIABETES MELLITUS WITH HYPERLIPIDEMIA: ICD-10-CM

## 2022-08-22 DIAGNOSIS — R60.0 PERIPHERAL EDEMA: ICD-10-CM

## 2022-08-22 DIAGNOSIS — M05.79 RHEUMATOID ARTHRITIS INVOLVING MULTIPLE SITES WITH POSITIVE RHEUMATOID FACTOR: Primary | ICD-10-CM

## 2022-08-22 DIAGNOSIS — G47.33 OSA ON CPAP: ICD-10-CM

## 2022-08-22 PROCEDURE — 1101F PR PT FALLS ASSESS DOC 0-1 FALLS W/OUT INJ PAST YR: ICD-10-PCS | Mod: CPTII,S$GLB,, | Performed by: INTERNAL MEDICINE

## 2022-08-22 PROCEDURE — 99214 PR OFFICE/OUTPT VISIT, EST, LEVL IV, 30-39 MIN: ICD-10-PCS | Mod: S$GLB,,, | Performed by: INTERNAL MEDICINE

## 2022-08-22 PROCEDURE — 4010F ACE/ARB THERAPY RXD/TAKEN: CPT | Mod: CPTII,S$GLB,, | Performed by: INTERNAL MEDICINE

## 2022-08-22 PROCEDURE — 1101F PT FALLS ASSESS-DOCD LE1/YR: CPT | Mod: CPTII,S$GLB,, | Performed by: INTERNAL MEDICINE

## 2022-08-22 PROCEDURE — 3044F HG A1C LEVEL LT 7.0%: CPT | Mod: CPTII,S$GLB,, | Performed by: INTERNAL MEDICINE

## 2022-08-22 PROCEDURE — 3288F PR FALLS RISK ASSESSMENT DOCUMENTED: ICD-10-PCS | Mod: CPTII,S$GLB,, | Performed by: INTERNAL MEDICINE

## 2022-08-22 PROCEDURE — 1159F MED LIST DOCD IN RCRD: CPT | Mod: CPTII,S$GLB,, | Performed by: INTERNAL MEDICINE

## 2022-08-22 PROCEDURE — 4010F PR ACE/ARB THEARPY RXD/TAKEN: ICD-10-PCS | Mod: CPTII,S$GLB,, | Performed by: INTERNAL MEDICINE

## 2022-08-22 PROCEDURE — 1159F PR MEDICATION LIST DOCUMENTED IN MEDICAL RECORD: ICD-10-PCS | Mod: CPTII,S$GLB,, | Performed by: INTERNAL MEDICINE

## 2022-08-22 PROCEDURE — 3075F PR MOST RECENT SYSTOLIC BLOOD PRESS GE 130-139MM HG: ICD-10-PCS | Mod: CPTII,S$GLB,, | Performed by: INTERNAL MEDICINE

## 2022-08-22 PROCEDURE — 3044F PR MOST RECENT HEMOGLOBIN A1C LEVEL <7.0%: ICD-10-PCS | Mod: CPTII,S$GLB,, | Performed by: INTERNAL MEDICINE

## 2022-08-22 PROCEDURE — 99999 PR PBB SHADOW E&M-EST. PATIENT-LVL V: CPT | Mod: PBBFAC,,, | Performed by: INTERNAL MEDICINE

## 2022-08-22 PROCEDURE — 99999 PR PBB SHADOW E&M-EST. PATIENT-LVL V: ICD-10-PCS | Mod: PBBFAC,,, | Performed by: INTERNAL MEDICINE

## 2022-08-22 PROCEDURE — 3078F DIAST BP <80 MM HG: CPT | Mod: CPTII,S$GLB,, | Performed by: INTERNAL MEDICINE

## 2022-08-22 PROCEDURE — 99214 OFFICE O/P EST MOD 30 MIN: CPT | Mod: S$GLB,,, | Performed by: INTERNAL MEDICINE

## 2022-08-22 PROCEDURE — 3288F FALL RISK ASSESSMENT DOCD: CPT | Mod: CPTII,S$GLB,, | Performed by: INTERNAL MEDICINE

## 2022-08-22 PROCEDURE — 1126F PR PAIN SEVERITY QUANTIFIED, NO PAIN PRESENT: ICD-10-PCS | Mod: CPTII,S$GLB,, | Performed by: INTERNAL MEDICINE

## 2022-08-22 PROCEDURE — 3075F SYST BP GE 130 - 139MM HG: CPT | Mod: CPTII,S$GLB,, | Performed by: INTERNAL MEDICINE

## 2022-08-22 PROCEDURE — 3078F PR MOST RECENT DIASTOLIC BLOOD PRESSURE < 80 MM HG: ICD-10-PCS | Mod: CPTII,S$GLB,, | Performed by: INTERNAL MEDICINE

## 2022-08-22 PROCEDURE — 1126F AMNT PAIN NOTED NONE PRSNT: CPT | Mod: CPTII,S$GLB,, | Performed by: INTERNAL MEDICINE

## 2022-08-22 RX ORDER — POTASSIUM CHLORIDE 20 MEQ/1
40 TABLET, EXTENDED RELEASE ORAL 2 TIMES DAILY
Qty: 360 TABLET | Refills: 1 | Status: SHIPPED | OUTPATIENT
Start: 2022-08-22 | End: 2023-02-16

## 2022-08-22 RX ORDER — BUSPIRONE HYDROCHLORIDE 5 MG/1
5 TABLET ORAL 2 TIMES DAILY
Qty: 180 TABLET | Refills: 3 | Status: SHIPPED | OUTPATIENT
Start: 2022-08-22 | End: 2023-06-05

## 2022-08-22 RX ORDER — ALBUTEROL SULFATE 0.83 MG/ML
SOLUTION RESPIRATORY (INHALATION)
Qty: 180 ML | Refills: 3 | Status: SHIPPED | OUTPATIENT
Start: 2022-08-22 | End: 2024-02-15

## 2022-08-22 RX ORDER — METHOCARBAMOL 500 MG/1
500 TABLET, FILM COATED ORAL 4 TIMES DAILY PRN
Qty: 40 TABLET | Refills: 1 | Status: SHIPPED | OUTPATIENT
Start: 2022-08-22 | End: 2022-09-01

## 2022-08-22 RX ORDER — GLIPIZIDE 5 MG/1
5 TABLET, FILM COATED, EXTENDED RELEASE ORAL
Qty: 90 TABLET | Refills: 1 | Status: SHIPPED | OUTPATIENT
Start: 2022-08-22 | End: 2023-05-10

## 2022-08-22 RX ORDER — ALBUTEROL SULFATE 90 UG/1
2 AEROSOL, METERED RESPIRATORY (INHALATION) EVERY 6 HOURS PRN
Qty: 18 G | Refills: 6 | Status: SHIPPED | OUTPATIENT
Start: 2022-08-22

## 2022-08-22 RX ORDER — BUDESONIDE AND FORMOTEROL FUMARATE DIHYDRATE 160; 4.5 UG/1; UG/1
AEROSOL RESPIRATORY (INHALATION)
Qty: 10.2 G | Refills: 5 | Status: SHIPPED | OUTPATIENT
Start: 2022-08-22

## 2022-08-22 RX ORDER — PRAVASTATIN SODIUM 20 MG/1
20 TABLET ORAL DAILY
Qty: 90 TABLET | Refills: 3 | Status: SHIPPED | OUTPATIENT
Start: 2022-08-22 | End: 2023-07-27

## 2022-08-22 NOTE — PROGRESS NOTES
Assessment/Plan:    Problem List Items Addressed This Visit        Psychiatric    Anxiety    Overview     -recent worsening stress/anxiety  -interested in PRN medication but nothing sedating  -start trial of buspar  -medication info including potential AE provided           Relevant Medications    busPIRone (BUSPAR) 5 MG Tab       Pulmonary    Moderate persistent asthma without complication    Overview     -previously followed by pulmonology, is no longer following  -using symbicort and albuterol but only as needed  -denies current/recent symptoms           Relevant Medications    budesonide-formoterol 160-4.5 mcg (SYMBICORT) 160-4.5 mcg/actuation HFAA    albuterol (PROVENTIL) 2.5 mg /3 mL (0.083 %) nebulizer solution    albuterol (PROVENTIL/VENTOLIN HFA) 90 mcg/actuation inhaler       Cardiac/Vascular    Hypertension associated with diabetes    Overview     Hypertension Medications             amlodipine-benazepril 5-20 mg (LOTREL) 5-20 mg per capsule Take 1 capsule by mouth 2 (two) times daily.    furosemide (LASIX) 40 MG tablet TAKE TWO TABLETS BY MOUTH DAILY AS NEEDED      -at goal today  -continue lifestyle modification with low sodium diet and exercise   -discussed hypertension disease course and importance of treating high blood pressure  -patient understood and advised of risk of untreated blood pressure.  ER precautions were given   for symptoms of hypertensive urgency and emergency.           Relevant Medications    potassium chloride SA (K-DUR,KLOR-CON) 20 MEQ tablet    glipiZIDE 5 MG TR24    Mixed hyperlipidemia    Overview     Hyperlipidemia Medications             pravastatin (PRAVACHOL) 20 MG tablet Take 1 tablet (20 mg total) by mouth once daily.      -chronic condition. Currently stable.    -reports compliance with hyperlipidemia treatment as prescribed  -denies any known adverse effects of medications  -most recent labs listed below:  Lab Results   Component Value Date    CHOL 209 (H) 06/17/2022      Lab Results   Component Value Date    HDL 57 06/17/2022     Lab Results   Component Value Date    LDLCALC 115.8 06/17/2022     Lab Results   Component Value Date    TRIG 181 (H) 06/17/2022     Lab Results   Component Value Date    ALT 14 04/07/2022    AST 14 04/07/2022    ALKPHOS 104 04/07/2022    BILITOT 0.3 04/07/2022               Relevant Medications    pravastatin (PRAVACHOL) 20 MG tablet       Renal/    Renal mass    Overview     -s/p cryoablation 12/13/2021 with [facility] interventional radiology  -CT March 2022- no recurrence  -plan for repeat CT in 6 months with IR follow up after                Immunology/Multi System    Rheumatoid arthritis with positive rheumatoid factor - Primary    Overview     -managed by rheumatology, Dr. Johnson  -currently on prednisone, leflunomide and plaquenil  -yearly eye exam up to date  -on chronic opiate therapy for pain. Has gabapentin that she uses PRN           Relevant Medications    methocarbamoL (ROBAXIN) 500 MG Tab    Other Relevant Orders    SUBSEQUENT HOME HEALTH ORDERS       Endocrine    Type 2 diabetes mellitus with hyperlipidemia    Overview     Diabetes Medications             glipiZIDE (GLUCOTROL) 5 MG TR24 Take 1 tablet (5 mg total) by mouth daily with breakfast.      -condition is currently controlled  -plan to repeat a1c in 6 months  -see diabetic health maintenance listed below  -on statin: Yes  -on ACE-I/ARB: Yes  -counseling provided on importance of diabetic diet and medication compliance in order to treat diabetes  -discussed diabetes disease course and potential complications           Relevant Medications    blood sugar diagnostic Strp    glipiZIDE 5 MG TR24       Other    AMOL on CPAP    Peripheral edema    Relevant Orders    HME - OTHER          Follow up in about 6 months (around 2/22/2023) for F/U with me.    Divina Silva,  MD  _____________________________________________________________________________________________________________________________________________________    CC: follow up of chronic medical conditions     HPI:    Patient is in clinic today as an established patient here for follow up of chronic medical conditions.    HTN: The patient is currently being treated for essential hypertension. This condition is chronic and stable. The patient is tolerating their medication well with good compliance.  Denies any adverse effects of medications.  Counseling was offered regarding low sodium diet.  The patient has a reduced salt intake. Routine exercise recommended. The patient denies headache, vision changes, chest pain, palpitations, shortness of breath, or lower extremity edema.    DM2: Patient presents for follow up of diabetes. Condition is chronic and stable. Patient denies symptoms, including foot ulcerations, hyperglycemia, hypoglycemia , nausea, paresthesia of the feet, polydipsia, polyuria and visual disturbances.  Evaluation to date has been included: fasting blood sugar, fasting lipid panel, hemoglobin A1C and microalbuminuria.  Denies adverse effects of medications.     Diabetes Management Status    Statin: Taking  ACE/ARB: Taking    Screening or Prevention Patient's value Goal Complete/Controlled?   HgA1C Testing and Control   Lab Results   Component Value Date    HGBA1C 5.8 (H) 06/17/2022      Annually/Less than 8% Yes   Lipid profile : 06/17/2022 Annually Yes   LDL control Lab Results   Component Value Date    LDLCALC 115.8 06/17/2022    Annually/Less than 100 mg/dl  No   Nephropathy screening Lab Results   Component Value Date    LABMICR <5.0 08/04/2021     No results found for: PROTEINUA Annually No   Blood pressure BP Readings from Last 1 Encounters:   08/22/22 139/72    Less than 140/90 Yes   Dilated retinal exam : 02/15/2022 Annually Yes   Foot exam   : 03/22/2022 Annually Yes     Anxiety: reports  increased stress at home causing an increase in anxiety. Denies depression. Reports trying something in the past for her nerves but it made her extremely sleepy. She would like to try another option for anxiety to be taken as needed but that is non-sedating.    No other new complaints today.  Remaining chronic conditions have been reviewed and remain stable. Further detail as stated above.      HM reviewed. Due for microalbumin, ordered.    Past Medical History:  Past Medical History:   Diagnosis Date    Asthma     COPD (chronic obstructive pulmonary disease)     Degenerative disc disease     Diabetes mellitus     Diabetes mellitus, type 2     Fibromyalgia     Hypertension     Rheumatoid arthritis     Rheumatoid arthritis(714.0)     Rheumatoid arthritis     Past Surgical History:   Procedure Laterality Date    BREAST SURGERY  1986    CATARACT EXTRACTION      EYE SURGERY  2013    Catatracts    ORIF FEMUR FRACTURE      right knee ligament rpeair  2005    right shoulder surgery  4/18/07    Dr. Gao     Review of patient's allergies indicates:   Allergen Reactions    Latex, natural rubber Swelling and Rash    Codeine     Dairy digestive ultra      Dairy products      Imuran [azathioprine sodium] Diarrhea    Lactobacillus     Lactobacillus acidoph-lactase Other (See Comments)    Morphine      DOESN'T FEEL RIGHT     Plaquenil [hydroxychloroquine] Other (See Comments)     headaches    Milk containing products Diarrhea     Social History     Tobacco Use    Smoking status: Never Smoker    Smokeless tobacco: Never Used   Substance Use Topics    Alcohol use: Never    Drug use: No     Family History   Problem Relation Age of Onset    Cancer Mother     Anemia Mother     Alcohol abuse Father     Anemia Maternal Grandmother     Hypertension Paternal Grandmother     Arthritis Paternal Grandmother      Current Outpatient Medications on File Prior to Visit   Medication Sig Dispense Refill     ACCU-CHEK VERONIQUE CONTROL SOLN Soln USE ONCE A WEEK AS DIRECTED 1 each 0    acetaminophen (TYLENOL) 325 MG tablet Take 650 mg by mouth as needed.      amlodipine-benazepril 5-20 mg (LOTREL) 5-20 mg per capsule Take 1 capsule by mouth 2 (two) times daily. 180 capsule 3    blood-glucose calibrat control Cmpk 1 each by Misc.(Non-Drug; Combo Route) route once a week. 1 each 0    blood-glucose meter kit To check BG once times daily, to use with insurance preferred meter 1 each 0    clotrimazole-betamethasone 1-0.05% (LOTRISONE) cream Apply topically 2 (two) times daily. 45 g 6    dicyclomine (BENTYL) 20 mg tablet Take 1 tablet (20 mg total) by mouth every 6 (six) hours. 120 tablet 3    ferric citrate (AURYXIA) 210 mg iron Tab Take 1 tablet by mouth once daily. 90 tablet 3    fexofenadine (ALLEGRA) 180 MG tablet Take 1 tablet by mouth once daily.      fluocinolone-hydroq.-tretinoin (TRI-VITALY) 0.01-4-0.05 % Crea Apply 1 application topically every evening. 30 g 1    fluticasone propionate (FLONASE) 50 mcg/actuation nasal spray 1 spray (50 mcg total) by Each Nostril route once daily. 16 g 0    folic acid-vit B6-vit B12 (FOLBEE) 2.5-25-1 mg Tab Take 1 tablet by mouth once daily. 90 each 3    furosemide (LASIX) 40 MG tablet TAKE TWO TABLETS BY MOUTH DAILY AS NEEDED 180 tablet 1    [START ON 9/20/2022] HYDROcodone-acetaminophen (NORCO)  mg per tablet Take 1 tablet by mouth every 8 (eight) hours as needed for Pain. 90 tablet 0    hydroquin-tretinoin-hydrocort 4-0.025-0.5 % Emul Apply 1 applicator topically every evening. 30 g 3    hydrOXYchloroQUINE (PLAQUENIL) 200 mg tablet Take 1 tablet (200 mg total) by mouth 2 (two) times daily. 180 tablet 3    ibandronate (BONIVA) 150 mg tablet Take 1 tablet (150 mg total) by mouth every 30 days. 3 tablet 3    lancets Misc To check BG once times daily, to use with insurance preferred meter 100 each 11    leflunomide (ARAVA) 10 MG Tab Take 1 tablet (10 mg total) by  mouth once daily. 90 tablet 1    levocetirizine (XYZAL) 5 MG tablet Take 5 mg by mouth.       multivitamin with iron Tab Take 1 tablet by mouth once daily. 30 each 11    mupirocin (BACTROBAN) 2 % ointment Apply topically 3 (three) times daily. 30 g 6    predniSONE (DELTASONE) 5 MG tablet TAKE THREE TABLETS BY MOUTH EVERY MORNING AS NEEDED 90 tablet 3    sodium chloride 5% (LALITHA 128) 5 % ophthalmic solution sodium chloride 5 % eye drops   INSTILL ONE DROP INTO EACH EYE FOUR TIMES DAILY      traZODone (DESYREL) 50 MG tablet Take 50 mg by mouth every evening.  5    triamcinolone acetonide 0.1% (KENALOG) 0.1 % ointment Apply topically 2 (two) times daily. 80 g 0    [DISCONTINUED] albuterol (PROVENTIL) 2.5 mg /3 mL (0.083 %) nebulizer solution USE 1 VIAL IN NEBULIZER EVERY 6 HOURS AS NEEDED FOR WHEEZING 180 mL 3    [DISCONTINUED] albuterol (PROVENTIL/VENTOLIN HFA) 90 mcg/actuation inhaler Inhale 2 puffs into the lungs every 6 (six) hours as needed for Wheezing. 18 g 6    [DISCONTINUED] blood sugar diagnostic Strp To check BG once times daily, to use with insurance preferred meter 100 strip 11    [DISCONTINUED] budesonide-formoterol 160-4.5 mcg (SYMBICORT) 160-4.5 mcg/actuation HFAA Symbicort 160 mcg-4.5 mcg/actuation HFA aerosol inhaler   INHALE TWO PUFFS TWICE DAILY      [DISCONTINUED] glipiZIDE (GLUCOTROL) 5 MG TR24 Take 1 tablet (5 mg total) by mouth daily with breakfast. 90 tablet 0    [DISCONTINUED] levoFLOXacin (LEVAQUIN) 750 MG tablet Take 1 tablet (750 mg total) by mouth once daily. 10 tablet 0    [DISCONTINUED] potassium chloride SA (K-DUR,KLOR-CON) 20 MEQ tablet Take 2 tablets (40 mEq total) by mouth 2 (two) times daily. 360 tablet 1    [DISCONTINUED] pravastatin (PRAVACHOL) 20 MG tablet Take 1 tablet (20 mg total) by mouth once daily. 90 tablet 0     No current facility-administered medications on file prior to visit.       Review of Systems   Constitutional: Negative for chills, diaphoresis,  "fatigue and fever.   HENT: Negative for congestion, ear pain, postnasal drip, sinus pain and sore throat.    Eyes: Negative for pain and redness.   Respiratory: Negative for cough, chest tightness and shortness of breath.    Cardiovascular: Negative for chest pain and leg swelling.   Gastrointestinal: Negative for abdominal pain, constipation, diarrhea, nausea and vomiting.   Genitourinary: Negative for dysuria and hematuria.   Musculoskeletal: Negative for arthralgias and joint swelling.   Skin: Negative for rash.   Neurological: Negative for dizziness, syncope and headaches.       Vitals:    08/22/22 1404   BP: 139/72   Pulse: 79   Temp: 98.2 °F (36.8 °C)   Weight: 122.5 kg (270 lb)   Height: 5' 6" (1.676 m)       Wt Readings from Last 3 Encounters:   08/22/22 122.5 kg (270 lb)   07/20/22 117.7 kg (259 lb 7.7 oz)   07/12/22 117.7 kg (259 lb 7.7 oz)       Physical Exam  Constitutional:       General: She is not in acute distress.     Appearance: Normal appearance. She is well-developed.   HENT:      Head: Normocephalic and atraumatic.   Eyes:      Conjunctiva/sclera: Conjunctivae normal.   Cardiovascular:      Rate and Rhythm: Normal rate and regular rhythm.      Pulses: Normal pulses.      Heart sounds: Normal heart sounds. No murmur heard.  Pulmonary:      Effort: Pulmonary effort is normal. No respiratory distress.      Breath sounds: Normal breath sounds.   Abdominal:      General: Bowel sounds are normal. There is no distension.      Palpations: Abdomen is soft.      Tenderness: There is no abdominal tenderness.   Musculoskeletal:         General: Normal range of motion.      Cervical back: Normal range of motion and neck supple.      Comments: 1+ LE edema bilaterally, chronic   Skin:     General: Skin is warm and dry.      Findings: No rash.   Neurological:      General: No focal deficit present.      Mental Status: She is alert and oriented to person, place, and time.   Psychiatric:         Mood and Affect: " Mood normal.         Behavior: Behavior normal.         Health Maintenance   Topic Date Due    Hemoglobin A1c  12/17/2022    Eye Exam  02/15/2023    Foot Exam  03/22/2023    Lipid Panel  06/17/2023    Mammogram  07/13/2023    Low Dose Statin  08/22/2023    DEXA Scan  07/13/2025    TETANUS VACCINE  03/09/2026    Hepatitis C Screening  Completed

## 2022-08-24 ENCOUNTER — NURSE TRIAGE (OUTPATIENT)
Dept: ADMINISTRATIVE | Facility: CLINIC | Age: 77
End: 2022-08-24
Payer: MEDICARE

## 2022-08-24 NOTE — TELEPHONE ENCOUNTER
Reason for Disposition   Wheezing, stridor, hoarseness, or difficulty breathing    Additional Information   Negative: Life-threatening reaction in the past to similar substance (e.g., food, insect bite/sting, medication, etc.) and < 2 hours since exposure    Protocols used: [id number]    Pt stated she has an allergic reaction each time she takes Boniva. Stated she is having difficulty breathing and feels like her neck/throat is swollen. Pt advised to take Benadryl and call 911 now. Pt verbalized understanding.

## 2022-08-25 DIAGNOSIS — M05.9 RHEUMATOID ARTHRITIS WITH POSITIVE RHEUMATOID FACTOR, INVOLVING UNSPECIFIED SITE: ICD-10-CM

## 2022-08-25 DIAGNOSIS — E11.9 TYPE 2 DIABETES MELLITUS WITHOUT COMPLICATION, UNSPECIFIED WHETHER LONG TERM INSULIN USE: ICD-10-CM

## 2022-08-25 DIAGNOSIS — E21.3 HYPERPARATHYROIDISM, UNSPECIFIED: ICD-10-CM

## 2022-08-25 DIAGNOSIS — M35.00 SJOGREN'S SYNDROME, WITH UNSPECIFIED ORGAN INVOLVEMENT: ICD-10-CM

## 2022-08-25 DIAGNOSIS — L40.9 PSORIASIS: ICD-10-CM

## 2022-08-25 DIAGNOSIS — N28.89 RENAL MASS: ICD-10-CM

## 2022-08-25 DIAGNOSIS — M17.0 PRIMARY OSTEOARTHRITIS OF BOTH KNEES: ICD-10-CM

## 2022-08-25 DIAGNOSIS — G89.4 CHRONIC PAIN SYNDROME: ICD-10-CM

## 2022-08-25 DIAGNOSIS — E11.9 TYPE 2 DIABETES MELLITUS WITHOUT COMPLICATION, WITHOUT LONG-TERM CURRENT USE OF INSULIN: ICD-10-CM

## 2022-08-25 DIAGNOSIS — M47.817 LUMBAR AND SACRAL ARTHRITIS: ICD-10-CM

## 2022-08-25 NOTE — TELEPHONE ENCOUNTER
----- Message from Isak Gentile sent at 8/25/2022  2:53 PM CDT -----  Regarding: pt called  Can the clinic reply in [facility]: SARAH TORRES [[id number]]      Please refill the medication(s) listed below. Please call the patient when the prescription(s) is ready for  at this phone number         Medication #1  HYDROcodone-acetaminophen (NORCO)  mg per tablet    Medication #2       Preferred Pharmacy:  [facility] [place] [address] [address]  Phone: [phone] Fax: [phone]

## 2022-08-26 PROCEDURE — G0180 PR HOME HEALTH MD CERTIFICATION: ICD-10-PCS | Mod: ,,, | Performed by: INTERNAL MEDICINE

## 2022-08-26 PROCEDURE — G0180 MD CERTIFICATION HHA PATIENT: HCPCS | Mod: ,,, | Performed by: INTERNAL MEDICINE

## 2022-08-26 NOTE — TELEPHONE ENCOUNTER
----- Message from Libertadfatmata Albaard sent at 8/26/2022  8:33 AM CDT -----  Contact: Patient  Type: Patient Call Back         Who called: Patient         What is the request in detail: requesting a call back from nurse to day regarding HYDROcodone-acetaminophen (NORCO)  mg per tablet; states the pharmacy has not received Rx and she had called it in in advance         Best call back number: [phone]         Additional Information:     [facility] - [address] [address] [facility] [place] [address]  Phone: [phone] Fax: [phone]           Thank You

## 2022-08-26 NOTE — TELEPHONE ENCOUNTER
----- Message from Giancarlo Villatoro sent at 8/26/2022 10:30 AM CDT -----  Contact: pt at [phone]  Type: Needs Medical Advice  Who Called:  pt   Best Call Back Number: [phone]    Additional Information: pt called in because the medication she needs is still waiting on an athorization from the doctor to xclear please call the pt back to advise she asked for a return call

## 2022-08-28 RX ORDER — HYDROCODONE BITARTRATE AND ACETAMINOPHEN 10; 325 MG/1; MG/1
1 TABLET ORAL EVERY 8 HOURS PRN
Qty: 90 TABLET | Refills: 0 | OUTPATIENT
Start: 2022-08-28

## 2022-09-12 ENCOUNTER — EXTERNAL HOME HEALTH (OUTPATIENT)
Dept: HOME HEALTH SERVICES | Facility: HOSPITAL | Age: 77
End: 2022-09-12
Payer: MEDICARE

## 2022-09-14 ENCOUNTER — TELEPHONE (OUTPATIENT)
Dept: FAMILY MEDICINE | Facility: CLINIC | Age: 77
End: 2022-09-14
Payer: MEDICARE

## 2022-09-14 ENCOUNTER — LAB VISIT (OUTPATIENT)
Dept: LAB | Facility: HOSPITAL | Age: 77
End: 2022-09-14
Attending: INTERNAL MEDICINE
Payer: MEDICARE

## 2022-09-14 DIAGNOSIS — R30.0 DYSURIA: ICD-10-CM

## 2022-09-14 DIAGNOSIS — R30.0 DYSURIA: Primary | ICD-10-CM

## 2022-09-14 LAB
BILIRUB UR QL STRIP: NEGATIVE
CLARITY UR: CLEAR
COLOR UR: YELLOW
GLUCOSE UR QL STRIP: NEGATIVE
HGB UR QL STRIP: NEGATIVE
KETONES UR QL STRIP: NEGATIVE
LEUKOCYTE ESTERASE UR QL STRIP: NEGATIVE
NITRITE UR QL STRIP: NEGATIVE
PH UR STRIP: 6 [PH] (ref 5–8)
PROT UR QL STRIP: NEGATIVE
SP GR UR STRIP: 1.02 (ref 1–1.03)
URN SPEC COLLECT METH UR: NORMAL

## 2022-09-14 PROCEDURE — 81003 URINALYSIS AUTO W/O SCOPE: CPT | Mod: PO | Performed by: INTERNAL MEDICINE

## 2022-09-14 NOTE — TELEPHONE ENCOUNTER
----- Message from Adriana Breaux sent at 9/14/2022 10:39 AM CDT -----  Regarding: Urine  Good Morning  Patient dropped of urine sample but there is no order in system for it. Please put in order and advise. Thank you

## 2022-09-14 NOTE — TELEPHONE ENCOUNTER
Please let patient know that her urine is normal, no signs of infection. If she is still having symptoms, she needs to be seen in [place].

## 2022-09-14 NOTE — TELEPHONE ENCOUNTER
Patient dropped off urine specimen for urinalysis today. No orders listed in Epic. Patient reports dysuria, lower abd fullness.

## 2022-09-14 NOTE — TELEPHONE ENCOUNTER
I have signed for the following orders AND/OR meds.  Please call the patient and ask the patient to schedule the testing AND/OR inform about any medications that were sent. Medications have been sent to pharmacy listed below      Orders Placed This Encounter   Procedures    Urinalysis, Reflex to Urine Culture Urine, Clean Catch     Standing Status:   Future     Standing Expiration Date:   11/13/2023     Order Specific Question:   Preferred Collection Type     Answer:   Urine, Clean Catch     Order Specific Question:   Specimen Source     Answer:   Urine              [facility] [place] - [address]  [place] [address]  Phone: [phone] Fax: [phone]    [id number] - [place] - [address] AT [facility] [address] [facility]  [address]  [place] [address]  Phone: [phone] Fax: [phone]    [facility] (Now [facility]) - [facility] [address] [facility]  [address] [facility] [address]  Phone: [phone] Fax: [phone]    [facility] - [place] - [address]  [place] [address]  Phone: [phone] Fax: [phone]

## 2022-09-20 ENCOUNTER — TELEPHONE (OUTPATIENT)
Dept: RHEUMATOLOGY | Facility: CLINIC | Age: 77
End: 2022-09-20
Payer: MEDICARE

## 2022-09-20 NOTE — TELEPHONE ENCOUNTER
Called patient to discuss flare and appointment  no answer----- Message from Annmarie Morales sent at 9/20/2022 11:09 AM CDT -----  Contact: self  Type: Needs Medical Advice    Who Called:  patient  Symptoms (please be specific):  flare up/ pain  How long has patient had these symptoms:  on and off    Best Call Back Number: [phone]      Additional Information: The pt would like to speak with someone in the office regarding a flare up. The pt also stated she would like to come in on Sep 28th when her daughter comes to her appt. The pt stated that would help her out and it allow the pt to have someone with her during the appt. Please call pt back. Thanks.

## 2022-09-23 ENCOUNTER — TELEPHONE (OUTPATIENT)
Dept: FAMILY MEDICINE | Facility: CLINIC | Age: 77
End: 2022-09-23
Payer: MEDICARE

## 2022-09-23 ENCOUNTER — TELEPHONE (OUTPATIENT)
Dept: RHEUMATOLOGY | Facility: CLINIC | Age: 77
End: 2022-09-23
Payer: MEDICARE

## 2022-09-23 DIAGNOSIS — J06.9 URI, ACUTE: Primary | ICD-10-CM

## 2022-09-23 RX ORDER — AMOXICILLIN 500 MG/1
500 CAPSULE ORAL 3 TIMES DAILY
Qty: 30 CAPSULE | Refills: 0 | Status: SHIPPED | OUTPATIENT
Start: 2022-09-23 | End: 2023-04-02

## 2022-09-23 NOTE — TELEPHONE ENCOUNTER
----- Message from Corrina Fuller sent at 9/23/2022 11:15 AM CDT -----  Contact: pt  Type:  Sooner Appointment Request    Caller is requesting a sooner appointment.  Caller declined first available appointment listed below.  Caller will not accept being placed on the waitlist and is requesting a message be sent to doctor.  Name of Caller:pt  When is the first available appointment? N/s  Symptoms: check up  Would the patient rather a call back or a response via [facility]? Call back  Best Call Back Number:[phone]  Additional Information:n/a

## 2022-09-27 ENCOUNTER — DOCUMENT SCAN (OUTPATIENT)
Dept: HOME HEALTH SERVICES | Facility: HOSPITAL | Age: 77
End: 2022-09-27
Payer: MEDICARE

## 2022-09-28 ENCOUNTER — TELEPHONE (OUTPATIENT)
Dept: FAMILY MEDICINE | Facility: CLINIC | Age: 77
End: 2022-09-28
Payer: MEDICARE

## 2022-09-28 NOTE — TELEPHONE ENCOUNTER
Called pt, no answer, left vm.   SUBJECTIVE:  Adam Barraza is a 15 year old male who presents to the clinic today with a chief complaint of cough  for 4 day(s).  His cough is described as nonproductive.    The patient's symptoms are moderate and worsening.  Associated symptoms include fever. The patient's symptoms are exacerbated by cold air  Patient has been using Tylenol  to improve symptoms.    No past medical history on file.    Current Outpatient Medications   Medication Sig Dispense Refill     IBUPROFEN PO          Social History     Tobacco Use     Smoking status: Never Smoker     Smokeless tobacco: Never Used     Tobacco comment: nonsmoking home   Substance Use Topics     Alcohol use: No     Alcohol/week: 0.0 standard drinks       ROS  10 point ROS negative except as listed above      OBJECTIVE:  /60   Pulse 117   Temp 97.8  F (36.6  C) (Tympanic)   Wt 91 kg (200 lb 9.6 oz)   SpO2 98%   GENERAL APPEARANCE: healthy, alert and no distress  EYES: EOMI,  PERRL, conjunctiva clear  HENT: ear canals and TM's normal.  Nose and mouth without ulcers, erythema or lesions  NECK: supple, nontender, no lymphadenopathy  RESP: lungs clear to auscultation - no rales, rhonchi or wheezes  CV: regular rates and rhythm, normal S1 S2, no murmur noted  ABDOMEN:  soft, nontender, no HSM or masses and bowel sounds normal  NEURO: Normal strength and tone, sensory exam grossly normal,  normal speech and mentation  SKIN: no suspicious lesions or rashes      Results for orders placed or performed in visit on 02/04/20   Influenza A/B antigen     Status: None   Result Value Ref Range    Influenza A/B Agn Specimen Nasal     Influenza A Negative NEG^Negative    Influenza B Negative NEG^Negative   Strep, Rapid Screen     Status: None   Result Value Ref Range    Specimen Description Throat     Rapid Strep A Screen       NEGATIVE: No Group A streptococcal antigen detected by immunoassay, await culture report.   Beta strep group A culture     Status: None    Result Value Ref Range    Specimen Description Throat     Culture Micro No beta hemolytic Streptococcus Group A isolated        ASSESSMENT:    (R05) Cough  (primary encounter diagnosis)  Comment: no evidence of bacteria  Plan: Influenza A/B antigen, Strep, Rapid Screen,         Beta strep group A culture      Follow up with PCP if symptoms worsen or fail to improve      Patient Instructions         With distilled water 2-3x per day for a minimum 2-3 days  Mucinex, increased fluids, humidifier at night, steaming in the day  Cough drops and hot saltwater gargles as needed    Adult Self-Care for Colds  Colds are caused by viruses. They can't be cured with antibiotics. However, you can ease symptoms and support your body's efforts to heal itself.  No matter which symptoms you have, be sure to:    Drink plenty of fluids (water or clear soup)    Stop smoking and drinking alcohol    Get plenty of rest    Understand a fever    Take your temperature several times a day. If your fever is 100.4 F (38.0 C) for more than a day, call your healthcare provider.    Relax, lie down. Go to bed if you want. Just get off your feet and rest. Also, drink plenty of fluids to avoid dehydration.    Take acetaminophen or a nonsteroidal anti-inflammatory agent (NSAID), such as ibuprofen.  Treat a troubled nose kindly    Breathe steam or heated humidified air to open blocked nasal passages.  a hot shower or use a vaporizer. Be careful not to get burned by the steam.    Saline nasal sprays and decongestant tablets help open a stuffy nose. Antihistamines can also help, but they can cause side effects such as drowsiness and drying of the eyes, nose, and mouth.  Soothe a sore throat and cough    Gargle every 2 hours with 1/4 teaspoon of salt dissolved in 1/2 cup of warm water. Suck on throat lozenges and cough drops to moisten your throat.    Cough medicines are available but it is unclear how well they actually work.    Take  acetaminophen or an NSAID, such as ibuprofen, to ease throat pain  Ease digestive problems    Put fluids back into your body. Take frequent sips of clear liquids such as water or broth. Avoid drinks that have a lot of sugar in them, such as juices and sodas. These can make diarrhea worse. Older children and adults can drink sports drinks.    As your appetite returns, you can resume your normal diet. Ask your healthcare provider if there are any foods you should avoid.  When to seek medical care  When you first notice symptoms, ask your healthcare provider if antiviral medicines are appropriate. Antibiotics should not be taken for colds or flu. Also, call your healthcare provider if you have any of the following symptoms or if you aren't feeling better after 7 days:    Shortness of breath    Pain or pressure in the chest or belly (abdomen)    Worsening symptoms, especially after a period of improvement    Fever of 100.4 F  (38.0 C) or higher, or fever that doesn't go down with medicine    Sudden dizziness or confusion    Severe or continued vomiting    Signs of dehydration, including extreme thirst, dark urine, infrequent urination, dry mouth    Spotted, red, or very sore throat   Date Last Reviewed: 12/1/2016 2000-2017 [employer]. [address] [place] [address], [place] [address]. All rights reserved. This information is not intended as a substitute for professional medical care. Always follow your healthcare professional's instructions.

## 2022-09-30 ENCOUNTER — TELEPHONE (OUTPATIENT)
Dept: FAMILY MEDICINE | Facility: CLINIC | Age: 77
End: 2022-09-30
Payer: MEDICARE

## 2022-09-30 NOTE — TELEPHONE ENCOUNTER
----- Message from Connie Mervin sent at 9/30/2022 11:14 AM CDT -----  Contact: Rekha  .Type:  Patient Returning Call    Who Called:Rekha   Who Left Message for Patient:unknown   Does the patient know what this is regarding?:unknown   Would the patient rather a call back or a response via [facility]? Call   Best Call Back Number:.[phone]   Additional Information: Patient returning a call. Please call back.

## 2022-09-30 NOTE — TELEPHONE ENCOUNTER
Call returned. Patient advised that Dr. Silva no longer sees patient in clinic, only virtual. She declined scheduling any sooner appointments available with an JORGE, stated that she only wants to see Dr. Silva. Appointment scheduled with PCP for next available on 11/3/2022.

## 2022-10-19 DIAGNOSIS — I10 HYPERTENSION, UNSPECIFIED TYPE: ICD-10-CM

## 2022-10-19 RX ORDER — AMLODIPINE AND BENAZEPRIL HYDROCHLORIDE 5; 20 MG/1; MG/1
CAPSULE ORAL
Qty: 180 CAPSULE | Refills: 1 | Status: CANCELLED | OUTPATIENT
Start: 2022-10-19

## 2022-10-19 NOTE — TELEPHONE ENCOUNTER
Spoke with patient, notified her of request received from [facility] to send 10 day supply to local pharmacy. Advised that a 90-day supply was just sent to [facility] on 10/17/2022. Patient will check with local pharmacy.

## 2022-10-19 NOTE — TELEPHONE ENCOUNTER
----- Message from Danis Muñoz sent at 10/19/2022  2:34 PM CDT -----  Regarding: Emergency Refill 10 day supply URGENT  Contact: [facility] pharmacy  Pt is requesting an emergency fill to be placed at the local pharmacy for ten days. The order is going out to her but the pt only has omne pill left . The medication requested is amlodipine-benazepril 5-20 mg (LOTREL) 5-20 mg per capsule.      Please call this ten day supply into   [facility] [place] - [facility] [id number] [facility] [address]  Phone: [phone] Fax: [phone]    Thank you    Jax

## 2022-10-19 NOTE — TELEPHONE ENCOUNTER
No new care gaps identified.  [facility] Embedded Care Gaps. Reference number: [id number]. 10/19/2022   2:39:52 PM CDT

## 2022-10-21 DIAGNOSIS — J06.9 UPPER RESPIRATORY TRACT INFECTION, UNSPECIFIED TYPE: Primary | ICD-10-CM

## 2022-10-21 RX ORDER — LEVOFLOXACIN 500 MG/1
500 TABLET, FILM COATED ORAL DAILY
Qty: 10 TABLET | Refills: 0 | Status: SHIPPED | OUTPATIENT
Start: 2022-10-21 | End: 2022-10-31

## 2022-11-17 ENCOUNTER — TELEPHONE (OUTPATIENT)
Dept: FAMILY MEDICINE | Facility: CLINIC | Age: 77
End: 2022-11-17
Payer: MEDICARE

## 2022-11-17 NOTE — TELEPHONE ENCOUNTER
Call returned. Patient reports that she was diagnosed with the flu on 11/10/22. She reports that she feels weak, nauseated, and is unable to eat. She believes she may be dehydrated. Patient advised that she needs to go to ER so that they can evaluate and provide fluids. She verbalized understanding and says that she can have someone bring her this afternoon.

## 2022-11-17 NOTE — TELEPHONE ENCOUNTER
----- Message from Kj Kruger sent at 11/17/2022 11:22 AM CST -----  Contact: [phone]  Pt is calling stating she have the flu, she is very weak and unable to eat , pt is asking for something to be called into local pharmacy . please call back at [phone] . Robles/darrion           [id number] - [place] - [address] AT [facility] OF [facility] [address]  [place] [address]  Phone: [phone] Fax: [phone]

## 2022-11-22 DIAGNOSIS — N28.89 RENAL MASS: ICD-10-CM

## 2022-11-22 DIAGNOSIS — E11.9 TYPE 2 DIABETES MELLITUS WITHOUT COMPLICATION, UNSPECIFIED WHETHER LONG TERM INSULIN USE: ICD-10-CM

## 2022-11-22 DIAGNOSIS — G89.4 CHRONIC PAIN SYNDROME: ICD-10-CM

## 2022-11-22 DIAGNOSIS — E11.9 TYPE 2 DIABETES MELLITUS WITHOUT COMPLICATION, WITHOUT LONG-TERM CURRENT USE OF INSULIN: ICD-10-CM

## 2022-11-22 DIAGNOSIS — L40.9 PSORIASIS: ICD-10-CM

## 2022-11-22 DIAGNOSIS — M17.0 PRIMARY OSTEOARTHRITIS OF BOTH KNEES: ICD-10-CM

## 2022-11-22 DIAGNOSIS — E21.3 HYPERPARATHYROIDISM, UNSPECIFIED: ICD-10-CM

## 2022-11-22 DIAGNOSIS — M47.817 LUMBAR AND SACRAL ARTHRITIS: ICD-10-CM

## 2022-11-22 DIAGNOSIS — M35.00 SJOGREN'S SYNDROME, WITH UNSPECIFIED ORGAN INVOLVEMENT: ICD-10-CM

## 2022-11-22 DIAGNOSIS — M05.9 RHEUMATOID ARTHRITIS WITH POSITIVE RHEUMATOID FACTOR, INVOLVING UNSPECIFIED SITE: ICD-10-CM

## 2022-11-28 RX ORDER — HYDROCODONE BITARTRATE AND ACETAMINOPHEN 10; 325 MG/1; MG/1
1 TABLET ORAL EVERY 8 HOURS PRN
Qty: 90 TABLET | Refills: 0 | Status: SHIPPED | OUTPATIENT
Start: 2022-11-28 | End: 2022-12-21

## 2022-12-07 DIAGNOSIS — I10 HYPERTENSION, UNSPECIFIED TYPE: ICD-10-CM

## 2022-12-07 NOTE — TELEPHONE ENCOUNTER
No new care gaps identified.  [facility] Embedded Care Gaps. Reference number: [id number]. 12/07/2022   3:07:43 PM CST

## 2022-12-08 ENCOUNTER — OFFICE VISIT (OUTPATIENT)
Dept: FAMILY MEDICINE | Facility: CLINIC | Age: 77
End: 2022-12-08
Payer: MEDICARE

## 2022-12-08 VITALS
TEMPERATURE: 98 F | BODY MASS INDEX: 41.83 KG/M2 | HEIGHT: 66 IN | SYSTOLIC BLOOD PRESSURE: 99 MMHG | OXYGEN SATURATION: 98 % | WEIGHT: 260.25 LBS | HEART RATE: 78 BPM | DIASTOLIC BLOOD PRESSURE: 60 MMHG

## 2022-12-08 DIAGNOSIS — J18.9 COMMUNITY ACQUIRED PNEUMONIA, UNSPECIFIED LATERALITY: Primary | ICD-10-CM

## 2022-12-08 DIAGNOSIS — E78.5 TYPE 2 DIABETES MELLITUS WITH HYPERLIPIDEMIA: ICD-10-CM

## 2022-12-08 DIAGNOSIS — E11.69 TYPE 2 DIABETES MELLITUS WITH HYPERLIPIDEMIA: ICD-10-CM

## 2022-12-08 DIAGNOSIS — Z09 HOSPITAL DISCHARGE FOLLOW-UP: ICD-10-CM

## 2022-12-08 DIAGNOSIS — M05.9 RHEUMATOID ARTHRITIS WITH POSITIVE RHEUMATOID FACTOR, INVOLVING UNSPECIFIED SITE: ICD-10-CM

## 2022-12-08 DIAGNOSIS — J45.40 MODERATE PERSISTENT ASTHMA WITHOUT COMPLICATION: ICD-10-CM

## 2022-12-08 PROCEDURE — 1101F PR PT FALLS ASSESS DOC 0-1 FALLS W/OUT INJ PAST YR: ICD-10-PCS | Mod: CPTII,S$GLB,, | Performed by: INTERNAL MEDICINE

## 2022-12-08 PROCEDURE — 3288F FALL RISK ASSESSMENT DOCD: CPT | Mod: CPTII,S$GLB,, | Performed by: INTERNAL MEDICINE

## 2022-12-08 PROCEDURE — 1126F AMNT PAIN NOTED NONE PRSNT: CPT | Mod: CPTII,S$GLB,, | Performed by: INTERNAL MEDICINE

## 2022-12-08 PROCEDURE — 99999 PR PBB SHADOW E&M-EST. PATIENT-LVL V: CPT | Mod: PBBFAC,,, | Performed by: INTERNAL MEDICINE

## 2022-12-08 PROCEDURE — 1126F PR PAIN SEVERITY QUANTIFIED, NO PAIN PRESENT: ICD-10-PCS | Mod: CPTII,S$GLB,, | Performed by: INTERNAL MEDICINE

## 2022-12-08 PROCEDURE — 3288F PR FALLS RISK ASSESSMENT DOCUMENTED: ICD-10-PCS | Mod: CPTII,S$GLB,, | Performed by: INTERNAL MEDICINE

## 2022-12-08 PROCEDURE — 3074F SYST BP LT 130 MM HG: CPT | Mod: CPTII,S$GLB,, | Performed by: INTERNAL MEDICINE

## 2022-12-08 PROCEDURE — 3074F PR MOST RECENT SYSTOLIC BLOOD PRESSURE < 130 MM HG: ICD-10-PCS | Mod: CPTII,S$GLB,, | Performed by: INTERNAL MEDICINE

## 2022-12-08 PROCEDURE — 3078F DIAST BP <80 MM HG: CPT | Mod: CPTII,S$GLB,, | Performed by: INTERNAL MEDICINE

## 2022-12-08 PROCEDURE — 1101F PT FALLS ASSESS-DOCD LE1/YR: CPT | Mod: CPTII,S$GLB,, | Performed by: INTERNAL MEDICINE

## 2022-12-08 PROCEDURE — 99999 PR PBB SHADOW E&M-EST. PATIENT-LVL V: ICD-10-PCS | Mod: PBBFAC,,, | Performed by: INTERNAL MEDICINE

## 2022-12-08 PROCEDURE — 99214 PR OFFICE/OUTPT VISIT, EST, LEVL IV, 30-39 MIN: ICD-10-PCS | Mod: S$GLB,,, | Performed by: INTERNAL MEDICINE

## 2022-12-08 PROCEDURE — 3078F PR MOST RECENT DIASTOLIC BLOOD PRESSURE < 80 MM HG: ICD-10-PCS | Mod: CPTII,S$GLB,, | Performed by: INTERNAL MEDICINE

## 2022-12-08 PROCEDURE — 99214 OFFICE O/P EST MOD 30 MIN: CPT | Mod: S$GLB,,, | Performed by: INTERNAL MEDICINE

## 2022-12-08 RX ORDER — FUROSEMIDE 40 MG/1
TABLET ORAL
Qty: 180 TABLET | Refills: 1 | Status: SHIPPED | OUTPATIENT
Start: 2022-12-08

## 2022-12-08 RX ORDER — BLOOD-GLUCOSE METER
EACH MISCELLANEOUS
COMMUNITY
Start: 2022-08-22

## 2022-12-08 RX ORDER — AMOXICILLIN AND CLAVULANATE POTASSIUM 875; 125 MG/1; MG/1
1 TABLET, FILM COATED ORAL 2 TIMES DAILY
Qty: 14 TABLET | Refills: 0 | Status: SHIPPED | OUTPATIENT
Start: 2022-12-08 | End: 2022-12-15

## 2022-12-08 RX ORDER — AZITHROMYCIN 250 MG/1
TABLET, FILM COATED ORAL
Qty: 6 TABLET | Refills: 0 | Status: SHIPPED | OUTPATIENT
Start: 2022-12-08 | End: 2022-12-23

## 2022-12-08 RX ORDER — HYDROXYCHLOROQUINE SULFATE 200 MG/1
200 TABLET, FILM COATED ORAL DAILY
COMMUNITY
End: 2023-03-09 | Stop reason: SDUPTHER

## 2022-12-08 RX ORDER — BENZONATATE 100 MG/1
100 CAPSULE ORAL 3 TIMES DAILY PRN
COMMUNITY
Start: 2022-11-10 | End: 2023-02-22

## 2022-12-08 RX ORDER — LANCETS 33 GAUGE
EACH MISCELLANEOUS
COMMUNITY
Start: 2022-09-09

## 2022-12-08 NOTE — PROGRESS NOTES
Assessment/Plan:    Problem List Items Addressed This Visit          Pulmonary    Moderate persistent asthma without complication    Overview     -previously followed by pulmonology, but is no longer following  -continues to have a cough since hospitalized for pneumonia   -repeat CXR normal  -using symbicort and albuterol PRN  -consider referral to pulmonology if symptoms persist           Relevant Orders    Ambulatory referral/consult to Home Health       Immunology/Multi System    Rheumatoid arthritis with positive rheumatoid factor    Overview     -managed by rheumatology, Dr. Johnson  -currently on prednisone, leflunomide and plaquenil  -yearly eye exam up to date  -on chronic opiate therapy for pain. Has gabapentin that she uses PRN            Endocrine    Type 2 diabetes mellitus with hyperlipidemia    Overview     Diabetes Medications               glipiZIDE (GLUCOTROL) 5 MG TR24 Take 1 tablet (5 mg total) by mouth daily with breakfast.   -condition is currently controlled  -plan to repeat a1c in 3 months  -see diabetic health maintenance listed below  -on statin: Yes  -on ACE-I/ARB: Yes  -counseling provided on importance of diabetic diet and medication compliance in order to treat diabetes  -discussed diabetes disease course and potential complications         Relevant Orders    Ambulatory referral/consult to Home Health     Other Visit Diagnoses       Community acquired pneumonia, unspecified laterality    -  Primary    Relevant Orders    Ambulatory referral/consult to Home Health    X-Ray Chest PA And Lateral (Completed)    Hospital discharge follow-up                Divina Silva MD  _____________________________________________________________________________________________________________________________________________________    CC: hospital discharge follow up    HPI:    Patient is in clinic today as an established patient. Patient admitted to [facility] for pneumonia. D/c summary  below:    Admit Date:   11/17/2022 3:25 PM  Discharge Date:   11/20/2022    History of Present Illness:   Rekha Miller is a 77 year old female that presented to the ed with complaints of productive cough and sob. Pt reports having nausea, feeling sick to her stomach, and mid back pain that began on the 7th. Pt reports she tested positive for the flu on the 10th. She reports associated fever, chills and congestion. Past medical history is significant for former tobacco use, Sjogrens disease, COPD, GERD, hypertension, sleep apnea, and type 2 diabetes. Initial vitals were significant for a low-grade temp 99.3 and elevated respirations of 21. Initial labs in the ED showed white blood cell count of 12.9, H&H of 11.4/35.6, and potassium 3.5. CXR showed persistent bilateral interstitial disease with development of airspace disease in the right lung base. Findings may represent pneumonia or pulmonary edema. Initial treatment in the ED included Duo Nebs x2, Solu-Medrol 125 mg, azithromycin 500 mg IV, and ceftriaxone 1 g IV.     Hospital Course and Treatment:     Pneumonia right lower lobe due to infectious organism  Sepsis  Acute exacerbation of COPD  -Patient admitted with significant pneumonia right lower lobe sepsis syndrome secondary to acute pneumonia with an acute COPD exacerbation as well. Patient tolerated intravenous antibiotics as well as steroids and breathing treatments on scheduled basis. Patient was able to be converted to p.o. Avelox as well as short oral steroid taper. White count remains somewhat elevated secondary to steroids. Patient's blood cultures and sputum cultures were negative. She is afebrile. She is currently on room air. She can be discharged home with close outpatient follow-up. Recommend outpatient follow-up chest x-ray in 4 weeks to document that the infection has resolved.   Hypokalemia  - Replete as needed   Normocytic anemia  - no overt bleeding   - No need for transfusion while  hospitalized.   Sjogrens disease  - Continue home regimen. Recommend follow-up with rheumatology as scheduled as outpatient   COPD  - Continue home regimen  - Placed on Breo  - As needed DuoNebs   Type 2 diabetes  - Glucose 99  - Accu-Cheks AC at bedtime  - Sliding scale insulin  - Last A1c 5.5 on 8/24/2022   Hypertension  - Continue home regimen   GERD  - PPI   Mixed hyperlipidemia  - Continue current statin   Morbid obesity  - BMI 47.61 recommend dietary changes and weight loss in the outpatient setting        Since d/c: patient reports completing antibiotics. She has had some improvement in SOB. Still having a cough. Productive at time with clear sputum. Denies any fever. She is open to home health for nursing visits so we will order this today.    No other new complaints today.      All discharge medications have been reviewed and reconciled on chart.     Current Outpatient Medications on File Prior to Visit   Medication Sig Dispense Refill    ACCU-CHEK VERONIQUE CONTROL SOLN Soln USE ONCE A WEEK AS DIRECTED 1 each 0    acetaminophen (TYLENOL) 325 MG tablet Take 650 mg by mouth as needed.      albuterol (PROVENTIL) 2.5 mg /3 mL (0.083 %) nebulizer solution USE 1 VIAL IN NEBULIZER EVERY 6 HOURS AS NEEDED FOR WHEEZING 180 mL 3    albuterol (PROVENTIL/VENTOLIN HFA) 90 mcg/actuation inhaler Inhale 2 puffs into the lungs every 6 (six) hours as needed for Wheezing. 18 g 6    amlodipine-benazepril 5-20 mg (LOTREL) 5-20 mg per capsule TAKE ONE CAPSULE BY MOUTH TWICE DAILY 180 capsule 1    benzonatate (TESSALON) 100 MG capsule Take 100 mg by mouth 3 times daily as needed.      blood-glucose calibrat control Cmpk 1 each by Misc.(Non-Drug; Combo Route) route once a week. 1 each 0    budesonide-formoterol 160-4.5 mcg (SYMBICORT) 160-4.5 mcg/actuation HFAA Symbicort 160 mcg-4.5 mcg/actuation HFA aerosol inhaler  INHALE TWO PUFFS TWICE DAILY 10.2 g 5    busPIRone (BUSPAR) 5 MG Tab Take 1 tablet (5 mg total) by mouth 2  (two) times daily. 180 tablet 3    clotrimazole-betamethasone 1-0.05% (LOTRISONE) cream Apply topically 2 (two) times daily. 45 g 6    dicyclomine (BENTYL) 20 mg tablet Take 1 tablet (20 mg total) by mouth every 6 (six) hours. 120 tablet 3    ferric citrate (AURYXIA) 210 mg iron Tab Take 1 tablet by mouth once daily. 90 tablet 3    fexofenadine (ALLEGRA) 180 MG tablet Take 1 tablet by mouth once daily.      fluocinolone-hydroq.-tretinoin (TRI-VITALY) 0.01-4-0.05 % Crea Apply 1 application topically every evening. 30 g 1    fluticasone propionate (FLONASE) 50 mcg/actuation nasal spray 1 spray (50 mcg total) by Each Nostril route once daily. 16 g 0    folic acid-vit B6-vit B12 (FOLBEE) 2.5-25-1 mg Tab Take 1 tablet by mouth once daily. 90 each 3    glipiZIDE 5 MG TR24 Take 1 tablet (5 mg total) by mouth daily with breakfast. 90 tablet 1    hydroquin-tretinoin-hydrocort 4-0.025-0.5 % Emul Apply 1 applicator topically every evening. 30 g 3    hydrOXYchloroQUINE (PLAQUENIL) 200 mg tablet Take 200 mg by mouth once daily.      ibandronate (BONIVA) 150 mg tablet Take 1 tablet (150 mg total) by mouth every 30 days. 3 tablet 3    leflunomide (ARAVA) 10 MG Tab Take 1 tablet (10 mg total) by mouth once daily. 90 tablet 1    multivitamin with iron Tab Take 1 tablet by mouth once daily. 30 each 11    mupirocin (BACTROBAN) 2 % ointment Apply topically 3 (three) times daily. 30 g 6    potassium chloride SA (K-DUR,KLOR-CON) 20 MEQ tablet Take 2 tablets (40 mEq total) by mouth 2 (two) times daily. 360 tablet 1    pravastatin (PRAVACHOL) 20 MG tablet Take 1 tablet (20 mg total) by mouth once daily. 90 tablet 3    traZODone (DESYREL) 50 MG tablet Take 50 mg by mouth every evening.  5    triamcinolone acetonide 0.1% (KENALOG) 0.1 % ointment Apply topically 2 (two) times daily. 80 g 0    TRUE METRIX GLUCOSE METER Misc test once DAILY AS DIRECTED      TRUEPLUS LANCETS 33 gauge Misc       furosemide (LASIX) 40 MG tablet  "TAKE 2 TABLETS EVERY DAY AS NEEDED 180 tablet 1    levocetirizine (XYZAL) 5 MG tablet Take 5 mg by mouth.       sodium chloride 5% (LALITHA 128) 5 % ophthalmic solution sodium chloride 5 % eye drops   INSTILL ONE DROP INTO EACH EYE FOUR TIMES DAILY       No current facility-administered medications on file prior to visit.       Review of Systems   Constitutional:  Negative for chills, diaphoresis, fatigue and fever.   HENT:  Negative for congestion, ear pain, postnasal drip, sinus pain and sore throat.    Eyes:  Negative for pain and redness.   Respiratory:  Positive for cough. Negative for chest tightness and shortness of breath.    Cardiovascular:  Negative for chest pain and leg swelling.   Gastrointestinal:  Negative for abdominal pain, constipation, diarrhea, nausea and vomiting.   Genitourinary:  Negative for dysuria and hematuria.   Musculoskeletal:  Negative for arthralgias and joint swelling.   Skin:  Negative for rash.   Neurological:  Negative for dizziness, syncope and headaches.   Psychiatric/Behavioral:  Negative for dysphoric mood. The patient is not nervous/anxious.      Vitals:    12/08/22 1316   BP: 99/60   Pulse: 78   Temp: 98.3 °F (36.8 °C)   SpO2: 98%   Weight: 118.1 kg (260 lb 4.1 oz)   Height: 5' 6" (1.676 m)       Wt Readings from Last 3 Encounters:   12/08/22 118.1 kg (260 lb 4.1 oz)   08/22/22 122.5 kg (270 lb)   07/20/22 117.7 kg (259 lb 7.7 oz)       Physical Exam  Constitutional:       General: She is not in acute distress.     Appearance: Normal appearance. She is well-developed. She is obese.   HENT:      Head: Normocephalic and atraumatic.   Eyes:      Conjunctiva/sclera: Conjunctivae normal.   Cardiovascular:      Rate and Rhythm: Normal rate and regular rhythm.      Pulses: Normal pulses.      Heart sounds: Normal heart sounds. No murmur heard.  Pulmonary:      Effort: Pulmonary effort is normal. No respiratory distress.      Breath sounds: Normal breath sounds. No stridor. No " wheezing, rhonchi or rales.   Chest:      Chest wall: No tenderness.   Abdominal:      General: Bowel sounds are normal. There is no distension.      Palpations: Abdomen is soft.      Tenderness: There is no abdominal tenderness.   Musculoskeletal:         General: Normal range of motion.      Cervical back: Normal range of motion and neck supple.   Skin:     General: Skin is warm and dry.      Findings: No rash.   Neurological:      General: No focal deficit present.      Mental Status: She is alert and oriented to person, place, and time.   Psychiatric:         Mood and Affect: Mood normal.         Behavior: Behavior normal.     Health Maintenance   Topic Date Due    Hemoglobin A1c  02/25/2023    Foot Exam  03/22/2023    Mammogram  07/13/2023    Eye Exam  08/16/2023    Lipid Panel  08/25/2023    Low Dose Statin  12/23/2023    DEXA Scan  07/13/2025    TETANUS VACCINE  03/09/2026    Hepatitis C Screening  Completed

## 2022-12-08 NOTE — TELEPHONE ENCOUNTER
Refill Decision Note   Rekha Miller  is requesting a refill authorization.  Brief Assessment and Rationale for Refill:  Approve     Medication Therapy Plan:       Medication Reconciliation Completed: No   Comments:     No Care Gaps recommended.     Note composed:2:54 PM 12/08/2022

## 2022-12-10 PROCEDURE — G0180 PR HOME HEALTH MD CERTIFICATION: ICD-10-PCS | Mod: ,,, | Performed by: INTERNAL MEDICINE

## 2022-12-10 PROCEDURE — G0180 MD CERTIFICATION HHA PATIENT: HCPCS | Mod: ,,, | Performed by: INTERNAL MEDICINE

## 2022-12-12 DIAGNOSIS — E78.5 TYPE 2 DIABETES MELLITUS WITH HYPERLIPIDEMIA: ICD-10-CM

## 2022-12-12 DIAGNOSIS — E11.69 TYPE 2 DIABETES MELLITUS WITH HYPERLIPIDEMIA: ICD-10-CM

## 2022-12-12 NOTE — TELEPHONE ENCOUNTER
No new care gaps identified.  [facility] Embedded Care Gaps. Reference number: [id number]. 12/12/2022   2:19:26 PM CST

## 2022-12-12 NOTE — TELEPHONE ENCOUNTER
----- Message from Anneliese Larkin sent at 12/12/2022  2:08 PM CST -----  Pt stated she is out of test strips and the pharmacy need someone to send the prescription in for refill. Call back number is [phone]. Thx.[facility]

## 2022-12-20 DIAGNOSIS — N28.89 RENAL MASS: ICD-10-CM

## 2022-12-20 DIAGNOSIS — M17.0 PRIMARY OSTEOARTHRITIS OF BOTH KNEES: ICD-10-CM

## 2022-12-20 DIAGNOSIS — E11.9 TYPE 2 DIABETES MELLITUS WITHOUT COMPLICATION, WITHOUT LONG-TERM CURRENT USE OF INSULIN: ICD-10-CM

## 2022-12-20 DIAGNOSIS — E11.9 TYPE 2 DIABETES MELLITUS WITHOUT COMPLICATION, UNSPECIFIED WHETHER LONG TERM INSULIN USE: ICD-10-CM

## 2022-12-20 DIAGNOSIS — E21.3 HYPERPARATHYROIDISM, UNSPECIFIED: ICD-10-CM

## 2022-12-20 DIAGNOSIS — M35.00 SJOGREN'S SYNDROME, WITH UNSPECIFIED ORGAN INVOLVEMENT: ICD-10-CM

## 2022-12-20 DIAGNOSIS — M05.9 RHEUMATOID ARTHRITIS WITH POSITIVE RHEUMATOID FACTOR, INVOLVING UNSPECIFIED SITE: ICD-10-CM

## 2022-12-20 DIAGNOSIS — M47.817 LUMBAR AND SACRAL ARTHRITIS: ICD-10-CM

## 2022-12-20 DIAGNOSIS — G89.4 CHRONIC PAIN SYNDROME: ICD-10-CM

## 2022-12-20 DIAGNOSIS — L40.9 PSORIASIS: ICD-10-CM

## 2022-12-20 NOTE — TELEPHONE ENCOUNTER
----- Message from Tabby Ryder sent at 12/20/2022 11:58 AM CST -----  Contact: [phone]  Type: Needs Medical Advice  Who Called:  Pt     Best Call Back Number: [phone]    Additional Information: Pt requesting a call back about her HYDROcodone-acetaminophen (NORCO)  mg per tablet from [facility]. Pt would not give any further details. Pls call back and advise

## 2022-12-21 PROBLEM — E66.01 TYPE 2 DIABETES MELLITUS WITH MORBID OBESITY: Status: ACTIVE | Noted: 2022-12-21

## 2022-12-21 PROBLEM — D84.9 IMMUNODEFICIENCY: Status: ACTIVE | Noted: 2022-12-21

## 2022-12-21 PROBLEM — E11.69 TYPE 2 DIABETES MELLITUS WITH MORBID OBESITY: Status: ACTIVE | Noted: 2022-12-21

## 2022-12-21 PROBLEM — E11.22 TYPE 2 DIABETES MELLITUS WITH STAGE 3A CHRONIC KIDNEY DISEASE, WITHOUT LONG-TERM CURRENT USE OF INSULIN: Status: ACTIVE | Noted: 2022-12-21

## 2022-12-21 PROBLEM — N18.31 TYPE 2 DIABETES MELLITUS WITH STAGE 3A CHRONIC KIDNEY DISEASE, WITHOUT LONG-TERM CURRENT USE OF INSULIN: Status: ACTIVE | Noted: 2022-12-21

## 2022-12-21 RX ORDER — HYDROCODONE BITARTRATE AND ACETAMINOPHEN 10; 325 MG/1; MG/1
1 TABLET ORAL EVERY 8 HOURS PRN
Qty: 90 TABLET | Refills: 0 | Status: SHIPPED | OUTPATIENT
Start: 2022-12-21 | End: 2023-03-09

## 2022-12-22 NOTE — TELEPHONE ENCOUNTER
If she does not see Ariana or myself before her next refill she will no longer receive Norco she needs to be seen it has been almost 5 months   [facility] started

## 2022-12-23 ENCOUNTER — HOSPITAL ENCOUNTER (OUTPATIENT)
Dept: RADIOLOGY | Facility: HOSPITAL | Age: 77
Discharge: HOME | End: 2022-12-23
Attending: INTERNAL MEDICINE
Payer: MEDICARE

## 2022-12-23 ENCOUNTER — OFFICE VISIT (OUTPATIENT)
Dept: FAMILY MEDICINE | Facility: CLINIC | Age: 77
End: 2022-12-23
Payer: MEDICARE

## 2022-12-23 VITALS
HEART RATE: 87 BPM | SYSTOLIC BLOOD PRESSURE: 138 MMHG | BODY MASS INDEX: 42.01 KG/M2 | DIASTOLIC BLOOD PRESSURE: 71 MMHG | OXYGEN SATURATION: 98 % | HEIGHT: 66 IN | TEMPERATURE: 99 F

## 2022-12-23 DIAGNOSIS — J18.9 COMMUNITY ACQUIRED PNEUMONIA, UNSPECIFIED LATERALITY: ICD-10-CM

## 2022-12-23 DIAGNOSIS — R05.9 COUGH, UNSPECIFIED TYPE: Primary | ICD-10-CM

## 2022-12-23 DIAGNOSIS — J45.40 MODERATE PERSISTENT ASTHMA WITHOUT COMPLICATION: ICD-10-CM

## 2022-12-23 PROCEDURE — 4010F PR ACE/ARB THEARPY RXD/TAKEN: ICD-10-PCS | Mod: CPTII,S$GLB,, | Performed by: PHYSICIAN ASSISTANT

## 2022-12-23 PROCEDURE — 99999 PR PBB SHADOW E&M-EST. PATIENT-LVL V: ICD-10-PCS | Mod: PBBFAC,,, | Performed by: PHYSICIAN ASSISTANT

## 2022-12-23 PROCEDURE — 3044F PR MOST RECENT HEMOGLOBIN A1C LEVEL <7.0%: ICD-10-PCS | Mod: CPTII,S$GLB,, | Performed by: PHYSICIAN ASSISTANT

## 2022-12-23 PROCEDURE — 71046 X-RAY EXAM CHEST 2 VIEWS: CPT | Mod: TC,PO

## 2022-12-23 PROCEDURE — 3044F HG A1C LEVEL LT 7.0%: CPT | Mod: CPTII,S$GLB,, | Performed by: PHYSICIAN ASSISTANT

## 2022-12-23 PROCEDURE — 4010F ACE/ARB THERAPY RXD/TAKEN: CPT | Mod: CPTII,S$GLB,, | Performed by: PHYSICIAN ASSISTANT

## 2022-12-23 PROCEDURE — 99214 PR OFFICE/OUTPT VISIT, EST, LEVL IV, 30-39 MIN: ICD-10-PCS | Mod: S$GLB,,, | Performed by: PHYSICIAN ASSISTANT

## 2022-12-23 PROCEDURE — 1126F PR PAIN SEVERITY QUANTIFIED, NO PAIN PRESENT: ICD-10-PCS | Mod: CPTII,S$GLB,, | Performed by: PHYSICIAN ASSISTANT

## 2022-12-23 PROCEDURE — 1101F PT FALLS ASSESS-DOCD LE1/YR: CPT | Mod: CPTII,S$GLB,, | Performed by: PHYSICIAN ASSISTANT

## 2022-12-23 PROCEDURE — 3288F PR FALLS RISK ASSESSMENT DOCUMENTED: ICD-10-PCS | Mod: CPTII,S$GLB,, | Performed by: PHYSICIAN ASSISTANT

## 2022-12-23 PROCEDURE — 3078F PR MOST RECENT DIASTOLIC BLOOD PRESSURE < 80 MM HG: ICD-10-PCS | Mod: CPTII,S$GLB,, | Performed by: PHYSICIAN ASSISTANT

## 2022-12-23 PROCEDURE — 3075F SYST BP GE 130 - 139MM HG: CPT | Mod: CPTII,S$GLB,, | Performed by: PHYSICIAN ASSISTANT

## 2022-12-23 PROCEDURE — 3075F PR MOST RECENT SYSTOLIC BLOOD PRESS GE 130-139MM HG: ICD-10-PCS | Mod: CPTII,S$GLB,, | Performed by: PHYSICIAN ASSISTANT

## 2022-12-23 PROCEDURE — 99999 PR PBB SHADOW E&M-EST. PATIENT-LVL V: CPT | Mod: PBBFAC,,, | Performed by: PHYSICIAN ASSISTANT

## 2022-12-23 PROCEDURE — 99214 OFFICE O/P EST MOD 30 MIN: CPT | Mod: S$GLB,,, | Performed by: PHYSICIAN ASSISTANT

## 2022-12-23 PROCEDURE — 1160F RVW MEDS BY RX/DR IN RCRD: CPT | Mod: CPTII,S$GLB,, | Performed by: PHYSICIAN ASSISTANT

## 2022-12-23 PROCEDURE — 3288F FALL RISK ASSESSMENT DOCD: CPT | Mod: CPTII,S$GLB,, | Performed by: PHYSICIAN ASSISTANT

## 2022-12-23 PROCEDURE — 71046 XR CHEST PA AND LATERAL: ICD-10-PCS | Mod: 26,,, | Performed by: RADIOLOGY

## 2022-12-23 PROCEDURE — 1126F AMNT PAIN NOTED NONE PRSNT: CPT | Mod: CPTII,S$GLB,, | Performed by: PHYSICIAN ASSISTANT

## 2022-12-23 PROCEDURE — 71046 X-RAY EXAM CHEST 2 VIEWS: CPT | Mod: 26,,, | Performed by: RADIOLOGY

## 2022-12-23 PROCEDURE — 3078F DIAST BP <80 MM HG: CPT | Mod: CPTII,S$GLB,, | Performed by: PHYSICIAN ASSISTANT

## 2022-12-23 PROCEDURE — 1159F PR MEDICATION LIST DOCUMENTED IN MEDICAL RECORD: ICD-10-PCS | Mod: CPTII,S$GLB,, | Performed by: PHYSICIAN ASSISTANT

## 2022-12-23 PROCEDURE — 1101F PR PT FALLS ASSESS DOC 0-1 FALLS W/OUT INJ PAST YR: ICD-10-PCS | Mod: CPTII,S$GLB,, | Performed by: PHYSICIAN ASSISTANT

## 2022-12-23 PROCEDURE — 1159F MED LIST DOCD IN RCRD: CPT | Mod: CPTII,S$GLB,, | Performed by: PHYSICIAN ASSISTANT

## 2022-12-23 PROCEDURE — 1160F PR REVIEW ALL MEDS BY PRESCRIBER/CLIN PHARMACIST DOCUMENTED: ICD-10-PCS | Mod: CPTII,S$GLB,, | Performed by: PHYSICIAN ASSISTANT

## 2022-12-23 RX ORDER — HYDROCODONE POLISTIREX AND CHLORPHENIRAMINE POLISTIREX 10; 8 MG/5ML; MG/5ML
5 SUSPENSION, EXTENDED RELEASE ORAL EVERY 12 HOURS PRN
Qty: 90 ML | Refills: 0 | Status: SHIPPED | OUTPATIENT
Start: 2022-12-23 | End: 2023-01-02

## 2022-12-23 RX ORDER — PROMETHAZINE HYDROCHLORIDE AND DEXTROMETHORPHAN HYDROBROMIDE 6.25; 15 MG/5ML; MG/5ML
5 SYRUP ORAL EVERY 6 HOURS PRN
Qty: 118 ML | Refills: 0 | Status: SHIPPED | OUTPATIENT
Start: 2022-12-23 | End: 2022-12-23 | Stop reason: ALTCHOICE

## 2022-12-23 NOTE — PROGRESS NOTES
Assessment/Plan:    Problem List Items Addressed This Visit          Pulmonary    Moderate persistent asthma without complication    Overview     -previously followed by pulmonology, but is no longer following  -continues to have a cough since hospitalized for pneumonia   -repeat CXR normal  -using symbicort and albuterol PRN  -consider referral to pulmonology if symptoms persist            Other Visit Diagnoses       Cough, unspecified type    -  Primary  -continues to have a cough since hospitalized for pneumonia  -recently completed antibiotics  -repeat CXR normal  -will Rx Tussionex PRN for cough. The patient was checked in the [facility] [employer] [facility] [employer]'s  Prescription Monitoring Program. There appears to be no incongruities with the patient's verbalized history.   -consider referral to pulmonology if symptoms persist  -ER precautions for severe or worsening of symptoms      Relevant Medications    hydrocodone-chlorpheniramine (TUSSIONEX) 10-8 mg/5 mL suspension            Follow up if symptoms worsen or fail to improve.    Zully Lua PA-C  _____________________________________________________________________________________________________________________________________________________    CC: follow up for pneumnoia     HPI: Patient is in clinic today as an established patient here for follow up for pneumonia. Patient was hospitalized on 11/17 for acute pneumonia with COPD exacerbation. Patient received IV antibiotics, steroids, and breathing treatments. She was discharged with outpatient antibiotics (Avelox). She followed up in the clinic on 12/8 in which she was started on Augmentin and Azithromycin. She had repeat CXR today which showed no evidence of active chest disease. Today, she reports some improvement in symptoms, but continues to have a cough. She denies fever or shortness of breath. She is continuing to use albuterol PRN and symbicort BID. She is also on home nebulizer treatments  which provides some relief of symptoms. No other complaints today.     Past Medical History:   Diagnosis Date    Asthma     COPD (chronic obstructive pulmonary disease)     Degenerative disc disease     Diabetes mellitus     Diabetes mellitus, type 2     Fibromyalgia     Hypertension     Rheumatoid arthritis     Rheumatoid arthritis(714.0)     Rheumatoid arthritis     Past Surgical History:   Procedure Laterality Date    BREAST SURGERY  1986    CATARACT EXTRACTION      EYE SURGERY  2013    Catatracts    ORIF FEMUR FRACTURE      right knee ligament rpeair  2005    right shoulder surgery  4/18/07    Dr. Gao     Review of patient's allergies indicates:   Allergen Reactions    Latex, natural rubber Swelling and Rash    Codeine     Dairy digestive ultra      Dairy products      Imuran [azathioprine sodium] Diarrhea    Lactobacillus     Lactobacillus acidoph-lactase Other (See Comments)    Morphine      DOESN'T FEEL RIGHT     Plaquenil [hydroxychloroquine] Other (See Comments)     headaches    Milk containing products Diarrhea     Social History     Tobacco Use    Smoking status: Never    Smokeless tobacco: Never   Substance Use Topics    Alcohol use: Never    Drug use: No     Family History   Problem Relation Age of Onset    Cancer Mother     Anemia Mother     Alcohol abuse Father     Anemia Maternal Grandmother     Hypertension Paternal Grandmother     Arthritis Paternal Grandmother      Current Outpatient Medications on File Prior to Visit   Medication Sig Dispense Refill    ACCU-CHEK VERONIQUE CONTROL SOLN Soln USE ONCE A WEEK AS DIRECTED 1 each 0    acetaminophen (TYLENOL) 325 MG tablet Take 650 mg by mouth as needed.      albuterol (PROVENTIL) 2.5 mg /3 mL (0.083 %) nebulizer solution USE 1 VIAL IN NEBULIZER EVERY 6 HOURS AS NEEDED FOR WHEEZING 180 mL 3    albuterol (PROVENTIL/VENTOLIN HFA) 90 mcg/actuation inhaler Inhale 2 puffs into the lungs every 6 (six) hours as needed for Wheezing. 18 g 6    amlodipine-benazepril  5-20 mg (LOTREL) 5-20 mg per capsule TAKE ONE CAPSULE BY MOUTH TWICE DAILY 180 capsule 1    benzonatate (TESSALON) 100 MG capsule Take 100 mg by mouth 3 times daily as needed.      blood sugar diagnostic Strp To check BG once times daily, to use with insurance preferred meter 100 strip 11    budesonide-formoterol 160-4.5 mcg (SYMBICORT) 160-4.5 mcg/actuation HFAA Symbicort 160 mcg-4.5 mcg/actuation HFA aerosol inhaler  INHALE TWO PUFFS TWICE DAILY 10.2 g 5    busPIRone (BUSPAR) 5 MG Tab Take 1 tablet (5 mg total) by mouth 2 (two) times daily. 180 tablet 3    clotrimazole-betamethasone 1-0.05% (LOTRISONE) cream Apply topically 2 (two) times daily. 45 g 6    dicyclomine (BENTYL) 20 mg tablet Take 1 tablet (20 mg total) by mouth every 6 (six) hours. 120 tablet 3    ferric citrate (AURYXIA) 210 mg iron Tab Take 1 tablet by mouth once daily. 90 tablet 3    fexofenadine (ALLEGRA) 180 MG tablet Take 1 tablet by mouth once daily.      fluocinolone-hydroq.-tretinoin (TRI-VITALY) 0.01-4-0.05 % Crea Apply 1 application topically every evening. 30 g 1    fluticasone propionate (FLONASE) 50 mcg/actuation nasal spray 1 spray (50 mcg total) by Each Nostril route once daily. 16 g 0    folic acid-vit B6-vit B12 (FOLBEE) 2.5-25-1 mg Tab Take 1 tablet by mouth once daily. 90 each 3    furosemide (LASIX) 40 MG tablet TAKE 2 TABLETS EVERY DAY AS NEEDED 180 tablet 1    glipiZIDE 5 MG TR24 Take 1 tablet (5 mg total) by mouth daily with breakfast. 90 tablet 1    HYDROcodone-acetaminophen (NORCO)  mg per tablet Take 1 tablet by mouth every 8 (eight) hours as needed for Pain. 90 tablet 0    hydroquin-tretinoin-hydrocort 4-0.025-0.5 % Emul Apply 1 applicator topically every evening. 30 g 3    hydrOXYchloroQUINE (PLAQUENIL) 200 mg tablet Take 200 mg by mouth once daily.      ibandronate (BONIVA) 150 mg tablet Take 1 tablet (150 mg total) by mouth every 30 days. 3 tablet 3    levocetirizine (XYZAL) 5 MG tablet Take 5 mg by mouth.        multivitamin with iron Tab Take 1 tablet by mouth once daily. 30 each 11    mupirocin (BACTROBAN) 2 % ointment Apply topically 3 (three) times daily. 30 g 6    potassium chloride SA (K-DUR,KLOR-CON) 20 MEQ tablet Take 2 tablets (40 mEq total) by mouth 2 (two) times daily. 360 tablet 1    pravastatin (PRAVACHOL) 20 MG tablet Take 1 tablet (20 mg total) by mouth once daily. 90 tablet 3    predniSONE (DELTASONE) 5 MG tablet TAKE THREE TABLETS BY MOUTH EVERY MORNING AS NEEDED 90 tablet 3    sodium chloride 5% (LALITHA 128) 5 % ophthalmic solution sodium chloride 5 % eye drops   INSTILL ONE DROP INTO EACH EYE FOUR TIMES DAILY      traZODone (DESYREL) 50 MG tablet Take 50 mg by mouth every evening.  5    triamcinolone acetonide 0.1% (KENALOG) 0.1 % ointment Apply topically 2 (two) times daily. 80 g 0    TRUE METRIX GLUCOSE METER Misc test once DAILY AS DIRECTED      TRUEPLUS LANCETS 33 gauge Misc       blood-glucose calibrat control Cmpk 1 each by Misc.(Non-Drug; Combo Route) route once a week. 1 each 0    leflunomide (ARAVA) 10 MG Tab Take 1 tablet (10 mg total) by mouth once daily. 90 tablet 1    [DISCONTINUED] azithromycin (Z-ANDREWS) 250 MG tablet Take 2 tablets on day 1, then 1 tablet daily on days 2 - 5. 6 tablet 0     No current facility-administered medications on file prior to visit.       Review of Systems   Constitutional:  Negative for chills, diaphoresis and fever.   HENT:  Negative for congestion, ear pain, postnasal drip, sinus pain and sore throat.    Eyes:  Negative for pain and redness.   Respiratory:  Positive for cough. Negative for chest tightness and shortness of breath.    Cardiovascular:  Negative for chest pain and leg swelling.   Gastrointestinal:  Negative for abdominal pain, constipation, diarrhea, nausea and vomiting.   Genitourinary:  Negative for dysuria and hematuria.   Musculoskeletal:  Negative for arthralgias and joint swelling.   Skin:  Negative for rash.   Neurological:  Negative for  "dizziness, syncope and headaches.   Psychiatric/Behavioral:  Negative for dysphoric mood. The patient is not nervous/anxious.      Vitals:    12/23/22 1316   BP: 138/71   Pulse: 87   Temp: 98.6 °F (37 °C)   TempSrc: Temporal   SpO2: 98%   Height: 5' 6" (1.676 m)       Wt Readings from Last 3 Encounters:   12/08/22 118.1 kg (260 lb 4.1 oz)   08/22/22 122.5 kg (270 lb)   07/20/22 117.7 kg (259 lb 7.7 oz)       Physical Exam  Constitutional:       General: She is not in acute distress.     Appearance: Normal appearance. She is well-developed.   HENT:      Head: Normocephalic and atraumatic.      Right Ear: Tympanic membrane normal.      Left Ear: Tympanic membrane normal.      Nose: Nose normal.      Mouth/Throat:      Mouth: Mucous membranes are moist.      Pharynx: Oropharynx is clear.   Eyes:      Conjunctiva/sclera: Conjunctivae normal.   Cardiovascular:      Rate and Rhythm: Normal rate and regular rhythm.      Pulses: Normal pulses.      Heart sounds: Normal heart sounds. No murmur heard.  Pulmonary:      Effort: Pulmonary effort is normal. No respiratory distress.      Breath sounds: Normal breath sounds. No wheezing.   Abdominal:      General: Bowel sounds are normal. There is no distension.      Palpations: Abdomen is soft.      Tenderness: There is no abdominal tenderness.   Musculoskeletal:         General: Normal range of motion.      Cervical back: Normal range of motion and neck supple.   Skin:     General: Skin is warm and dry.      Findings: No rash.   Neurological:      General: No focal deficit present.      Mental Status: She is alert and oriented to person, place, and time.   Psychiatric:         Mood and Affect: Mood normal.         Behavior: Behavior normal.       Health Maintenance   Topic Date Due    Hemoglobin A1c  02/25/2023    Foot Exam  03/22/2023    Mammogram  07/13/2023    Eye Exam  08/16/2023    Lipid Panel  08/25/2023    Low Dose Statin  12/23/2023    DEXA Scan  07/13/2025    TETANUS " VACCINE  03/09/2026    Hepatitis C Screening  Completed

## 2022-12-26 DIAGNOSIS — G89.4 CHRONIC PAIN SYNDROME: ICD-10-CM

## 2022-12-26 DIAGNOSIS — E11.9 TYPE 2 DIABETES MELLITUS WITHOUT COMPLICATION, UNSPECIFIED WHETHER LONG TERM INSULIN USE: ICD-10-CM

## 2022-12-26 DIAGNOSIS — M17.0 PRIMARY OSTEOARTHRITIS OF BOTH KNEES: ICD-10-CM

## 2022-12-26 DIAGNOSIS — L40.9 PSORIASIS: ICD-10-CM

## 2022-12-26 DIAGNOSIS — E21.3 HYPERPARATHYROIDISM, UNSPECIFIED: ICD-10-CM

## 2022-12-26 DIAGNOSIS — M05.9 RHEUMATOID ARTHRITIS WITH POSITIVE RHEUMATOID FACTOR, INVOLVING UNSPECIFIED SITE: ICD-10-CM

## 2022-12-26 DIAGNOSIS — M35.00 SJOGREN'S SYNDROME, WITH UNSPECIFIED ORGAN INVOLVEMENT: ICD-10-CM

## 2022-12-26 DIAGNOSIS — E11.9 TYPE 2 DIABETES MELLITUS WITHOUT COMPLICATION, WITHOUT LONG-TERM CURRENT USE OF INSULIN: ICD-10-CM

## 2022-12-26 DIAGNOSIS — N28.89 RENAL MASS: ICD-10-CM

## 2022-12-26 DIAGNOSIS — M47.817 LUMBAR AND SACRAL ARTHRITIS: ICD-10-CM

## 2022-12-28 DIAGNOSIS — M35.00 SJOGREN'S SYNDROME, WITH UNSPECIFIED ORGAN INVOLVEMENT: ICD-10-CM

## 2022-12-28 DIAGNOSIS — E11.9 TYPE 2 DIABETES MELLITUS WITHOUT COMPLICATION, WITHOUT LONG-TERM CURRENT USE OF INSULIN: ICD-10-CM

## 2022-12-28 DIAGNOSIS — E11.9 TYPE 2 DIABETES MELLITUS WITHOUT COMPLICATION, UNSPECIFIED WHETHER LONG TERM INSULIN USE: ICD-10-CM

## 2022-12-28 DIAGNOSIS — N28.89 RENAL MASS: ICD-10-CM

## 2022-12-28 DIAGNOSIS — M17.0 PRIMARY OSTEOARTHRITIS OF BOTH KNEES: ICD-10-CM

## 2022-12-28 DIAGNOSIS — E21.3 HYPERPARATHYROIDISM, UNSPECIFIED: ICD-10-CM

## 2022-12-28 DIAGNOSIS — L40.9 PSORIASIS: ICD-10-CM

## 2022-12-28 DIAGNOSIS — M05.9 RHEUMATOID ARTHRITIS WITH POSITIVE RHEUMATOID FACTOR, INVOLVING UNSPECIFIED SITE: ICD-10-CM

## 2022-12-28 DIAGNOSIS — M47.817 LUMBAR AND SACRAL ARTHRITIS: ICD-10-CM

## 2022-12-28 DIAGNOSIS — G89.4 CHRONIC PAIN SYNDROME: ICD-10-CM

## 2022-12-29 RX ORDER — HYDROCODONE BITARTRATE AND ACETAMINOPHEN 10; 325 MG/1; MG/1
TABLET ORAL
Qty: 90 TABLET | Refills: 0 | OUTPATIENT
Start: 2022-12-29

## 2023-01-02 RX ORDER — PREDNISONE 5 MG/1
TABLET ORAL
Qty: 90 TABLET | Refills: 3 | Status: SHIPPED | OUTPATIENT
Start: 2023-01-02 | End: 2023-05-11

## 2023-01-06 ENCOUNTER — TELEPHONE (OUTPATIENT)
Dept: RHEUMATOLOGY | Facility: CLINIC | Age: 78
End: 2023-01-06
Payer: MEDICARE

## 2023-01-06 NOTE — TELEPHONE ENCOUNTER
Ariana,   Pt states having a flare and requesting injections    LOV 07/20/2022 rec'd Toradol 60mg and Vit B12.

## 2023-01-06 NOTE — TELEPHONE ENCOUNTER
----- Message from Windy Fuller sent at 1/6/2023 10:30 AM CST -----  Regarding: pt call back  Name of Who is Calling:SARAH TORRES [[id number]]           What is the request in detail: PT needs injections she is having a flare up            Can the clinic reply by [facility]: no           [facility] [phone]

## 2023-01-11 ENCOUNTER — CLINICAL SUPPORT (OUTPATIENT)
Dept: RHEUMATOLOGY | Facility: CLINIC | Age: 78
End: 2023-01-11
Payer: MEDICARE

## 2023-01-11 VITALS — OXYGEN SATURATION: 98 % | SYSTOLIC BLOOD PRESSURE: 143 MMHG | DIASTOLIC BLOOD PRESSURE: 69 MMHG | HEART RATE: 90 BPM

## 2023-01-11 DIAGNOSIS — R52 PAIN AGGRAVATED BY WALKING: ICD-10-CM

## 2023-01-11 DIAGNOSIS — M54.9 BACK PAIN, UNSPECIFIED BACK LOCATION, UNSPECIFIED BACK PAIN LATERALITY, UNSPECIFIED CHRONICITY: ICD-10-CM

## 2023-01-11 DIAGNOSIS — M06.30 RHEUMATOID NODULES: Primary | ICD-10-CM

## 2023-01-11 DIAGNOSIS — M25.561 ARTHRALGIA OF BOTH KNEES: ICD-10-CM

## 2023-01-11 DIAGNOSIS — M25.562 ARTHRALGIA OF BOTH KNEES: ICD-10-CM

## 2023-01-11 DIAGNOSIS — J18.9 FREQUENT EPISODES OF BRONCHITIS AND PNEUMONIA: ICD-10-CM

## 2023-01-11 DIAGNOSIS — J40 FREQUENT EPISODES OF BRONCHITIS AND PNEUMONIA: ICD-10-CM

## 2023-01-11 PROCEDURE — 99999 PR PBB SHADOW E&M-EST. PATIENT-LVL II: CPT | Mod: PBBFAC,HCNC,,

## 2023-01-11 PROCEDURE — 96372 THER/PROPH/DIAG INJ SC/IM: CPT | Mod: HCNC,S$GLB,, | Performed by: INTERNAL MEDICINE

## 2023-01-11 PROCEDURE — 99999 PR PBB SHADOW E&M-EST. PATIENT-LVL II: ICD-10-PCS | Mod: PBBFAC,HCNC,,

## 2023-01-11 PROCEDURE — 96372 PR INJECTION,THERAP/PROPH/DIAG2ST, IM OR SUBCUT: ICD-10-PCS | Mod: HCNC,S$GLB,, | Performed by: INTERNAL MEDICINE

## 2023-01-11 RX ORDER — METHYLPREDNISOLONE ACETATE 80 MG/ML
80 INJECTION, SUSPENSION INTRA-ARTICULAR; INTRALESIONAL; INTRAMUSCULAR; SOFT TISSUE
Status: COMPLETED | OUTPATIENT
Start: 2023-01-11 | End: 2023-01-11

## 2023-01-11 RX ORDER — CEFTRIAXONE 1 G/1
1 INJECTION, POWDER, FOR SOLUTION INTRAMUSCULAR; INTRAVENOUS
Status: COMPLETED | OUTPATIENT
Start: 2023-01-11 | End: 2023-01-11

## 2023-01-11 RX ORDER — KETOROLAC TROMETHAMINE 30 MG/ML
60 INJECTION, SOLUTION INTRAMUSCULAR; INTRAVENOUS
Status: COMPLETED | OUTPATIENT
Start: 2023-01-11 | End: 2023-01-11

## 2023-01-11 RX ADMIN — KETOROLAC TROMETHAMINE 60 MG: 30 INJECTION, SOLUTION INTRAMUSCULAR; INTRAVENOUS at 10:01

## 2023-01-11 RX ADMIN — METHYLPREDNISOLONE ACETATE 80 MG: 80 INJECTION, SUSPENSION INTRA-ARTICULAR; INTRALESIONAL; INTRAMUSCULAR; SOFT TISSUE at 10:01

## 2023-01-11 RX ADMIN — CEFTRIAXONE 1 G: 1 INJECTION, POWDER, FOR SOLUTION INTRAMUSCULAR; INTRAVENOUS at 10:01

## 2023-01-11 NOTE — PROGRESS NOTES
2 Patient ID's verified and allergies reviewed     Patient came in with a slow gait and a walker . Patient presented heavy breathing when sitting and stated she was getting over bronchitis and pneumonia. Patient 's pain level was stated as 10/10 primarily in bilat knees, hips, hands and extreme pain in left shoulder. Patient stated pain when walking and showed substantial enlarged nodes in bilat knuckles. Medication administered:         Administered 2 cc ( 60 mg/ml ) of toradol to the left upper outer gluteal. Patient tolerated injections well. Informed of s/s to report verbalized understanding. No adverse reactions noted. Left facility in stable condition. PER LD OLMSTEAD PA-C    Administered 1 cc ( 80 mg/ml ) of depomedrol to the right upper outer gluteal. Informed of s/s to report verbalized understanding. No adverse reactions noted.PER LD OLMSTEAD PA-C      Administered 1 gram of rocephin mixed with 2.1 ml of lidocaine. Given in the right ventrolateral. Patient tolerated injections well. Informed of s/s to report verbalized understanding. No adverse reactions noted. left facility in stable condition  PER DR. REA BRADLEY MD

## 2023-01-12 ENCOUNTER — TELEPHONE (OUTPATIENT)
Dept: RHEUMATOLOGY | Facility: CLINIC | Age: 78
End: 2023-01-12
Payer: MEDICARE

## 2023-01-12 DIAGNOSIS — J32.9 SINUSITIS, UNSPECIFIED CHRONICITY, UNSPECIFIED LOCATION: Primary | ICD-10-CM

## 2023-01-12 RX ORDER — CEPHALEXIN 500 MG/1
1000 CAPSULE ORAL EVERY 12 HOURS
Qty: 40 CAPSULE | Refills: 0 | Status: SHIPPED | OUTPATIENT
Start: 2023-01-12 | End: 2023-01-22

## 2023-01-17 ENCOUNTER — TELEPHONE (OUTPATIENT)
Dept: RHEUMATOLOGY | Facility: CLINIC | Age: 78
End: 2023-01-17
Payer: MEDICARE

## 2023-01-17 NOTE — TELEPHONE ENCOUNTER
Medication sent to pharmacy  ----- Message from Ghislaine Velásquez sent at 1/12/2023  9:11 AM CST -----  Contact: PT  Type:  Needs Medical Advice    Who Called: PT  Pharmacy name and phone #:    [id number] - [place] - [address] [facility]  [address]  [place] [address]  Phone: [phone] Fax: [phone]    Would the patient rather a call back or a response via [facility]? CALL   Best Call Back Number: [phone] (home)     Additional Information: PT WOULD LIKE AN UPDATE ON HER ANTIBIOTIC. PT STATED THAT IT HASNT BEEN CALLED IN AS OF TODAY. THANK YOU

## 2023-01-18 ENCOUNTER — TELEPHONE (OUTPATIENT)
Dept: ENDOCRINOLOGY | Facility: CLINIC | Age: 78
End: 2023-01-18
Payer: MEDICARE

## 2023-01-19 NOTE — TELEPHONE ENCOUNTER
----- Message from Tabby Ryder sent at 1/11/2023  4:07 PM CST -----  Contact: [phone]  Type: Needs Medical Advice  Who Called:  Pt     Best Call Back Number: [phone] (home)     Additional Information: Pt stated she was supposed to have an antibiotic sent to [facility]  today but nothing has been sent in. Pls call back and advise         [facility] - [place] - [address] [facility]  [address] [facility]  [place] [address]  Phone: [phone] Fax: [phone]

## 2023-01-24 DIAGNOSIS — E11.9 TYPE 2 DIABETES MELLITUS WITHOUT COMPLICATION, UNSPECIFIED WHETHER LONG TERM INSULIN USE: ICD-10-CM

## 2023-01-24 DIAGNOSIS — M35.00 SJOGREN'S SYNDROME, WITH UNSPECIFIED ORGAN INVOLVEMENT: ICD-10-CM

## 2023-01-24 DIAGNOSIS — M05.9 RHEUMATOID ARTHRITIS WITH POSITIVE RHEUMATOID FACTOR, INVOLVING UNSPECIFIED SITE: ICD-10-CM

## 2023-01-24 DIAGNOSIS — E11.9 TYPE 2 DIABETES MELLITUS WITHOUT COMPLICATION, WITHOUT LONG-TERM CURRENT USE OF INSULIN: ICD-10-CM

## 2023-01-24 DIAGNOSIS — N28.89 RENAL MASS: ICD-10-CM

## 2023-01-24 DIAGNOSIS — E21.3 HYPERPARATHYROIDISM, UNSPECIFIED: ICD-10-CM

## 2023-01-24 DIAGNOSIS — G89.4 CHRONIC PAIN SYNDROME: ICD-10-CM

## 2023-01-24 DIAGNOSIS — M47.817 LUMBAR AND SACRAL ARTHRITIS: ICD-10-CM

## 2023-01-24 DIAGNOSIS — M17.0 PRIMARY OSTEOARTHRITIS OF BOTH KNEES: ICD-10-CM

## 2023-01-24 DIAGNOSIS — L40.9 PSORIASIS: ICD-10-CM

## 2023-01-25 NOTE — TELEPHONE ENCOUNTER
----- Message from Vasu Mosquera sent at 1/25/2023  2:39 PM CST -----  Who Called: patient          What is the reqeust in detail: Requesting call back to address issues with pts pain medication. Pt requesting call Asap please.          Can the clinic reply by [facility]? no         Best Call Back Number: [phone]           Additional Information:

## 2023-01-26 NOTE — TELEPHONE ENCOUNTER
Dr Johnson has never prescribed cough medication ( hydrocodone-chlorpheniramine (TUSSIONEX) 10-8 mg/5 mL suspension). This was filled by family [facility]

## 2023-01-26 NOTE — TELEPHONE ENCOUNTER
----- Message from Veronica Granado sent at 1/26/2023 12:45 PM CST -----  Regarding: pt call back requested  Name of Who is Calling:SARAH TORRES [[id number]]          What is the request in detail: pt call back requested           Can the clinic reply by [facility]: no           What Number to Call Back if not in [facility]: [phone] (home)

## 2023-01-26 NOTE — TELEPHONE ENCOUNTER
----- Message from Sarah Velez sent at 1/25/2023  8:46 AM CST -----  Regarding: patient call back  Contact: patient  Name of Who is Calling: SARAH TORRES [[id number]]      What is the request in detail: Patient is requesting a call back regarding a prescription she request. Prescription: hydrocodone-chlorpheniramine (TUSSIONEX) 10-8 mg/5 mL suspension. Please advise!         Can the clinic reply by [facility]: NO        What Number to Call Back if not in [facility]: [phone]

## 2023-01-27 NOTE — TELEPHONE ENCOUNTER
----- Message from Dean Hendrickson sent at 1/27/2023  8:48 AM CST -----  Regarding: med refill  PT States this is FIFTH Message between call center and pharmacy to get this med refilled. PT is out of med    Type:  RX Refill Request    Who Called:  Rekha  Refill or New Rx:  refill    RX Name and Strength:    HYDROcodone-acetaminophen (NORCO)  mg per tablet 90 tablet 0 12/21/2022  Sig - Route: Take 1 tablet by mouth every 8 (eight) hours as needed for Pain. - Oral   Sent to pharmacy as: HYDROcodone-acetaminophen (NORCO)  mg per tablet   Earliest Fill Date: 12/21/2022   Notes to Pharmacy: Chronic pain >7 days medically necessary override dx code G89.4   E-Prescribing Status: Receipt confirmed by pharmacy (12/21/2022  7:51 PM CST)     How is the patient currently taking it? (ex. 1XDay):  see above  Is this a 30 day or 90 day RX:  see above    Preferred Pharmacy with phone number:    [facility] - [place], [place] - [id number] [facility]  [place] [address]  Phone: [phone] Fax: [phone]    Local or Mail Order:  local    Ordering Provider:  Dr Elizabeth Mercado Call Back Number:  [phone]    Additional Information:  please call pt to let know when sent b/c pt is out of med

## 2023-01-29 RX ORDER — HYDROCODONE BITARTRATE AND ACETAMINOPHEN 10; 325 MG/1; MG/1
TABLET ORAL
Qty: 90 TABLET | Refills: 0 | OUTPATIENT
Start: 2023-01-29

## 2023-01-30 ENCOUNTER — TELEPHONE (OUTPATIENT)
Dept: ENDOCRINOLOGY | Facility: CLINIC | Age: 78
End: 2023-01-30
Payer: MEDICARE

## 2023-01-30 NOTE — TELEPHONE ENCOUNTER
----- Message from Viktoriya Tierney sent at 1/30/2023  8:06 AM CST -----  Who Called: Patient    What is the request in detail: New Rx    HYDROcodone-acetaminophen (NORCO)  mg per tablet 90 tablet 0 12/21/2022  No  Sig - Route: Take 1 tablet by mouth every 8 (eight) hours as needed for Pain. - Oral  Sent to pharmacy as: HYDROcodone-acetaminophen (NORCO)  mg per tablet  Class: Normal  Earliest Fill Date: 12/21/2022  Notes to Pharmacy: Chronic pain >7 days medically necessary override dx code G89.4        Can the clinic reply by [facility]? No    Best Call Back Number: [phone]      Additional Information: Patient mentioned that she has made several attempts to have this refilled. Please advise.

## 2023-01-30 NOTE — TELEPHONE ENCOUNTER
----- Message from Riri Tobar sent at 1/30/2023  8:55 AM CST -----  Regarding: change pharmacy  Who Called:SARAH TORRES [[id number]]     Refill or New Rx.:Change pharmacy    HYDROcodone-acetaminophen (NORCO)  mg per tablet    Preferred Pharmacy with phone number:      [facility] - [address] [address]  Phone: [phone] Fax: [phone]         Best Call Back Number:[phone]       Additional Information:

## 2023-01-30 NOTE — TELEPHONE ENCOUNTER
----- Message from Clifford Fong sent at 1/30/2023 10:10 AM CST -----  Contact: Konrad Ornelas  Type:  RX Refill Request    Who Called:  Konrad  Refill or New Rx:  refill / new with this pharmacy  RX Name and Strength:  HYDROcodone-acetaminophen (NORCO)  mg per tablet  How is the patient currently taking it? (ex. 1XDay):  3 x day  Is this a 30 day or 90 day RX:  30  Preferred Pharmacy with phone number:      [facility] - [place] [address] [address]  Phone: [phone] Fax: [phone]    Local or Mail Order:    Ordering Provider:    Best Call Back Number:  [phone]  Additional Information:

## 2023-01-30 NOTE — TELEPHONE ENCOUNTER
----- Message from Viktoriya Tierney sent at 1/30/2023 10:25 AM CST -----  Who Called: Patient    What is the request in detail: Requesting call back to have refills of her pain medications sent to [facility] in [place], if we are unable to send it to [facility]. Please advise.     Can the clinic reply by [facility]? No    Best Call Back Number: [phone]      Additional Information:

## 2023-01-30 NOTE — TELEPHONE ENCOUNTER
----- Message from Kymberly Zepeda sent at 1/30/2023  8:24 AM CST -----  Contact: pt  Type: Needs Medical Advice  Who Called:  pt     Pharmacy name and phone #:        [facility] - [place] - [place] [address] [place] [address]  Phone: [phone] Fax: [phone]            Best Call Back Number: [phone]    Additional Information: pt states she has been calling for 5 days now to get her HYDROcodone-acetaminophen (NORCO)  mg per tablet. Please advise.

## 2023-01-30 NOTE — TELEPHONE ENCOUNTER
----- Message from Leslie Toledo sent at 1/30/2023  3:12 PM CST -----  Regarding: rx concern  Can the clinic reply in [facility]:           Please refill the medication(s) listed below. Please call the patient when the prescription(s) is ready for  at this phone number   Pt is requesting a refill of the rx below. She has been reaching out for the office in regards to this rx for approx a week. Pt switched from [facility] to [facility]. Please send Rx to [facility]. Please advise pt with updated info as she is concerned. Pt is currently completely out she was due for rx refill about 3 days ago which would've been Friday 1/27/23.            Medication #1 HYDROcodone-acetaminophen (NORCO)  mg per tablet        Preferred Pharmacy:   [facility] - [address] [address]  Phone: [phone] Fax: [phone]

## 2023-01-30 NOTE — TELEPHONE ENCOUNTER
Office attempted to return patient call regarding refill request. Patient needs a follow appointment before medication will be sent.

## 2023-01-31 ENCOUNTER — TELEPHONE (OUTPATIENT)
Dept: RHEUMATOLOGY | Facility: CLINIC | Age: 78
End: 2023-01-31
Payer: MEDICARE

## 2023-02-02 ENCOUNTER — EXTERNAL HOME HEALTH (OUTPATIENT)
Dept: HOME HEALTH SERVICES | Facility: HOSPITAL | Age: 78
End: 2023-02-02
Payer: MEDICARE

## 2023-02-07 DIAGNOSIS — Z00.00 ENCOUNTER FOR MEDICARE ANNUAL WELLNESS EXAM: ICD-10-CM

## 2023-02-08 ENCOUNTER — TELEPHONE (OUTPATIENT)
Dept: FAMILY MEDICINE | Facility: CLINIC | Age: 78
End: 2023-02-08
Payer: MEDICARE

## 2023-02-08 DIAGNOSIS — M16.0 OSTEOARTHRITIS OF BOTH HIPS, UNSPECIFIED OSTEOARTHRITIS TYPE: ICD-10-CM

## 2023-02-08 DIAGNOSIS — M05.9 RHEUMATOID ARTHRITIS WITH POSITIVE RHEUMATOID FACTOR, INVOLVING UNSPECIFIED SITE: Primary | ICD-10-CM

## 2023-02-08 PROCEDURE — G0179 PR HOME HEALTH MD RECERTIFICATION: ICD-10-PCS | Mod: ,,, | Performed by: INTERNAL MEDICINE

## 2023-02-08 PROCEDURE — G0179 MD RECERTIFICATION HHA PT: HCPCS | Mod: ,,, | Performed by: INTERNAL MEDICINE

## 2023-02-08 NOTE — TELEPHONE ENCOUNTER
----- Message from Amilcar Watt sent at 2/8/2023  1:20 PM CST -----  Contact: [phone]  Pt is requesting a call In regards to getting an order for physical therapy. Please call pt back at [phone]. Thanks KB

## 2023-02-08 NOTE — TELEPHONE ENCOUNTER
I have signed for the following orders AND/OR meds.  Please call the patient and ask the patient to schedule the testing AND/OR inform about any medications that were sent. Medications have been sent to pharmacy listed below      Orders Placed This Encounter   Procedures    SUBSEQUENT HOME HEALTH ORDERS     Please add physical therapy to home health     Order Specific Question:   What Home Health Agency is the patient currently using?     Answer:   [id number] - [place] - [email or web address] AT [address] OF [facility] [address] [facility]  [address]  [place] [address]  Phone: [phone] Fax: [phone]    [facility] - [place] - [address]  [place] [address]  Phone: [phone] Fax: [phone]    [facility] - [place]  [place] [address]  Phone: [phone] Fax: [phone]

## 2023-02-09 DIAGNOSIS — Z00.00 ENCOUNTER FOR MEDICARE ANNUAL WELLNESS EXAM: ICD-10-CM

## 2023-02-15 ENCOUNTER — TELEPHONE (OUTPATIENT)
Dept: FAMILY MEDICINE | Facility: CLINIC | Age: 78
End: 2023-02-15
Payer: MEDICARE

## 2023-02-15 NOTE — TELEPHONE ENCOUNTER
Spoke with pt, decided to keep appt on 02/22 due provider not being in clinic on 02/21. Pt also declined to see PA.

## 2023-02-15 NOTE — TELEPHONE ENCOUNTER
----- Message from Lilia Kay sent at 2/15/2023 10:12 AM CST -----  Contact: Rekha  Patient requested an appt on Tuesday 02/21 instead, Please call her back at [phone], offered her an appt with PA, stated she wanted to be scheduled with pcp

## 2023-02-16 DIAGNOSIS — E11.9 TYPE 2 DIABETES MELLITUS WITHOUT COMPLICATION, WITHOUT LONG-TERM CURRENT USE OF INSULIN: ICD-10-CM

## 2023-02-16 DIAGNOSIS — M17.0 PRIMARY OSTEOARTHRITIS OF BOTH KNEES: ICD-10-CM

## 2023-02-16 DIAGNOSIS — N28.89 RENAL MASS: ICD-10-CM

## 2023-02-16 DIAGNOSIS — E11.9 TYPE 2 DIABETES MELLITUS WITHOUT COMPLICATION, UNSPECIFIED WHETHER LONG TERM INSULIN USE: ICD-10-CM

## 2023-02-16 DIAGNOSIS — L40.9 PSORIASIS: ICD-10-CM

## 2023-02-16 DIAGNOSIS — E21.3 HYPERPARATHYROIDISM, UNSPECIFIED: ICD-10-CM

## 2023-02-16 DIAGNOSIS — M47.817 LUMBAR AND SACRAL ARTHRITIS: ICD-10-CM

## 2023-02-16 DIAGNOSIS — M05.9 RHEUMATOID ARTHRITIS WITH POSITIVE RHEUMATOID FACTOR, INVOLVING UNSPECIFIED SITE: ICD-10-CM

## 2023-02-16 DIAGNOSIS — M35.00 SJOGREN'S SYNDROME, WITH UNSPECIFIED ORGAN INVOLVEMENT: ICD-10-CM

## 2023-02-16 DIAGNOSIS — G89.4 CHRONIC PAIN SYNDROME: ICD-10-CM

## 2023-02-16 DIAGNOSIS — K21.9 GASTROESOPHAGEAL REFLUX DISEASE WITHOUT ESOPHAGITIS: ICD-10-CM

## 2023-02-19 RX ORDER — DICYCLOMINE HYDROCHLORIDE 20 MG/1
TABLET ORAL
Qty: 120 TABLET | Refills: 3 | Status: SHIPPED | OUTPATIENT
Start: 2023-02-19 | End: 2024-03-13 | Stop reason: SDUPTHER

## 2023-02-21 ENCOUNTER — OFFICE VISIT (OUTPATIENT)
Dept: PODIATRY | Facility: CLINIC | Age: 78
End: 2023-02-21
Payer: MEDICARE

## 2023-02-21 VITALS — WEIGHT: 260.38 LBS | HEIGHT: 66 IN | BODY MASS INDEX: 41.85 KG/M2

## 2023-02-21 DIAGNOSIS — L60.3 ONYCHODYSTROPHY: ICD-10-CM

## 2023-02-21 DIAGNOSIS — R60.0 BILATERAL LOWER EXTREMITY EDEMA: ICD-10-CM

## 2023-02-21 DIAGNOSIS — N18.31 TYPE 2 DIABETES MELLITUS WITH STAGE 3A CHRONIC KIDNEY DISEASE, WITHOUT LONG-TERM CURRENT USE OF INSULIN: ICD-10-CM

## 2023-02-21 DIAGNOSIS — E11.22 TYPE 2 DIABETES MELLITUS WITH STAGE 3A CHRONIC KIDNEY DISEASE, WITHOUT LONG-TERM CURRENT USE OF INSULIN: ICD-10-CM

## 2023-02-21 DIAGNOSIS — E11.9 ENCOUNTER FOR COMPREHENSIVE DIABETIC FOOT EXAMINATION, TYPE 2 DIABETES MELLITUS: Primary | ICD-10-CM

## 2023-02-21 PROCEDURE — 1101F PR PT FALLS ASSESS DOC 0-1 FALLS W/OUT INJ PAST YR: ICD-10-PCS | Mod: HCNC,CPTII,S$GLB, | Performed by: PODIATRIST

## 2023-02-21 PROCEDURE — 1126F AMNT PAIN NOTED NONE PRSNT: CPT | Mod: HCNC,CPTII,S$GLB, | Performed by: PODIATRIST

## 2023-02-21 PROCEDURE — 11721 DEBRIDE NAIL 6 OR MORE: CPT | Mod: Q9,HCNC,S$GLB, | Performed by: PODIATRIST

## 2023-02-21 PROCEDURE — 11721 PR DEBRIDEMENT OF NAILS, 6 OR MORE: ICD-10-PCS | Mod: Q9,HCNC,S$GLB, | Performed by: PODIATRIST

## 2023-02-21 PROCEDURE — 99213 OFFICE O/P EST LOW 20 MIN: CPT | Mod: 25,HCNC,S$GLB, | Performed by: PODIATRIST

## 2023-02-21 PROCEDURE — 1159F PR MEDICATION LIST DOCUMENTED IN MEDICAL RECORD: ICD-10-PCS | Mod: HCNC,CPTII,S$GLB, | Performed by: PODIATRIST

## 2023-02-21 PROCEDURE — 99213 PR OFFICE/OUTPT VISIT, EST, LEVL III, 20-29 MIN: ICD-10-PCS | Mod: 25,HCNC,S$GLB, | Performed by: PODIATRIST

## 2023-02-21 PROCEDURE — 1160F RVW MEDS BY RX/DR IN RCRD: CPT | Mod: HCNC,CPTII,S$GLB, | Performed by: PODIATRIST

## 2023-02-21 PROCEDURE — 1101F PT FALLS ASSESS-DOCD LE1/YR: CPT | Mod: HCNC,CPTII,S$GLB, | Performed by: PODIATRIST

## 2023-02-21 PROCEDURE — 3288F PR FALLS RISK ASSESSMENT DOCUMENTED: ICD-10-PCS | Mod: HCNC,CPTII,S$GLB, | Performed by: PODIATRIST

## 2023-02-21 PROCEDURE — 3288F FALL RISK ASSESSMENT DOCD: CPT | Mod: HCNC,CPTII,S$GLB, | Performed by: PODIATRIST

## 2023-02-21 PROCEDURE — 99999 PR PBB SHADOW E&M-EST. PATIENT-LVL V: ICD-10-PCS | Mod: PBBFAC,HCNC,, | Performed by: PODIATRIST

## 2023-02-21 PROCEDURE — 1159F MED LIST DOCD IN RCRD: CPT | Mod: HCNC,CPTII,S$GLB, | Performed by: PODIATRIST

## 2023-02-21 PROCEDURE — 99999 PR PBB SHADOW E&M-EST. PATIENT-LVL V: CPT | Mod: PBBFAC,HCNC,, | Performed by: PODIATRIST

## 2023-02-21 PROCEDURE — 1160F PR REVIEW ALL MEDS BY PRESCRIBER/CLIN PHARMACIST DOCUMENTED: ICD-10-PCS | Mod: HCNC,CPTII,S$GLB, | Performed by: PODIATRIST

## 2023-02-21 PROCEDURE — 1126F PR PAIN SEVERITY QUANTIFIED, NO PAIN PRESENT: ICD-10-PCS | Mod: HCNC,CPTII,S$GLB, | Performed by: PODIATRIST

## 2023-02-21 NOTE — PROGRESS NOTES
Subjective:     Patient ID: Rekha Miller is a 78 y.o. female.    Chief Complaint: Nail Care (Pt c/o BL feet tenderness, Diabetic pt wears diabetic shoes, PCP Dr. Silva last seen 12-23-22)    Rekha is a 78 y.o. female who presents to the clinic for evaluation and treatment of high risk feet. Rekha has a past medical history of Asthma, COPD (chronic obstructive pulmonary disease), Degenerative disc disease, Diabetes mellitus, Diabetes mellitus, type 2, Fibromyalgia, Hypertension, Rheumatoid arthritis, and Rheumatoid arthritis(714.0). The patient's chief complaint is long, thick toenails. This patient has documented high risk feet requiring routine maintenance secondary to diabetes mellitis and those secondary complications of diabetes, as mentioned..    PCP: Divina Silva MD    Date Last Seen by PCP: 12/23/2022    Current shoe gear:  Affected Foot: Casual shoes     Unaffected Foot: Casual shoes    Hemoglobin A1C   Date Value Ref Range Status   08/25/2022 5.5 4.6 - 6.2 % Final   06/17/2022 5.8 (H) 4.0 - 5.6 % Final     Comment:     ADA Screening Guidelines:  5.7-6.4%  Consistent with prediabetes  >or=6.5%  Consistent with diabetes    High levels of fetal hemoglobin interfere with the HbA1C  assay. Heterozygous hemoglobin variants (HbS, HgC, etc)do  not significantly interfere with this assay.   However, presence of multiple variants may affect accuracy.     10/26/2021 5.9 <5.7 % Final   07/29/2021 6.0 (H) 4.0 - 5.6 % Final     Comment:     ADA Screening Guidelines:  5.7-6.4%  Consistent with prediabetes  >or=6.5%  Consistent with diabetes    High levels of fetal hemoglobin interfere with the HbA1C  assay. Heterozygous hemoglobin variants (HbS, HgC, etc)do  not significantly interfere with this assay.   However, presence of multiple variants may affect accuracy.         Patient Active Problem List   Diagnosis    Rheumatoid arthritis with positive rheumatoid factor    Osteoarthritis of both hips    Sjogren's  disease    Anemia     Corticosteroid dependence    Type 2 diabetes mellitus with hyperlipidemia    Hypertension associated with diabetes    Moderate persistent asthma without complication    Peripheral edema    Renal mass    AMOL on CPAP    Mixed hyperlipidemia    Greater trochanteric bursitis of right hip    Anxiety    Immunodeficiency    Type 2 diabetes mellitus with morbid obesity    Type 2 diabetes mellitus with stage 3a chronic kidney disease, without long-term current use of insulin       Medication List with Changes/Refills   New Medications    CETIRIZINE (ZYRTEC) 10 MG TABLET    Take 1 tablet (10 mg total) by mouth once daily.    DENOSUMAB (PROLIA) 60 MG/ML SYRG    Inject 1 mL (60 mg total) into the skin every 6 (six) months.    OXYCODONE-ACETAMINOPHEN (PERCOCET)  MG PER TABLET    Take 1 tablet by mouth every 8 (eight) hours as needed for Pain.    OXYCODONE-ACETAMINOPHEN (PERCOCET)  MG PER TABLET    Take 1 tablet by mouth every 8 (eight) hours as needed for Pain.    OXYCODONE-ACETAMINOPHEN (PERCOCET)  MG PER TABLET    Take 1 tablet by mouth every 8 (eight) hours as needed for Pain.   Current Medications    ACCU-CHEK VERONIQUE CONTROL SOLN SOLN    USE ONCE A WEEK AS DIRECTED    ACETAMINOPHEN (TYLENOL) 325 MG TABLET    Take 650 mg by mouth as needed.    ALBUTEROL (PROVENTIL) 2.5 MG /3 ML (0.083 %) NEBULIZER SOLUTION    USE 1 VIAL IN NEBULIZER EVERY 6 HOURS AS NEEDED FOR WHEEZING    ALBUTEROL (PROVENTIL/VENTOLIN HFA) 90 MCG/ACTUATION INHALER    Inhale 2 puffs into the lungs every 6 (six) hours as needed for Wheezing.    AMLODIPINE-BENAZEPRIL 5-20 MG (LOTREL) 5-20 MG PER CAPSULE    TAKE ONE CAPSULE BY MOUTH TWICE DAILY    BLOOD-GLUCOSE CALIBRAT CONTROL CMPK    1 each by Misc.(Non-Drug; Combo Route) route once a week.    BUDESONIDE-FORMOTEROL 160-4.5 MCG (SYMBICORT) 160-4.5 MCG/ACTUATION HFAA    Symbicort 160 mcg-4.5 mcg/actuation HFA aerosol inhaler  INHALE TWO PUFFS TWICE DAILY    BUSPIRONE  (BUSPAR) 5 MG TAB    Take 1 tablet (5 mg total) by mouth 2 (two) times daily.    CLOTRIMAZOLE-BETAMETHASONE 1-0.05% (LOTRISONE) CREAM    Apply topically 2 (two) times daily.    DICYCLOMINE (BENTYL) 20 MG TABLET    TAKE 1 TABLET BY MOUTH EVERY 6 HOURS DAILY    FERRIC CITRATE (AURYXIA) 210 MG IRON TAB    Take 1 tablet by mouth once daily.    FLUOCINOLONE-HYDROQ.-TRETINOIN (TRI-VITALY) 0.01-4-0.05 % CREA    Apply 1 application topically every evening.    FLUTICASONE PROPIONATE (FLONASE) 50 MCG/ACTUATION NASAL SPRAY    1 spray (50 mcg total) by Each Nostril route once daily.    FUROSEMIDE (LASIX) 40 MG TABLET    TAKE 2 TABLETS EVERY DAY AS NEEDED    GLIPIZIDE 5 MG TR24    Take 1 tablet (5 mg total) by mouth daily with breakfast.    HYDROQUIN-TRETINOIN-HYDROCORT 4-0.025-0.5 % EMUL    Apply 1 applicator topically every evening.    LEFLUNOMIDE (ARAVA) 10 MG TAB    TAKE 1 TABLET EVERY DAY    MULTIVITAMIN WITH IRON TAB    Take 1 tablet by mouth once daily.    MUPIROCIN (BACTROBAN) 2 % OINTMENT    Apply topically 3 (three) times daily.    POTASSIUM CHLORIDE SA (K-DUR,KLOR-CON) 20 MEQ TABLET    TAKE TWO TABLETS BY MOUTH TWICE DAILY    PRAVASTATIN (PRAVACHOL) 20 MG TABLET    Take 1 tablet (20 mg total) by mouth once daily.    PREDNISONE (DELTASONE) 5 MG TABLET    TAKE THREE TABLETS BY MOUTH EVERY MORNING AS NEEDED    SODIUM CHLORIDE 5% (LALITHA 128) 5 % OPHTHALMIC SOLUTION    sodium chloride 5 % eye drops   INSTILL ONE DROP INTO EACH EYE FOUR TIMES DAILY    TRIAMCINOLONE ACETONIDE 0.1% (KENALOG) 0.1 % OINTMENT    Apply topically 2 (two) times daily.    TRUE METRIX GLUCOSE METER MISC    test once DAILY AS DIRECTED    TRUEPLUS LANCETS 33 GAUGE MISC       Changed and/or Refilled Medications    Modified Medication Previous Medication    BLOOD SUGAR DIAGNOSTIC (TRUE METRIX GLUCOSE TEST STRIP) STRP blood sugar diagnostic (TRUE METRIX GLUCOSE TEST STRIP) Strp       Use to test blood glucose one (1) time a day, to be used with  insurance-preferred brand of glucometer/supplies.    Use to test blood glucose one (1) time a day, to be used with insurance-preferred brand of glucometer/supplies.    HYDROXYCHLOROQUINE (PLAQUENIL) 200 MG TABLET hydrOXYchloroQUINE (PLAQUENIL) 200 mg tablet       Take 1 tablet (200 mg total) by mouth 2 (two) times daily.    Take 200 mg by mouth once daily.    TRAZODONE (DESYREL) 50 MG TABLET traZODone (DESYREL) 50 MG tablet       Take 1 tablet (50 mg total) by mouth every evening.    Take 50 mg by mouth every evening.   Discontinued Medications    BENZONATATE (TESSALON) 100 MG CAPSULE    Take 100 mg by mouth 3 times daily as needed.    FEXOFENADINE (ALLEGRA) 180 MG TABLET    Take 1 tablet by mouth once daily.    FOLIC ACID-VIT B6-VIT B12 (FOLBEE) 2.5-25-1 MG TAB    Take 1 tablet by mouth once daily.    HYDROCODONE-ACETAMINOPHEN (NORCO)  MG PER TABLET    Take 1 tablet by mouth every 8 (eight) hours as needed for Pain.    IBANDRONATE (BONIVA) 150 MG TABLET    Take 1 tablet (150 mg total) by mouth every 30 days.    LEVOCETIRIZINE (XYZAL) 5 MG TABLET    Take 5 mg by mouth.        Review of patient's allergies indicates:   Allergen Reactions    Latex, natural rubber Swelling and Rash    Codeine     Dairy digestive ultra      Dairy products      Imuran [azathioprine sodium] Diarrhea    Lactobacillus     Lactobacillus acidoph-lactase Other (See Comments)    Morphine      DOESN'T FEEL RIGHT     Plaquenil [hydroxychloroquine] Other (See Comments)     headaches    Milk containing products Diarrhea       Past Surgical History:   Procedure Laterality Date    BREAST SURGERY  1986    CATARACT EXTRACTION      EYE SURGERY  2013    Catatracts    ORIF FEMUR FRACTURE      right knee ligament rpeair  2005    right shoulder surgery  4/18/07    Dr. Gao       Family History   Problem Relation Age of Onset    Cancer Mother     Anemia Mother     Alcohol abuse Father     Anemia Maternal Grandmother     Hypertension Paternal  "Grandmother     Arthritis Paternal Grandmother        Social History     Socioeconomic History    Marital status:     Number of children: 8   Tobacco Use    Smoking status: Never     Passive exposure: Never    Smokeless tobacco: Never   Substance and Sexual Activity    Alcohol use: Never    Drug use: No       Vitals:    02/21/23 1416   Weight: 118.1 kg (260 lb 5.8 oz)   Height: 5' 6" (1.676 m)   PainSc: 0-No pain       Hemoglobin A1C   Date Value Ref Range Status   08/25/2022 5.5 4.6 - 6.2 % Final   06/17/2022 5.8 (H) 4.0 - 5.6 % Final     Comment:     ADA Screening Guidelines:  5.7-6.4%  Consistent with prediabetes  >or=6.5%  Consistent with diabetes    High levels of fetal hemoglobin interfere with the HbA1C  assay. Heterozygous hemoglobin variants (HbS, HgC, etc)do  not significantly interfere with this assay.   However, presence of multiple variants may affect accuracy.     10/26/2021 5.9 <5.7 % Final   07/29/2021 6.0 (H) 4.0 - 5.6 % Final     Comment:     ADA Screening Guidelines:  5.7-6.4%  Consistent with prediabetes  >or=6.5%  Consistent with diabetes    High levels of fetal hemoglobin interfere with the HbA1C  assay. Heterozygous hemoglobin variants (HbS, HgC, etc)do  not significantly interfere with this assay.   However, presence of multiple variants may affect accuracy.         Review of Systems   Constitutional:  Negative for chills and fever.   Respiratory:  Negative for shortness of breath.    Cardiovascular:  Positive for leg swelling. Negative for chest pain, palpitations, orthopnea and claudication.   Gastrointestinal:  Negative for diarrhea, nausea and vomiting.   Musculoskeletal:  Negative for joint pain.   Skin:  Negative for rash.   Neurological:  Positive for tingling and sensory change.   Psychiatric/Behavioral: Negative.             Objective:      PHYSICAL EXAM: Apperance: Alert and orient in no distress,well developed, and with good attention to grooming and body habits  Patient " presents ambulating in casual shoes.   LOWER EXTREMITY EXAM:  VASCULAR: Dorsalis pedis pulses 2/4 bilateral and Posterior Tibial pulses 1/4 bilateral. Capillary fill time <blanched bilateral. Moderate edema observed bilateral. Varicosities present bilateral. Skin temperature of the lower extremities is warm to warm, proximal to distal. Hair growth dim bilateral.  DERMATOLOGICAL: No skin rashes, subcutaneous nodules, lesions, or ulcers observed bilateral. Nails 1,2,3,4,5 bilateral elongated, thickened, and discolored with subungual debris. Webspaces 1,2,3,4 bilateral clean, dry and without evidence of break in skin integrity.   NEUROLOGICAL: Light touch, sharp-dull, proprioception all present and equal bilaterally.  Vibratory sensation diminished at bilateral hallux and navicular. Protective sensation absent sites as tested with a [place]uno 5.07 monofilament.   MUSCULOSKELETAL: Muscle strength is 5/5 for foot inverters, everters, plantarflexors, and dorsiflexors. Muscle tone is normal.         Assessment:       ICD-10-CM ICD-9-CM   1. Encounter for comprehensive diabetic foot examination, type 2 diabetes mellitus  E11.9 250.00   2. Type 2 diabetes mellitus with stage 3a chronic kidney disease, without long-term current use of insulin  E11.22 250.40    N18.31 585.3   3. Onychodystrophy  L60.3 703.8   4. Bilateral lower extremity edema  R60.0 782.3       Plan:   Encounter for comprehensive diabetic foot examination, type 2 diabetes mellitus    Type 2 diabetes mellitus with stage 3a chronic kidney disease, without long-term current use of insulin    Onychodystrophy    Bilateral lower extremity edema      I counseled the patient on her conditions, regarding findings of my examination, my impressions, and usual treatment plan.   This visit spent on counseling and coordination of care.  Greater than 12 minutes of a 15 minute appointment spent on education about the diabetic foot, neuropathy, and prevention of limb  loss.  Shoe inspection. Diabetic Foot Education. Patient reminded of the importance of good nutrition and blood sugar control to help prevent podiatric complications of diabetes. Patient instructed on proper foot hygeine. We discussed wearing proper shoe gear, daily foot inspections, never walking without protective shoe gear, never putting sharp instruments to feet.    With patient's permission, nails 1-5 bilateral were debrided/trimmed in length and thickness aggressively to their soft tissue attachment mechanically and with electric , removing all offending nail and debris. Patient relates relief following the procedure.  Patient counseled on ways to improve swelling in lower extremities including decreasing/eliminating salt intake, elevating lower extremities, compression stocking, or oral medications. Patient states she understands.   Patient  will continue to monitor the areas daily, inspect feet, wear protective shoe gear when ambulatory, moisturizer to maintain skin integrity. Patient reminded of the importance of good nutrition and blood sugar control to help prevent podiatric complications of diabetes.  Patient to return 4 months or sooner if needed.          Keturah Soni DPM  [facility]

## 2023-02-22 ENCOUNTER — OFFICE VISIT (OUTPATIENT)
Dept: FAMILY MEDICINE | Facility: CLINIC | Age: 78
End: 2023-02-22
Payer: MEDICARE

## 2023-02-22 VITALS
BODY MASS INDEX: 43.23 KG/M2 | HEART RATE: 80 BPM | DIASTOLIC BLOOD PRESSURE: 60 MMHG | SYSTOLIC BLOOD PRESSURE: 107 MMHG | TEMPERATURE: 98 F | WEIGHT: 269 LBS | HEIGHT: 66 IN

## 2023-02-22 DIAGNOSIS — M05.9 RHEUMATOID ARTHRITIS WITH POSITIVE RHEUMATOID FACTOR, INVOLVING UNSPECIFIED SITE: ICD-10-CM

## 2023-02-22 DIAGNOSIS — J20.8 ACUTE BACTERIAL BRONCHITIS: ICD-10-CM

## 2023-02-22 DIAGNOSIS — E11.59 HYPERTENSION ASSOCIATED WITH DIABETES: Primary | ICD-10-CM

## 2023-02-22 DIAGNOSIS — B96.89 ACUTE BACTERIAL BRONCHITIS: ICD-10-CM

## 2023-02-22 DIAGNOSIS — E78.2 MIXED HYPERLIPIDEMIA: ICD-10-CM

## 2023-02-22 DIAGNOSIS — M35.00 SJOGREN'S SYNDROME, WITH UNSPECIFIED ORGAN INVOLVEMENT: ICD-10-CM

## 2023-02-22 DIAGNOSIS — D84.9 IMMUNODEFICIENCY: ICD-10-CM

## 2023-02-22 DIAGNOSIS — R60.0 PERIPHERAL EDEMA: ICD-10-CM

## 2023-02-22 DIAGNOSIS — G47.33 OSA ON CPAP: ICD-10-CM

## 2023-02-22 DIAGNOSIS — N28.89 RENAL MASS: ICD-10-CM

## 2023-02-22 DIAGNOSIS — J45.40 MODERATE PERSISTENT ASTHMA WITHOUT COMPLICATION: ICD-10-CM

## 2023-02-22 DIAGNOSIS — E78.5 TYPE 2 DIABETES MELLITUS WITH HYPERLIPIDEMIA: ICD-10-CM

## 2023-02-22 DIAGNOSIS — I15.2 HYPERTENSION ASSOCIATED WITH DIABETES: Primary | ICD-10-CM

## 2023-02-22 DIAGNOSIS — E11.69 TYPE 2 DIABETES MELLITUS WITH HYPERLIPIDEMIA: ICD-10-CM

## 2023-02-22 DIAGNOSIS — D50.9 IRON DEFICIENCY ANEMIA, UNSPECIFIED IRON DEFICIENCY ANEMIA TYPE: ICD-10-CM

## 2023-02-22 PROCEDURE — 3074F SYST BP LT 130 MM HG: CPT | Mod: HCNC,CPTII,S$GLB, | Performed by: INTERNAL MEDICINE

## 2023-02-22 PROCEDURE — 3078F DIAST BP <80 MM HG: CPT | Mod: HCNC,CPTII,S$GLB, | Performed by: INTERNAL MEDICINE

## 2023-02-22 PROCEDURE — 3078F PR MOST RECENT DIASTOLIC BLOOD PRESSURE < 80 MM HG: ICD-10-PCS | Mod: HCNC,CPTII,S$GLB, | Performed by: INTERNAL MEDICINE

## 2023-02-22 PROCEDURE — 99214 OFFICE O/P EST MOD 30 MIN: CPT | Mod: HCNC,S$GLB,, | Performed by: INTERNAL MEDICINE

## 2023-02-22 PROCEDURE — 3288F FALL RISK ASSESSMENT DOCD: CPT | Mod: HCNC,CPTII,S$GLB, | Performed by: INTERNAL MEDICINE

## 2023-02-22 PROCEDURE — 1126F AMNT PAIN NOTED NONE PRSNT: CPT | Mod: HCNC,CPTII,S$GLB, | Performed by: INTERNAL MEDICINE

## 2023-02-22 PROCEDURE — 1101F PT FALLS ASSESS-DOCD LE1/YR: CPT | Mod: HCNC,CPTII,S$GLB, | Performed by: INTERNAL MEDICINE

## 2023-02-22 PROCEDURE — 1126F PR PAIN SEVERITY QUANTIFIED, NO PAIN PRESENT: ICD-10-PCS | Mod: HCNC,CPTII,S$GLB, | Performed by: INTERNAL MEDICINE

## 2023-02-22 PROCEDURE — 99999 PR PBB SHADOW E&M-EST. PATIENT-LVL V: CPT | Mod: PBBFAC,HCNC,, | Performed by: INTERNAL MEDICINE

## 2023-02-22 PROCEDURE — 1101F PR PT FALLS ASSESS DOC 0-1 FALLS W/OUT INJ PAST YR: ICD-10-PCS | Mod: HCNC,CPTII,S$GLB, | Performed by: INTERNAL MEDICINE

## 2023-02-22 PROCEDURE — 99214 PR OFFICE/OUTPT VISIT, EST, LEVL IV, 30-39 MIN: ICD-10-PCS | Mod: HCNC,S$GLB,, | Performed by: INTERNAL MEDICINE

## 2023-02-22 PROCEDURE — 3074F PR MOST RECENT SYSTOLIC BLOOD PRESSURE < 130 MM HG: ICD-10-PCS | Mod: HCNC,CPTII,S$GLB, | Performed by: INTERNAL MEDICINE

## 2023-02-22 PROCEDURE — 3288F PR FALLS RISK ASSESSMENT DOCUMENTED: ICD-10-PCS | Mod: HCNC,CPTII,S$GLB, | Performed by: INTERNAL MEDICINE

## 2023-02-22 PROCEDURE — 99999 PR PBB SHADOW E&M-EST. PATIENT-LVL V: ICD-10-PCS | Mod: PBBFAC,HCNC,, | Performed by: INTERNAL MEDICINE

## 2023-02-22 RX ORDER — GUAIFENESIN 600 MG/1
1200 TABLET, EXTENDED RELEASE ORAL 2 TIMES DAILY
Qty: 40 TABLET | Refills: 0 | Status: SHIPPED | OUTPATIENT
Start: 2023-02-22 | End: 2023-03-04

## 2023-02-22 RX ORDER — CETIRIZINE HYDROCHLORIDE 10 MG/1
10 TABLET ORAL DAILY
Qty: 30 TABLET | Refills: 11 | Status: SHIPPED | OUTPATIENT
Start: 2023-02-22 | End: 2024-02-22

## 2023-02-22 RX ORDER — AMOXICILLIN 500 MG/1
500 CAPSULE ORAL 3 TIMES DAILY
Qty: 15 CAPSULE | Refills: 0 | Status: SHIPPED | OUTPATIENT
Start: 2023-02-22 | End: 2023-02-27

## 2023-02-22 NOTE — PROGRESS NOTES
Assessment/Plan:    Problem List Items Addressed This Visit          Pulmonary    Moderate persistent asthma without complication    Overview     -previously followed by pulmonology, but is no longer following  -continues to have a cough since hospitalized for pneumonia   -repeat CXR normal  -using symbicort and albuterol PRN  -consider referral to pulmonology if symptoms persist              Cardiac/Vascular    Hypertension associated with diabetes - Primary    Overview     Hypertension Medications               amlodipine-benazepril 5-20 mg (LOTREL) 5-20 mg per capsule TAKE ONE CAPSULE BY MOUTH TWICE DAILY    furosemide (LASIX) 40 MG tablet TAKE 2 TABLETS EVERY DAY AS NEEDED   -at goal today  -continue lifestyle modification with low sodium diet and exercise   -discussed hypertension disease course and importance of treating high blood pressure  -patient understood and advised of risk of untreated blood pressure.  ER precautions were given   for symptoms of hypertensive urgency and emergency.         Relevant Orders    Comprehensive Metabolic Panel    CBC Auto Differential    Comprehensive Metabolic Panel    Hemoglobin A1C    Microalbumin/Creatinine Ratio, Urine    Mixed hyperlipidemia    Overview     Hyperlipidemia Medications               pravastatin (PRAVACHOL) 20 MG tablet Take 1 tablet (20 mg total) by mouth once daily.   -chronic condition. Currently stable.    -reports compliance with hyperlipidemia treatment as prescribed  -denies any known adverse effects of medications  -most recent labs listed below:  Lab Results   Component Value Date    CHOL 173 08/25/2022     Lab Results   Component Value Date    HDL 52 08/25/2022     Lab Results   Component Value Date    LDLCALC 86 08/25/2022     Lab Results   Component Value Date    TRIG 174 08/25/2022     Lab Results   Component Value Date    ALT 14 04/07/2022    AST 14 04/07/2022    ALKPHOS 104 04/07/2022    BILITOT 0.3 04/07/2022             Renal/    Renal  mass    Overview     -s/p cryoablation 12/13/2021 with [facility] interventional radiology  -CT March 2022- no recurrence  -plan for repeat CT scheduled in March            Immunology/Multi System    Immunodeficiency    Rheumatoid arthritis with positive rheumatoid factor    Overview     -managed by rheumatology, Dr. Johnson  -currently on prednisone, leflunomide and plaquenil  -yearly eye exam up to date  -on chronic opiate therapy for pain. Has gabapentin that she uses PRN         Sjogren's disease       Oncology    Anemia     Overview     -chronic hx of NITESH  -previously on daily iron suppement         Relevant Orders    Iron and TIBC    Ferritin       Endocrine    Type 2 diabetes mellitus with hyperlipidemia    Overview     Diabetes Medications               glipiZIDE (GLUCOTROL) 5 MG TR24 Take 1 tablet (5 mg total) by mouth daily with breakfast.   -condition is currently controlled  -plan to repeat a1c  -see diabetic health maintenance listed below  -on statin: Yes  -on ACE-I/ARB: Yes  -counseling provided on importance of diabetic diet and medication compliance in order to treat diabetes  -discussed diabetes disease course and potential complications            Other    AMOL on CPAP    Peripheral edema    Relevant Orders    BNP     Other Visit Diagnoses       Acute bacterial bronchitis                Divina Silva MD  _____________________________________________________________________________________________________________________________________________________    CC: follow up of chronic medical conditions     HPI:    Patient is in clinic today as an established patient.    HTN: The patient is currently being treated for essential hypertension. This condition is chronic and stable. The patient is tolerating their medication well with good compliance.  Denies any adverse effects of medications.  Counseling was offered regarding low sodium diet.  The patient has a reduced salt intake. Routine exercise  recommended. The patient denies headache, vision changes, chest pain, palpitations, shortness of breath, or lower extremity edema.    DM2: Patient presents for follow up of diabetes. Condition is chronic and stable. Patient denies symptoms, including foot ulcerations, hyperglycemia, hypoglycemia , nausea, paresthesia of the feet, polydipsia, polyuria and visual disturbances.  Evaluation to date has been included: fasting blood sugar, fasting lipid panel, hemoglobin A1C and microalbuminuria. Denies adverse effects of current medications.     Diabetes Management Status    Statin: Taking  ACE/ARB: Taking    Screening or Prevention Patient's value Goal Complete/Controlled?   HgA1C Testing and Control   Lab Results   Component Value Date    HGBA1C 5.5 08/25/2022      Annually/Less than 8% Yes     Lipid profile : 08/25/2022 Annually Yes     LDL control Lab Results   Component Value Date    LDLCALC 86 08/25/2022    Annually/Less than 100 mg/dl  Yes     Nephropathy screening Lab Results   Component Value Date    LABMICR <5.0 08/04/2021     Lab Results   Component Value Date    PROTEINUA Negative 09/14/2022    Annually Yes     Blood pressure BP Readings from Last 1 Encounters:   03/09/23 (!) 154/80    Less than 140/90 Yes     Dilated retinal exam : 08/16/2022 Annually Yes     Foot exam   : 02/21/2023 Annually Yes           No other new complaints today.  Remaining chronic conditions have been reviewed and remain stable. Further detail as stated above.     HM reviewed.     No recent changes to medical/surgical history.    Current Outpatient Medications on File Prior to Visit   Medication Sig Dispense Refill    ACCU-CHEK VERONIQUE CONTROL SOLN Soln USE ONCE A WEEK AS DIRECTED 1 each 0    acetaminophen (TYLENOL) 325 MG tablet Take 650 mg by mouth as needed.      albuterol (PROVENTIL) 2.5 mg /3 mL (0.083 %) nebulizer solution USE 1 VIAL IN NEBULIZER EVERY 6 HOURS AS NEEDED FOR WHEEZING 180 mL 3    albuterol (PROVENTIL/VENTOLIN HFA)  90 mcg/actuation inhaler Inhale 2 puffs into the lungs every 6 (six) hours as needed for Wheezing. 18 g 6    amlodipine-benazepril 5-20 mg (LOTREL) 5-20 mg per capsule TAKE ONE CAPSULE BY MOUTH TWICE DAILY 180 capsule 1    blood-glucose calibrat control Cmpk 1 each by Misc.(Non-Drug; Combo Route) route once a week. 1 each 0    budesonide-formoterol 160-4.5 mcg (SYMBICORT) 160-4.5 mcg/actuation HFAA Symbicort 160 mcg-4.5 mcg/actuation HFA aerosol inhaler  INHALE TWO PUFFS TWICE DAILY 10.2 g 5    busPIRone (BUSPAR) 5 MG Tab Take 1 tablet (5 mg total) by mouth 2 (two) times daily. 180 tablet 3    clotrimazole-betamethasone 1-0.05% (LOTRISONE) cream Apply topically 2 (two) times daily. 45 g 6    dicyclomine (BENTYL) 20 mg tablet TAKE 1 TABLET BY MOUTH EVERY 6 HOURS DAILY 120 tablet 3    ferric citrate (AURYXIA) 210 mg iron Tab Take 1 tablet by mouth once daily. 90 tablet 3    fluocinolone-hydroq.-tretinoin (TRI-VITALY) 0.01-4-0.05 % Crea Apply 1 application topically every evening. 30 g 1    fluticasone propionate (FLONASE) 50 mcg/actuation nasal spray 1 spray (50 mcg total) by Each Nostril route once daily. 16 g 0    furosemide (LASIX) 40 MG tablet TAKE 2 TABLETS EVERY DAY AS NEEDED 180 tablet 1    glipiZIDE 5 MG TR24 Take 1 tablet (5 mg total) by mouth daily with breakfast. 90 tablet 1    hydroquin-tretinoin-hydrocort 4-0.025-0.5 % Emul Apply 1 applicator topically every evening. 30 g 3    leflunomide (ARAVA) 10 MG Tab TAKE 1 TABLET EVERY DAY 90 tablet 1    multivitamin with iron Tab Take 1 tablet by mouth once daily. 30 each 11    mupirocin (BACTROBAN) 2 % ointment Apply topically 3 (three) times daily. 30 g 6    potassium chloride SA (K-DUR,KLOR-CON) 20 MEQ tablet TAKE TWO TABLETS BY MOUTH TWICE DAILY 360 tablet 0    pravastatin (PRAVACHOL) 20 MG tablet Take 1 tablet (20 mg total) by mouth once daily. 90 tablet 3    predniSONE (DELTASONE) 5 MG tablet TAKE THREE TABLETS BY MOUTH EVERY MORNING AS NEEDED 90 tablet 3     "sodium chloride 5% (LALITHA 128) 5 % ophthalmic solution sodium chloride 5 % eye drops   INSTILL ONE DROP INTO EACH EYE FOUR TIMES DAILY      triamcinolone acetonide 0.1% (KENALOG) 0.1 % ointment Apply topically 2 (two) times daily. 80 g 0    TRUE METRIX GLUCOSE METER Misc test once DAILY AS DIRECTED      TRUEPLUS LANCETS 33 gauge Misc        No current facility-administered medications on file prior to visit.       Review of Systems   Constitutional:  Negative for chills, diaphoresis, fatigue and fever.   HENT:  Negative for congestion, ear pain, postnasal drip, sinus pain and sore throat.    Eyes:  Negative for pain and redness.   Respiratory:  Negative for cough, chest tightness and shortness of breath.    Cardiovascular:  Positive for leg swelling. Negative for chest pain.   Gastrointestinal:  Negative for abdominal pain, constipation, diarrhea, nausea and vomiting.   Genitourinary:  Negative for dysuria and hematuria.   Musculoskeletal:  Negative for arthralgias and joint swelling.   Skin:  Negative for rash.   Neurological:  Negative for dizziness, syncope and headaches.   Psychiatric/Behavioral:  Negative for dysphoric mood. The patient is not nervous/anxious.      Vitals:    02/22/23 1313   BP: 107/60   Pulse: 80   Temp: 98.3 °F (36.8 °C)   Weight: 122 kg (269 lb)   Height: 5' 6" (1.676 m)       Wt Readings from Last 3 Encounters:   03/09/23 122.5 kg (270 lb)   02/22/23 122 kg (269 lb)   02/21/23 118.1 kg (260 lb 5.8 oz)       Physical Exam  Constitutional:       General: She is not in acute distress.     Appearance: Normal appearance. She is well-developed. She is obese.   HENT:      Head: Normocephalic and atraumatic.   Eyes:      Conjunctiva/sclera: Conjunctivae normal.   Cardiovascular:      Rate and Rhythm: Normal rate and regular rhythm.      Pulses: Normal pulses.      Heart sounds: Normal heart sounds. No murmur heard.  Pulmonary:      Effort: Pulmonary effort is normal. No respiratory distress.      " Breath sounds: Normal breath sounds.   Abdominal:      General: Bowel sounds are normal. There is no distension.      Palpations: Abdomen is soft.      Tenderness: There is no abdominal tenderness.   Musculoskeletal:         General: Normal range of motion.      Cervical back: Normal range of motion and neck supple.      Right lower leg: Edema present.      Left lower leg: Edema present.   Skin:     General: Skin is warm and dry.      Findings: No rash.   Neurological:      General: No focal deficit present.      Mental Status: She is alert and oriented to person, place, and time.   Psychiatric:         Mood and Affect: Mood normal.         Behavior: Behavior normal.       Health Maintenance   Topic Date Due    Hemoglobin A1c  02/25/2023    Mammogram  07/13/2023    Eye Exam  08/16/2023    Lipid Panel  08/25/2023    Foot Exam  02/21/2024    DEXA Scan  07/13/2025    TETANUS VACCINE  03/09/2026    Hepatitis C Screening  Completed

## 2023-03-01 ENCOUNTER — EXTERNAL HOME HEALTH (OUTPATIENT)
Dept: HOME HEALTH SERVICES | Facility: HOSPITAL | Age: 78
End: 2023-03-01
Payer: MEDICARE

## 2023-03-07 DIAGNOSIS — E78.5 TYPE 2 DIABETES MELLITUS WITH HYPERLIPIDEMIA: ICD-10-CM

## 2023-03-07 DIAGNOSIS — E11.69 TYPE 2 DIABETES MELLITUS WITH HYPERLIPIDEMIA: ICD-10-CM

## 2023-03-07 NOTE — TELEPHONE ENCOUNTER
Care Due:                  Date            Visit Type   Department     Provider  --------------------------------------------------------------------------------                                EP -                              PRIMARY      [facility] FAMILY  Last Visit: 02-      CARE ([facility])   MEDICINE       Diivna Silva  Next Visit: None Scheduled  None         None Found                                                            Last  Test          Frequency    Reason                     Performed    Due Date  --------------------------------------------------------------------------------    HBA1C.......  6 months...  glipiZIDE................  06- 12-    [facility] Embedded Care Gaps. Reference number: [id number]. 3/07/2023   9:42:42 AM CST

## 2023-03-07 NOTE — TELEPHONE ENCOUNTER
----- Message from Vandana Lundberg sent at 3/7/2023  9:32 AM CST -----  Contact: Rekha Da Silva is needing a call back regarding her having her lost her a tube of glucose test strips so she is out and she needs a new prescription sent in. Please call her back at [phone].     Please send it to:   [facility] - [address] [id number] [facility]  [place] [address]  Phone: [phone] Fax: [phone]

## 2023-03-09 ENCOUNTER — OFFICE VISIT (OUTPATIENT)
Dept: RHEUMATOLOGY | Facility: CLINIC | Age: 78
End: 2023-03-09
Payer: MEDICARE

## 2023-03-09 VITALS
HEIGHT: 66 IN | SYSTOLIC BLOOD PRESSURE: 154 MMHG | HEART RATE: 78 BPM | DIASTOLIC BLOOD PRESSURE: 80 MMHG | BODY MASS INDEX: 43.39 KG/M2 | WEIGHT: 270 LBS

## 2023-03-09 DIAGNOSIS — M25.561 ARTHRALGIA OF BOTH KNEES: ICD-10-CM

## 2023-03-09 DIAGNOSIS — M06.30 RHEUMATOID NODULES: ICD-10-CM

## 2023-03-09 DIAGNOSIS — G89.4 CHRONIC PAIN SYNDROME: ICD-10-CM

## 2023-03-09 DIAGNOSIS — M35.00 SJOGREN'S SYNDROME, WITH UNSPECIFIED ORGAN INVOLVEMENT: ICD-10-CM

## 2023-03-09 DIAGNOSIS — M25.562 ARTHRALGIA OF BOTH KNEES: ICD-10-CM

## 2023-03-09 DIAGNOSIS — M81.0 OSTEOPOROSIS, UNSPECIFIED OSTEOPOROSIS TYPE, UNSPECIFIED PATHOLOGICAL FRACTURE PRESENCE: Primary | ICD-10-CM

## 2023-03-09 PROCEDURE — 1125F PR PAIN SEVERITY QUANTIFIED, PAIN PRESENT: ICD-10-PCS | Mod: HCNC,CPTII,S$GLB, | Performed by: INTERNAL MEDICINE

## 2023-03-09 PROCEDURE — 1125F AMNT PAIN NOTED PAIN PRSNT: CPT | Mod: HCNC,CPTII,S$GLB, | Performed by: INTERNAL MEDICINE

## 2023-03-09 PROCEDURE — 3079F DIAST BP 80-89 MM HG: CPT | Mod: HCNC,CPTII,S$GLB, | Performed by: INTERNAL MEDICINE

## 2023-03-09 PROCEDURE — 3077F PR MOST RECENT SYSTOLIC BLOOD PRESSURE >= 140 MM HG: ICD-10-PCS | Mod: HCNC,CPTII,S$GLB, | Performed by: INTERNAL MEDICINE

## 2023-03-09 PROCEDURE — 3079F PR MOST RECENT DIASTOLIC BLOOD PRESSURE 80-89 MM HG: ICD-10-PCS | Mod: HCNC,CPTII,S$GLB, | Performed by: INTERNAL MEDICINE

## 2023-03-09 PROCEDURE — 99215 OFFICE O/P EST HI 40 MIN: CPT | Mod: HCNC,S$GLB,, | Performed by: INTERNAL MEDICINE

## 2023-03-09 PROCEDURE — 99999 PR PBB SHADOW E&M-EST. PATIENT-LVL III: ICD-10-PCS | Mod: PBBFAC,HCNC,, | Performed by: INTERNAL MEDICINE

## 2023-03-09 PROCEDURE — 3077F SYST BP >= 140 MM HG: CPT | Mod: HCNC,CPTII,S$GLB, | Performed by: INTERNAL MEDICINE

## 2023-03-09 PROCEDURE — 99215 PR OFFICE/OUTPT VISIT, EST, LEVL V, 40-54 MIN: ICD-10-PCS | Mod: HCNC,S$GLB,, | Performed by: INTERNAL MEDICINE

## 2023-03-09 PROCEDURE — 99999 PR PBB SHADOW E&M-EST. PATIENT-LVL III: CPT | Mod: PBBFAC,HCNC,, | Performed by: INTERNAL MEDICINE

## 2023-03-09 RX ORDER — HYDROXYCHLOROQUINE SULFATE 200 MG/1
200 TABLET, FILM COATED ORAL 2 TIMES DAILY
Qty: 180 TABLET | Refills: 3 | Status: SHIPPED | OUTPATIENT
Start: 2023-03-09 | End: 2023-11-30 | Stop reason: SDUPTHER

## 2023-03-09 RX ORDER — TRAZODONE HYDROCHLORIDE 50 MG/1
50 TABLET ORAL NIGHTLY
Qty: 90 TABLET | Refills: 3 | Status: SHIPPED | OUTPATIENT
Start: 2023-03-09 | End: 2024-01-09

## 2023-03-09 RX ORDER — OXYCODONE AND ACETAMINOPHEN 10; 325 MG/1; MG/1
1 TABLET ORAL EVERY 8 HOURS PRN
Qty: 90 TABLET | Refills: 0 | Status: SHIPPED | OUTPATIENT
Start: 2023-04-07 | End: 2023-05-07

## 2023-03-09 RX ORDER — OXYCODONE AND ACETAMINOPHEN 10; 325 MG/1; MG/1
1 TABLET ORAL EVERY 8 HOURS PRN
Qty: 90 TABLET | Refills: 0 | Status: SHIPPED | OUTPATIENT
Start: 2023-03-09 | End: 2023-04-08

## 2023-03-09 RX ORDER — OXYCODONE AND ACETAMINOPHEN 10; 325 MG/1; MG/1
1 TABLET ORAL EVERY 8 HOURS PRN
Qty: 90 TABLET | Refills: 0 | Status: SHIPPED | OUTPATIENT
Start: 2023-05-06 | End: 2023-06-07

## 2023-03-09 NOTE — PROGRESS NOTES
Subjective:       Patient ID: Rekha Miller is a 78 y.o. female.    Chief Complaint: Disease Management    Follow up: 78 year old female  RA.  She was admitted for diverticulitis  Ct/abd, she has a L kidney mass. R hip pain and sciatic pain and newly dx renal mass She had cryotherapy for renal mass. . She complains of increased swelling of big toe L>R, toes, and ankles. She is concerned about possible gout. She also continues to have intermittent pain involving her hands. She has been compliant with LEF and plaquenil. Overall, AM stiffness is typically 30 minutes. She has intermittent dry mouth and dry eyes (which is followed by ophthalmology). She had Covid in June 2021    Current tx:  1.arava 10 mg- herld  2. Plaquenil 200 mg bid  3. norco    Review of Systems   Constitutional:  Positive for activity change. Negative for appetite change and chills.   HENT:          Dry mouth   Eyes:  Negative for visual disturbance.        Dry eyes   Respiratory:  Negative for cough and shortness of breath.    Cardiovascular:  Negative for chest pain, palpitations and leg swelling.   Gastrointestinal:  Negative for abdominal pain and constipation.   Musculoskeletal:  Positive for arthralgias, neck pain and neck stiffness.   Allergic/Immunologic: Positive for immunocompromised state.   Neurological:  Negative for dizziness, weakness and light-headedness.       Objective:     Vitals:    03/09/23 1257   BP: (!) 154/80   Pulse: 78       Past Medical History:   Diagnosis Date    Asthma     COPD (chronic obstructive pulmonary disease)     Degenerative disc disease     Diabetes mellitus     Diabetes mellitus, type 2     Fibromyalgia     Hypertension     Rheumatoid arthritis     Rheumatoid arthritis(714.0)     Rheumatoid arthritis     Past Surgical History:   Procedure Laterality Date    BREAST SURGERY  1986    CATARACT EXTRACTION      EYE SURGERY  2013    Catatracts    ORIF FEMUR FRACTURE      right knee ligament rpeair  2005     right shoulder surgery  4/18/07    Dr. Gao          Physical Exam   Constitutional: She is oriented to person, place, and time.   HENT:   Head: Normocephalic and atraumatic.   Mouth/Throat: Oropharynx is clear and moist.   Eyes: Pupils are equal, round, and reactive to light.   Neck: No thyromegaly present.   Cardiovascular: Normal rate, regular rhythm and normal heart sounds. Exam reveals no gallop and no friction rub.   No murmur heard.  Pulmonary/Chest: Breath sounds normal. She has no wheezes. She has no rales. She exhibits no tenderness.   Abdominal: There is no abdominal tenderness. There is no rebound and no guarding.   Musculoskeletal:         General: Swelling, tenderness and deformity present.      Right shoulder: Tenderness present.      Left shoulder: Tenderness present.      Right elbow: Normal.      Left elbow: Normal.      Right wrist: Tenderness present.      Left wrist: Tenderness present.      Cervical back: Neck supple.      Right knee: No effusion. Tenderness present.      Left knee: No effusion. Tenderness present.   Lymphadenopathy:     She has no cervical adenopathy.   Neurological: She is alert and oriented to person, place, and time. Gait normal.   Skin: No rash noted. No erythema. No pallor.   Psychiatric: Mood, affect and judgment normal.   Nursing note and vitals reviewed.      Right Side Rheumatological Exam     Examination finds the elbow normal.    The patient is tender to palpation of the shoulder, wrist, knee, 1st PIP, 1st MCP, 2nd PIP, 2nd MCP, 3rd PIP, 3rd MCP, 4th PIP, 4th MCP, 5th PIP and 5th MCP    She has swelling of the 1st PIP, 1st MCP, 2nd PIP, 2nd MCP, 3rd PIP, 3rd MCP, 4th PIP, 4th MCP, 5th PIP and 5th MCP    Shoulder Exam   Tenderness Location: no tenderness    Range of Motion   Active abduction:  abnormal   Adduction: abnormal  Sensation: normal    Knee Exam   Patellofemoral Crepitus: positive  Effusion: negative  Sensation: normal    Hip Exam   Tenderness Location:  posterior  Sensation: normal    Elbow/Wrist Exam   Tenderness Location: no tenderness  Sensation: normal    Left Side Rheumatological Exam     Examination finds the elbow normal.    The patient is tender to palpation of the shoulder, wrist, knee, 1st PIP, 1st MCP, 2nd PIP, 2nd MCP, 3rd PIP, 3rd MCP, 4th PIP, 4th MCP, 5th PIP and 5th MCP.    She has swelling of the 1st PIP, 1st MCP, 2nd PIP, 2nd MCP, 3rd PIP, 3rd MCP, 4th PIP, 4th MCP, 5th PIP and 5th MCP    Shoulder Exam   Tenderness Location: no tenderness    Range of Motion   Active abduction:  abnormal   Sensation: normal    Knee Exam     Patellofemoral Crepitus: positive  Effusion: negative  Sensation: normal    Hip Exam   Tenderness Location: posterior  Sensation: normal    Elbow/Wrist Exam   Sensation: normal      Back/Neck Exam   General Inspection   Gait: normal           Results for orders placed or performed in visit on 09/14/22   Urinalysis, Reflex to Urine Culture Urine, Clean Catch    Specimen: Urine   Result Value Ref Range    Specimen UA Urine, Clean Catch     Color, UA Yellow Yellow, Straw, Tiffanie    Appearance, UA Clear Clear    pH, UA 6.0 5.0 - 8.0    Specific Gravity, UA 1.020 1.005 - 1.030    Protein, UA Negative Negative    Glucose, UA Negative Negative    Ketones, UA Negative Negative    Bilirubin (UA) Negative Negative    Occult Blood UA Negative Negative    Nitrite, UA Negative Negative    Leukocytes, UA Negative Negative     Collected Updated Procedure    06/17/2022 1025 06/17/2022 1728 Hemoglobin A1C [[id number]]   (Abnormal)   Blood    Component Value Units   Hemoglobin A1C 5.8 High   %   Estimated Avg Glucose 120 mg/dL          06/17/2022 1025 06/17/2022 1730 Lipid Panel [[id number]]   (Abnormal)   Blood    Component Value Units   Cholesterol 209 High   mg/dL   Triglycerides 181 High   mg/dL   HDL 57  mg/dL   LDL Cholesterol 115.8  mg/dL   HDL/Cholesterol Ratio 27.3 %   Total Cholesterol/HDL Ratio 3.7    Non-HDL Cholesterol 152  mg/dL           04/07/2022 1515 04/07/2022 2146 Vitamin D [[id number]]    Blood    Component Value Units   Vit D, 25-Hydroxy 39  ng/mL          04/07/2022 1515 04/07/2022 2146 T4, Free [[id number]]    Blood    Component Value Units   Free T4 0.90 ng/dL          04/07/2022 1515 04/07/2022 2146 TSH [[id number]]    (Abnormal)   Blood    Component Value Units   TSH 0.282 Low  uIU/mL          04/07/2022 1515 04/12/2022 1623 Sjogrens syndrome-B extractable nuclear antibody [[id number]]    Blood    Component Value Units   Anti-SSB Antibody 0.17 Ratio   Anti-SSB Interpretation Negative           04/07/2022 1515 04/12/2022 1623 Sjogrens syndrome-A extractable nuclear antibody [[id number]]    Blood    Component Value Units   Anti-SSA Antibody 0.05 Ratio   Anti-SSA Interpretation Negative           04/07/2022 1515 04/07/2022 2155 Cyclic Citrullinated Peptide Antibody, IgG [[id number]]    (Abnormal)   Blood    Component Value Units   CCP Antibodies 86.6 High  U/mL          04/07/2022 1515 04/07/2022 1702 Sedimentation rate [[id number]]    (Abnormal)   Blood    Component Value Units   Sed Rate 26 High  mm/Hr          04/07/2022 1515 04/07/2022 2130 C-Reactive Protein [[id number]]    (Abnormal)   Blood    Component Value Units   CRP 10.1 High  mg/L          04/07/2022 1515 04/08/2022 1729 Rheumatoid Factor [[id number]]    (Abnormal)   Blood    Component Value Units   Rheumatoid Factor 75.0 High  IU/mL          04/07/2022 1515 04/07/2022 2215 CBC Auto Differential [[id number]]    (Abnormal)   Blood    Component Value Units   WBC 13.86 High  K/uL   RBC 4.44 M/uL   Hemoglobin 11.7 Low  g/dL   Hematocrit 37.0 %   MCV 83 fL   MCH 26.4 Low  pg   MCHC 31.6 Low  g/dL   RDW 17.1 High  %   Platelets 285 K/uL   MPV 11.8 fL   Immature Granulocytes 0.4 %   Gran # (ANC) 10.5 High  K/uL   Immature Grans (Abs) 0.06 High   K/uL   Lymph # 2.7 K/uL   Mono # 0.5 K/uL   Eos # 0.0 K/uL   Baso # 0.06 K/uL   nRBC 0 /100 WBC   Gran % 76.0 High  %   Lymph % 19.5 %   Mono % 3.6  Low  %   Eosinophil % 0.1 %   Basophil % 0.4 %   Differential Method Automated           04/07/2022 1515 04/07/2022 2130 Comprehensive Metabolic Panel [[id number]]    (Abnormal)   Blood    Component Value Units   Sodium 143 mmol/L   Potassium 4.1 mmol/L   Chloride 108 mmol/L   CO2 22 Low  mmol/L   Glucose 119 High  mg/dL   BUN 17 mg/dL   Creatinine 1.1 mg/dL   Calcium 9.9 mg/dL   Total Protein 7.3 g/dL   Albumin 3.5 g/dL   Total Bilirubin 0.3  mg/dL   Alkaline Phosphatase 104 U/L   AST 14 U/L   ALT 14 U/L   Anion Gap 13 mmol/L   eGFR if  56.8 Abnormal  mL/min/1.73 m^2   eGFR if non  49.2 Abnormal   mL/min/1.73 m^2          04/07/2022 1515 04/07/2022 2147 COVID-19 (SARS CoV-2) IgG Antibody Quant [[id number]]    Blood    Component Value Units   COVID-19 (SARS CoV-2) IgG Antibody Quantitative 6643.4 AU/ml   COVID-19 (SARS CoV-2) IgG Antibody Interpretation Positive              Anatomical Region Laterality Modality   Breast bilateral Mammography         Osteoporosis     Electronically signed by Felipe Alvarez MD on 7/13/2022 2:23 PM    Narrative    REASON FOR EXAM: [M81.0]-Age-related osteoporosis without current pathological fracture / [M89.9]-Disorder of bone, unspecified / [N95.9]-Unspecified menopausal and perimenopausal disorder / [Z78.0]-Asymptomatic menopausal state     TECHNICAL FACTORS: Readings are obtained over the lumbar spine and bilateral hips using a GE Lunar Prodigy scanner.     COMPARISON: None     LUMBAR SPINE FINDINGS: Bone mineral density from L1 to L4 is 1.126 g/cm2. The T score is -0.6. The Z score is 0.5 .     LEFT HIP FINDINGS: Bone mineral density over the femoral neck is 0.706 g/cm2. The T score is -2.4. The Z score is -1.4. Bone mineral density over the total hip is 0.675 g/cm2. The T score is -2.6. The Z score is -1.9.     The 10-year probability of fracture utilizing FRAX is 11.7% for a major osteoporotic fracture and 3.2% for a hip fracture.    Procedure  Note    Felipe Alvarez MD - 07/13/2022   Formatting of this note might be different from the original.   REASON FOR EXAM: [M81.0]-Age-related osteoporosis without current pathological fracture / [M89.9]-Disorder of bone, unspecified / [N95.9]-Unspecified menopausal and perimenopausal disorder / [Z78.0]-Asymptomatic menopausal state     TECHNICAL FACTORS: Readings are obtained over the lumbar spine and bilateral hips using a GE Lunar Prodigy scanner.     COMPARISON: None     LUMBAR SPINE FINDINGS: Bone mineral density from L1 to L4 is 1.126 g/cm2. The T score is -0.6. The Z score is 0.5 .     LEFT HIP FINDINGS: Bone mineral density over the femoral neck is 0.706 g/cm2. The T score is -2.4. The Z score is -1.4. Bone mineral density over the total hip is 0.675 g/cm2. The T score is -2.6. The Z score is -1.9.     The 10-year probability of fracture utilizing FRAX is 11.7% for a major osteoporotic fracture and 3.2% for a hip fracture.     IMPRESSION:   Osteoporosis     Electronically signed by Felipe Alvarez MD on 7/13/2022 2:23 PM  Exam End: 07/13/22  2:00 PM    Specimen Collected: 07/13/22  2:22 PM Last Resulted: 07/13/22  2:23 PM   Received From: [facility]  Result Received: 07/20/22 12:51 PM          Assessment:       1. Osteoporosis, unspecified osteoporosis type, unspecified pathological fracture presence    2. Rheumatoid nodules    3. Arthralgia of both knees    4. Sjogren's syndrome, with unspecified organ involvement    5. Chronic pain syndrome              Plan:       Osteoporosis, unspecified osteoporosis type, unspecified pathological fracture presence  -     denosumab (PROLIA) 60 mg/mL Syrg; Inject 1 mL (60 mg total) into the skin every 6 (six) months.  Dispense: 2 mL; Refill: 3  -     oxyCODONE-acetaminophen (PERCOCET)  mg per tablet; Take 1 tablet by mouth every 8 (eight) hours as needed for Pain.  Dispense: 90 tablet; Refill: 0  -     oxyCODONE-acetaminophen (PERCOCET)  mg per  tablet; Take 1 tablet by mouth every 8 (eight) hours as needed for Pain.  Dispense: 90 tablet; Refill: 0  -     oxyCODONE-acetaminophen (PERCOCET)  mg per tablet; Take 1 tablet by mouth every 8 (eight) hours as needed for Pain.  Dispense: 90 tablet; Refill: 0  -     hydrOXYchloroQUINE (PLAQUENIL) 200 mg tablet; Take 1 tablet (200 mg total) by mouth 2 (two) times daily.  Dispense: 180 tablet; Refill: 3  -     traZODone (DESYREL) 50 MG tablet; Take 1 tablet (50 mg total) by mouth every evening.  Dispense: 90 tablet; Refill: 3  -     T4, Free; Future; Expected date: 03/09/2023  -     TSH; Future; Expected date: 03/09/2023  -     Vitamin D; Future; Expected date: 03/09/2023  -     Cyclic Citrullinated Peptide Antibody, IgG; Future; Expected date: 03/09/2023  -     Rheumatoid Factor; Future; Expected date: 03/09/2023  -     Sedimentation rate; Future; Expected date: 03/09/2023  -     C-Reactive Protein; Future; Expected date: 03/09/2023  -     Sjogrens syndrome-A extractable nuclear antibody; Future; Expected date: 03/09/2023  -     Sjogrens syndrome-B extractable nuclear antibody; Future; Expected date: 03/09/2023    Rheumatoid nodules  -     oxyCODONE-acetaminophen (PERCOCET)  mg per tablet; Take 1 tablet by mouth every 8 (eight) hours as needed for Pain.  Dispense: 90 tablet; Refill: 0  -     oxyCODONE-acetaminophen (PERCOCET)  mg per tablet; Take 1 tablet by mouth every 8 (eight) hours as needed for Pain.  Dispense: 90 tablet; Refill: 0  -     oxyCODONE-acetaminophen (PERCOCET)  mg per tablet; Take 1 tablet by mouth every 8 (eight) hours as needed for Pain.  Dispense: 90 tablet; Refill: 0  -     hydrOXYchloroQUINE (PLAQUENIL) 200 mg tablet; Take 1 tablet (200 mg total) by mouth 2 (two) times daily.  Dispense: 180 tablet; Refill: 3  -     traZODone (DESYREL) 50 MG tablet; Take 1 tablet (50 mg total) by mouth every evening.  Dispense: 90 tablet; Refill: 3  -     T4, Free; Future; Expected date:  03/09/2023  -     TSH; Future; Expected date: 03/09/2023  -     Vitamin D; Future; Expected date: 03/09/2023  -     Cyclic Citrullinated Peptide Antibody, IgG; Future; Expected date: 03/09/2023  -     Rheumatoid Factor; Future; Expected date: 03/09/2023  -     Sedimentation rate; Future; Expected date: 03/09/2023  -     C-Reactive Protein; Future; Expected date: 03/09/2023  -     Sjogrens syndrome-A extractable nuclear antibody; Future; Expected date: 03/09/2023  -     Sjogrens syndrome-B extractable nuclear antibody; Future; Expected date: 03/09/2023    Arthralgia of both knees  -     oxyCODONE-acetaminophen (PERCOCET)  mg per tablet; Take 1 tablet by mouth every 8 (eight) hours as needed for Pain.  Dispense: 90 tablet; Refill: 0  -     oxyCODONE-acetaminophen (PERCOCET)  mg per tablet; Take 1 tablet by mouth every 8 (eight) hours as needed for Pain.  Dispense: 90 tablet; Refill: 0  -     oxyCODONE-acetaminophen (PERCOCET)  mg per tablet; Take 1 tablet by mouth every 8 (eight) hours as needed for Pain.  Dispense: 90 tablet; Refill: 0  -     hydrOXYchloroQUINE (PLAQUENIL) 200 mg tablet; Take 1 tablet (200 mg total) by mouth 2 (two) times daily.  Dispense: 180 tablet; Refill: 3  -     traZODone (DESYREL) 50 MG tablet; Take 1 tablet (50 mg total) by mouth every evening.  Dispense: 90 tablet; Refill: 3  -     T4, Free; Future; Expected date: 03/09/2023  -     TSH; Future; Expected date: 03/09/2023  -     Vitamin D; Future; Expected date: 03/09/2023  -     Cyclic Citrullinated Peptide Antibody, IgG; Future; Expected date: 03/09/2023  -     Rheumatoid Factor; Future; Expected date: 03/09/2023  -     Sedimentation rate; Future; Expected date: 03/09/2023  -     C-Reactive Protein; Future; Expected date: 03/09/2023  -     Sjogrens syndrome-A extractable nuclear antibody; Future; Expected date: 03/09/2023  -     Sjogrens syndrome-B extractable nuclear antibody; Future; Expected date: 03/09/2023    Sjogren's  syndrome, with unspecified organ involvement  -     oxyCODONE-acetaminophen (PERCOCET)  mg per tablet; Take 1 tablet by mouth every 8 (eight) hours as needed for Pain.  Dispense: 90 tablet; Refill: 0  -     oxyCODONE-acetaminophen (PERCOCET)  mg per tablet; Take 1 tablet by mouth every 8 (eight) hours as needed for Pain.  Dispense: 90 tablet; Refill: 0  -     oxyCODONE-acetaminophen (PERCOCET)  mg per tablet; Take 1 tablet by mouth every 8 (eight) hours as needed for Pain.  Dispense: 90 tablet; Refill: 0  -     hydrOXYchloroQUINE (PLAQUENIL) 200 mg tablet; Take 1 tablet (200 mg total) by mouth 2 (two) times daily.  Dispense: 180 tablet; Refill: 3  -     traZODone (DESYREL) 50 MG tablet; Take 1 tablet (50 mg total) by mouth every evening.  Dispense: 90 tablet; Refill: 3  -     T4, Free; Future; Expected date: 03/09/2023  -     TSH; Future; Expected date: 03/09/2023  -     Vitamin D; Future; Expected date: 03/09/2023  -     Cyclic Citrullinated Peptide Antibody, IgG; Future; Expected date: 03/09/2023  -     Rheumatoid Factor; Future; Expected date: 03/09/2023  -     Sedimentation rate; Future; Expected date: 03/09/2023  -     C-Reactive Protein; Future; Expected date: 03/09/2023  -     Sjogrens syndrome-A extractable nuclear antibody; Future; Expected date: 03/09/2023  -     Sjogrens syndrome-B extractable nuclear antibody; Future; Expected date: 03/09/2023    Chronic pain syndrome  -     oxyCODONE-acetaminophen (PERCOCET)  mg per tablet; Take 1 tablet by mouth every 8 (eight) hours as needed for Pain.  Dispense: 90 tablet; Refill: 0  -     oxyCODONE-acetaminophen (PERCOCET)  mg per tablet; Take 1 tablet by mouth every 8 (eight) hours as needed for Pain.  Dispense: 90 tablet; Refill: 0  -     oxyCODONE-acetaminophen (PERCOCET)  mg per tablet; Take 1 tablet by mouth every 8 (eight) hours as needed for Pain.  Dispense: 90 tablet; Refill: 0  -     hydrOXYchloroQUINE (PLAQUENIL) 200 mg  tablet; Take 1 tablet (200 mg total) by mouth 2 (two) times daily.  Dispense: 180 tablet; Refill: 3  -     traZODone (DESYREL) 50 MG tablet; Take 1 tablet (50 mg total) by mouth every evening.  Dispense: 90 tablet; Refill: 3  -     T4, Free; Future; Expected date: 03/09/2023  -     TSH; Future; Expected date: 03/09/2023  -     Vitamin D; Future; Expected date: 03/09/2023  -     Cyclic Citrullinated Peptide Antibody, IgG; Future; Expected date: 03/09/2023  -     Rheumatoid Factor; Future; Expected date: 03/09/2023  -     Sedimentation rate; Future; Expected date: 03/09/2023  -     C-Reactive Protein; Future; Expected date: 03/09/2023          Assessment:  78 year old female with  Seropositive (+RF/+CCP) rheumatoid arthritis, Sjogren's syndrome (+SSB) on LEF and plaquenil  --chronic pain syndrome on norco  --GERD,  start famotidine  --HTN  --asthma  --controlled type 2 diabetes  --chronic steroid use    1. Arava and  plaquenil  2. Percocet  I have  check [place] prescription monitoring program site and no unusual or abnormal behavior has occurred pt understand the risk and benefits of taking opioid medications and has decided to continue the  medication   3. Hold injection today  4. Labs at [facility]  5. Start prolia stop boniva    More than 50% of the  40 minute encounter was spent face to face counseling the patient regarding current status and future plan of care as well as side effects  of the medications. All questions were answered to patient's satisfaction also includes  non-face to face time preparing to see the patient (eg, review of tests), Obtaining and/or reviewing separately obtained history, Documenting clinical information in the electronic or other health record, Independently interpreting results

## 2023-03-10 ENCOUNTER — DOCUMENT SCAN (OUTPATIENT)
Dept: HOME HEALTH SERVICES | Facility: HOSPITAL | Age: 78
End: 2023-03-10
Payer: MEDICARE

## 2023-03-10 ENCOUNTER — SPECIALTY PHARMACY (OUTPATIENT)
Dept: PHARMACY | Facility: CLINIC | Age: 78
End: 2023-03-10
Payer: MEDICARE

## 2023-03-10 ENCOUNTER — TELEPHONE (OUTPATIENT)
Dept: FAMILY MEDICINE | Facility: CLINIC | Age: 78
End: 2023-03-10
Payer: MEDICARE

## 2023-03-10 DIAGNOSIS — E66.01 TYPE 2 DIABETES MELLITUS WITH MORBID OBESITY: ICD-10-CM

## 2023-03-10 DIAGNOSIS — M16.0 OSTEOARTHRITIS OF BOTH HIPS, UNSPECIFIED OSTEOARTHRITIS TYPE: ICD-10-CM

## 2023-03-10 DIAGNOSIS — G47.33 OSA ON CPAP: ICD-10-CM

## 2023-03-10 DIAGNOSIS — R60.0 PERIPHERAL EDEMA: ICD-10-CM

## 2023-03-10 DIAGNOSIS — M05.9 RHEUMATOID ARTHRITIS WITH POSITIVE RHEUMATOID FACTOR, INVOLVING UNSPECIFIED SITE: Primary | ICD-10-CM

## 2023-03-10 DIAGNOSIS — E11.69 TYPE 2 DIABETES MELLITUS WITH MORBID OBESITY: ICD-10-CM

## 2023-03-10 DIAGNOSIS — J45.40 MODERATE PERSISTENT ASTHMA WITHOUT COMPLICATION: ICD-10-CM

## 2023-03-10 NOTE — TELEPHONE ENCOUNTER
----- Message from Eliane Farah sent at 3/10/2023 10:11 AM CST -----  Pt is requesting the nurse give her a call back at [phone]  Thx jm

## 2023-03-10 NOTE — TELEPHONE ENCOUNTER
I have signed for the following orders AND/OR meds.  Please call the patient and ask the patient to schedule the testing AND/OR inform about any medications that were sent. Medications have been sent to pharmacy listed below      Orders Placed This Encounter   Procedures    SUBSEQUENT HOME HEALTH ORDERS     Order Specific Question:   What Home Health Agency is the patient currently using?     Answer:   [facility]     Comments:   [id number] [place] - [id number] AT SEC [address] [facility] [address] [facility]  [id number]  [place] [address]  Phone: [phone] Fax: [phone]    [facility] - [place] - [address]  [address]  [place] [address]  Phone: [phone] Fax: [phone]    [facility] - [place], [place] - [address]  [address] [address]  Phone: [phone] Fax: [phone]

## 2023-03-10 NOTE — TELEPHONE ENCOUNTER
Pt requesting to have Home Health orders for Re certification sent to [facility]. States she is still in PT and needs assistance.

## 2023-03-15 ENCOUNTER — TELEPHONE (OUTPATIENT)
Dept: FAMILY MEDICINE | Facility: CLINIC | Age: 78
End: 2023-03-15
Payer: MEDICARE

## 2023-03-15 DIAGNOSIS — I15.2 HYPERTENSION ASSOCIATED WITH DIABETES: ICD-10-CM

## 2023-03-15 DIAGNOSIS — E11.59 HYPERTENSION ASSOCIATED WITH DIABETES: ICD-10-CM

## 2023-03-15 DIAGNOSIS — E11.69 TYPE 2 DIABETES MELLITUS WITH HYPERLIPIDEMIA: ICD-10-CM

## 2023-03-15 DIAGNOSIS — E78.5 TYPE 2 DIABETES MELLITUS WITH HYPERLIPIDEMIA: ICD-10-CM

## 2023-03-15 DIAGNOSIS — M16.0 OSTEOARTHRITIS OF BOTH HIPS, UNSPECIFIED OSTEOARTHRITIS TYPE: ICD-10-CM

## 2023-03-15 DIAGNOSIS — M35.00 SJOGREN'S SYNDROME, WITH UNSPECIFIED ORGAN INVOLVEMENT: Primary | ICD-10-CM

## 2023-03-15 DIAGNOSIS — M05.9 RHEUMATOID ARTHRITIS WITH POSITIVE RHEUMATOID FACTOR, INVOLVING UNSPECIFIED SITE: ICD-10-CM

## 2023-03-15 NOTE — TELEPHONE ENCOUNTER
I have signed for the following orders AND/OR meds.  Please call the patient and ask the patient to schedule the testing AND/OR inform about any medications that were sent. Medications have been sent to pharmacy listed below      Orders Placed This Encounter   Procedures    SUBSEQUENT HOME HEALTH ORDERS          Order Specific Question:   What Home Health Agency is the patient currently using?     Answer:   [facility]     Comments:   [id number] [place] - [address] OF [facility] [address] [facility]  [address]  [place] [address]  Phone: [phone] Fax: [phone]    [facility] - [place] - [address]  [address]  [place] [address]  Phone: [phone] Fax: [phone]    [facility] - [place], [place] - [address]  [address] [address]  Phone: [phone] Fax: [phone]

## 2023-03-17 ENCOUNTER — TELEPHONE (OUTPATIENT)
Dept: RHEUMATOLOGY | Facility: CLINIC | Age: 78
End: 2023-03-17
Payer: MEDICARE

## 2023-03-17 NOTE — TELEPHONE ENCOUNTER
----- Message from Vandana Lundberg sent at 3/16/2023 11:05 AM CDT -----  Contact: Rekha Da Silva is needing a call back to discuss her pain medication not working. Please call her back at [phone]

## 2023-03-20 ENCOUNTER — TELEPHONE (OUTPATIENT)
Dept: FAMILY MEDICINE | Facility: CLINIC | Age: 78
End: 2023-03-20
Payer: MEDICARE

## 2023-03-20 NOTE — TELEPHONE ENCOUNTER
----- Message from Magaly Macedo sent at 3/20/2023  9:44 AM CDT -----  Contact: Rekha Da Silva needs a call back at [phone], Regards to home health.    Thanks  Td

## 2023-03-24 NOTE — TELEPHONE ENCOUNTER
Chart notes available.     BI complete   Rx [employer]    Copay $0   ( LIS Level: 2)  Pt is in initial phase.  Will be in initial phase with this rx.  [facility] in network    FA not required.  Pending to initial consult.

## 2023-03-28 ENCOUNTER — TELEPHONE (OUTPATIENT)
Dept: FAMILY MEDICINE | Facility: CLINIC | Age: 78
End: 2023-03-28
Payer: MEDICARE

## 2023-03-28 ENCOUNTER — SPECIALTY PHARMACY (OUTPATIENT)
Dept: PHARMACY | Facility: CLINIC | Age: 78
End: 2023-03-28
Payer: MEDICARE

## 2023-03-28 DIAGNOSIS — J06.9 URI, ACUTE: ICD-10-CM

## 2023-03-28 DIAGNOSIS — M81.0 OSTEOPOROSIS, POST-MENOPAUSAL: Primary | ICD-10-CM

## 2023-03-28 NOTE — TELEPHONE ENCOUNTER
----- Message from Moe Zhang sent at 3/28/2023 11:54 AM CDT -----  Contact: Angie ([facility])  Angie would like a call back at [phone], in regards to the pt reporting she has had a cough for a few days.She states she was with the pt on today, and the pt  started having a really bad coughing spell. The patient is coughing up green yemi, and reports chest pain when she takes a deep breath.

## 2023-03-28 NOTE — TELEPHONE ENCOUNTER
Outgoing call to pt for initial consult of Prolia.  Pt stated she is not feeling well and is waiting for PCP to send prescription for antibiotics.  Phone disconnected during the call.  Attempted to call pt back.  No answer.  Will f/u.

## 2023-03-30 NOTE — TELEPHONE ENCOUNTER
Called patient regarding antibiotic refill request from pharmacy. Patient stated she needed it due to severe cough and congestion. Patient informed office that she has been in the hospital since yesterday. Nurse confirmed patient is a [facility].

## 2023-04-01 NOTE — PROGRESS NOTES
Patient, Rekha Miller (MRN #[id number]), presented with a recorded BMI of 43.58 kg/m^2 consistent with the definition of morbid obesity (ICD-10 E66.01). The patient's morbid obesity was monitored, evaluated, addressed and/or treated. This addendum to the medical record is made on 04/01/2023.

## 2023-04-02 RX ORDER — AMOXICILLIN 500 MG/1
CAPSULE ORAL
Qty: 30 CAPSULE | Refills: 0 | Status: SHIPPED | OUTPATIENT
Start: 2023-04-02 | End: 2023-06-05

## 2023-04-04 NOTE — TELEPHONE ENCOUNTER
Specialty Pharmacy - Initial Clinical Assessment    Specialty Medication Orders Linked to Encounter      Flowsheet Row Most Recent Value   Medication #1 denosumab (PROLIA) 60 mg/mL Syrg (Order#[id number], Rx#[id number])          Patient Diagnosis   M81.0 Osteoporosis    Subjective    Rekha Miller is a 78 y.o. female, who is followed by the specialty pharmacy service for management and education.    Recent Encounters       Date Type Provider Description    03/28/2023 Specialty Pharmacy Karly Sepulveda, Laureen Initial Clinical Assessment    03/10/2023 Specialty Pharmacy Paulina Garza, Laureen Referral Authorization          Clinical call attempts since last clinical assessment   3/28/2023  7:05 PM - Specialty Pharmacy - Clinical Assessment by Karly Sepulveda, Laureen     Current Outpatient Medications   Medication Sig    ACCU-CHEK VERONIQUE CONTROL SOLN Soln USE ONCE A WEEK AS DIRECTED    acetaminophen (TYLENOL) 325 MG tablet Take 650 mg by mouth as needed.    albuterol (PROVENTIL) 2.5 mg /3 mL (0.083 %) nebulizer solution USE 1 VIAL IN NEBULIZER EVERY 6 HOURS AS NEEDED FOR WHEEZING    albuterol (PROVENTIL/VENTOLIN HFA) 90 mcg/actuation inhaler Inhale 2 puffs into the lungs every 6 (six) hours as needed for Wheezing.    amlodipine-benazepril 5-20 mg (LOTREL) 5-20 mg per capsule TAKE ONE CAPSULE BY MOUTH TWICE DAILY    amoxicillin (AMOXIL) 500 MG capsule TAKE ONE CAPSULE BY MOUTH THREE TIMES DAILY FOR 10 DAYS    blood sugar diagnostic (TRUE METRIX GLUCOSE TEST STRIP) Strp Use to test blood glucose one (1) time a day, to be used with insurance-preferred brand of glucometer/supplies.    blood-glucose calibrat control Cmpk 1 each by Misc.(Non-Drug; Combo Route) route once a week.    budesonide-formoterol 160-4.5 mcg (SYMBICORT) 160-4.5 mcg/actuation HFAA Symbicort 160 mcg-4.5 mcg/actuation HFA aerosol inhaler  INHALE TWO PUFFS TWICE DAILY    busPIRone (BUSPAR) 5 MG Tab Take 1 tablet (5 mg total) by mouth 2 (two) times daily.     cetirizine (ZYRTEC) 10 MG tablet Take 1 tablet (10 mg total) by mouth once daily.    clotrimazole-betamethasone 1-0.05% (LOTRISONE) cream Apply topically 2 (two) times daily.    denosumab (PROLIA) 60 mg/mL Syrg Inject 1 mL (60 mg total) into the skin every 6 (six) months.    dicyclomine (BENTYL) 20 mg tablet TAKE 1 TABLET BY MOUTH EVERY 6 HOURS DAILY    ferric citrate (AURYXIA) 210 mg iron Tab Take 1 tablet by mouth once daily.    fluocinolone-hydroq.-tretinoin (TRI-VITALY) 0.01-4-0.05 % Crea Apply 1 application topically every evening.    fluticasone propionate (FLONASE) 50 mcg/actuation nasal spray 1 spray (50 mcg total) by Each Nostril route once daily.    furosemide (LASIX) 40 MG tablet TAKE 2 TABLETS EVERY DAY AS NEEDED    glipiZIDE 5 MG TR24 Take 1 tablet (5 mg total) by mouth daily with breakfast.    hydroquin-tretinoin-hydrocort 4-0.025-0.5 % Emul Apply 1 applicator topically every evening.    hydrOXYchloroQUINE (PLAQUENIL) 200 mg tablet Take 1 tablet (200 mg total) by mouth 2 (two) times daily.    leflunomide (ARAVA) 10 MG Tab TAKE 1 TABLET EVERY DAY    multivitamin with iron Tab Take 1 tablet by mouth once daily.    mupirocin (BACTROBAN) 2 % ointment Apply topically 3 (three) times daily.    oxyCODONE-acetaminophen (PERCOCET)  mg per tablet Take 1 tablet by mouth every 8 (eight) hours as needed for Pain.    [START ON 4/7/2023] oxyCODONE-acetaminophen (PERCOCET)  mg per tablet Take 1 tablet by mouth every 8 (eight) hours as needed for Pain.    [START ON 5/6/2023] oxyCODONE-acetaminophen (PERCOCET)  mg per tablet Take 1 tablet by mouth every 8 (eight) hours as needed for Pain.    potassium chloride SA (K-DUR,KLOR-CON) 20 MEQ tablet TAKE TWO TABLETS BY MOUTH TWICE DAILY    pravastatin (PRAVACHOL) 20 MG tablet Take 1 tablet (20 mg total) by mouth once daily.    predniSONE (DELTASONE) 5 MG tablet TAKE THREE TABLETS BY MOUTH EVERY MORNING AS NEEDED    sodium chloride 5% (LALITHA 128) 5 % ophthalmic  solution sodium chloride 5 % eye drops   INSTILL ONE DROP INTO EACH EYE FOUR TIMES DAILY    traZODone (DESYREL) 50 MG tablet Take 1 tablet (50 mg total) by mouth every evening.    triamcinolone acetonide 0.1% (KENALOG) 0.1 % ointment Apply topically 2 (two) times daily.    TRUE METRIX GLUCOSE METER Misc test once DAILY AS DIRECTED    TRUEPLUS LANCETS 33 gauge Misc    Last reviewed on 4/4/2023  4:14 PM by Karly Sepulveda, PharmD    Review of patient's allergies indicates:   Allergen Reactions    Latex, natural rubber Swelling and Rash    Codeine     Dairy digestive ultra      Dairy products      Imuran [azathioprine sodium] Diarrhea    Lactobacillus     Lactobacillus acidoph-lactase Other (See Comments)    Morphine      DOESN'T FEEL RIGHT     Plaquenil [hydroxychloroquine] Other (See Comments)     headaches    Milk containing products Diarrhea   Last reviewed on  4/4/2023 4:14 PM by Karly Sepulveda    Drug Interactions    Drug interactions evaluated: yes  Clinically relevant drug interactions identified: no  Provided the patient with educational material regarding drug interactions: not applicable         Adverse Effects    *All other systems reviewed and are negative       Assessment Questions - Documented Responses      Flowsheet Row Most Recent Value   Assessment    Medication Reconciliation completed for patient Yes   During the past 4 weeks, has patient missed any activities due to condition or medication? No   During the past 4 weeks, did patient have any of the following urgent care visits? None   Goals of Therapy Status Discussed (new start)   Status of the patients ability to self-administer: Is Able   All education points have been covered with patient? No, patient declined- printed education provided   Welcome packet contents reviewed and discussed with patient? Yes   Assesment completed? Yes   Plan Therapy being initiated   Do you need to open a clinical intervention (i-vent)? No   Do you want to schedule first  "shipment? No   Medication #1 Assessment Info    Patient status New medication, New to [facility]   Is this medication appropriate for the patient? Yes   Is this medication effective? Not yet started            Objective    She has a past medical history of Asthma, COPD (chronic obstructive pulmonary disease), Degenerative disc disease, Diabetes mellitus, Diabetes mellitus, type 2, Fibromyalgia, Hypertension, Rheumatoid arthritis, and Rheumatoid arthritis(714.0).    Tried/failed medications: none    BP Readings from Last 4 Encounters:   03/09/23 (!) 154/80   02/22/23 107/60   01/11/23 (!) 143/69   12/23/22 138/71     Ht Readings from Last 4 Encounters:   03/09/23 5' 6" (1.676 m)   02/22/23 5' 6" (1.676 m)   02/21/23 5' 6" (1.676 m)   12/23/22 5' 6" (1.676 m)     Wt Readings from Last 4 Encounters:   03/09/23 122.5 kg (270 lb)   02/22/23 122 kg (269 lb)   02/21/23 118.1 kg (260 lb 5.8 oz)   12/08/22 118.1 kg (260 lb 4.1 oz)       The goals of prescribed drug therapy management include:  Supporting patient to meet the prescriber's medical treatment objectives  Improving or maintaining quality of life  Maintaining optimal therapy adherence  Minimizing and managing side effects      Goals of Therapy Status: Discussed (new start)    Assessment/Plan  Patient plans to start therapy on 4/4/2023.    Indication, dosage, appropriateness, effectiveness, safety and convenience of her specialty medication(s) were reviewed today.     Patient Education   Pharmacist offer to  patient was declined. Printed educational materials will be provided with medication.      Initial consult for Prolia.  Pt requested medication guide be mailed to her so she can review.  Will set up  to send to office and have injection scheduled.     Tasks added this encounter   No tasks added.   Tasks due within next 3 months   3/24/2023 - Clinical - Initial Assessment     Karly Sepulveda, PharmD  [facility]  [address]  [address]  [facility] [address]  Phone: [phone]  Fax: [phone]

## 2023-04-05 ENCOUNTER — TELEPHONE (OUTPATIENT)
Dept: RHEUMATOLOGY | Facility: CLINIC | Age: 78
End: 2023-04-05
Payer: MEDICARE

## 2023-04-05 NOTE — TELEPHONE ENCOUNTER
Fermin confirmed with Ginger Rodríguez via Teams to send to [address] [facility] [address], [place] [address]    Specialty Pharmacy - Initial Clinical Assessment    Specialty Medication Orders Linked to Encounter      Flowsheet Row Most Recent Value   Medication #1 denosumab (PROLIA) 60 mg/mL Syrg (Order#[id number], Rx#[id number])          Patient Diagnosis   M81.0 - Osteoporosis, post-menopausal    Subjective    Rekha Miller is a 78 y.o. female, who is followed by the specialty pharmacy service for management and education.    Recent Encounters       Date Type Provider Description    03/28/2023 Specialty Pharmacy Karly Sepulveda, Laureen Initial Clinical Assessment    03/10/2023 Specialty Pharmacy Paulina Garza, SherD Referral Authorization          Clinical call attempts since last clinical assessment   3/28/2023  7:05 PM - Specialty Pharmacy - Clinical Assessment by Karly Sepulveda, Laureen     Current Outpatient Medications   Medication Sig    ACCU-CHEK VERONIQUE CONTROL SOLN Soln USE ONCE A WEEK AS DIRECTED    acetaminophen (TYLENOL) 325 MG tablet Take 650 mg by mouth as needed.    albuterol (PROVENTIL) 2.5 mg /3 mL (0.083 %) nebulizer solution USE 1 VIAL IN NEBULIZER EVERY 6 HOURS AS NEEDED FOR WHEEZING    albuterol (PROVENTIL/VENTOLIN HFA) 90 mcg/actuation inhaler Inhale 2 puffs into the lungs every 6 (six) hours as needed for Wheezing.    amlodipine-benazepril 5-20 mg (LOTREL) 5-20 mg per capsule TAKE ONE CAPSULE BY MOUTH TWICE DAILY    amoxicillin (AMOXIL) 500 MG capsule TAKE ONE CAPSULE BY MOUTH THREE TIMES DAILY FOR 10 DAYS    blood sugar diagnostic (TRUE METRIX GLUCOSE TEST STRIP) Strp Use to test blood glucose one (1) time a day, to be used with insurance-preferred brand of glucometer/supplies.    blood-glucose calibrat control Cmpk 1 each by Misc.(Non-Drug; Combo Route) route once a week.    budesonide-formoterol 160-4.5 mcg (SYMBICORT) 160-4.5 mcg/actuation HFAA Symbicort 160 mcg-4.5 mcg/actuation HFA aerosol  inhaler  INHALE TWO PUFFS TWICE DAILY    busPIRone (BUSPAR) 5 MG Tab Take 1 tablet (5 mg total) by mouth 2 (two) times daily.    cetirizine (ZYRTEC) 10 MG tablet Take 1 tablet (10 mg total) by mouth once daily.    clotrimazole-betamethasone 1-0.05% (LOTRISONE) cream Apply topically 2 (two) times daily.    denosumab (PROLIA) 60 mg/mL Syrg Inject 1 mL (60 mg total) into the skin every 6 (six) months.    dicyclomine (BENTYL) 20 mg tablet TAKE 1 TABLET BY MOUTH EVERY 6 HOURS DAILY    ferric citrate (AURYXIA) 210 mg iron Tab Take 1 tablet by mouth once daily.    fluocinolone-hydroq.-tretinoin (TRI-VITALY) 0.01-4-0.05 % Crea Apply 1 application topically every evening.    fluticasone propionate (FLONASE) 50 mcg/actuation nasal spray 1 spray (50 mcg total) by Each Nostril route once daily.    furosemide (LASIX) 40 MG tablet TAKE 2 TABLETS EVERY DAY AS NEEDED    glipiZIDE 5 MG TR24 Take 1 tablet (5 mg total) by mouth daily with breakfast.    hydroquin-tretinoin-hydrocort 4-0.025-0.5 % Emul Apply 1 applicator topically every evening.    hydrOXYchloroQUINE (PLAQUENIL) 200 mg tablet Take 1 tablet (200 mg total) by mouth 2 (two) times daily.    leflunomide (ARAVA) 10 MG Tab TAKE 1 TABLET EVERY DAY    multivitamin with iron Tab Take 1 tablet by mouth once daily.    mupirocin (BACTROBAN) 2 % ointment Apply topically 3 (three) times daily.    oxyCODONE-acetaminophen (PERCOCET)  mg per tablet Take 1 tablet by mouth every 8 (eight) hours as needed for Pain.    [START ON 4/7/2023] oxyCODONE-acetaminophen (PERCOCET)  mg per tablet Take 1 tablet by mouth every 8 (eight) hours as needed for Pain.    [START ON 5/6/2023] oxyCODONE-acetaminophen (PERCOCET)  mg per tablet Take 1 tablet by mouth every 8 (eight) hours as needed for Pain.    potassium chloride SA (K-DUR,KLOR-CON) 20 MEQ tablet TAKE TWO TABLETS BY MOUTH TWICE DAILY    pravastatin (PRAVACHOL) 20 MG tablet Take 1 tablet (20 mg total) by mouth once daily.     predniSONE (DELTASONE) 5 MG tablet TAKE THREE TABLETS BY MOUTH EVERY MORNING AS NEEDED    sodium chloride 5% (LALITHA 128) 5 % ophthalmic solution sodium chloride 5 % eye drops   INSTILL ONE DROP INTO EACH EYE FOUR TIMES DAILY    traZODone (DESYREL) 50 MG tablet Take 1 tablet (50 mg total) by mouth every evening.    triamcinolone acetonide 0.1% (KENALOG) 0.1 % ointment Apply topically 2 (two) times daily.    TRUE METRIX GLUCOSE METER Misc test once DAILY AS DIRECTED    TRUEPLUS LANCETS 33 gauge Misc    Last reviewed on 4/4/2023  4:14 PM by Karly Sepulveda, PharmD    Review of patient's allergies indicates:   Allergen Reactions    Latex, natural rubber Swelling and Rash    Codeine     Dairy digestive ultra      Dairy products      Imuran [azathioprine sodium] Diarrhea    Lactobacillus     Lactobacillus acidoph-lactase Other (See Comments)    Morphine      DOESN'T FEEL RIGHT     Plaquenil [hydroxychloroquine] Other (See Comments)     headaches    Milk containing products Diarrhea   Last reviewed on  4/4/2023 4:14 PM by Karly Sepulveda    Drug Interactions    Drug interactions evaluated: yes  Clinically relevant drug interactions identified: no  Provided the patient with educational material regarding drug interactions: not applicable         Adverse Effects    *All other systems reviewed and are negative       Assessment Questions - Documented Responses      Flowsheet Row Most Recent Value   Assessment    Medication Reconciliation completed for patient Yes   During the past 4 weeks, has patient missed any activities due to condition or medication? No   During the past 4 weeks, did patient have any of the following urgent care visits? None   Goals of Therapy Status Discussed (new start)   Status of the patients ability to self-administer: Is Able   All education points have been covered with patient? No, patient declined- printed education provided   Welcome packet contents reviewed and discussed with patient? Yes   Assesment  "completed? Yes   Plan Therapy being initiated   Do you need to open a clinical intervention (i-vent)? No   Do you want to schedule first shipment? Yes   Medication #1 Assessment Info    Patient status New medication, New to [facility]   Is this medication appropriate for the patient? Yes   Is this medication effective? Not yet started          Refill Questions - Documented Responses      Flowsheet Row Most Recent Value   Refill Screening Questions    When does the patient need to receive the medication? 04/11/23   Refill Delivery Questions    How will the patient receive the medication?    When does the patient need to receive the medication? 04/11/23   Shipping Address Prescription   Address in [facility] confirmed and updated if neccessary? Yes   Expected Copay ($) 0   Is the patient able to afford the medication copay? Yes   Payment Method zero copay   Days supply of Refill 180   Supplies needed? No supplies needed   Refill activity completed? Yes   Refill activity plan Refill scheduled   Shipment/Pickup Date: 04/10/23            Objective    She has a past medical history of Asthma, COPD (chronic obstructive pulmonary disease), Degenerative disc disease, Diabetes mellitus, Diabetes mellitus, type 2, Fibromyalgia, Hypertension, Rheumatoid arthritis, and Rheumatoid arthritis(714.0).    Tried/failed medications: none    BP Readings from Last 4 Encounters:   03/09/23 (!) 154/80   02/22/23 107/60   01/11/23 (!) 143/69   12/23/22 138/71     Ht Readings from Last 4 Encounters:   03/09/23 5' 6" (1.676 m)   02/22/23 5' 6" (1.676 m)   02/21/23 5' 6" (1.676 m)   12/23/22 5' 6" (1.676 m)     Wt Readings from Last 4 Encounters:   03/09/23 122.5 kg (270 lb)   02/22/23 122 kg (269 lb)   02/21/23 118.1 kg (260 lb 5.8 oz)   12/08/22 118.1 kg (260 lb 4.1 oz)       The goals of prescribed drug therapy management include:  Supporting patient to meet the prescriber's medical treatment objectives  Improving or maintaining " quality of life  Maintaining optimal therapy adherence  Minimizing and managing side effects      Goals of Therapy Status: Discussed (new start)    Assessment/Plan  Patient plans to start therapy on 04/11/23      Indication, dosage, appropriateness, effectiveness, safety and convenience of her specialty medication(s) were reviewed today.     Patient Education   Pharmacist offer to  patient was declined. Printed educational materials will be provided with medication.          Tasks added this encounter   1/4/2024 - Clinical - Follow Up Assesement (Annual)  9/18/2023 - Refill Call (Auto Added)   Tasks due within next 3 months   No tasks due.     Karly Sepulveda, PharmD  [facility]  [address] [address]  Phone: [phone]  Fax: [phone]

## 2023-04-05 NOTE — TELEPHONE ENCOUNTER
----- Message from Rob Frazier sent at 4/4/2023 10:34 AM CDT -----  Who Called:pt       What is the reqeust in detail: pt is requesting a call back to talk about rx that is in pharm . Please advise       Can the clinic reply by [facility]? No       Best Call Back Number: [phone] (home)         Additional Information:

## 2023-04-12 ENCOUNTER — TELEPHONE (OUTPATIENT)
Dept: RHEUMATOLOGY | Facility: CLINIC | Age: 78
End: 2023-04-12
Payer: MEDICARE

## 2023-04-12 NOTE — TELEPHONE ENCOUNTER
----- Message from Britany Browne, Patient Care Assistant sent at 4/12/2023 11:06 AM CDT -----  Contact: self  Pt is calling  to speak w/ a nurse in regards to her medication. Please call back to advise [phone]  thanks

## 2023-04-12 NOTE — TELEPHONE ENCOUNTER
Returned patient call. Patient is asking if she can come get her Prolia shot. Nurse scheduled patient for this Friday and requesting most recent records from [place] to be faxed to the office.

## 2023-04-13 ENCOUNTER — DOCUMENT SCAN (OUTPATIENT)
Dept: HOME HEALTH SERVICES | Facility: HOSPITAL | Age: 78
End: 2023-04-13
Payer: MEDICARE

## 2023-04-19 ENCOUNTER — DOCUMENT SCAN (OUTPATIENT)
Dept: HOME HEALTH SERVICES | Facility: HOSPITAL | Age: 78
End: 2023-04-19
Payer: MEDICARE

## 2023-04-20 ENCOUNTER — DOCUMENT SCAN (OUTPATIENT)
Dept: HOME HEALTH SERVICES | Facility: HOSPITAL | Age: 78
End: 2023-04-20
Payer: MEDICARE

## 2023-04-21 ENCOUNTER — TELEPHONE (OUTPATIENT)
Dept: RHEUMATOLOGY | Facility: CLINIC | Age: 78
End: 2023-04-21
Payer: MEDICARE

## 2023-04-21 NOTE — TELEPHONE ENCOUNTER
----- Message from Margaux Lowery sent at 4/20/2023 11:14 AM CDT -----  Regarding: pt call bk  Name of Who is Calling:SARAH TORRES [[id number]]        What is the request in detail: Pt requesting call back due to medication needing to be filled           Can the clinic reply by [facility]: no         What Number to Call Back if not in [facility]: Telephone Information:  Mobile          [phone]

## 2023-04-21 NOTE — TELEPHONE ENCOUNTER
Returned patient call. Patient asked nurse if blood work results and recent hospital records from [place] was received by office. Nurse informed patient that office has not received anything. Informed unable to administer Prolia injection until lab results received to check calcium and kidney functions. Nurse asked [facility] to see if she can have them fax records to office. Verbalized understanding.

## 2023-04-24 ENCOUNTER — LAB VISIT (OUTPATIENT)
Dept: LAB | Facility: HOSPITAL | Age: 78
End: 2023-04-24
Attending: INTERNAL MEDICINE
Payer: MEDICARE

## 2023-04-24 DIAGNOSIS — D50.9 IRON DEFICIENCY ANEMIA, UNSPECIFIED IRON DEFICIENCY ANEMIA TYPE: ICD-10-CM

## 2023-04-24 DIAGNOSIS — M35.00 SJOGREN'S SYNDROME, WITH UNSPECIFIED ORGAN INVOLVEMENT: ICD-10-CM

## 2023-04-24 DIAGNOSIS — M06.30 RHEUMATOID NODULES: ICD-10-CM

## 2023-04-24 DIAGNOSIS — G89.4 CHRONIC PAIN SYNDROME: ICD-10-CM

## 2023-04-24 DIAGNOSIS — M25.562 ARTHRALGIA OF BOTH KNEES: ICD-10-CM

## 2023-04-24 DIAGNOSIS — I15.2 HYPERTENSION ASSOCIATED WITH DIABETES: ICD-10-CM

## 2023-04-24 DIAGNOSIS — E11.59 HYPERTENSION ASSOCIATED WITH DIABETES: ICD-10-CM

## 2023-04-24 DIAGNOSIS — M81.0 OSTEOPOROSIS, UNSPECIFIED OSTEOPOROSIS TYPE, UNSPECIFIED PATHOLOGICAL FRACTURE PRESENCE: ICD-10-CM

## 2023-04-24 DIAGNOSIS — M25.561 ARTHRALGIA OF BOTH KNEES: ICD-10-CM

## 2023-04-24 DIAGNOSIS — R60.0 PERIPHERAL EDEMA: ICD-10-CM

## 2023-04-24 LAB
25(OH)D3+25(OH)D2 SERPL-MCNC: 38 NG/ML (ref 30–96)
ALBUMIN SERPL BCP-MCNC: 3.1 G/DL (ref 3.5–5.2)
ALP SERPL-CCNC: 96 U/L (ref 55–135)
ALT SERPL W/O P-5'-P-CCNC: 21 U/L (ref 10–44)
ANION GAP SERPL CALC-SCNC: 8 MMOL/L (ref 8–16)
AST SERPL-CCNC: 26 U/L (ref 10–40)
BASOPHILS # BLD AUTO: 0.1 K/UL (ref 0–0.2)
BASOPHILS NFR BLD: 1 % (ref 0–1.9)
BILIRUB SERPL-MCNC: 0.3 MG/DL (ref 0.1–1)
BNP SERPL-MCNC: 34 PG/ML (ref 0–99)
BUN SERPL-MCNC: 18 MG/DL (ref 8–23)
CALCIUM SERPL-MCNC: 9.1 MG/DL (ref 8.7–10.5)
CCP AB SER IA-ACNC: 132 U/ML
CHLORIDE SERPL-SCNC: 106 MMOL/L (ref 95–110)
CO2 SERPL-SCNC: 24 MMOL/L (ref 23–29)
CREAT SERPL-MCNC: 1.1 MG/DL (ref 0.5–1.4)
CRP SERPL-MCNC: 5 MG/L (ref 0–8.2)
DIFFERENTIAL METHOD: ABNORMAL
EOSINOPHIL # BLD AUTO: 0.1 K/UL (ref 0–0.5)
EOSINOPHIL NFR BLD: 0.7 % (ref 0–8)
ERYTHROCYTE [DISTWIDTH] IN BLOOD BY AUTOMATED COUNT: 17.5 % (ref 11.5–14.5)
ERYTHROCYTE [SEDIMENTATION RATE] IN BLOOD BY WESTERGREN METHOD: 35 MM/HR (ref 0–20)
EST. GFR  (NO RACE VARIABLE): 52.1 ML/MIN/1.73 M^2
ESTIMATED AVG GLUCOSE: 114 MG/DL (ref 68–131)
FERRITIN SERPL-MCNC: 168 NG/ML (ref 20–300)
GLUCOSE SERPL-MCNC: 77 MG/DL (ref 70–110)
HBA1C MFR BLD: 5.6 % (ref 4–5.6)
HCT VFR BLD AUTO: 36.7 % (ref 37–48.5)
HGB BLD-MCNC: 11.1 G/DL (ref 12–16)
IMM GRANULOCYTES # BLD AUTO: 0.03 K/UL (ref 0–0.04)
IMM GRANULOCYTES NFR BLD AUTO: 0.3 % (ref 0–0.5)
IRON SERPL-MCNC: 43 UG/DL (ref 30–160)
LYMPHOCYTES # BLD AUTO: 1.6 K/UL (ref 1–4.8)
LYMPHOCYTES NFR BLD: 16.7 % (ref 18–48)
MCH RBC QN AUTO: 25.8 PG (ref 27–31)
MCHC RBC AUTO-ENTMCNC: 30.2 G/DL (ref 32–36)
MCV RBC AUTO: 85 FL (ref 82–98)
MONOCYTES # BLD AUTO: 0.5 K/UL (ref 0.3–1)
MONOCYTES NFR BLD: 5.3 % (ref 4–15)
NEUTROPHILS # BLD AUTO: 7.3 K/UL (ref 1.8–7.7)
NEUTROPHILS NFR BLD: 76 % (ref 38–73)
NRBC BLD-RTO: 0 /100 WBC
PLATELET # BLD AUTO: 290 K/UL (ref 150–450)
PMV BLD AUTO: 11.4 FL (ref 9.2–12.9)
POTASSIUM SERPL-SCNC: 3.5 MMOL/L (ref 3.5–5.1)
PROT SERPL-MCNC: 7.1 G/DL (ref 6–8.4)
RBC # BLD AUTO: 4.31 M/UL (ref 4–5.4)
RHEUMATOID FACT SERPL-ACNC: 50 IU/ML (ref 0–15)
SATURATED IRON: 15 % (ref 20–50)
SODIUM SERPL-SCNC: 138 MMOL/L (ref 136–145)
T4 FREE SERPL-MCNC: 0.94 NG/DL (ref 0.71–1.51)
TOTAL IRON BINDING CAPACITY: 292 UG/DL (ref 250–450)
TRANSFERRIN SERPL-MCNC: 197 MG/DL (ref 200–375)
TSH SERPL DL<=0.005 MIU/L-ACNC: 0.4 UIU/ML (ref 0.4–4)
WBC # BLD AUTO: 9.58 K/UL (ref 3.9–12.7)

## 2023-04-24 PROCEDURE — 86431 RHEUMATOID FACTOR QUANT: CPT | Mod: HCNC | Performed by: INTERNAL MEDICINE

## 2023-04-24 PROCEDURE — 85025 COMPLETE CBC W/AUTO DIFF WBC: CPT | Mod: HCNC | Performed by: INTERNAL MEDICINE

## 2023-04-24 PROCEDURE — 82570 ASSAY OF URINE CREATININE: CPT | Mod: HCNC | Performed by: INTERNAL MEDICINE

## 2023-04-24 PROCEDURE — 84439 ASSAY OF FREE THYROXINE: CPT | Mod: HCNC | Performed by: INTERNAL MEDICINE

## 2023-04-24 PROCEDURE — 86200 CCP ANTIBODY: CPT | Mod: HCNC | Performed by: INTERNAL MEDICINE

## 2023-04-24 PROCEDURE — 80053 COMPREHEN METABOLIC PANEL: CPT | Mod: HCNC | Performed by: INTERNAL MEDICINE

## 2023-04-24 PROCEDURE — 86235 NUCLEAR ANTIGEN ANTIBODY: CPT | Mod: HCNC | Performed by: INTERNAL MEDICINE

## 2023-04-24 PROCEDURE — 36415 COLL VENOUS BLD VENIPUNCTURE: CPT | Mod: HCNC,PO | Performed by: INTERNAL MEDICINE

## 2023-04-24 PROCEDURE — 83880 ASSAY OF NATRIURETIC PEPTIDE: CPT | Mod: HCNC | Performed by: INTERNAL MEDICINE

## 2023-04-24 PROCEDURE — 84466 ASSAY OF TRANSFERRIN: CPT | Mod: HCNC | Performed by: INTERNAL MEDICINE

## 2023-04-24 PROCEDURE — 82306 VITAMIN D 25 HYDROXY: CPT | Mod: HCNC | Performed by: INTERNAL MEDICINE

## 2023-04-24 PROCEDURE — 86140 C-REACTIVE PROTEIN: CPT | Mod: HCNC | Performed by: INTERNAL MEDICINE

## 2023-04-24 PROCEDURE — 82728 ASSAY OF FERRITIN: CPT | Mod: HCNC | Performed by: INTERNAL MEDICINE

## 2023-04-24 PROCEDURE — 83036 HEMOGLOBIN GLYCOSYLATED A1C: CPT | Mod: HCNC | Performed by: INTERNAL MEDICINE

## 2023-04-24 PROCEDURE — 84443 ASSAY THYROID STIM HORMONE: CPT | Mod: HCNC | Performed by: INTERNAL MEDICINE

## 2023-04-24 PROCEDURE — 85651 RBC SED RATE NONAUTOMATED: CPT | Mod: HCNC,PO | Performed by: INTERNAL MEDICINE

## 2023-04-25 LAB
ALBUMIN/CREAT UR: 4.5 UG/MG (ref 0–30)
ANTI-SSA ANTIBODY: 0.04 RATIO (ref 0–0.99)
ANTI-SSA INTERPRETATION: NEGATIVE
CREAT UR-MCNC: 112 MG/DL (ref 15–325)
MICROALBUMIN UR DL<=1MG/L-MCNC: 5 UG/ML

## 2023-04-26 ENCOUNTER — TELEPHONE (OUTPATIENT)
Dept: RHEUMATOLOGY | Facility: CLINIC | Age: 78
End: 2023-04-26
Payer: MEDICARE

## 2023-04-26 NOTE — TELEPHONE ENCOUNTER
Spoke to patient states she will be here for her injection on 4/27 understanding verbalized  ----- Message from Sanjuana Lowery sent at 4/26/2023 10:50 AM CDT -----  Contact: PT at [phone]  Type: Needs Medical Advice  Who Called:  PT  Best Call Back Number: [phone]  Additional Information: Pt need a call back to set appt. She has gotten blood work done.  Please call back to advise.

## 2023-04-27 ENCOUNTER — CLINICAL SUPPORT (OUTPATIENT)
Dept: RHEUMATOLOGY | Facility: CLINIC | Age: 78
End: 2023-04-27
Payer: MEDICARE

## 2023-04-27 DIAGNOSIS — M81.0 OSTEOPOROSIS, UNSPECIFIED OSTEOPOROSIS TYPE, UNSPECIFIED PATHOLOGICAL FRACTURE PRESENCE: Primary | ICD-10-CM

## 2023-04-27 PROCEDURE — 99999 PR PBB SHADOW E&M-EST. PATIENT-LVL I: CPT | Mod: PBBFAC,HCNC,,

## 2023-04-27 PROCEDURE — 96372 PR  PATIENT SUPPLIED MEDICATION ADMINISTRATION FEE: ICD-10-PCS | Mod: HCNC,S$GLB,, | Performed by: INTERNAL MEDICINE

## 2023-04-27 PROCEDURE — 96372 THER/PROPH/DIAG INJ SC/IM: CPT | Mod: HCNC,S$GLB,, | Performed by: INTERNAL MEDICINE

## 2023-04-27 PROCEDURE — 99999 PR PBB SHADOW E&M-EST. PATIENT-LVL I: ICD-10-PCS | Mod: PBBFAC,HCNC,,

## 2023-05-01 ENCOUNTER — EXTERNAL HOME HEALTH (OUTPATIENT)
Dept: HOME HEALTH SERVICES | Facility: HOSPITAL | Age: 78
End: 2023-05-01
Payer: MEDICARE

## 2023-05-08 ENCOUNTER — PES CALL (OUTPATIENT)
Dept: ADMINISTRATIVE | Facility: CLINIC | Age: 78
End: 2023-05-08
Payer: MEDICARE

## 2023-05-09 ENCOUNTER — TELEPHONE (OUTPATIENT)
Dept: ADMINISTRATIVE | Facility: HOSPITAL | Age: 78
End: 2023-05-09
Payer: MEDICARE

## 2023-05-10 DIAGNOSIS — I15.2 HYPERTENSION ASSOCIATED WITH DIABETES: ICD-10-CM

## 2023-05-10 DIAGNOSIS — E11.69 TYPE 2 DIABETES MELLITUS WITH HYPERLIPIDEMIA: ICD-10-CM

## 2023-05-10 DIAGNOSIS — E78.5 TYPE 2 DIABETES MELLITUS WITH HYPERLIPIDEMIA: ICD-10-CM

## 2023-05-10 DIAGNOSIS — E11.59 HYPERTENSION ASSOCIATED WITH DIABETES: ICD-10-CM

## 2023-05-10 RX ORDER — GLIPIZIDE 5 MG/1
TABLET, FILM COATED, EXTENDED RELEASE ORAL
Qty: 90 TABLET | Refills: 3 | Status: SHIPPED | OUTPATIENT
Start: 2023-05-10

## 2023-05-10 RX ORDER — POTASSIUM CHLORIDE 20 MEQ/1
TABLET, EXTENDED RELEASE ORAL
Qty: 360 TABLET | Refills: 3 | Status: SHIPPED | OUTPATIENT
Start: 2023-05-10

## 2023-05-10 NOTE — TELEPHONE ENCOUNTER
Refill Routing Note   Medication(s) are not appropriate for processing by [facility] for the following reason(s):      Required labs abnormal    [facility] action(s):  Approve  Defer None identified          Appointments  past 12m or future 3m with PCP    Date Provider   Last Visit   2/22/2023 Divina Silva MD   Next Visit   Visit date not found Divina Silva MD   ED visits in past 90 days: 0        Note composed:1:32 PM 05/10/2023

## 2023-05-10 NOTE — TELEPHONE ENCOUNTER
Care Due:                  Date            Visit Type   Department     Provider  --------------------------------------------------------------------------------                                EP -                              PRIMARY      [facility] FAMILY  Last Visit: 02-      CARE ([facility])   MEDICINE       Divina Silva  Next Visit: None Scheduled  None         None Found                                                            Last  Test          Frequency    Reason                     Performed    Due Date  --------------------------------------------------------------------------------    Lipid Panel.  12 months..  pravastatin..............  06- 06-    [facility] Embedded Care Due Messages. Reference number: [id number].   5/10/2023 1:14:12 PM CDT

## 2023-05-19 ENCOUNTER — DOCUMENTATION ONLY (OUTPATIENT)
Dept: RHEUMATOLOGY | Facility: CLINIC | Age: 78
End: 2023-05-19
Payer: MEDICARE

## 2023-05-24 PROBLEM — Z71.89 ACP (ADVANCE CARE PLANNING): Status: ACTIVE | Noted: 2023-05-24

## 2023-05-24 PROBLEM — J15.9 HEALTHCARE ASSOCIATED BACTERIAL PNEUMONIA: Status: ACTIVE | Noted: 2023-05-24

## 2023-06-01 ENCOUNTER — TELEPHONE (OUTPATIENT)
Dept: FAMILY MEDICINE | Facility: CLINIC | Age: 78
End: 2023-06-01
Payer: MEDICARE

## 2023-06-01 NOTE — TELEPHONE ENCOUNTER
----- Message from Amilcar Watt sent at 6/1/2023 11:14 AM CDT -----  Contact: [phone]  Pt is requesting a call in regards to a order for a wheelchair. Please call pt back at [phone]. Thanks KB

## 2023-06-01 NOTE — TELEPHONE ENCOUNTER
Attempt call to pt, no answer, message on recorder to return call.   Scheduled appt for Monday 6/5/23 at 9:00 AM for a hospital f/u.  Pt will need to be informed of this appt.

## 2023-06-02 NOTE — TELEPHONE ENCOUNTER
Pt informed the need for the appointment, verbalized understanding and states she will try to make it to the appt that scheduled for 6/5/23

## 2023-06-05 ENCOUNTER — OFFICE VISIT (OUTPATIENT)
Dept: FAMILY MEDICINE | Facility: CLINIC | Age: 78
End: 2023-06-05
Payer: MEDICARE

## 2023-06-05 ENCOUNTER — HOSPITAL ENCOUNTER (OUTPATIENT)
Dept: RADIOLOGY | Facility: HOSPITAL | Age: 78
Discharge: HOME | End: 2023-06-05
Attending: INTERNAL MEDICINE
Payer: MEDICARE

## 2023-06-05 VITALS
DIASTOLIC BLOOD PRESSURE: 62 MMHG | BODY MASS INDEX: 43.39 KG/M2 | TEMPERATURE: 99 F | SYSTOLIC BLOOD PRESSURE: 124 MMHG | HEIGHT: 66 IN | OXYGEN SATURATION: 98 % | WEIGHT: 270 LBS | HEART RATE: 83 BPM

## 2023-06-05 DIAGNOSIS — J96.01 ACUTE HYPOXEMIC RESPIRATORY FAILURE: ICD-10-CM

## 2023-06-05 DIAGNOSIS — D64.9 ANEMIA, UNSPECIFIED TYPE: ICD-10-CM

## 2023-06-05 DIAGNOSIS — R53.81 DEBILITY: ICD-10-CM

## 2023-06-05 DIAGNOSIS — J13 PNEUMONIA DUE TO STREPTOCOCCUS PNEUMONIAE, UNSPECIFIED LATERALITY, UNSPECIFIED PART OF LUNG: Primary | ICD-10-CM

## 2023-06-05 DIAGNOSIS — J13 PNEUMONIA DUE TO STREPTOCOCCUS PNEUMONIAE, UNSPECIFIED LATERALITY, UNSPECIFIED PART OF LUNG: ICD-10-CM

## 2023-06-05 DIAGNOSIS — J90 PLEURAL EFFUSION: ICD-10-CM

## 2023-06-05 DIAGNOSIS — N28.89 RENAL MASS: ICD-10-CM

## 2023-06-05 DIAGNOSIS — F41.9 ANXIETY: ICD-10-CM

## 2023-06-05 PROCEDURE — 99214 PR OFFICE/OUTPT VISIT, EST, LEVL IV, 30-39 MIN: ICD-10-PCS | Mod: S$GLB,,, | Performed by: INTERNAL MEDICINE

## 2023-06-05 PROCEDURE — 71046 XR CHEST PA AND LATERAL: ICD-10-PCS | Mod: 26,,, | Performed by: STUDENT IN AN ORGANIZED HEALTH CARE EDUCATION/TRAINING PROGRAM

## 2023-06-05 PROCEDURE — 1111F DSCHRG MED/CURRENT MED MERGE: CPT | Mod: CPTII,S$GLB,, | Performed by: INTERNAL MEDICINE

## 2023-06-05 PROCEDURE — 99999 PR PBB SHADOW E&M-EST. PATIENT-LVL V: CPT | Mod: PBBFAC,,, | Performed by: INTERNAL MEDICINE

## 2023-06-05 PROCEDURE — 3288F PR FALLS RISK ASSESSMENT DOCUMENTED: ICD-10-PCS | Mod: CPTII,S$GLB,, | Performed by: INTERNAL MEDICINE

## 2023-06-05 PROCEDURE — 3074F PR MOST RECENT SYSTOLIC BLOOD PRESSURE < 130 MM HG: ICD-10-PCS | Mod: CPTII,S$GLB,, | Performed by: INTERNAL MEDICINE

## 2023-06-05 PROCEDURE — 3074F SYST BP LT 130 MM HG: CPT | Mod: CPTII,S$GLB,, | Performed by: INTERNAL MEDICINE

## 2023-06-05 PROCEDURE — 1159F PR MEDICATION LIST DOCUMENTED IN MEDICAL RECORD: ICD-10-PCS | Mod: CPTII,S$GLB,, | Performed by: INTERNAL MEDICINE

## 2023-06-05 PROCEDURE — 99214 OFFICE O/P EST MOD 30 MIN: CPT | Mod: S$GLB,,, | Performed by: INTERNAL MEDICINE

## 2023-06-05 PROCEDURE — 99999 PR PBB SHADOW E&M-EST. PATIENT-LVL V: ICD-10-PCS | Mod: PBBFAC,,, | Performed by: INTERNAL MEDICINE

## 2023-06-05 PROCEDURE — 71046 X-RAY EXAM CHEST 2 VIEWS: CPT | Mod: 26,,, | Performed by: STUDENT IN AN ORGANIZED HEALTH CARE EDUCATION/TRAINING PROGRAM

## 2023-06-05 PROCEDURE — 1101F PR PT FALLS ASSESS DOC 0-1 FALLS W/OUT INJ PAST YR: ICD-10-PCS | Mod: CPTII,S$GLB,, | Performed by: INTERNAL MEDICINE

## 2023-06-05 PROCEDURE — 3078F PR MOST RECENT DIASTOLIC BLOOD PRESSURE < 80 MM HG: ICD-10-PCS | Mod: CPTII,S$GLB,, | Performed by: INTERNAL MEDICINE

## 2023-06-05 PROCEDURE — 1111F PR DISCHARGE MEDS RECONCILED W/ CURRENT OUTPATIENT MED LIST: ICD-10-PCS | Mod: CPTII,S$GLB,, | Performed by: INTERNAL MEDICINE

## 2023-06-05 PROCEDURE — 1126F PR PAIN SEVERITY QUANTIFIED, NO PAIN PRESENT: ICD-10-PCS | Mod: CPTII,S$GLB,, | Performed by: INTERNAL MEDICINE

## 2023-06-05 PROCEDURE — 1101F PT FALLS ASSESS-DOCD LE1/YR: CPT | Mod: CPTII,S$GLB,, | Performed by: INTERNAL MEDICINE

## 2023-06-05 PROCEDURE — 1159F MED LIST DOCD IN RCRD: CPT | Mod: CPTII,S$GLB,, | Performed by: INTERNAL MEDICINE

## 2023-06-05 PROCEDURE — 3078F DIAST BP <80 MM HG: CPT | Mod: CPTII,S$GLB,, | Performed by: INTERNAL MEDICINE

## 2023-06-05 PROCEDURE — 71046 X-RAY EXAM CHEST 2 VIEWS: CPT | Mod: TC,PO

## 2023-06-05 PROCEDURE — 1126F AMNT PAIN NOTED NONE PRSNT: CPT | Mod: CPTII,S$GLB,, | Performed by: INTERNAL MEDICINE

## 2023-06-05 PROCEDURE — 3288F FALL RISK ASSESSMENT DOCD: CPT | Mod: CPTII,S$GLB,, | Performed by: INTERNAL MEDICINE

## 2023-06-05 RX ORDER — BUSPIRONE HYDROCHLORIDE 5 MG/1
5 TABLET ORAL 2 TIMES DAILY
Qty: 180 TABLET | Refills: 3 | Status: SHIPPED | OUTPATIENT
Start: 2023-06-05 | End: 2024-06-04

## 2023-06-05 NOTE — PATIENT INSTRUCTIONS
-referral placed and sent to [facility]. Make appt if you do not hear from them within a week

## 2023-06-05 NOTE — PROGRESS NOTES
Assessment/Plan:    Problem List Items Addressed This Visit          Psychiatric    Anxiety    Overview     -started on buspar with improvement of symptoms  -plan to continue  -denies AE of medication         Relevant Medications    busPIRone (BUSPAR) 5 MG Tab       Renal/    Renal mass    Overview     -s/p cryoablation 12/13/2021 with [facility] interventional radiology  -CT March 2022- no recurrence  -repeat CT Nov 2022- no evidence of recurrence  -due for follow up with urology         Relevant Orders    Comprehensive Metabolic Panel       Oncology    Anemia     Overview     -chronic hx of NITESH  -previously on daily iron suppement         Relevant Orders    Pathologist Interpretation Differential    CBC Auto Differential    Comprehensive Metabolic Panel     Other Visit Diagnoses       Pneumonia due to Streptococcus pneumoniae, unspecified laterality, unspecified part of lung    -  Primary    Relevant Orders    X-Ray Chest PA And Lateral (Completed)    Ambulatory referral/consult to Pulmonology    Comprehensive Metabolic Panel    Pleural effusion        Relevant Orders    X-Ray Chest PA And Lateral (Completed)    Ambulatory referral/consult to Pulmonology    Acute hypoxemic respiratory failure        Debility        Relevant Orders    WHEELCHAIR FOR HOME USE            Follow up for Chest xray and labs today: cbc,cmp,path diff.    Divina Silva MD  _____________________________________________________________________________________________________________________________________________________    CC: hospital follow up    HPI:    Patient is in clinic today as an established patient. Patient admitted to [facility] for streptococcal pneumonia. D/c summary below:    Admission Date: 5/23/2023  Hospital Length of Stay: 5 days  Discharge Date and Time: 5/28/2023     HPI:   78-year-old female with past medical history of rheumatoid arthritis, COPD-asthma overlap, diabetes mellitus, hypertension came to the  emergency room with complaints of 3 weeks of fatigue, productive cough, headache, shortness of breath, subjective fevers.  Initial vitals indicated hypoxia with O2 saturations of 89% on room air, tachypnea with respiratory rate in the 30s, tachycardia with heart rate in the 115 range and a low-grade fever 100.3.  Chest x-ray revealed a large right-sided pleural effusion, workup  revealed elevated WBC, elevated procalcitonin, in light of the fevers and pneumonia was suspected.  As she was hypoxic, she was placed on O2 at 3 L with correction of hypoxemia.     Patient is a poor historian however review of the medical record system indicates the patient was hospitalized in March of 2023 for a pneumonia.  This raises concern for MDR organisms.     She is unable to tell me what medications she takes.  We were unable to reach her family members to get a medication list from them as well.  Chart review indicates that she is on leflunomide and possibly 15 mg of prednisone p.r.n..      Hospital Course:   Patient admitted with acute hypoxic respiratory failure concerning for hospital-acquired pneumonia.  She was started on broad-spectrum antibiotics and admitted.  Was found to have loculated right pleural effusion on CT scan.  Pulmonology consulted.  Performed thoracentesis, with 250 cc removed and studies sent.  Blood cultures growing only coag-negative staph, vancomycin discontinued on 05/25/2023.  Sputum culture growing strep pneumo,  patient was switched from broad-spectrum antibiotics to ceftriaxone.  Levaquin added by pulmonology.  Patient was discharged home on 3 L of supplemental oxygen which should be able to be weaned.  And 5 more days of levofloxacin to complete a 7 day course.  Of note patient insists she is going to see the hospital for keeping her here too long.    Since discharge: Patient reports respiratory symptoms are improving. Still using continuous O2 at 3L. She has not attempted to wean at home. She was  placed on room air in clinic and O2 sats stayed >95%, however she dropped to 70's while walking. Returned to 90's once place back on 2 L. Discussed checking a repeat xray today. Will also refer to local pulmonology.     All discharge medications have been reviewed and reconciled on chart.     Current Outpatient Medications on File Prior to Visit   Medication Sig Dispense Refill    ACCU-CHEK VERONIQUE CONTROL SOLN Soln USE ONCE A WEEK AS DIRECTED 1 each 0    acetaminophen (TYLENOL) 325 MG tablet Take 650 mg by mouth as needed.      albuterol (PROVENTIL) 2.5 mg /3 mL (0.083 %) nebulizer solution USE 1 VIAL IN NEBULIZER EVERY 6 HOURS AS NEEDED FOR WHEEZING 180 mL 3    albuterol (PROVENTIL/VENTOLIN HFA) 90 mcg/actuation inhaler Inhale 2 puffs into the lungs every 6 (six) hours as needed for Wheezing. 18 g 6    amlodipine-benazepril 5-20 mg (LOTREL) 5-20 mg per capsule TAKE ONE CAPSULE BY MOUTH TWICE DAILY 180 capsule 1    blood sugar diagnostic (TRUE METRIX GLUCOSE TEST STRIP) Strp Use to test blood glucose one (1) time a day, to be used with insurance-preferred brand of glucometer/supplies. 100 strip 11    blood-glucose calibrat control Cmpk 1 each by Misc.(Non-Drug; Combo Route) route once a week. 1 each 0    budesonide-formoterol 160-4.5 mcg (SYMBICORT) 160-4.5 mcg/actuation HFAA Symbicort 160 mcg-4.5 mcg/actuation HFA aerosol inhaler  INHALE TWO PUFFS TWICE DAILY 10.2 g 5    cetirizine (ZYRTEC) 10 MG tablet Take 1 tablet (10 mg total) by mouth once daily. 30 tablet 11    clotrimazole-betamethasone 1-0.05% (LOTRISONE) cream Apply topically 2 (two) times daily. 45 g 6    denosumab (PROLIA) 60 mg/mL Syrg Inject 1 mL (60 mg total) into the skin every 6 (six) months. 2 mL 3    dicyclomine (BENTYL) 20 mg tablet TAKE 1 TABLET BY MOUTH EVERY 6 HOURS DAILY 120 tablet 3    fluocinolone-hydroq.-tretinoin (TRI-VITALY) 0.01-4-0.05 % Crea Apply 1 application topically every evening. 30 g 1    fluticasone propionate (FLONASE) 50  mcg/actuation nasal spray 1 spray (50 mcg total) by Each Nostril route once daily. 16 g 0    furosemide (LASIX) 40 MG tablet TAKE 2 TABLETS EVERY DAY AS NEEDED 180 tablet 1    glipiZIDE 5 MG TR24 TAKE 1 TABLET BY MOUTH ONCE DAILY WITH BREAKFAST 90 tablet 3    hydroquin-tretinoin-hydrocort 4-0.025-0.5 % Emul Apply 1 applicator topically every evening. 30 g 3    hydrOXYchloroQUINE (PLAQUENIL) 200 mg tablet Take 1 tablet (200 mg total) by mouth 2 (two) times daily. 180 tablet 3    ibandronate (BONIVA) 150 mg tablet TAKE 1 TABLET BY MOUTH EVERY 30 days 3 tablet 3    leflunomide (ARAVA) 10 MG Tab TAKE 1 TABLET EVERY DAY 90 tablet 1    multivitamin with iron Tab Take 1 tablet by mouth once daily. 30 each 11    mupirocin (BACTROBAN) 2 % ointment Apply topically 3 (three) times daily. 30 g 6    oxyCODONE-acetaminophen (PERCOCET)  mg per tablet Take 1 tablet by mouth every 8 (eight) hours as needed for Pain. 90 tablet 0    potassium chloride SA (K-DUR,KLOR-CON) 20 MEQ tablet TAKE TWO TABLETS BY MOUTH TWICE DAILY 360 tablet 3    pravastatin (PRAVACHOL) 20 MG tablet Take 1 tablet (20 mg total) by mouth once daily. 90 tablet 3    predniSONE (DELTASONE) 5 MG tablet TAKE THREE TABLETS BY MOUTH EVERY MORNING AS NEEDED 90 tablet 3    sodium chloride 5% (LALITHA 128) 5 % ophthalmic solution sodium chloride 5 % eye drops   INSTILL ONE DROP INTO EACH EYE FOUR TIMES DAILY      traZODone (DESYREL) 50 MG tablet Take 1 tablet (50 mg total) by mouth every evening. 90 tablet 3    triamcinolone acetonide 0.1% (KENALOG) 0.1 % ointment Apply topically 2 (two) times daily. 80 g 0    TRUE METRIX GLUCOSE METER Misc test once DAILY AS DIRECTED      TRUEPLUS LANCETS 33 gauge Misc       [DISCONTINUED] amoxicillin (AMOXIL) 500 MG capsule TAKE ONE CAPSULE BY MOUTH THREE TIMES DAILY FOR 10 DAYS (Patient not taking: Reported on 6/5/2023) 30 capsule 0    [DISCONTINUED] busPIRone (BUSPAR) 5 MG Tab Take 1 tablet (5 mg total) by mouth 2 (two) times  "daily. (Patient not taking: Reported on 6/5/2023) 180 tablet 3    [DISCONTINUED] ferric citrate (AURYXIA) 210 mg iron Tab Take 1 tablet by mouth once daily. (Patient not taking: Reported on 6/5/2023) 90 tablet 3     No current facility-administered medications on file prior to visit.       Review of Systems   Constitutional:  Negative for chills, diaphoresis, fatigue and fever.   HENT:  Negative for congestion, ear pain, postnasal drip, sinus pain and sore throat.    Eyes:  Negative for pain and redness.   Respiratory:  Positive for cough and shortness of breath. Negative for chest tightness.    Cardiovascular:  Negative for chest pain and leg swelling.   Gastrointestinal:  Negative for abdominal pain, constipation, diarrhea, nausea and vomiting.   Genitourinary:  Negative for dysuria and hematuria.   Musculoskeletal:  Negative for arthralgias and joint swelling.   Skin:  Negative for rash.   Neurological:  Negative for dizziness, syncope and headaches.   Psychiatric/Behavioral:  Negative for dysphoric mood. The patient is not nervous/anxious.      Vitals:    06/05/23 0916   BP: 124/62   Pulse: 83   Temp: 98.5 °F (36.9 °C)   SpO2: 98%   Weight: 122.5 kg (270 lb)   Height: 5' 6" (1.676 m)       Wt Readings from Last 3 Encounters:   06/05/23 122.5 kg (270 lb)   05/28/23 115.8 kg (255 lb 4.7 oz)   03/09/23 122.5 kg (270 lb)       Physical Exam  Constitutional:       General: She is not in acute distress.     Appearance: Normal appearance. She is well-developed. She is obese.   HENT:      Head: Normocephalic and atraumatic.   Eyes:      Conjunctiva/sclera: Conjunctivae normal.   Cardiovascular:      Rate and Rhythm: Normal rate and regular rhythm.      Pulses: Normal pulses.      Heart sounds: Normal heart sounds. No murmur heard.  Pulmonary:      Effort: Pulmonary effort is normal. No respiratory distress.      Breath sounds: Normal breath sounds.   Abdominal:      General: Bowel sounds are normal. There is no " distension.      Palpations: Abdomen is soft.      Tenderness: There is no abdominal tenderness.   Musculoskeletal:         General: Normal range of motion.      Cervical back: Normal range of motion and neck supple.   Skin:     General: Skin is warm and dry.      Findings: No rash.   Neurological:      General: No focal deficit present.      Mental Status: She is alert and oriented to person, place, and time.   Psychiatric:         Mood and Affect: Mood normal.         Behavior: Behavior normal.       Health Maintenance   Topic Date Due    Mammogram  07/13/2023    Eye Exam  08/16/2023    Lipid Panel  08/25/2023    Hemoglobin A1c  10/24/2023    Foot Exam  02/21/2024    DEXA Scan  07/13/2025    TETANUS VACCINE  03/09/2026    Hepatitis C Screening  Completed

## 2023-06-05 NOTE — PROGRESS NOTES
Results have been released via [employer]. Please verify that these have been viewed by patient. If not, please call patient with results.    I have sent a message to them with the following interpretation (see below).    I have reviewed your recent results.    No worsening on chest xray. Plan to follow up with pulmonology as discussed in clinic. Continue to wear oxygen when moving but ok to take off when at rest.    Please do not hesitate to call or message with any additional questions or concerns.    Divina Silva MD

## 2023-06-06 ENCOUNTER — OUTPATIENT CASE MANAGEMENT (OUTPATIENT)
Dept: ADMINISTRATIVE | Facility: OTHER | Age: 78
End: 2023-06-06
Payer: MEDICARE

## 2023-06-06 DIAGNOSIS — M25.561 ARTHRALGIA OF BOTH KNEES: ICD-10-CM

## 2023-06-06 DIAGNOSIS — M81.0 OSTEOPOROSIS, UNSPECIFIED OSTEOPOROSIS TYPE, UNSPECIFIED PATHOLOGICAL FRACTURE PRESENCE: ICD-10-CM

## 2023-06-06 DIAGNOSIS — G89.4 CHRONIC PAIN SYNDROME: ICD-10-CM

## 2023-06-06 DIAGNOSIS — M35.00 SJOGREN'S SYNDROME, WITH UNSPECIFIED ORGAN INVOLVEMENT: ICD-10-CM

## 2023-06-06 DIAGNOSIS — M06.30 RHEUMATOID NODULES: ICD-10-CM

## 2023-06-06 DIAGNOSIS — M25.562 ARTHRALGIA OF BOTH KNEES: ICD-10-CM

## 2023-06-07 ENCOUNTER — OUTPATIENT CASE MANAGEMENT (OUTPATIENT)
Dept: ADMINISTRATIVE | Facility: OTHER | Age: 78
End: 2023-06-07
Payer: MEDICARE

## 2023-06-07 RX ORDER — OXYCODONE AND ACETAMINOPHEN 10; 325 MG/1; MG/1
1 TABLET ORAL EVERY 8 HOURS PRN
Qty: 90 TABLET | Refills: 0 | Status: SHIPPED | OUTPATIENT
Start: 2023-06-07 | End: 2023-07-04

## 2023-06-07 NOTE — PROGRESS NOTES
"Outpatient Care Management  Patient Does Not Consent    Patient: Rekha Miller  MRN:  [id number]  Date of Service:  6/7/2023  Completed by:  Luisana Ignacio RN    Chief Complaint   Patient presents with    OPCM Enrollment Call    Case Closure     Pt was very short with me and said she would "bypass this" and was not interested in OPCM,  but did not let me fully explain the program or offer [employer] due to recent hospitalization.        Patient Summary           Consent Received:  Decline  Decline Reason:  Needs met             "

## 2023-06-12 ENCOUNTER — TELEPHONE (OUTPATIENT)
Dept: FAMILY MEDICINE | Facility: CLINIC | Age: 78
End: 2023-06-12
Payer: MEDICARE

## 2023-06-12 DIAGNOSIS — J13 PNEUMONIA DUE TO STREPTOCOCCUS PNEUMONIAE, UNSPECIFIED LATERALITY, UNSPECIFIED PART OF LUNG: Primary | ICD-10-CM

## 2023-06-12 DIAGNOSIS — K21.9 GASTROESOPHAGEAL REFLUX DISEASE, UNSPECIFIED WHETHER ESOPHAGITIS PRESENT: ICD-10-CM

## 2023-06-12 DIAGNOSIS — J96.01 ACUTE HYPOXEMIC RESPIRATORY FAILURE: ICD-10-CM

## 2023-06-12 NOTE — TELEPHONE ENCOUNTER
----- Message from aGla العلي sent at 6/12/2023  1:40 PM CDT -----  Contact: Sandra/[facility]  Sandra would like a call back at [phone], in regards to patient suing 2 liters of oxygen instead of 3 liters , patient oxygen levels are within normal limits. The [employer] company would like a new prescription sent over for patient portable oxygen and they would like it at 2 liters if possible.  Thanks   Am

## 2023-06-12 NOTE — TELEPHONE ENCOUNTER
I have signed for the following orders AND/OR meds.  Please call the patient and ask the patient to schedule the testing AND/OR inform about any medications that were sent. Medications have been sent to pharmacy listed below      Orders Placed This Encounter   Procedures    OXYGEN FOR HOME USE     Order Specific Question:   Liter Flow     Answer:   2     Order Specific Question:   Duration     Answer:   With activity     Order Specific Question:   Qualifying Test Performed at:     Answer:   Activity     Order Specific Question:   Oxygen saturation at rest     Answer:   94     Order Specific Question:   Oxygen saturation with activity     Answer:   84     Order Specific Question:   Oxygen saturation with activity on oxygen     Answer:   96     Order Specific Question:   Portable mode:     Answer:   pulse dose acceptable     Order Specific Question:   Mode:     Answer:   Portable concentrator     Order Specific Question:   Route     Answer:   nasal cannula     Order Specific Question:   Device:     Answer:   home concentrator with portable tanks     Order Specific Question:   Length of need (in months):     Answer:   99 mos     Order Specific Question:   Patient condition with qualifying saturation     Answer:   Other - List qualifying diagnosis and code     Order Specific Question:   Select a diagnosis & list the code in the comments     Answer:   Pneumonia [984860]     Order Specific Question:   Height:     Answer:   5 6     Order Specific Question:   Weight:     Answer:   270     Order Specific Question:   Alternative treatment measures have been tried or considered and deemed clinically ineffective.     Answer:   Yes              [facility] [place] - [address]  [address]  [place] [address]  Phone: [phone] Fax: [phone]

## 2023-06-13 ENCOUNTER — TELEPHONE (OUTPATIENT)
Dept: PRIMARY CARE CLINIC | Facility: CLINIC | Age: 78
End: 2023-06-13
Payer: MEDICARE

## 2023-06-13 RX ORDER — SUCRALFATE 1 G/1
TABLET ORAL
Qty: 360 TABLET | Refills: 0 | Status: SHIPPED | OUTPATIENT
Start: 2023-06-13 | End: 2024-03-14 | Stop reason: SDUPTHER

## 2023-06-13 NOTE — TELEPHONE ENCOUNTER
----- Message from Radha Powers sent at 6/13/2023 10:49 AM CDT -----  Contact: Rekha Da Silva is calling to speak to the nurse regarding her recent x ray she had, she is calling to discuss the results, please give her a call back at [phone]    Thanks  LJ

## 2023-06-13 NOTE — TELEPHONE ENCOUNTER
----- Message from Natalia Powers sent at 6/13/2023  8:42 AM CDT -----  Contact: BlpiJcu-[phone] ext [phone]  Cassie called in requesting clinical notes for the patients oxygen orders. Please fax to [phone]. Please call them back at [phone] ext [phone]. Thanks

## 2023-06-13 NOTE — TELEPHONE ENCOUNTER
----- Message from Kalyn Faye sent at 6/13/2023 12:19 PM CDT -----  Regarding: refill  Contact: patient  Type:  RX Refill Request    Who Called:  Patient  Refill or New Rx:  refill  RX Name and Strength:  Sucralfate  How is the patient currently taking it? (ex. 1XDay):    Is this a 30 day or 90 day RX:    Preferred Pharmacy with phone number:      [facility] - [place], [place] - [address] [facility] [address]  Phone: [phone] Fax: [phone]      Local or Mail Order:    Ordering Provider:    Best Call Back Number:  [phone] (home)     Additional Information:  Advise pharmacy if the pt can start the following prescription again thanks!

## 2023-06-30 ENCOUNTER — TELEPHONE (OUTPATIENT)
Dept: RHEUMATOLOGY | Facility: CLINIC | Age: 78
End: 2023-06-30
Payer: MEDICARE

## 2023-06-30 NOTE — TELEPHONE ENCOUNTER
----- Message from Darleen Diallo sent at 6/30/2023  2:21 PM CDT -----  Regarding: Change meds  Type:  Needs Medical Advice    Who Called: Pt    Would the patient rather a call back or a response via [facility]? Call back    Best Call Back Number: [phone]    Additional Information: Sts she needs her meds to be change. Please advise -------------------Thank you.

## 2023-07-03 DIAGNOSIS — M81.0 OSTEOPOROSIS, UNSPECIFIED OSTEOPOROSIS TYPE, UNSPECIFIED PATHOLOGICAL FRACTURE PRESENCE: ICD-10-CM

## 2023-07-03 DIAGNOSIS — M35.00 SJOGREN'S SYNDROME, WITH UNSPECIFIED ORGAN INVOLVEMENT: ICD-10-CM

## 2023-07-03 DIAGNOSIS — M06.30 RHEUMATOID NODULES: ICD-10-CM

## 2023-07-03 DIAGNOSIS — M25.562 ARTHRALGIA OF BOTH KNEES: ICD-10-CM

## 2023-07-03 DIAGNOSIS — M25.561 ARTHRALGIA OF BOTH KNEES: ICD-10-CM

## 2023-07-03 DIAGNOSIS — G89.4 CHRONIC PAIN SYNDROME: ICD-10-CM

## 2023-07-04 RX ORDER — OXYCODONE AND ACETAMINOPHEN 10; 325 MG/1; MG/1
1 TABLET ORAL EVERY 8 HOURS PRN
Qty: 90 TABLET | Refills: 0 | Status: SHIPPED | OUTPATIENT
Start: 2023-07-04 | End: 2023-08-04

## 2023-07-11 ENCOUNTER — TELEPHONE (OUTPATIENT)
Dept: FAMILY MEDICINE | Facility: CLINIC | Age: 78
End: 2023-07-11
Payer: MEDICARE

## 2023-07-11 NOTE — TELEPHONE ENCOUNTER
----- Message from Saima Fuller sent at 7/11/2023  8:38 AM CDT -----  Contact: self [phone]  Patient called in this morning to schedule an appointment stating she has a bed sore on her left buttocks. She declined the first available which is Monday and would like to know can you get her in today its urgent. Please call back [phone]

## 2023-07-13 ENCOUNTER — OFFICE VISIT (OUTPATIENT)
Dept: FAMILY MEDICINE | Facility: CLINIC | Age: 78
End: 2023-07-13
Payer: MEDICARE

## 2023-07-13 VITALS
BODY MASS INDEX: 43.39 KG/M2 | SYSTOLIC BLOOD PRESSURE: 115 MMHG | WEIGHT: 270 LBS | DIASTOLIC BLOOD PRESSURE: 64 MMHG | HEIGHT: 66 IN | RESPIRATION RATE: 18 BRPM | TEMPERATURE: 98 F | HEART RATE: 99 BPM

## 2023-07-13 DIAGNOSIS — R53.81 DEBILITY: ICD-10-CM

## 2023-07-13 DIAGNOSIS — L89.322 PRESSURE INJURY OF LEFT BUTTOCK, STAGE 2: Primary | ICD-10-CM

## 2023-07-13 PROCEDURE — 1125F PR PAIN SEVERITY QUANTIFIED, PAIN PRESENT: ICD-10-PCS | Mod: HCNC,CPTII,S$GLB, | Performed by: PHYSICIAN ASSISTANT

## 2023-07-13 PROCEDURE — 3074F SYST BP LT 130 MM HG: CPT | Mod: HCNC,CPTII,S$GLB, | Performed by: PHYSICIAN ASSISTANT

## 2023-07-13 PROCEDURE — 1160F PR REVIEW ALL MEDS BY PRESCRIBER/CLIN PHARMACIST DOCUMENTED: ICD-10-PCS | Mod: HCNC,CPTII,S$GLB, | Performed by: PHYSICIAN ASSISTANT

## 2023-07-13 PROCEDURE — 99213 OFFICE O/P EST LOW 20 MIN: CPT | Mod: HCNC,S$GLB,, | Performed by: PHYSICIAN ASSISTANT

## 2023-07-13 PROCEDURE — 1101F PR PT FALLS ASSESS DOC 0-1 FALLS W/OUT INJ PAST YR: ICD-10-PCS | Mod: HCNC,CPTII,S$GLB, | Performed by: PHYSICIAN ASSISTANT

## 2023-07-13 PROCEDURE — 3288F PR FALLS RISK ASSESSMENT DOCUMENTED: ICD-10-PCS | Mod: HCNC,CPTII,S$GLB, | Performed by: PHYSICIAN ASSISTANT

## 2023-07-13 PROCEDURE — 1160F RVW MEDS BY RX/DR IN RCRD: CPT | Mod: HCNC,CPTII,S$GLB, | Performed by: PHYSICIAN ASSISTANT

## 2023-07-13 PROCEDURE — 3078F DIAST BP <80 MM HG: CPT | Mod: HCNC,CPTII,S$GLB, | Performed by: PHYSICIAN ASSISTANT

## 2023-07-13 PROCEDURE — 1101F PT FALLS ASSESS-DOCD LE1/YR: CPT | Mod: HCNC,CPTII,S$GLB, | Performed by: PHYSICIAN ASSISTANT

## 2023-07-13 PROCEDURE — 1159F PR MEDICATION LIST DOCUMENTED IN MEDICAL RECORD: ICD-10-PCS | Mod: HCNC,CPTII,S$GLB, | Performed by: PHYSICIAN ASSISTANT

## 2023-07-13 PROCEDURE — 99999 PR PBB SHADOW E&M-EST. PATIENT-LVL V: ICD-10-PCS | Mod: PBBFAC,HCNC,, | Performed by: PHYSICIAN ASSISTANT

## 2023-07-13 PROCEDURE — 1159F MED LIST DOCD IN RCRD: CPT | Mod: HCNC,CPTII,S$GLB, | Performed by: PHYSICIAN ASSISTANT

## 2023-07-13 PROCEDURE — 99213 PR OFFICE/OUTPT VISIT, EST, LEVL III, 20-29 MIN: ICD-10-PCS | Mod: HCNC,S$GLB,, | Performed by: PHYSICIAN ASSISTANT

## 2023-07-13 PROCEDURE — 99999 PR PBB SHADOW E&M-EST. PATIENT-LVL V: CPT | Mod: PBBFAC,HCNC,, | Performed by: PHYSICIAN ASSISTANT

## 2023-07-13 PROCEDURE — 3288F FALL RISK ASSESSMENT DOCD: CPT | Mod: HCNC,CPTII,S$GLB, | Performed by: PHYSICIAN ASSISTANT

## 2023-07-13 PROCEDURE — 3074F PR MOST RECENT SYSTOLIC BLOOD PRESSURE < 130 MM HG: ICD-10-PCS | Mod: HCNC,CPTII,S$GLB, | Performed by: PHYSICIAN ASSISTANT

## 2023-07-13 PROCEDURE — 1125F AMNT PAIN NOTED PAIN PRSNT: CPT | Mod: HCNC,CPTII,S$GLB, | Performed by: PHYSICIAN ASSISTANT

## 2023-07-13 PROCEDURE — 3078F PR MOST RECENT DIASTOLIC BLOOD PRESSURE < 80 MM HG: ICD-10-PCS | Mod: HCNC,CPTII,S$GLB, | Performed by: PHYSICIAN ASSISTANT

## 2023-07-13 RX ORDER — MENTHOL AND ZINC OXIDE .44; 20.625 G/100G; G/100G
OINTMENT TOPICAL DAILY
Qty: 113 G | Refills: 0 | Status: SHIPPED | OUTPATIENT
Start: 2023-07-13

## 2023-07-17 ENCOUNTER — TELEPHONE (OUTPATIENT)
Dept: FAMILY MEDICINE | Facility: CLINIC | Age: 78
End: 2023-07-17
Payer: MEDICARE

## 2023-07-17 NOTE — TELEPHONE ENCOUNTER
----- Message from Kayla Moran sent at 7/17/2023 12:49 PM CDT -----  Who Called: Patient     What is the request in detail: Requesting call back to discuss no one calling her for medication and other things. Pls advise    Can the clinic reply by [facility]? No    Best Call Back Number: [phone]      Additional Information:

## 2023-07-17 NOTE — TELEPHONE ENCOUNTER
Pt states she was instructed to call us if she had not heard from anyone regarding her walker or Home Health.  I informed pt that we would re-fax the walker order to [employer] and the Home Health order to [facility].

## 2023-07-19 ENCOUNTER — TELEPHONE (OUTPATIENT)
Dept: FAMILY MEDICINE | Facility: CLINIC | Age: 78
End: 2023-07-19
Payer: MEDICARE

## 2023-07-19 NOTE — TELEPHONE ENCOUNTER
----- Message from Kj Kruger sent at 7/19/2023 11:07 AM CDT -----  Contact: self [phone]  Pt is calling to speak with nurse . Please call back at [phone] . Thanksdj

## 2023-07-20 ENCOUNTER — TELEPHONE (OUTPATIENT)
Dept: FAMILY MEDICINE | Facility: CLINIC | Age: 78
End: 2023-07-20
Payer: MEDICARE

## 2023-07-20 NOTE — TELEPHONE ENCOUNTER
----- Message from Triny Kruger sent at 7/20/2023  4:09 PM CDT -----  Contact: Rekha Emerson is calling in regards to her getting an denial but she called [employer] and stated that they didn't get anything to turn down, and need everything resubmitted.please call back at [phone]        Thanks  LUKE

## 2023-07-25 ENCOUNTER — TELEPHONE (OUTPATIENT)
Dept: FAMILY MEDICINE | Facility: CLINIC | Age: 78
End: 2023-07-25
Payer: MEDICARE

## 2023-07-25 NOTE — TELEPHONE ENCOUNTER
Pt states she still has not received a call regarding the walker.    Called Cassie to check on previous faxes sent, they state that they did receive the order and when they tried submitting it to the insurance, it was denied because she received a walker in 2021 and her insurance will only cover one walker within a 5 year span.  Cassie states they did reach out to her on 7/20/23 and spoke to a family member.  She states she will reach out to the pt to speak to her directly to inform her of the status.

## 2023-07-25 NOTE — TELEPHONE ENCOUNTER
----- Message from Betsey Mittal sent at 7/25/2023  9:31 AM CDT -----  Contact: Rekha  Patient is calling to speak with the nurse regarding several questions and concerns. Reports needing to discuss equipment and other issues. Please give patient a call at .[phone]

## 2023-07-27 ENCOUNTER — TELEPHONE (OUTPATIENT)
Dept: RHEUMATOLOGY | Facility: CLINIC | Age: 78
End: 2023-07-27
Payer: MEDICARE

## 2023-07-27 ENCOUNTER — TELEPHONE (OUTPATIENT)
Dept: FAMILY MEDICINE | Facility: CLINIC | Age: 78
End: 2023-07-27
Payer: MEDICARE

## 2023-07-27 DIAGNOSIS — I10 HYPERTENSION, UNSPECIFIED TYPE: ICD-10-CM

## 2023-07-27 DIAGNOSIS — E78.2 MIXED HYPERLIPIDEMIA: ICD-10-CM

## 2023-07-27 PROCEDURE — G0180 PR HOME HEALTH MD CERTIFICATION: ICD-10-PCS | Mod: ,,, | Performed by: INTERNAL MEDICINE

## 2023-07-27 PROCEDURE — G0180 MD CERTIFICATION HHA PATIENT: HCPCS | Mod: ,,, | Performed by: INTERNAL MEDICINE

## 2023-07-27 RX ORDER — PRAVASTATIN SODIUM 20 MG/1
TABLET ORAL
Qty: 90 TABLET | Refills: 0 | Status: SHIPPED | OUTPATIENT
Start: 2023-07-27 | End: 2023-10-19

## 2023-07-27 NOTE — TELEPHONE ENCOUNTER
Care Due:                  Date            Visit Type   Department     Provider  --------------------------------------------------------------------------------                                [facility]  Last Visit: 06-      FOLLOW UP    MEDICINE       Divina Silva  Next Visit: None Scheduled  None         None Found                                                            Last  Test          Frequency    Reason                     Performed    Due Date  --------------------------------------------------------------------------------    HBA1C.......  6 months...  glipiZIDE................  04-   10-    Lipid Panel.  12 months..  pravastatin..............  06- 06-    [employer] Embedded Care Due Messages. Reference number: [id number].   7/27/2023 4:41:00 PM CDT

## 2023-07-27 NOTE — TELEPHONE ENCOUNTER
----- Message from Jeff Kevin sent at 7/26/2023 11:09 AM CDT -----  Regarding: Return Call  Contact: patient  Type:  Patient Returning Call    Who Called:patient  Who Left Message for Patient:office nurse  Does the patient know what this is regarding?:schedule a nurse visit for injection  Would the patient rather a call back or a response via [facility]? call  Best Call Back Number:[phone]  Additional Information: call center unable to schedule

## 2023-07-28 RX ORDER — AMLODIPINE AND BENAZEPRIL HYDROCHLORIDE 5; 20 MG/1; MG/1
CAPSULE ORAL
Qty: 180 CAPSULE | Refills: 1 | Status: SHIPPED | OUTPATIENT
Start: 2023-07-28 | End: 2024-01-29 | Stop reason: ALTCHOICE

## 2023-07-28 NOTE — TELEPHONE ENCOUNTER
Patient has CKD and Scr elevated. Do not recommend toradol injection at this time. Also, seems that nephrology advised against use of NSAIDs. Ok to receive depo 80 mg and vitamin b12

## 2023-07-28 NOTE — TELEPHONE ENCOUNTER
Spoke with HH care provider Sujatha who just wanted to get verbal HH order to continue the wound care as prescribed and make you aware of the pitting edema in her legs and two drug interactions.

## 2023-07-28 NOTE — TELEPHONE ENCOUNTER
Please make sure the patient is taking her Lasix as prescribed for edema. If it worsens, she may need to come in for an apt.

## 2023-07-28 NOTE — TELEPHONE ENCOUNTER
I called and spoke with Sujatha at home health regarding this. Sujatha reports patient has not been taking her Lasix because or urinary frequency but was instructed this morning to take this medication as prescribed and elevate her feet. I have attempted to contact this patient by phone with the following results: no answer.

## 2023-07-28 NOTE — TELEPHONE ENCOUNTER
Refill Routing Note     Refill Routing Note   Medication(s) are not appropriate for processing by [facility] for the following reason(s):      Required labs abnormal    [facility] action(s):  Defer  Approve Care Due:  Labs due     Medication Therapy Plan: defer amlodipine; approve pravastatin      Appointments  past 12m or future 3m with PCP    Date Provider   Last Visit   6/5/2023 Divina Silva MD   Next Visit   Visit date not found Divina Silva MD   ED visits in past 90 days: 0        Note composed:7:58 PM 07/27/2023

## 2023-07-28 NOTE — TELEPHONE ENCOUNTER
----- Message from Shilo Fuller sent at 7/28/2023  9:17 AM CDT -----  Contact: Sujatha/[facility]  Sujatha with [facility] is calling to speak with the nurse in regards to getting a verbal order to approve plan of care. Also patients legs are very swollen may be adema. Level 2 interaction with dicyclomine (BENTYL) 20 mg tablet and potassium. Please give Sujatha a callback at [phone].

## 2023-07-31 DIAGNOSIS — M81.0 OSTEOPOROSIS, UNSPECIFIED OSTEOPOROSIS TYPE, UNSPECIFIED PATHOLOGICAL FRACTURE PRESENCE: ICD-10-CM

## 2023-07-31 DIAGNOSIS — M35.00 SJOGREN'S SYNDROME, WITH UNSPECIFIED ORGAN INVOLVEMENT: ICD-10-CM

## 2023-07-31 DIAGNOSIS — M25.561 ARTHRALGIA OF BOTH KNEES: ICD-10-CM

## 2023-07-31 DIAGNOSIS — M06.30 RHEUMATOID NODULES: ICD-10-CM

## 2023-07-31 DIAGNOSIS — G89.4 CHRONIC PAIN SYNDROME: ICD-10-CM

## 2023-07-31 DIAGNOSIS — M25.562 ARTHRALGIA OF BOTH KNEES: ICD-10-CM

## 2023-08-01 ENCOUNTER — TELEPHONE (OUTPATIENT)
Dept: PODIATRY | Facility: CLINIC | Age: 78
End: 2023-08-01
Payer: MEDICARE

## 2023-08-01 NOTE — TELEPHONE ENCOUNTER
----- Message from Zahira Quiñonez sent at 8/1/2023  4:18 PM CDT -----  Contact: self  Type: RX Refill Request    Who Called: Pt    Refill or New Rx: refill    RX Name and Strength: oxyCODONE-acetaminophen (PERCOCET)  mg per tablet    How is the patient currently taking it? (ex. 1XDay): as directed    Is this a 30 day or 90 day RX: 90    Preferred Pharmacy with phone number:  [facility] [place] - [address] [facility] [address]  Phone: [phone] Fax: [phone]    Local or Mail Order: Local    Ordering Provider: Dr. Johnson    Would the patient rather a call back or a response via [facility]? Yes    Best Call Back Number: [phone]    Additional Information: Pt is calling because the pharmacy faxed over request for medication. Request needs to be sent before Friday   Pt states pharmacy asked her to call back  Please advise  Thank you

## 2023-08-01 NOTE — TELEPHONE ENCOUNTER
----- Message from Lissett Tadeo sent at 8/1/2023  8:36 AM CDT -----  Regarding: Soon Appt  Contact: Rekha  .Type:  Sooner Apoointment Request    Caller is requesting a sooner appointment.  Caller declined first available appointment listed below.  Caller will not accept being placed on the waitlist and is requesting a message be sent to doctor.  Name of Caller: Rekha  When is the first available appointment?  Symptoms:  Would the patient rather a call back or a response via [facility]?call   Best Call Back Number: [phone] (home)    Additional Information:  nail care

## 2023-08-01 NOTE — TELEPHONE ENCOUNTER
Pending request for reil of percocet already.   ----- Message from Mily Arteaga sent at 8/1/2023 10:18 AM CDT -----  Type:  RX Refill Request    Who Called: Pt    Refill or New Rx: refill    RX Name and Strength: oxyCODONE-acetaminophen (PERCOCET)  mg per tablet    How is the patient currently taking it? (ex. 1XDay): as directed    Is this a 30 day or 90 day RX: 90    Preferred Pharmacy with phone number:   [facility] [place] - [address] [address]  Phone: [phone] Fax: [phone]    Local or Mail Order: Local    Ordering Provider: Dr. Johnson    Would the patient rather a call back or a response via [facility]?  Yes    Best Call Back Number: [phone]    Additional Information: Pt is calling because the pharmacy faxed over request for medication. Please call back to advise. Thanks!

## 2023-08-02 ENCOUNTER — TELEPHONE (OUTPATIENT)
Dept: RHEUMATOLOGY | Facility: CLINIC | Age: 78
End: 2023-08-02
Payer: MEDICARE

## 2023-08-02 NOTE — TELEPHONE ENCOUNTER
Returned patient call regarding medication. Patient asked if office had received a fax from pharmacy about her refill request for pain meds. Nurse informed that office did receive it and the request was sent to Dr Johnson just waiting for her to send to the pharmacy. Patient also asked to reschedule nurse visit from today to another day next week. Stated was recently in the hospital. Rescheduled nurse visit patient aware of date and time of nurse visit next week.

## 2023-08-02 NOTE — TELEPHONE ENCOUNTER
----- Message from Senia Newsome sent at 8/2/2023  4:40 PM CDT -----  Type: Needs Medical Advice  Who Called:  jean marie     Best Call Back Number: [phone]    Additional Information: pt calling in regards to fax from [employer] please advise

## 2023-08-04 RX ORDER — OXYCODONE AND ACETAMINOPHEN 10; 325 MG/1; MG/1
1 TABLET ORAL EVERY 8 HOURS PRN
Qty: 90 TABLET | Refills: 0 | Status: SHIPPED | OUTPATIENT
Start: 2023-08-04 | End: 2023-09-03

## 2023-08-08 ENCOUNTER — CLINICAL SUPPORT (OUTPATIENT)
Dept: RHEUMATOLOGY | Facility: CLINIC | Age: 78
End: 2023-08-08
Payer: MEDICARE

## 2023-08-08 VITALS — HEART RATE: 80 BPM | DIASTOLIC BLOOD PRESSURE: 70 MMHG | SYSTOLIC BLOOD PRESSURE: 123 MMHG

## 2023-08-08 DIAGNOSIS — M06.30 RHEUMATOID NODULES: ICD-10-CM

## 2023-08-08 DIAGNOSIS — M25.562 ARTHRALGIA OF BOTH KNEES: ICD-10-CM

## 2023-08-08 DIAGNOSIS — M35.00 SJOGREN'S SYNDROME, WITH UNSPECIFIED ORGAN INVOLVEMENT: ICD-10-CM

## 2023-08-08 DIAGNOSIS — M81.0 OSTEOPOROSIS, UNSPECIFIED OSTEOPOROSIS TYPE, UNSPECIFIED PATHOLOGICAL FRACTURE PRESENCE: Primary | ICD-10-CM

## 2023-08-08 DIAGNOSIS — D64.9 ANEMIA, UNSPECIFIED TYPE: ICD-10-CM

## 2023-08-08 DIAGNOSIS — M25.561 ARTHRALGIA OF BOTH KNEES: ICD-10-CM

## 2023-08-08 DIAGNOSIS — M05.9 RHEUMATOID ARTHRITIS WITH POSITIVE RHEUMATOID FACTOR, INVOLVING UNSPECIFIED SITE: ICD-10-CM

## 2023-08-08 PROCEDURE — 96372 PR INJECTION,THERAP/PROPH/DIAG2ST, IM OR SUBCUT: ICD-10-PCS | Mod: HCNC,S$GLB,, | Performed by: INTERNAL MEDICINE

## 2023-08-08 PROCEDURE — 99999 PR PBB SHADOW E&M-EST. PATIENT-LVL IV: ICD-10-PCS | Mod: PBBFAC,HCNC,,

## 2023-08-08 PROCEDURE — 96372 THER/PROPH/DIAG INJ SC/IM: CPT | Mod: HCNC,S$GLB,, | Performed by: INTERNAL MEDICINE

## 2023-08-08 PROCEDURE — 99999 PR PBB SHADOW E&M-EST. PATIENT-LVL IV: CPT | Mod: PBBFAC,HCNC,,

## 2023-08-08 RX ORDER — CYANOCOBALAMIN 1000 UG/ML
1000 INJECTION, SOLUTION INTRAMUSCULAR; SUBCUTANEOUS
Status: COMPLETED | OUTPATIENT
Start: 2023-08-08 | End: 2023-08-08

## 2023-08-08 RX ORDER — METHYLPREDNISOLONE ACETATE 80 MG/ML
80 INJECTION, SUSPENSION INTRA-ARTICULAR; INTRALESIONAL; INTRAMUSCULAR; SOFT TISSUE
Status: COMPLETED | OUTPATIENT
Start: 2023-08-08 | End: 2023-08-08

## 2023-08-08 RX ADMIN — METHYLPREDNISOLONE ACETATE 80 MG: 80 INJECTION, SUSPENSION INTRA-ARTICULAR; INTRALESIONAL; INTRAMUSCULAR; SOFT TISSUE at 02:08

## 2023-08-08 RX ADMIN — CYANOCOBALAMIN 1000 MCG: 1000 INJECTION, SOLUTION INTRAMUSCULAR; SUBCUTANEOUS at 02:08

## 2023-08-08 NOTE — PROGRESS NOTES
2 pt ID used, allergies reviewed, see MAR for meds, doses, and injection sites.  Pt tolerated well. TC LPN

## 2023-08-16 ENCOUNTER — TELEPHONE (OUTPATIENT)
Dept: FAMILY MEDICINE | Facility: CLINIC | Age: 78
End: 2023-08-16
Payer: MEDICARE

## 2023-08-16 DIAGNOSIS — L89.322 PRESSURE INJURY OF LEFT BUTTOCK, STAGE 2: Primary | ICD-10-CM

## 2023-08-16 DIAGNOSIS — L02.91 CUTANEOUS ABSCESS, UNSPECIFIED SITE: ICD-10-CM

## 2023-08-16 NOTE — TELEPHONE ENCOUNTER
----- Message from Uche Quinn sent at 8/16/2023  1:56 PM CDT -----  Name of Who is Calling:Sujatha riggs/[facility]        What is the request in detail: Called in regards to getting wounds care orders for the pt., Please advise       Can the clinic reply by [facility]:NO        What Number to [facility] Back [facility]: [phone]

## 2023-08-16 NOTE — TELEPHONE ENCOUNTER
----- Message from Ofe Fernandes sent at 8/16/2023 11:03 AM CDT -----  Contact: Rekha Emerson called in to schedule a hospital follow up for today or tomorrow, specifically with Dr. Silva. Please call her back at [phone] .    Thanks  TS

## 2023-08-17 ENCOUNTER — TELEPHONE (OUTPATIENT)
Dept: FAMILY MEDICINE | Facility: CLINIC | Age: 78
End: 2023-08-17
Payer: MEDICARE

## 2023-08-17 NOTE — TELEPHONE ENCOUNTER
----- Message from Farzana Golden sent at 8/17/2023  2:08 PM CDT -----  Contact: SARAH  Patient is requesting a call back from the nurse regarding medication and appointment. Reports urgency, please call patient back at [phone]        Thanks

## 2023-08-17 NOTE — TELEPHONE ENCOUNTER
Spoke with pt, stated that she needed to reschedule appt for after 2 00. Pt informed that no appts available after 2 00 in [place] for several months. Pt refused to schedule with [facility]. Pt decided to keep appt scheduled on Monday at 1 20.

## 2023-08-17 NOTE — TELEPHONE ENCOUNTER
I have signed for the following orders AND/OR meds.  Please call the patient and ask the patient to schedule the testing AND/OR inform about any medications that were sent. Medications have been sent to pharmacy listed below      Orders Placed This Encounter   Procedures    SUBSEQUENT HOME HEALTH ORDERS     Please add wound care services to patient's current home health.     Order Specific Question:   What Home Health Agency is the patient currently using?     Answer:   [facility]              [facility] [place] - [address]  [address] [facility] [address]  Phone: [phone] Fax: [phone]    [facility] - [place], [place] - [address]  [address] [facility] [address]  Phone: [phone] Fax: [phone]

## 2023-08-23 ENCOUNTER — EXTERNAL HOME HEALTH (OUTPATIENT)
Dept: HOME HEALTH SERVICES | Facility: HOSPITAL | Age: 78
End: 2023-08-23
Payer: MEDICARE

## 2023-08-28 PROBLEM — J15.9 HEALTHCARE ASSOCIATED BACTERIAL PNEUMONIA: Status: RESOLVED | Noted: 2023-05-24 | Resolved: 2023-08-28

## 2023-09-01 DIAGNOSIS — M25.562 ARTHRALGIA OF BOTH KNEES: ICD-10-CM

## 2023-09-01 DIAGNOSIS — G89.4 CHRONIC PAIN SYNDROME: ICD-10-CM

## 2023-09-01 DIAGNOSIS — M06.30 RHEUMATOID NODULES: ICD-10-CM

## 2023-09-01 DIAGNOSIS — M81.0 OSTEOPOROSIS, UNSPECIFIED OSTEOPOROSIS TYPE, UNSPECIFIED PATHOLOGICAL FRACTURE PRESENCE: ICD-10-CM

## 2023-09-01 DIAGNOSIS — M35.00 SJOGREN'S SYNDROME, WITH UNSPECIFIED ORGAN INVOLVEMENT: ICD-10-CM

## 2023-09-01 DIAGNOSIS — M25.561 ARTHRALGIA OF BOTH KNEES: ICD-10-CM

## 2023-09-01 RX ORDER — OXYCODONE AND ACETAMINOPHEN 10; 325 MG/1; MG/1
1 TABLET ORAL EVERY 8 HOURS PRN
Qty: 90 TABLET | Refills: 0 | Status: CANCELLED | OUTPATIENT
Start: 2023-09-01 | End: 2023-10-01

## 2023-09-01 NOTE — TELEPHONE ENCOUNTER
Returned patient call and informed  Dr Johnson can not fill her pain medication since her last visit was in March. Was able to schedule an appointment with Ariana this month. Letter mailed to her home address.

## 2023-09-01 NOTE — TELEPHONE ENCOUNTER
----- Message from Ольга Marrero sent at 9/1/2023  9:28 AM CDT -----  Regarding: RX Refill Request  Contact: SARAH TORRES [phone]  Type:  RX Refill Request        Who Called: SARAH TORRES    Refill or New Rx:  REFILL    RX Name and Strength:  oxyCODONE-acetaminophen (PERCOCET)  mg per tablet    How is the patient currently taking it? (ex. 1XDay):  1X DAY     Is this a 30 day or 90 day RX:  30 DAY     Preferred Pharmacy with phone number:    [facility] [place] - [address] [facility] [address]  Phone: [phone] Fax: [phone]    Local or Mail Order:  LOCAL    Ordering Provider:  MIRNA Mercado Call Back Number:  [phone]     Additional Information:  Patient is calling to request Rx refill on oxyCODONE-acetaminophen (PERCOCET)  mg per tablet  Please call back and advise. Thanks

## 2023-09-01 NOTE — TELEPHONE ENCOUNTER
----- Message from Thony Bills sent at 9/1/2023  2:41 PM CDT -----  Contact: self  Type: Sooner Appointment Request        Caller is requesting a sooner appointment. Caller declined first available appointment listed below. Caller will not accept being placed on the waitlist and is requesting a message be sent to doctor.        Name of Caller: Patient   Best Call Back Number: [phone]  Additional Information: Pt states she needs a call today if possible this is her second message. Thanks

## 2023-09-07 NOTE — TELEPHONE ENCOUNTER
----- Message from Shyanne Garcia sent at 4/4/2017  1:56 PM CDT -----  Patient stated that she is experiencing bad Rheumatoid Arthritis/needs medication ordered or advice/please call back at [phone] to advise.   This document is complete and the patient is ready for discharge.

## 2023-09-21 ENCOUNTER — OFFICE VISIT (OUTPATIENT)
Dept: RHEUMATOLOGY | Facility: CLINIC | Age: 78
End: 2023-09-21
Payer: MEDICARE

## 2023-09-21 VITALS
HEART RATE: 85 BPM | DIASTOLIC BLOOD PRESSURE: 74 MMHG | HEIGHT: 66 IN | SYSTOLIC BLOOD PRESSURE: 127 MMHG | BODY MASS INDEX: 43.58 KG/M2

## 2023-09-21 DIAGNOSIS — E83.51 HYPOCALCEMIA: ICD-10-CM

## 2023-09-21 DIAGNOSIS — G89.4 CHRONIC PAIN SYNDROME: ICD-10-CM

## 2023-09-21 DIAGNOSIS — D84.821 IMMUNOCOMPROMISED STATE DUE TO DRUG THERAPY: ICD-10-CM

## 2023-09-21 DIAGNOSIS — M35.00 SJOGREN'S SYNDROME, WITH UNSPECIFIED ORGAN INVOLVEMENT: ICD-10-CM

## 2023-09-21 DIAGNOSIS — M06.30 RHEUMATOID NODULES: Primary | ICD-10-CM

## 2023-09-21 DIAGNOSIS — M05.9 RHEUMATOID ARTHRITIS WITH POSITIVE RHEUMATOID FACTOR, INVOLVING UNSPECIFIED SITE: ICD-10-CM

## 2023-09-21 DIAGNOSIS — Z79.899 IMMUNOCOMPROMISED STATE DUE TO DRUG THERAPY: ICD-10-CM

## 2023-09-21 DIAGNOSIS — M05.9 SEROPOSITIVE RHEUMATOID ARTHRITIS: Primary | ICD-10-CM

## 2023-09-21 DIAGNOSIS — M81.0 OSTEOPOROSIS, UNSPECIFIED OSTEOPOROSIS TYPE, UNSPECIFIED PATHOLOGICAL FRACTURE PRESENCE: ICD-10-CM

## 2023-09-21 PROCEDURE — 1125F PR PAIN SEVERITY QUANTIFIED, PAIN PRESENT: ICD-10-PCS | Mod: HCNC,CPTII,S$GLB, | Performed by: PHYSICIAN ASSISTANT

## 2023-09-21 PROCEDURE — 96372 PR INJECTION,THERAP/PROPH/DIAG2ST, IM OR SUBCUT: ICD-10-PCS | Mod: HCNC,S$GLB,, | Performed by: PHYSICIAN ASSISTANT

## 2023-09-21 PROCEDURE — 3078F DIAST BP <80 MM HG: CPT | Mod: HCNC,CPTII,S$GLB, | Performed by: PHYSICIAN ASSISTANT

## 2023-09-21 PROCEDURE — 1159F MED LIST DOCD IN RCRD: CPT | Mod: HCNC,CPTII,S$GLB, | Performed by: PHYSICIAN ASSISTANT

## 2023-09-21 PROCEDURE — 99214 PR OFFICE/OUTPT VISIT, EST, LEVL IV, 30-39 MIN: ICD-10-PCS | Mod: 25,HCNC,S$GLB, | Performed by: PHYSICIAN ASSISTANT

## 2023-09-21 PROCEDURE — 3078F PR MOST RECENT DIASTOLIC BLOOD PRESSURE < 80 MM HG: ICD-10-PCS | Mod: HCNC,CPTII,S$GLB, | Performed by: PHYSICIAN ASSISTANT

## 2023-09-21 PROCEDURE — 1160F RVW MEDS BY RX/DR IN RCRD: CPT | Mod: HCNC,CPTII,S$GLB, | Performed by: PHYSICIAN ASSISTANT

## 2023-09-21 PROCEDURE — 3074F PR MOST RECENT SYSTOLIC BLOOD PRESSURE < 130 MM HG: ICD-10-PCS | Mod: HCNC,CPTII,S$GLB, | Performed by: PHYSICIAN ASSISTANT

## 2023-09-21 PROCEDURE — 99214 OFFICE O/P EST MOD 30 MIN: CPT | Mod: 25,HCNC,S$GLB, | Performed by: PHYSICIAN ASSISTANT

## 2023-09-21 PROCEDURE — 96372 THER/PROPH/DIAG INJ SC/IM: CPT | Mod: HCNC,S$GLB,, | Performed by: PHYSICIAN ASSISTANT

## 2023-09-21 PROCEDURE — 1125F AMNT PAIN NOTED PAIN PRSNT: CPT | Mod: HCNC,CPTII,S$GLB, | Performed by: PHYSICIAN ASSISTANT

## 2023-09-21 PROCEDURE — 99999 PR PBB SHADOW E&M-EST. PATIENT-LVL V: CPT | Mod: PBBFAC,HCNC,, | Performed by: PHYSICIAN ASSISTANT

## 2023-09-21 PROCEDURE — 99999 PR PBB SHADOW E&M-EST. PATIENT-LVL V: ICD-10-PCS | Mod: PBBFAC,HCNC,, | Performed by: PHYSICIAN ASSISTANT

## 2023-09-21 PROCEDURE — 1159F PR MEDICATION LIST DOCUMENTED IN MEDICAL RECORD: ICD-10-PCS | Mod: HCNC,CPTII,S$GLB, | Performed by: PHYSICIAN ASSISTANT

## 2023-09-21 PROCEDURE — 1160F PR REVIEW ALL MEDS BY PRESCRIBER/CLIN PHARMACIST DOCUMENTED: ICD-10-PCS | Mod: HCNC,CPTII,S$GLB, | Performed by: PHYSICIAN ASSISTANT

## 2023-09-21 PROCEDURE — 3074F SYST BP LT 130 MM HG: CPT | Mod: HCNC,CPTII,S$GLB, | Performed by: PHYSICIAN ASSISTANT

## 2023-09-21 RX ORDER — METHYLPREDNISOLONE ACETATE 80 MG/ML
80 INJECTION, SUSPENSION INTRA-ARTICULAR; INTRALESIONAL; INTRAMUSCULAR; SOFT TISSUE
Status: COMPLETED | OUTPATIENT
Start: 2023-09-21 | End: 2023-09-21

## 2023-09-21 RX ORDER — OXYCODONE AND ACETAMINOPHEN 10; 325 MG/1; MG/1
1 TABLET ORAL EVERY 8 HOURS PRN
Qty: 90 TABLET | Refills: 0 | Status: SHIPPED | OUTPATIENT
Start: 2023-11-20 | End: 2023-11-30 | Stop reason: SDUPTHER

## 2023-09-21 RX ORDER — OXYCODONE AND ACETAMINOPHEN 10; 325 MG/1; MG/1
1 TABLET ORAL EVERY 8 HOURS PRN
Qty: 90 TABLET | Refills: 0 | Status: SHIPPED | OUTPATIENT
Start: 2023-09-21 | End: 2023-11-30 | Stop reason: SDUPTHER

## 2023-09-21 RX ORDER — OXYCODONE AND ACETAMINOPHEN 10; 325 MG/1; MG/1
1 TABLET ORAL EVERY 8 HOURS PRN
Qty: 90 TABLET | Refills: 0 | Status: SHIPPED | OUTPATIENT
Start: 2023-10-21 | End: 2023-11-30 | Stop reason: SDUPTHER

## 2023-09-21 RX ORDER — CYANOCOBALAMIN 1000 UG/ML
1000 INJECTION, SOLUTION INTRAMUSCULAR; SUBCUTANEOUS
Status: COMPLETED | OUTPATIENT
Start: 2023-09-21 | End: 2023-09-21

## 2023-09-21 RX ADMIN — CYANOCOBALAMIN 1000 MCG: 1000 INJECTION, SOLUTION INTRAMUSCULAR; SUBCUTANEOUS at 03:09

## 2023-09-21 RX ADMIN — METHYLPREDNISOLONE ACETATE 80 MG: 80 INJECTION, SUSPENSION INTRA-ARTICULAR; INTRALESIONAL; INTRAMUSCULAR; SOFT TISSUE at 03:09

## 2023-09-21 ASSESSMENT — ROUTINE ASSESSMENT OF PATIENT INDEX DATA (RAPID3)
TOTAL RAPID3 SCORE: 3.33
PAIN SCORE: 10
PSYCHOLOGICAL DISTRESS SCORE: 0
PATIENT GLOBAL ASSESSMENT SCORE: 0
FATIGUE SCORE: 0
MDHAQ FUNCTION SCORE: 0

## 2023-09-21 NOTE — Clinical Note
Due for prolia in October--she plans to receive at visit with Dr. Johnson in November. Can you make sure this is set up appropriately?

## 2023-09-21 NOTE — PROGRESS NOTES
Subjective:       Patient ID: Rekha Miller is a 78 y.o. female.    Chief Complaint: Disease Management    Mrs. Miller is a 78 year old female who presents to clinic visit for follow up on RA. She has been compliant with LEF 10 mg, plaquenil, and prednisone 10-15 mg daily for RA. She has chronic pain in her hands, wrists, hips, and low back. She reports pain has been worse in the recent weeks since she ran out of her percocet, which she is taking TID.     She started prolia in 4/2023 and denies s/e.     We reviewed her recent labs available.     She had I&D abscess L forearm recently.    Recent Interventional Radiology Note:  She has ongoing good result from her cryoablation. No signs of residual/rebound malignancy.   Will place orders for repeat CT kidney in 12 months.       Current tx:  1. LEF  2. Plaquenil  3. Percocet  4. Prednisone  5. Prolia      Review of Systems   Constitutional:  Positive for activity change. Negative for appetite change, chills, fatigue and fever.   HENT:          Dry mouth   Eyes:  Negative for visual disturbance.        Dry eyes   Respiratory:  Negative for cough and shortness of breath.    Cardiovascular:  Negative for chest pain, palpitations and leg swelling.   Gastrointestinal:  Negative for abdominal pain and constipation.   Musculoskeletal:  Positive for arthralgias and joint swelling.   Allergic/Immunologic: Positive for immunocompromised state.   Neurological:  Negative for dizziness, weakness, light-headedness and headaches.         Objective:     Vitals:    09/21/23 1446   BP: 127/74   Pulse: 85       Past Medical History:   Diagnosis Date    Asthma     COPD (chronic obstructive pulmonary disease)     Degenerative disc disease     Diabetes mellitus     Diabetes mellitus, type 2     Fibromyalgia     Hypertension     Rheumatoid arthritis     Rheumatoid arthritis(714.0)     Rheumatoid arthritis     Past Surgical History:   Procedure Laterality Date    BREAST SURGERY  1986     CATARACT EXTRACTION      EYE SURGERY  2013    Catatracts    ORIF FEMUR FRACTURE      right knee ligament rpeair  2005    right shoulder surgery  4/18/07    Dr. Gao          Physical Exam   Constitutional: She is oriented to person, place, and time.   Eyes: Right conjunctiva is not injected. Left conjunctiva is not injected.   Neck: No JVD present. No thyromegaly present.   Cardiovascular: Normal rate.   Pulmonary/Chest: Effort normal.   Musculoskeletal:      Right shoulder: Tenderness present.      Left shoulder: Tenderness present.      Right wrist: Swelling and tenderness present.      Left wrist: Swelling and tenderness present.   Lymphadenopathy:     She has no cervical adenopathy.   Neurological: She is alert and oriented to person, place, and time.   Pt ambulates with wheeled walker   Skin: No rash noted.   Psychiatric: Mood and affect normal.       Right Side Rheumatological Exam     Examination finds the 1st PIP, 2nd PIP, 3rd PIP, 4th PIP and 5th PIP normal.    The patient is tender to palpation of the shoulder, wrist, 1st MCP, 2nd MCP, 3rd MCP, 4th MCP and 5th MCP    She has swelling of the wrist, 1st MCP, 2nd MCP, 3rd MCP, 4th MCP and 5th MCP    The patient has an enlarged wrist    Left Side Rheumatological Exam     Examination finds the 1st PIP, 2nd PIP, 3rd PIP, 4th PIP and 5th PIP normal.    The patient is tender to palpation of the shoulder, wrist, 1st MCP, 2nd MCP, 3rd MCP, 4th MCP and 5th MCP.    She has swelling of the wrist, 1st MCP, 2nd MCP, 3rd MCP, 4th MCP and 5th MCP          Labs:  Component      Latest Ref Rng 4/24/2023 6/5/2023   WBC      3.90 - 12.70 K/uL  8.46    RBC      4.00 - 5.40 M/uL  3.99 (L)    Hemoglobin      12.0 - 16.0 g/dL  10.0 (L)    Hematocrit      37.0 - 48.5 %  33.5 (L)    MCV      82 - 98 fL  84    MCH      27.0 - 31.0 pg  25.1 (L)    MCHC      32.0 - 36.0 g/dL  29.9 (L)    RDW      11.5 - 14.5 %  15.7 (H)    Platelets      150 - 450 K/uL  380    MPV      9.2 - 12.9 fL   11.0    Immature Granulocytes      0.0 - 0.5 %  0.5    Gran # (ANC)      1.8 - 7.7 K/uL  4.2    Immature Grans (Abs)      0.00 - 0.04 K/uL  0.04    Lymph #      1.0 - 4.8 K/uL  2.6    Mono #      0.3 - 1.0 K/uL  1.4 (H)    Eos #      0.0 - 0.5 K/uL  0.1    Baso #      0.00 - 0.20 K/uL  0.16    nRBC      0 /100 WBC  0    Gran %      38.0 - 73.0 %  49.4    Lymph %      18.0 - 48.0 %  30.7    Mono %      4.0 - 15.0 %  16.0 (H)    Eosinophil %      0.0 - 8.0 %  1.5    Basophil %      0.0 - 1.9 %  1.9    Differential Method  Automated    Sodium      136 - 145 mmol/L  141    Potassium      3.5 - 5.1 mmol/L  3.3 (L)    Chloride      95 - 110 mmol/L  105    CO2      23 - 29 mmol/L  27    Glucose      70 - 110 mg/dL  79    BUN      8 - 23 mg/dL  3 (L)    Creatinine      0.5 - 1.4 mg/dL  0.8    Calcium      8.7 - 10.5 mg/dL  8.2 (L)    PROTEIN TOTAL      6.0 - 8.4 g/dL  6.9    Albumin      3.5 - 5.2 g/dL  2.2 (L)    BILIRUBIN TOTAL      0.1 - 1.0 mg/dL  0.3    Alkaline Phosphatase      55 - 135 U/L  88    AST      10 - 40 U/L  17    ALT      10 - 44 U/L  13    Anion Gap      8 - 16 mmol/L  9    eGFR      >60 mL/min/1.73 m^2  >60.0    Vit D, 25-Hydroxy      30 - 96 ng/mL 38        Legend:  (L) Low  (H) High  Assessment:       1. Seropositive rheumatoid arthritis    2. Sjogren's syndrome, with unspecified organ involvement    3. Osteoporosis, unspecified osteoporosis type, unspecified pathological fracture presence    4. Hypocalcemia    5. Immunocompromised state due to drug therapy            Plan:       Seropositive rheumatoid arthritis  -     CBC Auto Differential; Future; Expected date: 09/21/2023  -     Comprehensive Metabolic Panel; Future; Expected date: 09/21/2023  -     C-Reactive Protein; Future; Expected date: 09/21/2023  -     Sedimentation rate; Future; Expected date: 09/21/2023  -     methylPREDNISolone acetate injection 80 mg  -     cyanocobalamin injection 1,000 mcg    Sjogren's syndrome, with unspecified organ  involvement    Osteoporosis, unspecified osteoporosis type, unspecified pathological fracture presence    Hypocalcemia  -     PTH, intact; Future; Expected date: 09/21/2023  -     Magnesium; Future; Expected date: 09/21/2023  -     Phosphorus; Future; Expected date: 09/21/2023    Immunocompromised state due to drug therapy        Assessment:  78 year old female with  Seropositive (+RF/+CCP) rheumatoid arthritis, Sjogren's syndrome (+SSB) on LEF and plaquenil  --chronic pain syndrome   --GERD, d/c zantac start famotidine  --HTN  --asthma  --controlled type 2 diabetes  --chronic steroid use  --left renal mass s/p cryo, stable on recent imaging 9/2023, repeat CT 1 year    Plan:  1. Cont plaquenil 200 mg bid, cont LEF 10 mg daily  2. Decrease prednisone 5 mg daily  3. Depo 80, b12 given today  4. Cont prolia every 6 months. Check calcium prior to injection.   5. Cont percocet per MD.  I have checked [place] prescription monitoring program site and no unusual or abnormal behavior has occurred pt understand the risk and benefits of taking opioid medications and has decided to continue the medication.  Pended x 3    Follow up:  3 mo Dr. Johnson

## 2023-09-26 NOTE — TELEPHONE ENCOUNTER
----- Message from Yolanda Louise sent at 6/20/2018  1:09 PM CDT -----  Contact: pt  She's calling in regards to speak with nurse pls call pt back at [phone] (home)      PAST MEDICAL HISTORY:  CAD (coronary artery disease)     HTN (hypertension)     Hypothyroidism     Osteoporosis

## 2023-10-03 ENCOUNTER — OFFICE VISIT (OUTPATIENT)
Dept: PODIATRY | Facility: CLINIC | Age: 78
End: 2023-10-03
Payer: MEDICARE

## 2023-10-03 VITALS — BODY MASS INDEX: 39.86 KG/M2 | HEIGHT: 66 IN | WEIGHT: 248 LBS

## 2023-10-03 DIAGNOSIS — N18.31 TYPE 2 DIABETES MELLITUS WITH STAGE 3A CHRONIC KIDNEY DISEASE, WITHOUT LONG-TERM CURRENT USE OF INSULIN: Primary | ICD-10-CM

## 2023-10-03 DIAGNOSIS — L60.3 ONYCHODYSTROPHY: ICD-10-CM

## 2023-10-03 DIAGNOSIS — E11.22 TYPE 2 DIABETES MELLITUS WITH STAGE 3A CHRONIC KIDNEY DISEASE, WITHOUT LONG-TERM CURRENT USE OF INSULIN: Primary | ICD-10-CM

## 2023-10-03 PROCEDURE — 99499 NO LOS: ICD-10-PCS | Mod: HCNC,S$GLB,, | Performed by: PODIATRIST

## 2023-10-03 PROCEDURE — 11721 DEBRIDE NAIL 6 OR MORE: CPT | Mod: Q9,HCNC,S$GLB, | Performed by: PODIATRIST

## 2023-10-03 PROCEDURE — 99999 PR PBB SHADOW E&M-EST. PATIENT-LVL IV: CPT | Mod: PBBFAC,HCNC,, | Performed by: PODIATRIST

## 2023-10-03 PROCEDURE — 11721 PR DEBRIDEMENT OF NAILS, 6 OR MORE: ICD-10-PCS | Mod: Q9,HCNC,S$GLB, | Performed by: PODIATRIST

## 2023-10-03 PROCEDURE — 99999 PR PBB SHADOW E&M-EST. PATIENT-LVL IV: ICD-10-PCS | Mod: PBBFAC,HCNC,, | Performed by: PODIATRIST

## 2023-10-03 PROCEDURE — 99499 UNLISTED E&M SERVICE: CPT | Mod: HCNC,S$GLB,, | Performed by: PODIATRIST

## 2023-10-03 NOTE — PROGRESS NOTES
Subjective:     Patient ID: Rekha Miller is a 78 y.o. female.    Chief Complaint: Nail Care (Nail care (diabetic pt, wearing casual shoes, last seen PCP Dr. Silva 07/13/23 *Zully Lua PA-C*))    Rekha is a 78 y.o. female who presents to the clinic for evaluation and treatment of high risk feet. Rekha has a past medical history of Asthma, COPD (chronic obstructive pulmonary disease), Degenerative disc disease, Diabetes mellitus, Diabetes mellitus, type 2, Fibromyalgia, Hypertension, Rheumatoid arthritis, and Rheumatoid arthritis(714.0). The patient's chief complaint is long, thick toenails. This patient has documented high risk feet requiring routine maintenance secondary to diabetes mellitis and those secondary complications of diabetes, as mentioned..    PCP: Divina Silva MD    Date Last Seen by PCP: 07/13/2023    Current shoe gear:  Affected Foot: Casual shoes     Unaffected Foot: Casual shoes    Hemoglobin A1C   Date Value Ref Range Status   04/24/2023 5.6 4.0 - 5.6 % Final     Comment:     ADA Screening Guidelines:  5.7-6.4%  Consistent with prediabetes  >or=6.5%  Consistent with diabetes    High levels of fetal hemoglobin interfere with the HbA1C  assay. Heterozygous hemoglobin variants (HbS, HgC, etc)do  not significantly interfere with this assay.   However, presence of multiple variants may affect accuracy.     08/25/2022 5.5 4.6 - 6.2 % Final   06/17/2022 5.8 (H) 4.0 - 5.6 % Final     Comment:     ADA Screening Guidelines:  5.7-6.4%  Consistent with prediabetes  >or=6.5%  Consistent with diabetes    High levels of fetal hemoglobin interfere with the HbA1C  assay. Heterozygous hemoglobin variants (HbS, HgC, etc)do  not significantly interfere with this assay.   However, presence of multiple variants may affect accuracy.     10/26/2021 5.9 <5.7 % Final       Patient Active Problem List   Diagnosis    Rheumatoid arthritis with positive rheumatoid factor    Osteoarthritis of both hips     Sjogren's disease    Anemia     Corticosteroid dependence    Type 2 diabetes mellitus with hyperlipidemia    Hypertension associated with diabetes    Moderate persistent asthma without complication    Peripheral edema    Renal mass    AMOL on CPAP    Mixed hyperlipidemia    Greater trochanteric bursitis of right hip    Anxiety    Immunodeficiency    Type 2 diabetes mellitus with morbid obesity    Type 2 diabetes mellitus with stage 3a chronic kidney disease, without long-term current use of insulin    ACP (advance care planning)       Medication List with Changes/Refills   Current Medications    ACCU-CHEK VERONIQUE CONTROL SOLN SOLN    USE ONCE A WEEK AS DIRECTED    ACETAMINOPHEN (TYLENOL) 325 MG TABLET    Take 650 mg by mouth as needed.    ALBUTEROL (PROVENTIL) 2.5 MG /3 ML (0.083 %) NEBULIZER SOLUTION    USE 1 VIAL IN NEBULIZER EVERY 6 HOURS AS NEEDED FOR WHEEZING    ALBUTEROL (PROVENTIL/VENTOLIN HFA) 90 MCG/ACTUATION INHALER    Inhale 2 puffs into the lungs every 6 (six) hours as needed for Wheezing.    AMLODIPINE-BENAZEPRIL 5-20 MG (LOTREL) 5-20 MG PER CAPSULE    TAKE 1 CAPSULE TWICE DAILY    BLOOD SUGAR DIAGNOSTIC (TRUE METRIX GLUCOSE TEST STRIP) STRP    Use to test blood glucose one (1) time a day, to be used with insurance-preferred brand of glucometer/supplies.    BLOOD-GLUCOSE CALIBRAT CONTROL CMPK    1 each by Misc.(Non-Drug; Combo Route) route once a week.    BUDESONIDE-FORMOTEROL 160-4.5 MCG (SYMBICORT) 160-4.5 MCG/ACTUATION HFAA    Symbicort 160 mcg-4.5 mcg/actuation HFA aerosol inhaler  INHALE TWO PUFFS TWICE DAILY    BUSPIRONE (BUSPAR) 5 MG TAB    Take 1 tablet (5 mg total) by mouth 2 (two) times daily.    CETIRIZINE (ZYRTEC) 10 MG TABLET    Take 1 tablet (10 mg total) by mouth once daily.    CLOTRIMAZOLE-BETAMETHASONE 1-0.05% (LOTRISONE) CREAM    Apply topically 2 (two) times daily.    DENOSUMAB (PROLIA) 60 MG/ML SYRG    Inject 1 mL (60 mg total) into the skin every 6 (six) months.    DICYCLOMINE (BENTYL)  20 MG TABLET    TAKE 1 TABLET BY MOUTH EVERY 6 HOURS DAILY    FLUOCINOLONE-HYDROQ.-TRETINOIN (TRI-VITALY) 0.01-4-0.05 % CREA    Apply 1 application topically every evening.    FLUTICASONE PROPIONATE (FLONASE) 50 MCG/ACTUATION NASAL SPRAY    1 spray (50 mcg total) by Each Nostril route once daily.    FUROSEMIDE (LASIX) 40 MG TABLET    TAKE 2 TABLETS EVERY DAY AS NEEDED    GLIPIZIDE 5 MG TR24    TAKE 1 TABLET BY MOUTH ONCE DAILY WITH BREAKFAST    HYDROQUIN-TRETINOIN-HYDROCORT 4-0.025-0.5 % EMUL    Apply 1 applicator topically every evening.    HYDROXYCHLOROQUINE (PLAQUENIL) 200 MG TABLET    Take 1 tablet (200 mg total) by mouth 2 (two) times daily.    LEFLUNOMIDE (ARAVA) 10 MG TAB    TAKE 1 TABLET EVERY DAY    MENTHOL-ZINC OXIDE (CALMOSEPTINE) 0.44-20.6 % OINT    Apply topically once daily.    MULTIVITAMIN WITH IRON TAB    Take 1 tablet by mouth once daily.    MUPIROCIN (BACTROBAN) 2 % OINTMENT    Apply topically 3 (three) times daily.    OXYCODONE-ACETAMINOPHEN (PERCOCET)  MG PER TABLET    Take 1 tablet by mouth every 8 (eight) hours as needed for Pain.    OXYCODONE-ACETAMINOPHEN (PERCOCET)  MG PER TABLET    Take 1 tablet by mouth every 8 (eight) hours as needed for Pain.    OXYCODONE-ACETAMINOPHEN (PERCOCET)  MG PER TABLET    Take 1 tablet by mouth every 8 (eight) hours as needed for Pain.    POTASSIUM CHLORIDE SA (K-DUR,KLOR-CON) 20 MEQ TABLET    TAKE TWO TABLETS BY MOUTH TWICE DAILY    PRAVASTATIN (PRAVACHOL) 20 MG TABLET    TAKE 1 TABLET EVERY DAY    PREDNISONE (DELTASONE) 5 MG TABLET    TAKE THREE TABLETS BY MOUTH EVERY MORNING AS NEEDED    SODIUM CHLORIDE 5% (LALITHA 128) 5 % OPHTHALMIC SOLUTION    sodium chloride 5 % eye drops   INSTILL ONE DROP INTO EACH EYE FOUR TIMES DAILY    SUCRALFATE (CARAFATE) 1 GRAM TABLET    TAKE 1 TABLET BY MOUTH FOUR TIMES DAILY    TRAZODONE (DESYREL) 50 MG TABLET    Take 1 tablet (50 mg total) by mouth every evening.    TRIAMCINOLONE ACETONIDE 0.1% (KENALOG) 0.1 %  "OINTMENT    Apply topically 2 (two) times daily.    TRUE METRIX GLUCOSE METER MISC    test once DAILY AS DIRECTED    TRUEPLUS LANCETS 33 GAUGE MISC           Review of patient's allergies indicates:   Allergen Reactions    Latex, natural rubber Swelling and Rash    Codeine     Dairy digestive ultra      Dairy products      Imuran [azathioprine sodium] Diarrhea    Lactobacillus     Lactobacillus acidoph-lactase Other (See Comments)    Morphine      DOESN'T FEEL RIGHT     Plaquenil [hydroxychloroquine] Other (See Comments)     headaches    Milk containing products (dairy) Diarrhea       Past Surgical History:   Procedure Laterality Date    BREAST SURGERY  1986    CATARACT EXTRACTION      EYE SURGERY  2013    Catatracts    ORIF FEMUR FRACTURE      right knee ligament rpeair  2005    right shoulder surgery  4/18/07    Dr. Gao       Family History   Problem Relation Age of Onset    Cancer Mother     Anemia Mother     Alcohol abuse Father     Anemia Maternal Grandmother     Hypertension Paternal Grandmother     Arthritis Paternal Grandmother        Social History     Socioeconomic History    Marital status:     Number of children: 8   Tobacco Use    Smoking status: Never     Passive exposure: Never    Smokeless tobacco: Never   Substance and Sexual Activity    Alcohol use: Never    Drug use: No     Social Determinants of Health     Stress: No Stress Concern Present (10/30/2019)    [employer] - Occupational Stress Questionnaire     Feeling of Stress : Not at all       Vitals:    10/03/23 1535   Weight: 112.5 kg (248 lb 0.3 oz)   Height: 5' 6" (1.676 m)   PainSc: 0-No pain       Hemoglobin A1C   Date Value Ref Range Status   04/24/2023 5.6 4.0 - 5.6 % Final     Comment:     ADA Screening Guidelines:  5.7-6.4%  Consistent with prediabetes  >or=6.5%  Consistent with diabetes    High levels of fetal hemoglobin interfere with the HbA1C  assay. Heterozygous hemoglobin variants (HbS, HgC, " etc)do  not significantly interfere with this assay.   However, presence of multiple variants may affect accuracy.     08/25/2022 5.5 4.6 - 6.2 % Final   06/17/2022 5.8 (H) 4.0 - 5.6 % Final     Comment:     ADA Screening Guidelines:  5.7-6.4%  Consistent with prediabetes  >or=6.5%  Consistent with diabetes    High levels of fetal hemoglobin interfere with the HbA1C  assay. Heterozygous hemoglobin variants (HbS, HgC, etc)do  not significantly interfere with this assay.   However, presence of multiple variants may affect accuracy.     10/26/2021 5.9 <5.7 % Final       Review of Systems   Constitutional:  Negative for chills and fever.   Respiratory:  Negative for shortness of breath.    Cardiovascular:  Positive for leg swelling. Negative for chest pain, palpitations, orthopnea and claudication.   Gastrointestinal:  Negative for diarrhea, nausea and vomiting.   Musculoskeletal:  Negative for joint pain.   Skin:  Negative for rash.   Neurological:  Positive for tingling and sensory change.   Psychiatric/Behavioral: Negative.             Objective:      PHYSICAL EXAM: Apperance: Alert and orient in no distress,well developed, and with good attention to grooming and body habits  Patient presents ambulating in casual shoes.   LOWER EXTREMITY EXAM:  VASCULAR: Dorsalis pedis pulses 2/4 bilateral and Posterior Tibial pulses 1/4 bilateral. Capillary fill time <blanched bilateral. Moderate edema observed bilateral. Varicosities present bilateral. Skin temperature of the lower extremities is warm to warm, proximal to distal. Hair growth dim bilateral.  DERMATOLOGICAL: No skin rashes, subcutaneous nodules, lesions, or ulcers observed bilateral. Nails 1,2,3,4,5 bilateral elongated, thickened, and discolored with subungual debris. Webspaces 1,2,3,4 bilateral clean, dry and without evidence of break in skin integrity.   NEUROLOGICAL: Light touch, sharp-dull, proprioception all present and equal bilaterally.  Vibratory sensation  diminished at bilateral hallux and navicular. Protective sensation absent sites as tested with a Rhododendron-Juno 5.07 monofilament.   MUSCULOSKELETAL: Muscle strength is 5/5 for foot inverters, everters, plantarflexors, and dorsiflexors. Muscle tone is normal.         Assessment:       ICD-10-CM ICD-9-CM   1. Type 2 diabetes mellitus with stage 3a chronic kidney disease, without long-term current use of insulin  E11.22 250.40    N18.31 585.3   2. Onychodystrophy  L60.3 703.8         Plan:   Type 2 diabetes mellitus with stage 3a chronic kidney disease, without long-term current use of insulin    Onychodystrophy        I counseled the patient on her conditions, regarding findings of my examination, my impressions, and usual treatment plan.   Appointment spent on education about the diabetic foot, neuropathy, and prevention of limb loss.  Shoe inspection. Diabetic Foot Education. Patient reminded of the importance of good nutrition and blood sugar control to help prevent podiatric complications of diabetes. Patient instructed on proper foot hygeine. We discussed wearing proper shoe gear, daily foot inspections, never walking without protective shoe gear, never putting sharp instruments to feet.    With patient's permission, nails 1-5 bilateral were debrided/trimmed in length and thickness aggressively to their soft tissue attachment mechanically and with electric , removing all offending nail and debris. Patient relates relief following the procedure.  Patient  will continue to monitor the areas daily, inspect feet, wear protective shoe gear when ambulatory, moisturizer to maintain skin integrity. Patient reminded of the importance of good nutrition and blood sugar control to help prevent podiatric complications of diabetes.  Patient to return 4 months or sooner if needed.          Keturah Soni DPM  [facility]

## 2023-10-13 ENCOUNTER — TELEPHONE (OUTPATIENT)
Dept: RHEUMATOLOGY | Facility: CLINIC | Age: 78
End: 2023-10-13
Payer: MEDICARE

## 2023-10-14 NOTE — TELEPHONE ENCOUNTER
----- Message from Ariana Henriquez PA-C sent at 9/21/2023  5:10 PM CDT -----  Due for prolia in October--she plans to receive at visit with Dr. Johnson in November. Can you make sure this is set up appropriately?

## 2023-10-14 NOTE — TELEPHONE ENCOUNTER
Patient has her next appointment in office on 11/9 with Dr. Johnson and would like to receive her Prolia at that appointment. [facility] filled patient's last prolia that was administered on 4/27/23. Routing [facility] about upcoming appointment and need for Prolia    Last dexa 7/13/22. Vit D last completed 4/2023. CMP scheduled on 11/2 at lab appointment.

## 2023-10-19 ENCOUNTER — OFFICE VISIT (OUTPATIENT)
Dept: FAMILY MEDICINE | Facility: CLINIC | Age: 78
End: 2023-10-19
Payer: MEDICARE

## 2023-10-19 VITALS
WEIGHT: 230 LBS | SYSTOLIC BLOOD PRESSURE: 125 MMHG | BODY MASS INDEX: 42.33 KG/M2 | HEART RATE: 85 BPM | TEMPERATURE: 98 F | HEIGHT: 62 IN | DIASTOLIC BLOOD PRESSURE: 67 MMHG

## 2023-10-19 DIAGNOSIS — J20.8 ACUTE BACTERIAL BRONCHITIS: ICD-10-CM

## 2023-10-19 DIAGNOSIS — E78.5 TYPE 2 DIABETES MELLITUS WITH HYPERLIPIDEMIA: ICD-10-CM

## 2023-10-19 DIAGNOSIS — E53.8 B12 DEFICIENCY: ICD-10-CM

## 2023-10-19 DIAGNOSIS — B96.89 ACUTE BACTERIAL BRONCHITIS: ICD-10-CM

## 2023-10-19 DIAGNOSIS — M05.79 RHEUMATOID ARTHRITIS INVOLVING MULTIPLE SITES WITH POSITIVE RHEUMATOID FACTOR: ICD-10-CM

## 2023-10-19 DIAGNOSIS — E78.2 MIXED HYPERLIPIDEMIA: ICD-10-CM

## 2023-10-19 DIAGNOSIS — F19.20 CORTICOSTEROID DEPENDENCE: ICD-10-CM

## 2023-10-19 DIAGNOSIS — I70.0 AORTIC ATHEROSCLEROSIS: Primary | ICD-10-CM

## 2023-10-19 DIAGNOSIS — E11.69 TYPE 2 DIABETES MELLITUS WITH HYPERLIPIDEMIA: ICD-10-CM

## 2023-10-19 DIAGNOSIS — N28.89 RENAL MASS: ICD-10-CM

## 2023-10-19 DIAGNOSIS — I15.2 HYPERTENSION ASSOCIATED WITH DIABETES: ICD-10-CM

## 2023-10-19 DIAGNOSIS — E11.59 HYPERTENSION ASSOCIATED WITH DIABETES: ICD-10-CM

## 2023-10-19 DIAGNOSIS — Z23 INFLUENZA VACCINE NEEDED: ICD-10-CM

## 2023-10-19 PROCEDURE — 99999 PR PBB SHADOW E&M-EST. PATIENT-LVL V: CPT | Mod: PBBFAC,HCNC,, | Performed by: INTERNAL MEDICINE

## 2023-10-19 PROCEDURE — 3078F DIAST BP <80 MM HG: CPT | Mod: HCNC,CPTII,S$GLB, | Performed by: INTERNAL MEDICINE

## 2023-10-19 PROCEDURE — 99214 OFFICE O/P EST MOD 30 MIN: CPT | Mod: HCNC,25,S$GLB, | Performed by: INTERNAL MEDICINE

## 2023-10-19 PROCEDURE — 1159F PR MEDICATION LIST DOCUMENTED IN MEDICAL RECORD: ICD-10-PCS | Mod: HCNC,CPTII,S$GLB, | Performed by: INTERNAL MEDICINE

## 2023-10-19 PROCEDURE — 3078F PR MOST RECENT DIASTOLIC BLOOD PRESSURE < 80 MM HG: ICD-10-PCS | Mod: HCNC,CPTII,S$GLB, | Performed by: INTERNAL MEDICINE

## 2023-10-19 PROCEDURE — 1101F PT FALLS ASSESS-DOCD LE1/YR: CPT | Mod: HCNC,CPTII,S$GLB, | Performed by: INTERNAL MEDICINE

## 2023-10-19 PROCEDURE — 90686 IIV4 VACC NO PRSV 0.5 ML IM: CPT | Mod: HCNC,S$GLB,, | Performed by: INTERNAL MEDICINE

## 2023-10-19 PROCEDURE — 99214 PR OFFICE/OUTPT VISIT, EST, LEVL IV, 30-39 MIN: ICD-10-PCS | Mod: HCNC,25,S$GLB, | Performed by: INTERNAL MEDICINE

## 2023-10-19 PROCEDURE — G0008 ADMIN INFLUENZA VIRUS VAC: HCPCS | Mod: HCNC,S$GLB,, | Performed by: INTERNAL MEDICINE

## 2023-10-19 PROCEDURE — 99999 PR PBB SHADOW E&M-EST. PATIENT-LVL V: ICD-10-PCS | Mod: PBBFAC,HCNC,, | Performed by: INTERNAL MEDICINE

## 2023-10-19 PROCEDURE — G0008 FLU VACCINE (QUAD) GREATER THAN OR EQUAL TO 3YO PRESERVATIVE FREE IM: ICD-10-PCS | Mod: HCNC,S$GLB,, | Performed by: INTERNAL MEDICINE

## 2023-10-19 PROCEDURE — 1101F PR PT FALLS ASSESS DOC 0-1 FALLS W/OUT INJ PAST YR: ICD-10-PCS | Mod: HCNC,CPTII,S$GLB, | Performed by: INTERNAL MEDICINE

## 2023-10-19 PROCEDURE — 3074F SYST BP LT 130 MM HG: CPT | Mod: HCNC,CPTII,S$GLB, | Performed by: INTERNAL MEDICINE

## 2023-10-19 PROCEDURE — 1126F PR PAIN SEVERITY QUANTIFIED, NO PAIN PRESENT: ICD-10-PCS | Mod: HCNC,CPTII,S$GLB, | Performed by: INTERNAL MEDICINE

## 2023-10-19 PROCEDURE — 3074F PR MOST RECENT SYSTOLIC BLOOD PRESSURE < 130 MM HG: ICD-10-PCS | Mod: HCNC,CPTII,S$GLB, | Performed by: INTERNAL MEDICINE

## 2023-10-19 PROCEDURE — 1159F MED LIST DOCD IN RCRD: CPT | Mod: HCNC,CPTII,S$GLB, | Performed by: INTERNAL MEDICINE

## 2023-10-19 PROCEDURE — 3288F FALL RISK ASSESSMENT DOCD: CPT | Mod: HCNC,CPTII,S$GLB, | Performed by: INTERNAL MEDICINE

## 2023-10-19 PROCEDURE — 90686 FLU VACCINE (QUAD) GREATER THAN OR EQUAL TO 3YO PRESERVATIVE FREE IM: ICD-10-PCS | Mod: HCNC,S$GLB,, | Performed by: INTERNAL MEDICINE

## 2023-10-19 PROCEDURE — 1126F AMNT PAIN NOTED NONE PRSNT: CPT | Mod: HCNC,CPTII,S$GLB, | Performed by: INTERNAL MEDICINE

## 2023-10-19 PROCEDURE — 3288F PR FALLS RISK ASSESSMENT DOCUMENTED: ICD-10-PCS | Mod: HCNC,CPTII,S$GLB, | Performed by: INTERNAL MEDICINE

## 2023-10-19 RX ORDER — AMOXICILLIN 500 MG/1
500 TABLET, FILM COATED ORAL EVERY 8 HOURS
Qty: 21 TABLET | Refills: 0 | Status: SHIPPED | OUTPATIENT
Start: 2023-10-19 | End: 2023-10-26

## 2023-10-19 RX ORDER — IBANDRONATE SODIUM 150 MG/1
150 TABLET, FILM COATED ORAL
COMMUNITY
Start: 2023-10-04 | End: 2023-10-19

## 2023-10-19 RX ORDER — DICLOFENAC SODIUM 10 MG/G
2 GEL TOPICAL 3 TIMES DAILY PRN
Qty: 150 G | Refills: 1 | Status: SHIPPED | OUTPATIENT
Start: 2023-10-19

## 2023-10-19 RX ORDER — MAGNESIUM 200 MG
1000 TABLET ORAL DAILY
Qty: 30 TABLET | Refills: 11 | Status: SHIPPED | OUTPATIENT
Start: 2023-10-19

## 2023-10-19 NOTE — PROGRESS NOTES
Assessment/Plan:    Problem List Items Addressed This Visit          Psychiatric    Corticosteroid dependence    Overview     -on chronic prednisone 5 mg QD  -managed by rheumatoloy  -DEXA up to date            Cardiac/Vascular    Aortic atherosclerosis - Primary    Overview     -noted on imaging  -intolerant of statin         Hypertension associated with diabetes    Overview     Hypertension Medications               amlodipine-benazepril 5-20 mg (LOTREL) 5-20 mg per capsule TAKE ONE CAPSULE BY MOUTH TWICE DAILY    furosemide (LASIX) 40 MG tablet TAKE 2 TABLETS EVERY DAY AS NEEDED   -at goal today  -continue lifestyle modification with low sodium diet and exercise   -discussed hypertension disease course and importance of treating high blood pressure  -patient understood and advised of risk of untreated blood pressure.  ER precautions were given   for symptoms of hypertensive urgency and emergency.         Mixed hyperlipidemia    Overview     -chronic condition. Currently stable.    -no longer taking pravastatin; states that she had AE  -most recent labs listed below; due for updated labs  Lab Results   Component Value Date    CHOL 173 08/25/2022     Lab Results   Component Value Date    HDL 52 08/25/2022     Lab Results   Component Value Date    LDLCALC 86 08/25/2022     Lab Results   Component Value Date    TRIG 174 08/25/2022     Lab Results   Component Value Date    ALT 14 04/07/2022    AST 14 04/07/2022    ALKPHOS 104 04/07/2022    BILITOT 0.3 04/07/2022             Renal/    Renal mass    Overview     -s/p cryoablation 12/13/2021 with [facility] interventional radiology  -CT Aug 2023- no evidence of recurrence  -recs to repeat 12 months from previous            Immunology/Multi System    Rheumatoid arthritis with positive rheumatoid factor    Overview     -managed by rheumatology, Dr. Johnson  -currently on prednisone, leflunomide and plaquenil  -on chronic opiate therapy for pain            Endocrine    Type  2 diabetes mellitus with hyperlipidemia    Overview     Diabetes Medications               glipiZIDE (GLUCOTROL) 5 MG TR24 Take 1 tablet (5 mg total) by mouth daily with breakfast.   -condition is currently controlled  -plan to repeat a1c  -see diabetic health maintenance listed below  -on statin: Yes  -on ACE-I/ARB: Yes  -counseling provided on importance of diabetic diet and medication compliance in order to treat diabetes  -discussed diabetes disease course and potential complications          Other Visit Diagnoses       B12 deficiency     -on po b12 supplement  -checking labs     Relevant Medications    cyanocobalamin, vitamin B-12, 1,000 mcg Subl    Other Relevant Orders    Vitamin B12 (Completed)    Folate (Completed)    Methylmalonic Acid, Serum    Acute bacterial bronchitis        Influenza vaccine needed        Relevant Orders    Influenza - Quadrivalent (PF) (Completed)            Follow up for add b12,mma,folate,lipid and a1c to labs in 11/2 and change to fasting.    Divina Silva MD  _____________________________________________________________________________________________________________________________________________________    CC: follow up of chronic medical conditions     HPI:    Patient is in clinic today as an established patient.    HTN: The patient is currently being treated for essential hypertension. This condition is chronic and stable. The patient is tolerating their medication well with good compliance.  Denies any adverse effects of medications.  Counseling was offered regarding low sodium diet.  The patient has a reduced salt intake. Routine exercise recommended. The patient denies headache, vision changes, chest pain, palpitations, shortness of breath, or lower extremity edema.    DM2: Patient presents for follow up of diabetes. Condition is chronic and stable. Patient denies symptoms, including foot ulcerations, hyperglycemia, hypoglycemia , nausea, paresthesia of the feet,  polydipsia, polyuria and visual disturbances.  Evaluation to date has been included: fasting blood sugar, fasting lipid panel, hemoglobin A1C and microalbuminuria.   Denies adverse effects of current medications.     Diabetes Management Status    Statin: Not taking  ACE/ARB: Taking    Screening or Prevention Patient's value Goal Complete/Controlled?   HgA1C Testing and Control   Lab Results   Component Value Date    HGBA1C 5.7 (H) 11/02/2023      Annually/Less than 8% Yes   Lipid profile : 11/02/2023 Annually Yes   LDL control Lab Results   Component Value Date    LDLCALC 56.8 (L) 11/02/2023    Annually/Less than 100 mg/dl  Yes   Nephropathy screening Lab Results   Component Value Date    LABMICR 5.0 04/25/2023     Lab Results   Component Value Date    PROTEINUA 1+ (A) 05/24/2023    Annually Yes   Blood pressure BP Readings from Last 1 Encounters:   10/19/23 125/67    Less than 140/90 Yes   Dilated retinal exam : 05/11/2023 Annually Yes   Foot exam   : 02/21/2023 Annually Yes       No other new complaints today.  Remaining chronic conditions have been reviewed and remain stable. Further detail as stated above.     HM reviewed. Flu shot and labs today.    No recent changes to medical/surgical history.    Current Outpatient Medications on File Prior to Visit   Medication Sig Dispense Refill    ACCU-CHEK VERONIQUE CONTROL SOLN Soln USE ONCE A WEEK AS DIRECTED 1 each 0    acetaminophen (TYLENOL) 325 MG tablet Take 650 mg by mouth as needed.      albuterol (PROVENTIL) 2.5 mg /3 mL (0.083 %) nebulizer solution USE 1 VIAL IN NEBULIZER EVERY 6 HOURS AS NEEDED FOR WHEEZING 180 mL 3    albuterol (PROVENTIL/VENTOLIN HFA) 90 mcg/actuation inhaler Inhale 2 puffs into the lungs every 6 (six) hours as needed for Wheezing. 18 g 6    amlodipine-benazepril 5-20 mg (LOTREL) 5-20 mg per capsule TAKE 1 CAPSULE TWICE DAILY 180 capsule 1    blood sugar diagnostic (TRUE METRIX GLUCOSE TEST STRIP) Strp Use to test blood glucose one (1)  time a day, to be used with insurance-preferred brand of glucometer/supplies. 100 strip 11    blood-glucose calibrat control Cmpk 1 each by Misc.(Non-Drug; Combo Route) route once a week. 1 each 0    budesonide-formoterol 160-4.5 mcg (SYMBICORT) 160-4.5 mcg/actuation HFAA Symbicort 160 mcg-4.5 mcg/actuation HFA aerosol inhaler  INHALE TWO PUFFS TWICE DAILY 10.2 g 5    busPIRone (BUSPAR) 5 MG Tab Take 1 tablet (5 mg total) by mouth 2 (two) times daily. 180 tablet 3    cetirizine (ZYRTEC) 10 MG tablet Take 1 tablet (10 mg total) by mouth once daily. 30 tablet 11    clotrimazole-betamethasone 1-0.05% (LOTRISONE) cream Apply topically 2 (two) times daily. 45 g 6    denosumab (PROLIA) 60 mg/mL Syrg Inject 1 mL (60 mg total) into the skin every 6 (six) months. 2 mL 3    dicyclomine (BENTYL) 20 mg tablet TAKE 1 TABLET BY MOUTH EVERY 6 HOURS DAILY 120 tablet 3    fluocinolone-hydroq.-tretinoin (TRI-VITALY) 0.01-4-0.05 % Crea Apply 1 application topically every evening. 30 g 1    furosemide (LASIX) 40 MG tablet TAKE 2 TABLETS EVERY DAY AS NEEDED 180 tablet 1    glipiZIDE 5 MG TR24 TAKE 1 TABLET BY MOUTH ONCE DAILY WITH BREAKFAST 90 tablet 3    hydroquin-tretinoin-hydrocort 4-0.025-0.5 % Emul Apply 1 applicator topically every evening. 30 g 3    hydrOXYchloroQUINE (PLAQUENIL) 200 mg tablet Take 1 tablet (200 mg total) by mouth 2 (two) times daily. 180 tablet 3    leflunomide (ARAVA) 10 MG Tab TAKE 1 TABLET EVERY DAY 90 tablet 1    menthol-zinc oxide (CALMOSEPTINE) 0.44-20.6 % Oint Apply topically once daily. 113 g 0    multivitamin with iron Tab Take 1 tablet by mouth once daily. 30 each 11    mupirocin (BACTROBAN) 2 % ointment Apply topically 3 (three) times daily. 30 g 6    [START ON 11/20/2023] oxyCODONE-acetaminophen (PERCOCET)  mg per tablet Take 1 tablet by mouth every 8 (eight) hours as needed for Pain. 90 tablet 0    oxyCODONE-acetaminophen (PERCOCET)  mg per tablet Take 1 tablet by mouth  "every 8 (eight) hours as needed for Pain. 90 tablet 0    oxyCODONE-acetaminophen (PERCOCET)  mg per tablet Take 1 tablet by mouth every 8 (eight) hours as needed for Pain. 90 tablet 0    potassium chloride SA (K-DUR,KLOR-CON) 20 MEQ tablet TAKE TWO TABLETS BY MOUTH TWICE DAILY 360 tablet 3    predniSONE (DELTASONE) 5 MG tablet TAKE THREE TABLETS BY MOUTH EVERY MORNING AS NEEDED 90 tablet 3    sodium chloride 5% (LALITHA 128) 5 % ophthalmic solution sodium chloride 5 % eye drops   INSTILL ONE DROP INTO EACH EYE FOUR TIMES DAILY      sucralfate (CARAFATE) 1 gram tablet TAKE 1 TABLET BY MOUTH FOUR TIMES DAILY 360 tablet 0    traZODone (DESYREL) 50 MG tablet Take 1 tablet (50 mg total) by mouth every evening. 90 tablet 3    triamcinolone acetonide 0.1% (KENALOG) 0.1 % ointment Apply topically 2 (two) times daily. 80 g 0    TRUE METRIX GLUCOSE METER Misc test once DAILY AS DIRECTED      TRUEPLUS LANCETS 33 gauge Misc        No current facility-administered medications on file prior to visit.       Review of Systems   Constitutional:  Negative for chills, diaphoresis, fatigue and fever.   HENT:  Negative for congestion, ear pain, postnasal drip, sinus pain and sore throat.    Eyes:  Negative for pain and redness.   Respiratory:  Positive for cough. Negative for chest tightness and shortness of breath.    Cardiovascular:  Negative for chest pain and leg swelling.   Gastrointestinal:  Negative for abdominal pain, constipation, diarrhea, nausea and vomiting.   Genitourinary:  Negative for dysuria and hematuria.   Musculoskeletal:  Negative for arthralgias and joint swelling.   Skin:  Negative for rash.   Neurological:  Negative for dizziness, syncope and headaches.   Psychiatric/Behavioral:  Negative for dysphoric mood. The patient is not nervous/anxious.        Vitals:    10/19/23 1330   BP: 125/67   Pulse: 85   Temp: 98.4 °F (36.9 °C)   Weight: 104.3 kg (230 lb)   Height: 5' 2" (1.575 m)       Wt Readings from " Last 3 Encounters:   10/19/23 104.3 kg (230 lb)   10/03/23 112.5 kg (248 lb 0.3 oz)   07/13/23 122.5 kg (270 lb)       Physical Exam  Constitutional:       General: She is not in acute distress.     Appearance: Normal appearance. She is well-developed. She is obese.   HENT:      Head: Normocephalic and atraumatic.   Eyes:      Conjunctiva/sclera: Conjunctivae normal.   Cardiovascular:      Rate and Rhythm: Normal rate and regular rhythm.      Pulses: Normal pulses.      Heart sounds: Normal heart sounds. No murmur heard.  Pulmonary:      Effort: Pulmonary effort is normal. No respiratory distress.      Breath sounds: Normal breath sounds.   Abdominal:      General: Bowel sounds are normal. There is no distension.      Palpations: Abdomen is soft.      Tenderness: There is no abdominal tenderness.   Musculoskeletal:         General: Normal range of motion.      Cervical back: Normal range of motion and neck supple.   Skin:     General: Skin is warm and dry.      Findings: No rash.   Neurological:      General: No focal deficit present.      Mental Status: She is alert and oriented to person, place, and time.   Psychiatric:         Mood and Affect: Mood normal.         Behavior: Behavior normal.       Health Maintenance   Topic Date Due    Shingles Vaccine (1 of 2) Never done    Foot Exam  02/21/2024    Hemoglobin A1c  05/02/2024    Eye Exam  05/11/2024    Mammogram  06/26/2024    Lipid Panel  11/02/2024    DEXA Scan  07/13/2025    TETANUS VACCINE  03/09/2026    Hepatitis C Screening  Completed

## 2023-11-02 ENCOUNTER — LAB VISIT (OUTPATIENT)
Dept: LAB | Facility: HOSPITAL | Age: 78
End: 2023-11-02
Attending: INTERNAL MEDICINE
Payer: MEDICARE

## 2023-11-02 DIAGNOSIS — Z13.6 ENCOUNTER FOR LIPID SCREENING FOR CARDIOVASCULAR DISEASE: ICD-10-CM

## 2023-11-02 DIAGNOSIS — E78.5 TYPE 2 DIABETES MELLITUS WITH HYPERLIPIDEMIA: ICD-10-CM

## 2023-11-02 DIAGNOSIS — E11.69 TYPE 2 DIABETES MELLITUS WITH HYPERLIPIDEMIA: ICD-10-CM

## 2023-11-02 DIAGNOSIS — E83.51 HYPOCALCEMIA: ICD-10-CM

## 2023-11-02 DIAGNOSIS — E53.8 B12 DEFICIENCY: ICD-10-CM

## 2023-11-02 DIAGNOSIS — M05.9 SEROPOSITIVE RHEUMATOID ARTHRITIS: ICD-10-CM

## 2023-11-02 DIAGNOSIS — Z13.220 ENCOUNTER FOR LIPID SCREENING FOR CARDIOVASCULAR DISEASE: ICD-10-CM

## 2023-11-02 LAB
ALBUMIN SERPL BCP-MCNC: 3.4 G/DL (ref 3.5–5.2)
ALP SERPL-CCNC: 90 U/L (ref 55–135)
ALT SERPL W/O P-5'-P-CCNC: 12 U/L (ref 10–44)
ANION GAP SERPL CALC-SCNC: 12 MMOL/L (ref 8–16)
AST SERPL-CCNC: 15 U/L (ref 10–40)
BASOPHILS # BLD AUTO: 0.11 K/UL (ref 0–0.2)
BASOPHILS NFR BLD: 0.9 % (ref 0–1.9)
BILIRUB SERPL-MCNC: 0.3 MG/DL (ref 0.1–1)
BUN SERPL-MCNC: 20 MG/DL (ref 8–23)
CALCIUM SERPL-MCNC: 9.2 MG/DL (ref 8.7–10.5)
CHLORIDE SERPL-SCNC: 107 MMOL/L (ref 95–110)
CHOLEST SERPL-MCNC: 151 MG/DL (ref 120–199)
CHOLEST/HDLC SERPL: 2.5 {RATIO} (ref 2–5)
CO2 SERPL-SCNC: 22 MMOL/L (ref 23–29)
CREAT SERPL-MCNC: 1.2 MG/DL (ref 0.5–1.4)
CRP SERPL-MCNC: 9.6 MG/L (ref 0–8.2)
DIFFERENTIAL METHOD: ABNORMAL
EOSINOPHIL # BLD AUTO: 0.1 K/UL (ref 0–0.5)
EOSINOPHIL NFR BLD: 1.1 % (ref 0–8)
ERYTHROCYTE [DISTWIDTH] IN BLOOD BY AUTOMATED COUNT: 16.5 % (ref 11.5–14.5)
ERYTHROCYTE [SEDIMENTATION RATE] IN BLOOD BY WESTERGREN METHOD: 45 MM/HR (ref 0–20)
EST. GFR  (NO RACE VARIABLE): 46.9 ML/MIN/1.73 M^2
ESTIMATED AVG GLUCOSE: 117 MG/DL (ref 68–131)
FOLATE SERPL-MCNC: 6.9 NG/ML (ref 4–24)
GLUCOSE SERPL-MCNC: 57 MG/DL (ref 70–110)
HBA1C MFR BLD: 5.7 % (ref 4–5.6)
HCT VFR BLD AUTO: 37.9 % (ref 37–48.5)
HDLC SERPL-MCNC: 61 MG/DL (ref 40–75)
HDLC SERPL: 40.4 % (ref 20–50)
HGB BLD-MCNC: 11.5 G/DL (ref 12–16)
IMM GRANULOCYTES # BLD AUTO: 0.04 K/UL (ref 0–0.04)
IMM GRANULOCYTES NFR BLD AUTO: 0.3 % (ref 0–0.5)
LDLC SERPL CALC-MCNC: 56.8 MG/DL (ref 63–159)
LYMPHOCYTES # BLD AUTO: 4.2 K/UL (ref 1–4.8)
LYMPHOCYTES NFR BLD: 35.1 % (ref 18–48)
MAGNESIUM SERPL-MCNC: 2.2 MG/DL (ref 1.6–2.6)
MCH RBC QN AUTO: 25.6 PG (ref 27–31)
MCHC RBC AUTO-ENTMCNC: 30.3 G/DL (ref 32–36)
MCV RBC AUTO: 84 FL (ref 82–98)
MONOCYTES # BLD AUTO: 1 K/UL (ref 0.3–1)
MONOCYTES NFR BLD: 8.5 % (ref 4–15)
NEUTROPHILS # BLD AUTO: 6.4 K/UL (ref 1.8–7.7)
NEUTROPHILS NFR BLD: 54.1 % (ref 38–73)
NONHDLC SERPL-MCNC: 90 MG/DL
NRBC BLD-RTO: 0 /100 WBC
PHOSPHATE SERPL-MCNC: 2.7 MG/DL (ref 2.7–4.5)
PLATELET # BLD AUTO: 306 K/UL (ref 150–450)
PMV BLD AUTO: 11.6 FL (ref 9.2–12.9)
POTASSIUM SERPL-SCNC: 4.1 MMOL/L (ref 3.5–5.1)
PROT SERPL-MCNC: 7.4 G/DL (ref 6–8.4)
PTH-INTACT SERPL-MCNC: 57.8 PG/ML (ref 9–77)
RBC # BLD AUTO: 4.49 M/UL (ref 4–5.4)
SODIUM SERPL-SCNC: 141 MMOL/L (ref 136–145)
TRIGL SERPL-MCNC: 166 MG/DL (ref 30–150)
VIT B12 SERPL-MCNC: 1326 PG/ML (ref 210–950)
WBC # BLD AUTO: 11.81 K/UL (ref 3.9–12.7)

## 2023-11-02 PROCEDURE — 83036 HEMOGLOBIN GLYCOSYLATED A1C: CPT | Mod: HCNC | Performed by: INTERNAL MEDICINE

## 2023-11-02 PROCEDURE — 86140 C-REACTIVE PROTEIN: CPT | Mod: HCNC | Performed by: PHYSICIAN ASSISTANT

## 2023-11-02 PROCEDURE — 83921 ORGANIC ACID SINGLE QUANT: CPT | Mod: HCNC | Performed by: INTERNAL MEDICINE

## 2023-11-02 PROCEDURE — 84100 ASSAY OF PHOSPHORUS: CPT | Mod: HCNC | Performed by: PHYSICIAN ASSISTANT

## 2023-11-02 PROCEDURE — 36415 COLL VENOUS BLD VENIPUNCTURE: CPT | Mod: HCNC,PO | Performed by: INTERNAL MEDICINE

## 2023-11-02 PROCEDURE — 82607 VITAMIN B-12: CPT | Mod: HCNC | Performed by: INTERNAL MEDICINE

## 2023-11-02 PROCEDURE — 82746 ASSAY OF FOLIC ACID SERUM: CPT | Mod: HCNC | Performed by: INTERNAL MEDICINE

## 2023-11-02 PROCEDURE — 85651 RBC SED RATE NONAUTOMATED: CPT | Mod: HCNC,PO | Performed by: PHYSICIAN ASSISTANT

## 2023-11-02 PROCEDURE — 83735 ASSAY OF MAGNESIUM: CPT | Mod: HCNC | Performed by: PHYSICIAN ASSISTANT

## 2023-11-02 PROCEDURE — 80061 LIPID PANEL: CPT | Mod: HCNC | Performed by: INTERNAL MEDICINE

## 2023-11-02 PROCEDURE — 85025 COMPLETE CBC W/AUTO DIFF WBC: CPT | Mod: HCNC | Performed by: PHYSICIAN ASSISTANT

## 2023-11-02 PROCEDURE — 80053 COMPREHEN METABOLIC PANEL: CPT | Mod: HCNC | Performed by: PHYSICIAN ASSISTANT

## 2023-11-02 PROCEDURE — 83970 ASSAY OF PARATHORMONE: CPT | Mod: HCNC | Performed by: PHYSICIAN ASSISTANT

## 2023-11-06 NOTE — PROGRESS NOTES
Results have been released via [employer]. Please verify that these have been viewed by patient. If not, please call patient with results.    I have reviewed your recent results.    A1C NORMAL-The A1c remains at goal at 5.7.  Repeat an a1c in 6 months.     B12 ABNORMAL-The B12 test shows that your b12 is elevated. You can decrease your dose in half to 500 mcg daily or take the 1000 mcg every other day.   LIPID NORMAL SCREEN-The cholesterol panel screening showed levels that are considered at target at this time.  Recheck each year.     Please let me know if you have any additional questions or concerns about above.

## 2023-11-07 LAB — METHYLMALONATE SERPL-SCNC: 0.14 UMOL/L

## 2023-11-09 NOTE — TELEPHONE ENCOUNTER
Looks like patient cancelled her appointment today, 11/9 with Dr. Johnson. Next OV not til 2/26/24.    Staff,  Please call patient to reschedule her for appointment with Dr. Johnson and for her Prolia injection. Thanks!

## 2023-11-15 ENCOUNTER — TELEPHONE (OUTPATIENT)
Dept: FAMILY MEDICINE | Facility: CLINIC | Age: 78
End: 2023-11-15
Payer: MEDICARE

## 2023-11-15 DIAGNOSIS — B96.89 ACUTE BACTERIAL BRONCHITIS: Primary | ICD-10-CM

## 2023-11-15 DIAGNOSIS — J20.8 ACUTE BACTERIAL BRONCHITIS: Primary | ICD-10-CM

## 2023-11-15 NOTE — TELEPHONE ENCOUNTER
----- Message from Anneliese Larkin sent at 11/15/2023  3:13 PM CST -----  Pt called regarding a different type of antibiotic because the first one didn't help. Call back number is [phone]. Thx.EL

## 2023-11-15 NOTE — TELEPHONE ENCOUNTER
Spoke with pt, informed message was sent to provider this morning regarding sending in a new antibiotic. Pt informed we would contact her once we receive a response.

## 2023-11-15 NOTE — TELEPHONE ENCOUNTER
Pt states she was instructed to call back if the sore had not cleared up after taking the Amoxicillin and that Dr. Silva would send in something different if not cleared up.

## 2023-11-16 ENCOUNTER — TELEPHONE (OUTPATIENT)
Dept: FAMILY MEDICINE | Facility: CLINIC | Age: 78
End: 2023-11-16
Payer: MEDICARE

## 2023-11-16 RX ORDER — AMOXICILLIN AND CLAVULANATE POTASSIUM 875; 125 MG/1; MG/1
1 TABLET, FILM COATED ORAL 2 TIMES DAILY
Qty: 14 TABLET | Refills: 0 | Status: SHIPPED | OUTPATIENT
Start: 2023-11-16 | End: 2023-11-23

## 2023-11-16 NOTE — TELEPHONE ENCOUNTER
I have signed for the following orders AND/OR meds.  Please call the patient and ask the patient to schedule the testing AND/OR inform about any medications that were sent. Medications have been sent to pharmacy listed below      No orders of the defined types were placed in this encounter.      Medications Ordered This Encounter   Medications    amoxicillin-clavulanate 875-125mg (AUGMENTIN) 875-125 mg per tablet     Sig: Take 1 tablet by mouth 2 (two) times daily. for 7 days     Dispense:  14 tablet     Refill:  0         [facility] - [place] - [address] [facility]  [place] [address]  Phone: [phone] Fax: [phone]    [facility] - [place], [place] - [address] [facility]  [address] [facility] [address]  Phone: [phone] Fax: [phone]    [id number] - [place] - [address] [facility] [address]  [address]  [place] [address]  Phone: [phone] Fax: [phone]

## 2023-11-16 NOTE — TELEPHONE ENCOUNTER
----- Message from Sophy Ward sent at 11/16/2023 11:18 AM CST -----  Contact: Rekha [phone]  Pt called asking for advice about her Rx that the provider has to refill, Rekha states she would like the nurse to call her back. Please call Rekha back for advice    Pt  can be reached at [phone]

## 2023-11-30 ENCOUNTER — OFFICE VISIT (OUTPATIENT)
Dept: RHEUMATOLOGY | Facility: CLINIC | Age: 78
End: 2023-11-30
Payer: MEDICARE

## 2023-11-30 VITALS
SYSTOLIC BLOOD PRESSURE: 133 MMHG | BODY MASS INDEX: 42.31 KG/M2 | HEIGHT: 62 IN | HEART RATE: 105 BPM | DIASTOLIC BLOOD PRESSURE: 74 MMHG | WEIGHT: 229.94 LBS

## 2023-11-30 DIAGNOSIS — M25.562 ARTHRALGIA OF BOTH KNEES: ICD-10-CM

## 2023-11-30 DIAGNOSIS — G89.4 CHRONIC PAIN SYNDROME: ICD-10-CM

## 2023-11-30 DIAGNOSIS — D84.821 IMMUNOCOMPROMISED STATE DUE TO DRUG THERAPY: ICD-10-CM

## 2023-11-30 DIAGNOSIS — M25.561 ARTHRALGIA OF BOTH KNEES: ICD-10-CM

## 2023-11-30 DIAGNOSIS — J32.9 SINUSITIS, UNSPECIFIED CHRONICITY, UNSPECIFIED LOCATION: ICD-10-CM

## 2023-11-30 DIAGNOSIS — M05.9 SEROPOSITIVE RHEUMATOID ARTHRITIS: Primary | ICD-10-CM

## 2023-11-30 DIAGNOSIS — M81.0 OSTEOPOROSIS, UNSPECIFIED OSTEOPOROSIS TYPE, UNSPECIFIED PATHOLOGICAL FRACTURE PRESENCE: ICD-10-CM

## 2023-11-30 DIAGNOSIS — M35.00 SJOGREN'S SYNDROME, WITH UNSPECIFIED ORGAN INVOLVEMENT: ICD-10-CM

## 2023-11-30 DIAGNOSIS — E11.9 TYPE 2 DIABETES MELLITUS WITHOUT COMPLICATION, WITHOUT LONG-TERM CURRENT USE OF INSULIN: ICD-10-CM

## 2023-11-30 DIAGNOSIS — Z79.899 IMMUNOCOMPROMISED STATE DUE TO DRUG THERAPY: ICD-10-CM

## 2023-11-30 DIAGNOSIS — J40 BRONCHITIS: ICD-10-CM

## 2023-11-30 DIAGNOSIS — M06.30 RHEUMATOID NODULES: ICD-10-CM

## 2023-11-30 DIAGNOSIS — M05.9 RHEUMATOID ARTHRITIS WITH POSITIVE RHEUMATOID FACTOR, INVOLVING UNSPECIFIED SITE: ICD-10-CM

## 2023-11-30 PROCEDURE — 96372 PR INJECTION,THERAP/PROPH/DIAG2ST, IM OR SUBCUT: ICD-10-PCS | Mod: HCNC,S$GLB,, | Performed by: INTERNAL MEDICINE

## 2023-11-30 PROCEDURE — 3075F PR MOST RECENT SYSTOLIC BLOOD PRESS GE 130-139MM HG: ICD-10-PCS | Mod: HCNC,CPTII,S$GLB, | Performed by: INTERNAL MEDICINE

## 2023-11-30 PROCEDURE — 1125F PR PAIN SEVERITY QUANTIFIED, PAIN PRESENT: ICD-10-PCS | Mod: HCNC,CPTII,S$GLB, | Performed by: INTERNAL MEDICINE

## 2023-11-30 PROCEDURE — 99215 OFFICE O/P EST HI 40 MIN: CPT | Mod: 25,HCNC,S$GLB, | Performed by: INTERNAL MEDICINE

## 2023-11-30 PROCEDURE — 1125F AMNT PAIN NOTED PAIN PRSNT: CPT | Mod: HCNC,CPTII,S$GLB, | Performed by: INTERNAL MEDICINE

## 2023-11-30 PROCEDURE — 3288F FALL RISK ASSESSMENT DOCD: CPT | Mod: HCNC,CPTII,S$GLB, | Performed by: INTERNAL MEDICINE

## 2023-11-30 PROCEDURE — 99215 PR OFFICE/OUTPT VISIT, EST, LEVL V, 40-54 MIN: ICD-10-PCS | Mod: 25,HCNC,S$GLB, | Performed by: INTERNAL MEDICINE

## 2023-11-30 PROCEDURE — 1159F MED LIST DOCD IN RCRD: CPT | Mod: HCNC,CPTII,S$GLB, | Performed by: INTERNAL MEDICINE

## 2023-11-30 PROCEDURE — 1159F PR MEDICATION LIST DOCUMENTED IN MEDICAL RECORD: ICD-10-PCS | Mod: HCNC,CPTII,S$GLB, | Performed by: INTERNAL MEDICINE

## 2023-11-30 PROCEDURE — 96372 THER/PROPH/DIAG INJ SC/IM: CPT | Mod: HCNC,S$GLB,, | Performed by: INTERNAL MEDICINE

## 2023-11-30 PROCEDURE — 99999 PR PBB SHADOW E&M-EST. PATIENT-LVL V: CPT | Mod: PBBFAC,HCNC,, | Performed by: INTERNAL MEDICINE

## 2023-11-30 PROCEDURE — 1101F PT FALLS ASSESS-DOCD LE1/YR: CPT | Mod: HCNC,CPTII,S$GLB, | Performed by: INTERNAL MEDICINE

## 2023-11-30 PROCEDURE — 99999 PR PBB SHADOW E&M-EST. PATIENT-LVL V: ICD-10-PCS | Mod: PBBFAC,HCNC,, | Performed by: INTERNAL MEDICINE

## 2023-11-30 PROCEDURE — 3078F DIAST BP <80 MM HG: CPT | Mod: HCNC,CPTII,S$GLB, | Performed by: INTERNAL MEDICINE

## 2023-11-30 PROCEDURE — 3078F PR MOST RECENT DIASTOLIC BLOOD PRESSURE < 80 MM HG: ICD-10-PCS | Mod: HCNC,CPTII,S$GLB, | Performed by: INTERNAL MEDICINE

## 2023-11-30 PROCEDURE — 3075F SYST BP GE 130 - 139MM HG: CPT | Mod: HCNC,CPTII,S$GLB, | Performed by: INTERNAL MEDICINE

## 2023-11-30 PROCEDURE — 1101F PR PT FALLS ASSESS DOC 0-1 FALLS W/OUT INJ PAST YR: ICD-10-PCS | Mod: HCNC,CPTII,S$GLB, | Performed by: INTERNAL MEDICINE

## 2023-11-30 PROCEDURE — 3288F PR FALLS RISK ASSESSMENT DOCUMENTED: ICD-10-PCS | Mod: HCNC,CPTII,S$GLB, | Performed by: INTERNAL MEDICINE

## 2023-11-30 RX ORDER — OXYCODONE AND ACETAMINOPHEN 10; 325 MG/1; MG/1
1 TABLET ORAL EVERY 8 HOURS PRN
Qty: 90 TABLET | Refills: 0 | Status: SHIPPED | OUTPATIENT
Start: 2024-01-18 | End: 2024-02-17

## 2023-11-30 RX ORDER — METHYLPREDNISOLONE ACETATE 80 MG/ML
160 INJECTION, SUSPENSION INTRA-ARTICULAR; INTRALESIONAL; INTRAMUSCULAR; SOFT TISSUE
Status: COMPLETED | OUTPATIENT
Start: 2023-11-30 | End: 2023-11-30

## 2023-11-30 RX ORDER — OXYCODONE AND ACETAMINOPHEN 10; 325 MG/1; MG/1
1 TABLET ORAL EVERY 8 HOURS PRN
Qty: 90 TABLET | Refills: 0 | Status: SHIPPED | OUTPATIENT
Start: 2024-02-16 | End: 2024-02-26 | Stop reason: SDUPTHER

## 2023-11-30 RX ORDER — CEFTRIAXONE 1 G/1
1 INJECTION, POWDER, FOR SOLUTION INTRAMUSCULAR; INTRAVENOUS
Status: COMPLETED | OUTPATIENT
Start: 2023-11-30 | End: 2023-11-30

## 2023-11-30 RX ORDER — HYDROXYCHLOROQUINE SULFATE 200 MG/1
200 TABLET, FILM COATED ORAL 2 TIMES DAILY
Qty: 180 TABLET | Refills: 3 | Status: SHIPPED | OUTPATIENT
Start: 2023-11-30 | End: 2024-11-29

## 2023-11-30 RX ORDER — PROMETHAZINE HYDROCHLORIDE AND DEXTROMETHORPHAN HYDROBROMIDE 6.25; 15 MG/5ML; MG/5ML
5 SYRUP ORAL EVERY 4 HOURS PRN
Qty: 240 ML | Refills: 0 | Status: SHIPPED | OUTPATIENT
Start: 2023-11-30 | End: 2023-12-10

## 2023-11-30 RX ORDER — KETOROLAC TROMETHAMINE 30 MG/ML
30 INJECTION, SOLUTION INTRAMUSCULAR; INTRAVENOUS
Status: COMPLETED | OUTPATIENT
Start: 2023-11-30 | End: 2023-11-30

## 2023-11-30 RX ORDER — AMOXICILLIN AND CLAVULANATE POTASSIUM 875; 125 MG/1; MG/1
1 TABLET, FILM COATED ORAL 2 TIMES DAILY
Qty: 20 TABLET | Refills: 0 | Status: SHIPPED | OUTPATIENT
Start: 2023-11-30 | End: 2023-12-10

## 2023-11-30 RX ORDER — OXYCODONE AND ACETAMINOPHEN 10; 325 MG/1; MG/1
1 TABLET ORAL EVERY 8 HOURS PRN
Qty: 90 TABLET | Refills: 0 | Status: SHIPPED | OUTPATIENT
Start: 2023-12-18 | End: 2024-01-17

## 2023-11-30 RX ADMIN — KETOROLAC TROMETHAMINE 30 MG: 30 INJECTION, SOLUTION INTRAMUSCULAR; INTRAVENOUS at 02:11

## 2023-11-30 RX ADMIN — CEFTRIAXONE 1 G: 1 INJECTION, POWDER, FOR SOLUTION INTRAMUSCULAR; INTRAVENOUS at 02:11

## 2023-11-30 RX ADMIN — METHYLPREDNISOLONE ACETATE 160 MG: 80 INJECTION, SUSPENSION INTRA-ARTICULAR; INTRALESIONAL; INTRAMUSCULAR; SOFT TISSUE at 01:11

## 2023-11-30 NOTE — PROGRESS NOTES
Subjective:       Patient ID: Rekha Miller is a 78 y.o. female.    Chief Complaint: Disease Management    Follow up: 78 year old female who presents to clinic visit for follow up on RA. She has been compliant with LEF 10 mg, plaquenil, and prednisone 10-15 mg daily for RA. She has chronic pain in her hands, wrists, hips, and low back. She reports pain has been worse in the recent weeks since she ran out of her percocet, which she is taking TID.     She started prolia in 4/2023 and denies s/e.     We reviewed her recent labs available.     She had I&D abscess L forearm recently.    Recent Interventional Radiology Note:  She has ongoing good result from her cryoablation. No signs of residual/rebound malignancy.   Will place orders for repeat CT kidney in 12 months.       Current tx:  1. LEF  2. Plaquenil  3. Percocet  4. Prednisone  5. Prolia      Review of Systems   Constitutional:  Positive for activity change. Negative for appetite change and chills.   HENT:          Dry mouth   Eyes:  Negative for visual disturbance.        Dry eyes   Respiratory:  Negative for cough and shortness of breath.    Cardiovascular:  Negative for chest pain, palpitations and leg swelling.   Gastrointestinal:  Negative for abdominal pain and constipation.   Musculoskeletal:  Positive for arthralgias.   Allergic/Immunologic: Positive for immunocompromised state.   Neurological:  Negative for dizziness, weakness and light-headedness.         Objective:     Vitals:    11/30/23 1300   BP: 133/74   Pulse: 105       Past Medical History:   Diagnosis Date    Asthma     COPD (chronic obstructive pulmonary disease)     Degenerative disc disease     Diabetes mellitus     Diabetes mellitus, type 2     Fibromyalgia     Hypertension     Rheumatoid arthritis     Rheumatoid arthritis(714.0)     Rheumatoid arthritis     Past Surgical History:   Procedure Laterality Date    BREAST SURGERY  1986    CATARACT EXTRACTION      EYE SURGERY  2013     Catatracts    ORIF FEMUR FRACTURE      right knee ligament rpeair  2005    right shoulder surgery  4/18/07    Dr. Gao          Physical Exam   Constitutional: She is oriented to person, place, and time.   Eyes: Right conjunctiva is not injected. Left conjunctiva is not injected.   Neck: No JVD present. No thyromegaly present.   Cardiovascular: Normal rate.   Pulmonary/Chest: Effort normal.   Musculoskeletal:      Right shoulder: Tenderness present.      Left shoulder: Tenderness present.      Right wrist: Swelling and tenderness present.      Left wrist: Swelling and tenderness present.   Lymphadenopathy:     She has no cervical adenopathy.   Neurological: She is alert and oriented to person, place, and time.   Pt ambulates with wheeled walker   Skin: No rash noted.   Psychiatric: Mood and affect normal.       Right Side Rheumatological Exam     Examination finds the 1st PIP, 2nd PIP, 3rd PIP, 4th PIP and 5th PIP normal.    The patient is tender to palpation of the shoulder, wrist, 1st MCP, 2nd MCP, 3rd MCP, 4th MCP and 5th MCP    She has swelling of the wrist, 1st MCP, 2nd MCP, 3rd MCP, 4th MCP and 5th MCP    The patient has an enlarged wrist    Left Side Rheumatological Exam     Examination finds the 1st PIP, 2nd PIP, 3rd PIP, 4th PIP and 5th PIP normal.    The patient is tender to palpation of the shoulder, wrist, 1st MCP, 2nd MCP, 3rd MCP, 4th MCP and 5th MCP.    She has swelling of the wrist, 1st MCP, 2nd MCP, 3rd MCP, 4th MCP and 5th MCP            Collected Updated Procedure    11/02/2023 1127 11/07/2023 0314 Methylmalonic Acid, Serum [[id number]]   Blood    Component Value Units   Methlymalonic Acid 0.14  umol/L          11/02/2023 1127 11/02/2023 2326 Folate [[id number]]   Blood    Component Value Units   Folate 6.9 ng/mL          11/02/2023 1127 11/02/2023 2355 Vitamin B12 [[id number]]   (Abnormal)   Blood    Component Value Units   Vitamin B-12 1326 High  pg/mL          11/02/2023 1127 11/02/2023 2202  Phosphorus [[id number]]   Blood    Component Value Units   Phosphorus 2.7 mg/dL          11/02/2023 1127 11/02/2023 2204 Magnesium [[id number]]   Blood    Component Value Units   Magnesium 2.2 mg/dL          11/02/2023 1127 11/02/2023 2206 PTH, intact [[id number]]   Blood    Component Value Units   PTH, Intact 57.8 pg/mL          11/02/2023 1127 11/02/2023 1425 Sedimentation rate [[id number]]   (Abnormal)   Blood    Component Value Units   Sed Rate 45 High  mm/Hr          11/02/2023 1127 11/02/2023 2204 C-Reactive Protein [[id number]]   (Abnormal)   Blood    Component Value Units   CRP 9.6 High  mg/L          11/02/2023 1127 11/02/2023 2204 Comprehensive Metabolic Panel [[id number]]   (Abnormal)   Blood    Component Value Units   Sodium 141 mmol/L   Potassium 4.1 mmol/L   Chloride 107 mmol/L   CO2 22 Low  mmol/L   Glucose 57 Low  mg/dL   BUN 20 mg/dL   Creatinine 1.2 mg/dL   Calcium 9.2 mg/dL   Total Protein 7.4 g/dL   Albumin 3.4 Low  g/dL   Total Bilirubin 0.3  mg/dL   Alkaline Phosphatase 90 U/L   AST 15 U/L   ALT 12 U/L   eGFR 46.9 Abnormal  mL/min/1.73 m^2   Anion Gap 12 mmol/L          11/02/2023 1127 11/02/2023 2132 CBC Auto Differential [[id number]]   (Abnormal)   Blood    Component Value Units   WBC 11.81 K/uL   RBC 4.49 M/uL   Hemoglobin 11.5 Low  g/dL   Hematocrit 37.9 %   MCV 84 fL   MCH 25.6 Low  pg   MCHC 30.3 Low  g/dL   RDW 16.5 High  %   Platelets 306 K/uL   MPV 11.6 fL   Immature Granulocytes 0.3 %   Gran # (ANC) 6.4 K/uL   Immature Grans (Abs) 0.04  K/uL   Lymph # 4.2 K/uL   Mono # 1.0 K/uL   Eos # 0.1 K/uL   Baso # 0.11 K/uL   nRBC 0 /100 WBC   Gran % 54.1 %   Lymph % 35.1 %   Mono % 8.5 %   Eosinophil % 1.1 %   Basophil % 0.9 %   Differential Method Automated           11/02/2023 1127 11/02/2023 2157 Hemoglobin A1C [[id number]]   (Abnormal)   Blood    Component Value Units   Hemoglobin A1C 5.7 High   %   Estimated Avg Glucose 117 mg/dL          11/02/2023 1127 11/02/2023 2205 Lipid Panel  [[id number]]   (Abnormal)   Blood    Component Value Units   Cholesterol 151  mg/dL   Triglycerides 166 High   mg/dL   HDL 61  mg/dL   LDL Cholesterol 56.8 Low   mg/dL   HDL/Cholesterol Ratio 40.4 %   Total Cholesterol/HDL Ratio 2.5    Non-HDL Cholesterol 90  mg/dL        Assessment:       1. Seropositive rheumatoid arthritis    2. Osteoporosis, unspecified osteoporosis type, unspecified pathological fracture presence    3. Immunocompromised state due to drug therapy    4. Type 2 diabetes mellitus without complication, without long-term current use of insulin    5. Sjogren's syndrome, with unspecified organ involvement    6. Sinusitis, unspecified chronicity, unspecified location    7. Rheumatoid arthritis with positive rheumatoid factor, involving unspecified site    8. Chronic pain syndrome    9. Rheumatoid nodules    10. Arthralgia of both knees    11. Bronchitis              Plan:       Seropositive rheumatoid arthritis  -     cefTRIAXone injection 1 g  -     ketorolac injection 30 mg  -     methylPREDNISolone acetate injection 160 mg  -     T4, Free; Future; Expected date: 11/30/2023  -     TSH; Future; Expected date: 11/30/2023  -     Vitamin D; Future; Expected date: 11/30/2023  -     Cyclic Citrullinated Peptide Antibody, IgG; Future; Expected date: 11/30/2023  -     Rheumatoid Factor; Future; Expected date: 11/30/2023  -     Sedimentation rate; Future; Expected date: 11/30/2023  -     C-Reactive Protein; Future; Expected date: 11/30/2023  -     Comprehensive Metabolic Panel; Future; Expected date: 11/30/2023  -     CBC Auto Differential; Future; Expected date: 11/30/2023    Osteoporosis, unspecified osteoporosis type, unspecified pathological fracture presence  -     cefTRIAXone injection 1 g  -     ketorolac injection 30 mg  -     methylPREDNISolone acetate injection 160 mg  -     hydroxychloroquine (PLAQUENIL) 200 mg tablet; Take 1 tablet (200 mg total) by mouth 2 (two) times daily.  Dispense: 180 tablet;  Refill: 3  -     T4, Free; Future; Expected date: 11/30/2023  -     TSH; Future; Expected date: 11/30/2023  -     Vitamin D; Future; Expected date: 11/30/2023  -     Cyclic Citrullinated Peptide Antibody, IgG; Future; Expected date: 11/30/2023  -     Rheumatoid Factor; Future; Expected date: 11/30/2023  -     Sedimentation rate; Future; Expected date: 11/30/2023  -     C-Reactive Protein; Future; Expected date: 11/30/2023  -     Comprehensive Metabolic Panel; Future; Expected date: 11/30/2023  -     CBC Auto Differential; Future; Expected date: 11/30/2023    Immunocompromised state due to drug therapy  -     cefTRIAXone injection 1 g  -     ketorolac injection 30 mg  -     methylPREDNISolone acetate injection 160 mg  -     T4, Free; Future; Expected date: 11/30/2023  -     TSH; Future; Expected date: 11/30/2023  -     Vitamin D; Future; Expected date: 11/30/2023  -     Cyclic Citrullinated Peptide Antibody, IgG; Future; Expected date: 11/30/2023  -     Rheumatoid Factor; Future; Expected date: 11/30/2023  -     Sedimentation rate; Future; Expected date: 11/30/2023  -     C-Reactive Protein; Future; Expected date: 11/30/2023  -     Comprehensive Metabolic Panel; Future; Expected date: 11/30/2023  -     CBC Auto Differential; Future; Expected date: 11/30/2023    Type 2 diabetes mellitus without complication, without long-term current use of insulin  -     cefTRIAXone injection 1 g  -     ketorolac injection 30 mg  -     methylPREDNISolone acetate injection 160 mg  -     T4, Free; Future; Expected date: 11/30/2023  -     TSH; Future; Expected date: 11/30/2023  -     Vitamin D; Future; Expected date: 11/30/2023  -     Cyclic Citrullinated Peptide Antibody, IgG; Future; Expected date: 11/30/2023  -     Rheumatoid Factor; Future; Expected date: 11/30/2023  -     Sedimentation rate; Future; Expected date: 11/30/2023  -     C-Reactive Protein; Future; Expected date: 11/30/2023  -     Comprehensive Metabolic Panel; Future;  Expected date: 11/30/2023  -     CBC Auto Differential; Future; Expected date: 11/30/2023    Sjogren's syndrome, with unspecified organ involvement  -     cefTRIAXone injection 1 g  -     ketorolac injection 30 mg  -     methylPREDNISolone acetate injection 160 mg  -     hydroxychloroquine (PLAQUENIL) 200 mg tablet; Take 1 tablet (200 mg total) by mouth 2 (two) times daily.  Dispense: 180 tablet; Refill: 3  -     T4, Free; Future; Expected date: 11/30/2023  -     TSH; Future; Expected date: 11/30/2023  -     Vitamin D; Future; Expected date: 11/30/2023  -     Cyclic Citrullinated Peptide Antibody, IgG; Future; Expected date: 11/30/2023  -     Rheumatoid Factor; Future; Expected date: 11/30/2023  -     Sedimentation rate; Future; Expected date: 11/30/2023  -     C-Reactive Protein; Future; Expected date: 11/30/2023  -     Comprehensive Metabolic Panel; Future; Expected date: 11/30/2023  -     CBC Auto Differential; Future; Expected date: 11/30/2023    Sinusitis, unspecified chronicity, unspecified location  -     cefTRIAXone injection 1 g  -     ketorolac injection 30 mg  -     methylPREDNISolone acetate injection 160 mg  -     amoxicillin-clavulanate 875-125mg (AUGMENTIN) 875-125 mg per tablet; Take 1 tablet by mouth 2 (two) times daily. for 10 days  Dispense: 20 tablet; Refill: 0  -     promethazine-dextromethorphan (PROMETHAZINE-DM) 6.25-15 mg/5 mL Syrp; Take 5 mLs by mouth every 4 (four) hours as needed (cough).  Dispense: 240 mL; Refill: 0  -     T4, Free; Future; Expected date: 11/30/2023  -     TSH; Future; Expected date: 11/30/2023  -     Vitamin D; Future; Expected date: 11/30/2023  -     Cyclic Citrullinated Peptide Antibody, IgG; Future; Expected date: 11/30/2023  -     Rheumatoid Factor; Future; Expected date: 11/30/2023  -     Sedimentation rate; Future; Expected date: 11/30/2023  -     C-Reactive Protein; Future; Expected date: 11/30/2023  -     Comprehensive Metabolic Panel; Future; Expected date:  11/30/2023  -     CBC Auto Differential; Future; Expected date: 11/30/2023    Rheumatoid arthritis with positive rheumatoid factor, involving unspecified site  -     oxyCODONE-acetaminophen (PERCOCET)  mg per tablet; Take 1 tablet by mouth every 8 (eight) hours as needed for Pain.  Dispense: 90 tablet; Refill: 0  -     oxyCODONE-acetaminophen (PERCOCET)  mg per tablet; Take 1 tablet by mouth every 8 (eight) hours as needed for Pain.  Dispense: 90 tablet; Refill: 0  -     oxyCODONE-acetaminophen (PERCOCET)  mg per tablet; Take 1 tablet by mouth every 8 (eight) hours as needed for Pain.  Dispense: 90 tablet; Refill: 0  -     T4, Free; Future; Expected date: 11/30/2023  -     TSH; Future; Expected date: 11/30/2023  -     Vitamin D; Future; Expected date: 11/30/2023  -     Cyclic Citrullinated Peptide Antibody, IgG; Future; Expected date: 11/30/2023  -     Rheumatoid Factor; Future; Expected date: 11/30/2023  -     Sedimentation rate; Future; Expected date: 11/30/2023  -     C-Reactive Protein; Future; Expected date: 11/30/2023  -     Comprehensive Metabolic Panel; Future; Expected date: 11/30/2023  -     CBC Auto Differential; Future; Expected date: 11/30/2023    Chronic pain syndrome  -     oxyCODONE-acetaminophen (PERCOCET)  mg per tablet; Take 1 tablet by mouth every 8 (eight) hours as needed for Pain.  Dispense: 90 tablet; Refill: 0  -     oxyCODONE-acetaminophen (PERCOCET)  mg per tablet; Take 1 tablet by mouth every 8 (eight) hours as needed for Pain.  Dispense: 90 tablet; Refill: 0  -     oxyCODONE-acetaminophen (PERCOCET)  mg per tablet; Take 1 tablet by mouth every 8 (eight) hours as needed for Pain.  Dispense: 90 tablet; Refill: 0  -     hydroxychloroquine (PLAQUENIL) 200 mg tablet; Take 1 tablet (200 mg total) by mouth 2 (two) times daily.  Dispense: 180 tablet; Refill: 3  -     T4, Free; Future; Expected date: 11/30/2023  -     TSH; Future; Expected date: 11/30/2023  -      Vitamin D; Future; Expected date: 11/30/2023  -     Cyclic Citrullinated Peptide Antibody, IgG; Future; Expected date: 11/30/2023  -     Rheumatoid Factor; Future; Expected date: 11/30/2023  -     Sedimentation rate; Future; Expected date: 11/30/2023  -     C-Reactive Protein; Future; Expected date: 11/30/2023  -     Comprehensive Metabolic Panel; Future; Expected date: 11/30/2023  -     CBC Auto Differential; Future; Expected date: 11/30/2023    Rheumatoid nodules  -     hydroxychloroquine (PLAQUENIL) 200 mg tablet; Take 1 tablet (200 mg total) by mouth 2 (two) times daily.  Dispense: 180 tablet; Refill: 3  -     T4, Free; Future; Expected date: 11/30/2023  -     TSH; Future; Expected date: 11/30/2023  -     Vitamin D; Future; Expected date: 11/30/2023  -     Cyclic Citrullinated Peptide Antibody, IgG; Future; Expected date: 11/30/2023  -     Rheumatoid Factor; Future; Expected date: 11/30/2023  -     Sedimentation rate; Future; Expected date: 11/30/2023  -     C-Reactive Protein; Future; Expected date: 11/30/2023  -     Comprehensive Metabolic Panel; Future; Expected date: 11/30/2023  -     CBC Auto Differential; Future; Expected date: 11/30/2023    Arthralgia of both knees  -     hydroxychloroquine (PLAQUENIL) 200 mg tablet; Take 1 tablet (200 mg total) by mouth 2 (two) times daily.  Dispense: 180 tablet; Refill: 3  -     T4, Free; Future; Expected date: 11/30/2023  -     TSH; Future; Expected date: 11/30/2023  -     Vitamin D; Future; Expected date: 11/30/2023  -     Cyclic Citrullinated Peptide Antibody, IgG; Future; Expected date: 11/30/2023  -     Rheumatoid Factor; Future; Expected date: 11/30/2023  -     Sedimentation rate; Future; Expected date: 11/30/2023  -     C-Reactive Protein; Future; Expected date: 11/30/2023  -     Comprehensive Metabolic Panel; Future; Expected date: 11/30/2023  -     CBC Auto Differential; Future; Expected date: 11/30/2023    Bronchitis  -     amoxicillin-clavulanate 875-125mg  (AUGMENTIN) 875-125 mg per tablet; Take 1 tablet by mouth 2 (two) times daily. for 10 days  Dispense: 20 tablet; Refill: 0  -     promethazine-dextromethorphan (PROMETHAZINE-DM) 6.25-15 mg/5 mL Syrp; Take 5 mLs by mouth every 4 (four) hours as needed (cough).  Dispense: 240 mL; Refill: 0  -     T4, Free; Future; Expected date: 11/30/2023  -     TSH; Future; Expected date: 11/30/2023  -     Vitamin D; Future; Expected date: 11/30/2023  -     Cyclic Citrullinated Peptide Antibody, IgG; Future; Expected date: 11/30/2023  -     Rheumatoid Factor; Future; Expected date: 11/30/2023  -     Sedimentation rate; Future; Expected date: 11/30/2023  -     C-Reactive Protein; Future; Expected date: 11/30/2023  -     Comprehensive Metabolic Panel; Future; Expected date: 11/30/2023  -     CBC Auto Differential; Future; Expected date: 11/30/2023          Assessment:  78 year old female with  Seropositive (+RF/+CCP) rheumatoid arthritis, Sjogren's syndrome (+SSB) on LEF and plaquenil  --chronic pain syndrome   --GERD, d/c zantac start famotidine  --HTN  --asthma  --controlled type 2 diabetes  --chronic steroid use  --left renal mass s/p cryo, stable on recent imaging 9/2023, repeat CT 1 year    Plan:  1. Cont plaquenil 200 mg bid, cont LEF 10 mg daily  2. Decrease prednisone 5 mg daily  3. Depo 160, rocephin 1 ggiven today toradol 30  4. Cont prolia every 6 months. Check calcium prior to injection.   5. Cont percocet   I have checked [place] prescription monitoring program site and no unusual or abnormal behavior has occurred pt understand the risk and benefits of taking opioid medications and has decided to continue the medication.      More than 50% of the  40 minute encounter was spent face to face counseling the patient regarding current status and future plan of care as well as side effects  of the medications. All questions were answered to patient's satisfaction also includes  non-face to face time preparing to see the  patient (eg, review of tests), Obtaining and/or reviewing separately obtained history, Documenting clinical information in the electronic or other health record, Independently interpreting results

## 2023-12-21 DIAGNOSIS — N28.89 RENAL MASS: ICD-10-CM

## 2023-12-21 DIAGNOSIS — E11.9 TYPE 2 DIABETES MELLITUS WITHOUT COMPLICATION, UNSPECIFIED WHETHER LONG TERM INSULIN USE: ICD-10-CM

## 2023-12-21 DIAGNOSIS — M35.00 SJOGREN'S SYNDROME, WITH UNSPECIFIED ORGAN INVOLVEMENT: ICD-10-CM

## 2023-12-21 DIAGNOSIS — L40.9 PSORIASIS: ICD-10-CM

## 2023-12-21 DIAGNOSIS — G89.4 CHRONIC PAIN SYNDROME: ICD-10-CM

## 2023-12-21 DIAGNOSIS — M16.0 OSTEOARTHRITIS OF BOTH HIPS, UNSPECIFIED OSTEOARTHRITIS TYPE: ICD-10-CM

## 2023-12-21 DIAGNOSIS — M05.9 RHEUMATOID ARTHRITIS WITH POSITIVE RHEUMATOID FACTOR, INVOLVING UNSPECIFIED SITE: ICD-10-CM

## 2023-12-21 DIAGNOSIS — M17.0 PRIMARY OSTEOARTHRITIS OF BOTH KNEES: ICD-10-CM

## 2023-12-21 DIAGNOSIS — M81.0 OSTEOPOROSIS, UNSPECIFIED OSTEOPOROSIS TYPE, UNSPECIFIED PATHOLOGICAL FRACTURE PRESENCE: ICD-10-CM

## 2023-12-21 DIAGNOSIS — E11.9 TYPE 2 DIABETES MELLITUS WITHOUT COMPLICATION, WITHOUT LONG-TERM CURRENT USE OF INSULIN: ICD-10-CM

## 2023-12-21 DIAGNOSIS — M47.817 LUMBAR AND SACRAL ARTHRITIS: ICD-10-CM

## 2023-12-21 DIAGNOSIS — E21.3 HYPERPARATHYROIDISM, UNSPECIFIED: ICD-10-CM

## 2023-12-22 RX ORDER — LEFLUNOMIDE 10 MG/1
TABLET ORAL
Qty: 90 TABLET | Refills: 1 | Status: SHIPPED | OUTPATIENT
Start: 2023-12-22

## 2024-01-03 DIAGNOSIS — M05.9 RHEUMATOID ARTHRITIS WITH POSITIVE RHEUMATOID FACTOR, INVOLVING UNSPECIFIED SITE: ICD-10-CM

## 2024-01-05 RX ORDER — PREDNISONE 5 MG/1
TABLET ORAL
Qty: 90 TABLET | Refills: 3 | Status: SHIPPED | OUTPATIENT
Start: 2024-01-05

## 2024-01-07 DIAGNOSIS — M25.561 ARTHRALGIA OF BOTH KNEES: ICD-10-CM

## 2024-01-07 DIAGNOSIS — M25.562 ARTHRALGIA OF BOTH KNEES: ICD-10-CM

## 2024-01-07 DIAGNOSIS — M35.00 SJOGREN'S SYNDROME, WITH UNSPECIFIED ORGAN INVOLVEMENT: ICD-10-CM

## 2024-01-07 DIAGNOSIS — M81.0 OSTEOPOROSIS, UNSPECIFIED OSTEOPOROSIS TYPE, UNSPECIFIED PATHOLOGICAL FRACTURE PRESENCE: ICD-10-CM

## 2024-01-07 DIAGNOSIS — M06.30 RHEUMATOID NODULES: ICD-10-CM

## 2024-01-07 DIAGNOSIS — G89.4 CHRONIC PAIN SYNDROME: ICD-10-CM

## 2024-01-09 RX ORDER — TRAZODONE HYDROCHLORIDE 50 MG/1
TABLET ORAL
Qty: 90 TABLET | Refills: 3 | Status: SHIPPED | OUTPATIENT
Start: 2024-01-09

## 2024-01-29 ENCOUNTER — OFFICE VISIT (OUTPATIENT)
Dept: FAMILY MEDICINE | Facility: CLINIC | Age: 79
End: 2024-01-29
Payer: MEDICARE

## 2024-01-29 VITALS
TEMPERATURE: 98 F | WEIGHT: 197 LBS | SYSTOLIC BLOOD PRESSURE: 90 MMHG | HEART RATE: 89 BPM | DIASTOLIC BLOOD PRESSURE: 60 MMHG | HEIGHT: 66 IN | BODY MASS INDEX: 31.66 KG/M2

## 2024-01-29 DIAGNOSIS — R60.0 LOWER EXTREMITY EDEMA: Primary | ICD-10-CM

## 2024-01-29 DIAGNOSIS — Z79.899 IMMUNOCOMPROMISED STATE DUE TO DRUG THERAPY: ICD-10-CM

## 2024-01-29 DIAGNOSIS — M10.9 ACUTE GOUT INVOLVING TOE, UNSPECIFIED CAUSE, UNSPECIFIED LATERALITY: ICD-10-CM

## 2024-01-29 DIAGNOSIS — N18.31 STAGE 3A CHRONIC KIDNEY DISEASE: ICD-10-CM

## 2024-01-29 DIAGNOSIS — I70.0 AORTIC ATHEROSCLEROSIS: ICD-10-CM

## 2024-01-29 DIAGNOSIS — E11.59 HYPERTENSION ASSOCIATED WITH DIABETES: ICD-10-CM

## 2024-01-29 DIAGNOSIS — E78.5 TYPE 2 DIABETES MELLITUS WITH HYPERLIPIDEMIA: ICD-10-CM

## 2024-01-29 DIAGNOSIS — D84.821 IMMUNOCOMPROMISED STATE DUE TO DRUG THERAPY: ICD-10-CM

## 2024-01-29 DIAGNOSIS — N18.31 TYPE 2 DIABETES MELLITUS WITH STAGE 3A CHRONIC KIDNEY DISEASE, WITHOUT LONG-TERM CURRENT USE OF INSULIN: ICD-10-CM

## 2024-01-29 DIAGNOSIS — R79.89 ABNORMAL TSH: ICD-10-CM

## 2024-01-29 DIAGNOSIS — M05.79 RHEUMATOID ARTHRITIS INVOLVING MULTIPLE SITES WITH POSITIVE RHEUMATOID FACTOR: ICD-10-CM

## 2024-01-29 DIAGNOSIS — E11.22 TYPE 2 DIABETES MELLITUS WITH STAGE 3A CHRONIC KIDNEY DISEASE, WITHOUT LONG-TERM CURRENT USE OF INSULIN: ICD-10-CM

## 2024-01-29 DIAGNOSIS — E66.01 SEVERE OBESITY: ICD-10-CM

## 2024-01-29 DIAGNOSIS — E11.69 TYPE 2 DIABETES MELLITUS WITH HYPERLIPIDEMIA: ICD-10-CM

## 2024-01-29 DIAGNOSIS — F19.20 CORTICOSTEROID DEPENDENCE: ICD-10-CM

## 2024-01-29 DIAGNOSIS — I15.2 HYPERTENSION ASSOCIATED WITH DIABETES: ICD-10-CM

## 2024-01-29 DIAGNOSIS — N28.89 RENAL MASS: ICD-10-CM

## 2024-01-29 PROCEDURE — 3074F SYST BP LT 130 MM HG: CPT | Mod: HCNC,CPTII,S$GLB, | Performed by: INTERNAL MEDICINE

## 2024-01-29 PROCEDURE — 99214 OFFICE O/P EST MOD 30 MIN: CPT | Mod: HCNC,S$GLB,, | Performed by: INTERNAL MEDICINE

## 2024-01-29 PROCEDURE — 3288F FALL RISK ASSESSMENT DOCD: CPT | Mod: HCNC,CPTII,S$GLB, | Performed by: INTERNAL MEDICINE

## 2024-01-29 PROCEDURE — 1125F AMNT PAIN NOTED PAIN PRSNT: CPT | Mod: HCNC,CPTII,S$GLB, | Performed by: INTERNAL MEDICINE

## 2024-01-29 PROCEDURE — 3078F DIAST BP <80 MM HG: CPT | Mod: HCNC,CPTII,S$GLB, | Performed by: INTERNAL MEDICINE

## 2024-01-29 PROCEDURE — 2023F DILAT RTA XM W/O RTNOPTHY: CPT | Mod: HCNC,CPTII,S$GLB, | Performed by: INTERNAL MEDICINE

## 2024-01-29 PROCEDURE — 1101F PT FALLS ASSESS-DOCD LE1/YR: CPT | Mod: HCNC,CPTII,S$GLB, | Performed by: INTERNAL MEDICINE

## 2024-01-29 PROCEDURE — 99999 PR PBB SHADOW E&M-EST. PATIENT-LVL V: CPT | Mod: PBBFAC,HCNC,, | Performed by: INTERNAL MEDICINE

## 2024-01-29 RX ORDER — COLCHICINE 0.6 MG/1
0.6 TABLET ORAL 2 TIMES DAILY
Qty: 10 TABLET | Refills: 0 | Status: SHIPPED | OUTPATIENT
Start: 2024-01-29 | End: 2024-02-03

## 2024-01-29 RX ORDER — BENAZEPRIL HYDROCHLORIDE 20 MG/1
20 TABLET ORAL DAILY
Qty: 90 TABLET | Refills: 3 | Status: SHIPPED | OUTPATIENT
Start: 2024-01-29 | End: 2025-01-28

## 2024-01-29 NOTE — PROGRESS NOTES
Assessment/Plan:    Problem List Items Addressed This Visit          Psychiatric    Corticosteroid dependence    Overview     -on chronic prednisone 5 mg QD  -managed by rheumatoloy  -DEXA up to date, due July 2024            Cardiac/Vascular    Aortic atherosclerosis    Overview     -noted on imaging  -intolerant of statin         Hypertension associated with diabetes    Overview     Hypertension Medications               amlodipine-benazepril 5-20 mg (LOTREL) 5-20 mg per capsule TAKE ONE CAPSULE BY MOUTH TWICE DAILY    furosemide (LASIX) 40 MG tablet TAKE 2 TABLETS EVERY DAY AS NEEDED   -at goal today  -continue lifestyle modification with low sodium diet and exercise   -discussed hypertension disease course and importance of treating high blood pressure  -patient understood and advised of risk of untreated blood pressure.  ER precautions were given   for symptoms of hypertensive urgency and emergency.         Relevant Medications    benazepriL (LOTENSIN) 20 MG tablet       Renal/    Renal mass    Overview     -s/p cryoablation 12/13/2021 with [facility] interventional radiology  -CT Aug 2023- no evidence of recurrence  -recs to repeat 12 months from previous so will be due Aug 2024         Stage 3a chronic kidney disease    Overview     -previously followed by nephrology but lost to follow up  -labs remain stable  -renally dose medication  -avoid nephrotoxic agents, including NSAIDs              Immunology/Multi System    Immunocompromised state due to drug therapy    Overview     -chronic use of plaquenil, leflunomide and prednisone  -precautions given for prevention of illness, such as good hand hygiene          Rheumatoid arthritis with positive rheumatoid factor    Overview     -managed by rheumatology, Dr. Johnson  -currently on prednisone, leflunomide and plaquenil  -on chronic opiate therapy for pain            Endocrine    Severe obesity    Overview     General weight loss/lifestyle modification  strategies discussed (elicit support from others; identify saboteurs; non-food rewards, etc).  Informal exercise measures discussed, e.g. taking stairs instead of elevator.  Regular aerobic exercise program discussed.  Consider starting GLP1a in the future if patient open to this         Type 2 diabetes mellitus with hyperlipidemia    Overview     Diabetes Medications               glipiZIDE (GLUCOTROL) 5 MG TR24 Take 1 tablet (5 mg total) by mouth daily with breakfast.   -condition is currently controlled  -plan to repeat a1c  -see diabetic health maintenance listed below  -on statin: Yes  -on ACE-I/ARB: Yes  -counseling provided on importance of diabetic diet and medication compliance in order to treat diabetes  -discussed diabetes disease course and potential complications         Relevant Orders    TSH (Completed)    T4, FREE (Completed)    Type 2 diabetes mellitus with stage 3a chronic kidney disease, without long-term current use of insulin     Other Visit Diagnoses       Lower extremity edema    -  Primary    Relevant Orders    TSH (Completed)    BNP (Completed)    Comprehensive Metabolic Panel (Completed)    Abnormal TSH        Relevant Orders    TSH (Completed)    T4, FREE (Completed)    T3 (Completed)    Acute gout involving toe, unspecified cause, unspecified laterality        Relevant Orders    Uric Acid (Completed)            Follow up for labs friday: cmp,lipid,tsh,t3,t4,uric acid.    Divina Silva MD  _____________________________________________________________________________________________________________________________________________________    CC: follow up of chronic medical conditions     HPI:    Patient is in clinic today as an established patient.    HTN: The patient is currently being treated for essential hypertension. This condition is chronic and stable. The patient is tolerating their medication well with good compliance.  Denies any adverse effects of medications.  Counseling was  offered regarding low sodium diet.  The patient has a reduced salt intake. Routine exercise recommended. The patient denies headache, vision changes, chest pain, palpitations, shortness of breath. Reports some worsening lower extremity edema. This has been a chronic issue. Denies orthopnea or PND. Using lasix prn.     DM2: Patient presents for follow up of diabetes. Condition is chronic and stable. Patient denies symptoms, including foot ulcerations, hyperglycemia, hypoglycemia , nausea, paresthesia of the feet, polydipsia, polyuria and visual disturbances.  Evaluation to date has been included: fasting blood sugar, fasting lipid panel, hemoglobin A1C and microalbuminuria. Denies adverse effects of current medications.     Diabetes Management Status    Statin: Not taking  ACE/ARB: Taking    Screening or Prevention Patient's value Goal Complete/Controlled?   HgA1C Testing and Control   Lab Results   Component Value Date    HGBA1C 5.7 (H) 11/02/2023      Annually/Less than 8% Yes   Lipid profile : 11/02/2023 Annually Yes   LDL control Lab Results   Component Value Date    LDLCALC 56.8 (L) 11/02/2023    Annually/Less than 100 mg/dl  Yes   Nephropathy screening Lab Results   Component Value Date    LABMICR 5.0 04/25/2023     Lab Results   Component Value Date    PROTEINUA 1+ (A) 05/24/2023    Annually Yes   Blood pressure BP Readings from Last 1 Encounters:   02/26/24 (!) 155/76    Less than 140/90 No   Dilated retinal exam : 02/27/2024 Annually Yes   Foot exam   : 02/21/2023 Annually No       No other new complaints today.  Remaining chronic conditions have been reviewed and remain stable. Further detail as stated above.     HM reviewed.     No recent changes to medical/surgical history.    Current Outpatient Medications on File Prior to Visit   Medication Sig Dispense Refill    ACCU-CHEK VERONIQUE CONTROL SOLN Soln USE ONCE A WEEK AS DIRECTED 1 each 0    acetaminophen (TYLENOL) 325 MG tablet Take 650 mg by mouth as  needed.      albuterol (PROVENTIL/VENTOLIN HFA) 90 mcg/actuation inhaler Inhale 2 puffs into the lungs every 6 (six) hours as needed for Wheezing. 18 g 6    blood sugar diagnostic (TRUE METRIX GLUCOSE TEST STRIP) Strp Use to test blood glucose one (1) time a day, to be used with insurance-preferred brand of glucometer/supplies. 100 strip 11    blood-glucose calibrat control Cmpk 1 each by Misc.(Non-Drug; Combo Route) route once a week. 1 each 0    budesonide-formoterol 160-4.5 mcg (SYMBICORT) 160-4.5 mcg/actuation HFAA Symbicort 160 mcg-4.5 mcg/actuation HFA aerosol inhaler  INHALE TWO PUFFS TWICE DAILY 10.2 g 5    busPIRone (BUSPAR) 5 MG Tab Take 1 tablet (5 mg total) by mouth 2 (two) times daily. 180 tablet 3    cetirizine (ZYRTEC) 10 MG tablet Take 1 tablet (10 mg total) by mouth once daily. 30 tablet 11    clotrimazole-betamethasone 1-0.05% (LOTRISONE) cream Apply topically 2 (two) times daily. 45 g 6    cyanocobalamin, vitamin B-12, 1,000 mcg Subl Place 1,000 mcg under the tongue once daily. 30 tablet 11    denosumab (PROLIA) 60 mg/mL Syrg Inject 1 mL (60 mg total) into the skin every 6 (six) months. 2 mL 3    diclofenac sodium (VOLTAREN) 1 % Gel Apply 2 g topically 3 (three) times daily as needed (arthritis). 150 g 1    dicyclomine (BENTYL) 20 mg tablet TAKE 1 TABLET BY MOUTH EVERY 6 HOURS DAILY 120 tablet 3    fluocinolone-hydroq.-tretinoin (TRI-VITALY) 0.01-4-0.05 % Crea Apply 1 application topically every evening. 30 g 1    furosemide (LASIX) 40 MG tablet TAKE 2 TABLETS EVERY DAY AS NEEDED 180 tablet 1    glipiZIDE 5 MG TR24 TAKE 1 TABLET BY MOUTH ONCE DAILY WITH BREAKFAST 90 tablet 3    hydroquin-tretinoin-hydrocort 4-0.025-0.5 % Emul Apply 1 applicator topically every evening. 30 g 3    hydroxychloroquine (PLAQUENIL) 200 mg tablet Take 1 tablet (200 mg total) by mouth 2 (two) times daily. 180 tablet 3    leflunomide (ARAVA) 10 MG Tab TAKE 1 TABLET EVERY DAY 90 tablet 1    menthol-zinc  oxide (CALMOSEPTINE) 0.44-20.6 % Oint Apply topically once daily. 113 g 0    multivitamin with iron Tab Take 1 tablet by mouth once daily. 30 each 11    mupirocin (BACTROBAN) 2 % ointment Apply topically 3 (three) times daily. 30 g 6    potassium chloride SA (K-DUR,KLOR-CON) 20 MEQ tablet TAKE TWO TABLETS BY MOUTH TWICE DAILY 360 tablet 3    predniSONE (DELTASONE) 5 MG tablet Take 3 tablets by mouth every morning as needed 90 tablet 3    sodium chloride 5% (LALITHA 128) 5 % ophthalmic solution sodium chloride 5 % eye drops   INSTILL ONE DROP INTO EACH EYE FOUR TIMES DAILY      sucralfate (CARAFATE) 1 gram tablet TAKE 1 TABLET BY MOUTH FOUR TIMES DAILY 360 tablet 0    traZODone (DESYREL) 50 MG tablet TAKE 1 TABLET EVERY EVENING TO HELP WITH SLEEP 90 tablet 3    triamcinolone acetonide 0.1% (KENALOG) 0.1 % ointment Apply topically 2 (two) times daily. 80 g 0    TRUE METRIX GLUCOSE METER Misc test once DAILY AS DIRECTED      TRUEPLUS LANCETS 33 gauge Misc        No current facility-administered medications on file prior to visit.       Review of Systems   Constitutional:  Negative for chills, diaphoresis, fatigue and fever.   HENT:  Negative for congestion, ear pain, postnasal drip, sinus pain and sore throat.    Eyes:  Negative for pain and redness.   Respiratory:  Negative for cough, chest tightness and shortness of breath.    Cardiovascular:  Positive for leg swelling. Negative for chest pain.   Gastrointestinal:  Negative for abdominal pain, constipation, diarrhea, nausea and vomiting.   Genitourinary:  Negative for dysuria and hematuria.   Musculoskeletal:  Positive for arthralgias. Negative for joint swelling.   Skin:  Negative for rash.   Neurological:  Negative for dizziness, syncope and headaches.   Psychiatric/Behavioral:  Negative for dysphoric mood. The patient is not nervous/anxious.      Vitals:    01/29/24 1056   BP: 90/60   Pulse: 89   Temp: 98.1 °F (36.7 °C)   Weight: 89.4 kg (197 lb)   Height:  "5' 6" (1.676 m)       Wt Readings from Last 3 Encounters:   02/26/24 96.6 kg (213 lb)   01/29/24 89.4 kg (197 lb)   11/30/23 104.3 kg (229 lb 15 oz)       Physical Exam  Constitutional:       General: She is not in acute distress.     Appearance: Normal appearance. She is well-developed. She is obese.   HENT:      Head: Normocephalic and atraumatic.   Eyes:      Conjunctiva/sclera: Conjunctivae normal.   Cardiovascular:      Rate and Rhythm: Normal rate and regular rhythm.      Pulses: Normal pulses.      Heart sounds: Normal heart sounds. No murmur heard.  Pulmonary:      Effort: Pulmonary effort is normal. No respiratory distress.      Breath sounds: Normal breath sounds.   Abdominal:      General: Bowel sounds are normal. There is no distension.      Palpations: Abdomen is soft.      Tenderness: There is no abdominal tenderness.   Musculoskeletal:         General: Normal range of motion.      Cervical back: Normal range of motion and neck supple.      Comments: Trace LE edema   Skin:     General: Skin is warm and dry.      Findings: No rash.   Neurological:      General: No focal deficit present.      Mental Status: She is alert and oriented to person, place, and time.   Psychiatric:         Mood and Affect: Mood normal.         Behavior: Behavior normal.     Health Maintenance   Topic Date Due    Shingles Vaccine (1 of 2) Never done    Foot Exam  02/21/2024    Hemoglobin A1c  05/02/2024    Mammogram  06/26/2024    Lipid Panel  11/02/2024    Eye Exam  02/27/2025    DEXA Scan  07/13/2025    TETANUS VACCINE  03/09/2026    Hepatitis C Screening  Completed     "

## 2024-02-14 DIAGNOSIS — J45.40 MODERATE PERSISTENT ASTHMA WITHOUT COMPLICATION: ICD-10-CM

## 2024-02-14 NOTE — TELEPHONE ENCOUNTER
Care Due:                  Date            Visit Type   Department     Provider  --------------------------------------------------------------------------------                                EP -                              PRIMARY      [facility]  Last Visit: 01-      CARE ([facility])   MEDICINE       Divina Silva  Next Visit: None Scheduled  None         None Found                                                            Last  Test          Frequency    Reason                     Performed    Due Date  --------------------------------------------------------------------------------    HBA1C.......  6 months...  glipiZIDE................  11- 05-    Uric Acid...  12 months..  colchicine...............  Not Found    Overdue    [employer] Embedded Care Due Messages. Reference number: [id number].   2/14/2024 10:15:26 AM CST

## 2024-02-15 RX ORDER — ALBUTEROL SULFATE 0.83 MG/ML
SOLUTION RESPIRATORY (INHALATION)
Qty: 180 ML | Refills: 3 | Status: SHIPPED | OUTPATIENT
Start: 2024-02-15

## 2024-02-15 NOTE — TELEPHONE ENCOUNTER
Provider Staff:  Action required for this patient    Requires labs      Please see care gap opportunities below in Care Due Message.    Thanks!  [facility]     Appointments      Date Provider   Last Visit   1/29/2024 Divina Silva MD   Next Visit   Visit date not found Divina Silva MD     Refill Decision Note   Rekha Miller  is requesting a refill authorization.  Brief Assessment and Rationale for Refill:  Approve     Medication Therapy Plan:  Due for A1c; FLOS ( uric acid); Duplicate Medication/Therapy: albuterol (Overridden by Divina Silva MD on Aug 22, 2022)      Alert overridden per protocol: Yes   Comments:     Note composed:2:01 PM 02/15/2024

## 2024-02-20 ENCOUNTER — TELEPHONE (OUTPATIENT)
Dept: FAMILY MEDICINE | Facility: CLINIC | Age: 79
End: 2024-02-20
Payer: MEDICARE

## 2024-02-20 ENCOUNTER — TELEPHONE (OUTPATIENT)
Dept: PRIMARY CARE CLINIC | Facility: CLINIC | Age: 79
End: 2024-02-20
Payer: MEDICARE

## 2024-02-20 ENCOUNTER — LAB VISIT (OUTPATIENT)
Dept: LAB | Facility: HOSPITAL | Age: 79
End: 2024-02-20
Attending: INTERNAL MEDICINE
Payer: MEDICARE

## 2024-02-20 DIAGNOSIS — R60.0 LOWER EXTREMITY EDEMA: ICD-10-CM

## 2024-02-20 DIAGNOSIS — E11.69 TYPE 2 DIABETES MELLITUS WITH HYPERLIPIDEMIA: ICD-10-CM

## 2024-02-20 DIAGNOSIS — M10.9 ACUTE GOUT INVOLVING TOE, UNSPECIFIED CAUSE, UNSPECIFIED LATERALITY: ICD-10-CM

## 2024-02-20 DIAGNOSIS — E78.5 TYPE 2 DIABETES MELLITUS WITH HYPERLIPIDEMIA: ICD-10-CM

## 2024-02-20 DIAGNOSIS — R79.89 ABNORMAL TSH: ICD-10-CM

## 2024-02-20 LAB
ALBUMIN SERPL BCP-MCNC: 3.1 G/DL (ref 3.5–5.2)
ALP SERPL-CCNC: 89 U/L (ref 55–135)
ALT SERPL W/O P-5'-P-CCNC: 12 U/L (ref 10–44)
ANION GAP SERPL CALC-SCNC: 9 MMOL/L (ref 8–16)
AST SERPL-CCNC: 16 U/L (ref 10–40)
BILIRUB SERPL-MCNC: 0.6 MG/DL (ref 0.1–1)
BNP SERPL-MCNC: 11 PG/ML (ref 0–99)
BUN SERPL-MCNC: 20 MG/DL (ref 8–23)
CALCIUM SERPL-MCNC: 9.1 MG/DL (ref 8.7–10.5)
CHLORIDE SERPL-SCNC: 104 MMOL/L (ref 95–110)
CO2 SERPL-SCNC: 25 MMOL/L (ref 23–29)
CREAT SERPL-MCNC: 1.4 MG/DL (ref 0.5–1.4)
EST. GFR  (NO RACE VARIABLE): 39 ML/MIN/1.73 M^2
GLUCOSE SERPL-MCNC: 47 MG/DL (ref 70–110)
POTASSIUM SERPL-SCNC: 4 MMOL/L (ref 3.5–5.1)
PROT SERPL-MCNC: 7.3 G/DL (ref 6–8.4)
SODIUM SERPL-SCNC: 138 MMOL/L (ref 136–145)
T3 SERPL-MCNC: 75 NG/DL (ref 60–180)
T4 FREE SERPL-MCNC: 1.18 NG/DL (ref 0.71–1.51)
TSH SERPL DL<=0.005 MIU/L-ACNC: 0.63 UIU/ML (ref 0.4–4)
URATE SERPL-MCNC: 9 MG/DL (ref 2.4–5.7)

## 2024-02-20 PROCEDURE — 36415 COLL VENOUS BLD VENIPUNCTURE: CPT | Mod: HCNC,PO | Performed by: INTERNAL MEDICINE

## 2024-02-20 PROCEDURE — 84550 ASSAY OF BLOOD/URIC ACID: CPT | Mod: HCNC | Performed by: INTERNAL MEDICINE

## 2024-02-20 PROCEDURE — 84480 ASSAY TRIIODOTHYRONINE (T3): CPT | Mod: HCNC | Performed by: INTERNAL MEDICINE

## 2024-02-20 PROCEDURE — 84443 ASSAY THYROID STIM HORMONE: CPT | Mod: HCNC | Performed by: INTERNAL MEDICINE

## 2024-02-20 PROCEDURE — 84439 ASSAY OF FREE THYROXINE: CPT | Mod: HCNC | Performed by: INTERNAL MEDICINE

## 2024-02-20 PROCEDURE — 83880 ASSAY OF NATRIURETIC PEPTIDE: CPT | Mod: HCNC | Performed by: INTERNAL MEDICINE

## 2024-02-20 PROCEDURE — 80053 COMPREHEN METABOLIC PANEL: CPT | Mod: HCNC | Performed by: INTERNAL MEDICINE

## 2024-02-20 NOTE — TELEPHONE ENCOUNTER
----- Message from Brenda Degroot sent at 2/20/2024 10:04 AM CST -----  Contact: Patient, [phone]  Calling to speak with the nurse. Please call her. Thanks.

## 2024-02-20 NOTE — TELEPHONE ENCOUNTER
Pt advised labs are still in process and will need to be resulted by the provider. Once this is done we will call her with the recommendations

## 2024-02-20 NOTE — TELEPHONE ENCOUNTER
Call patient and ask for updated glucose and make sure she is not symptomatic. She needs to decrease Glipizide dose to 1/2 pill (2.5 mg) daily.

## 2024-02-20 NOTE — TELEPHONE ENCOUNTER
----- Message from Eula Sylvester sent at 2/20/2024  4:33 PM CST -----  Contact: [facility]  Domenico is calling regarding a critical for patient .  Please give her a call back at [phone]       30

## 2024-02-20 NOTE — TELEPHONE ENCOUNTER
Imelda from lab called with critical glucose value of 47 on patient. Phong Kinsey notified via phone also.

## 2024-02-20 NOTE — TELEPHONE ENCOUNTER
Informed pt of lab value and Dr Silva recommendations. Pt states she has not taken glucose today at all.I advised he to take this afternoon and in the am and report with readings.    Pt reports she is feeling ok, complained about pain no other s/s.

## 2024-02-26 ENCOUNTER — OFFICE VISIT (OUTPATIENT)
Dept: RHEUMATOLOGY | Facility: CLINIC | Age: 79
End: 2024-02-26
Payer: MEDICARE

## 2024-02-26 VITALS
HEART RATE: 66 BPM | DIASTOLIC BLOOD PRESSURE: 76 MMHG | SYSTOLIC BLOOD PRESSURE: 155 MMHG | BODY MASS INDEX: 34.23 KG/M2 | HEIGHT: 66 IN | WEIGHT: 213 LBS

## 2024-02-26 DIAGNOSIS — R05.9 COUGH, UNSPECIFIED TYPE: ICD-10-CM

## 2024-02-26 DIAGNOSIS — M81.0 OSTEOPOROSIS, UNSPECIFIED OSTEOPOROSIS TYPE, UNSPECIFIED PATHOLOGICAL FRACTURE PRESENCE: ICD-10-CM

## 2024-02-26 DIAGNOSIS — M05.9 RHEUMATOID ARTHRITIS WITH POSITIVE RHEUMATOID FACTOR, INVOLVING UNSPECIFIED SITE: ICD-10-CM

## 2024-02-26 DIAGNOSIS — G89.4 CHRONIC PAIN SYNDROME: ICD-10-CM

## 2024-02-26 DIAGNOSIS — N28.9 DECREASED RENAL FUNCTION: ICD-10-CM

## 2024-02-26 DIAGNOSIS — Z79.899 HIGH RISK MEDICATION USE: ICD-10-CM

## 2024-02-26 DIAGNOSIS — M05.9 SEROPOSITIVE RHEUMATOID ARTHRITIS: Primary | ICD-10-CM

## 2024-02-26 PROCEDURE — 99214 OFFICE O/P EST MOD 30 MIN: CPT | Mod: 25,HCNC,S$GLB, | Performed by: PHYSICIAN ASSISTANT

## 2024-02-26 PROCEDURE — 1159F MED LIST DOCD IN RCRD: CPT | Mod: HCNC,CPTII,S$GLB, | Performed by: PHYSICIAN ASSISTANT

## 2024-02-26 PROCEDURE — 1125F AMNT PAIN NOTED PAIN PRSNT: CPT | Mod: HCNC,CPTII,S$GLB, | Performed by: PHYSICIAN ASSISTANT

## 2024-02-26 PROCEDURE — 3078F DIAST BP <80 MM HG: CPT | Mod: HCNC,CPTII,S$GLB, | Performed by: PHYSICIAN ASSISTANT

## 2024-02-26 PROCEDURE — 3077F SYST BP >= 140 MM HG: CPT | Mod: HCNC,CPTII,S$GLB, | Performed by: PHYSICIAN ASSISTANT

## 2024-02-26 PROCEDURE — 99999 PR PBB SHADOW E&M-EST. PATIENT-LVL V: CPT | Mod: PBBFAC,HCNC,, | Performed by: PHYSICIAN ASSISTANT

## 2024-02-26 PROCEDURE — 96372 THER/PROPH/DIAG INJ SC/IM: CPT | Mod: HCNC,S$GLB,, | Performed by: PHYSICIAN ASSISTANT

## 2024-02-26 PROCEDURE — 1160F RVW MEDS BY RX/DR IN RCRD: CPT | Mod: HCNC,CPTII,S$GLB, | Performed by: PHYSICIAN ASSISTANT

## 2024-02-26 RX ORDER — OXYCODONE AND ACETAMINOPHEN 10; 325 MG/1; MG/1
1 TABLET ORAL EVERY 8 HOURS PRN
Qty: 90 TABLET | Refills: 0 | Status: SHIPPED | OUTPATIENT
Start: 2024-05-15 | End: 2024-06-05 | Stop reason: SDUPTHER

## 2024-02-26 RX ORDER — CYANOCOBALAMIN 1000 UG/ML
1000 INJECTION, SOLUTION INTRAMUSCULAR; SUBCUTANEOUS
Status: COMPLETED | OUTPATIENT
Start: 2024-02-26 | End: 2024-02-26

## 2024-02-26 RX ORDER — METHYLPREDNISOLONE ACETATE 80 MG/ML
80 INJECTION, SUSPENSION INTRA-ARTICULAR; INTRALESIONAL; INTRAMUSCULAR; SOFT TISSUE
Status: COMPLETED | OUTPATIENT
Start: 2024-02-26 | End: 2024-02-26

## 2024-02-26 RX ORDER — OXYCODONE AND ACETAMINOPHEN 10; 325 MG/1; MG/1
1 TABLET ORAL EVERY 8 HOURS PRN
Qty: 90 TABLET | Refills: 0 | Status: SHIPPED | OUTPATIENT
Start: 2024-03-16 | End: 2024-04-15

## 2024-02-26 RX ORDER — IBANDRONATE SODIUM 150 MG/1
150 TABLET, FILM COATED ORAL
COMMUNITY
Start: 2023-12-29 | End: 2024-02-26

## 2024-02-26 RX ORDER — OXYCODONE AND ACETAMINOPHEN 10; 325 MG/1; MG/1
1 TABLET ORAL EVERY 8 HOURS PRN
Qty: 90 TABLET | Refills: 0 | Status: SHIPPED | OUTPATIENT
Start: 2024-04-15 | End: 2024-05-15

## 2024-02-26 RX ORDER — PROMETHAZINE HYDROCHLORIDE AND DEXTROMETHORPHAN HYDROBROMIDE 6.25; 15 MG/5ML; MG/5ML
5 SYRUP ORAL EVERY 8 HOURS PRN
Qty: 150 ML | Refills: 0 | Status: SHIPPED | OUTPATIENT
Start: 2024-02-26 | End: 2024-03-07

## 2024-02-26 RX ADMIN — CYANOCOBALAMIN 1000 MCG: 1000 INJECTION, SOLUTION INTRAMUSCULAR; SUBCUTANEOUS at 02:02

## 2024-02-26 RX ADMIN — METHYLPREDNISOLONE ACETATE 80 MG: 80 INJECTION, SUSPENSION INTRA-ARTICULAR; INTRALESIONAL; INTRAMUSCULAR; SOFT TISSUE at 02:02

## 2024-02-26 NOTE — Clinical Note
Can you help me find out what happened to her prolia in October? Did the office just not call to set it up? Last given 4/23

## 2024-02-26 NOTE — TELEPHONE ENCOUNTER
In clinic today   superficially cooperative superficially cooperative/Cooperative superficially cooperative superficially cooperative

## 2024-02-26 NOTE — PROGRESS NOTES
Subjective:       Patient ID: Rekha Miller is a 78 y.o. female.    Chief Complaint: Disease Management    Mrs. Miller is a 78 year old female who presents to clinic visit for follow up on RA. She has been compliant with plaquenil and prednisone 10-15 mg daily for RA. She has chronic pain in her hands, wrists, hips, and low back. She is not sure if she is taking leflunomide. She wants all of her medications labeled with their indication moving forward.     She started prolia in 4/2023 and denies s/e. Not sure why she did not receive injection in October 2023?    We reviewed her recent labs from PCP--renal function declined.     She is taking percocet tid prn for severe pain without s/e.     She complains of ongoing cough and is asking for refill of cough medication. She is on antibiotic by another provider.    Current tx:  1. LEF ? Not sure if she is taking this  2. Plaquenil  3. Percocet  4. Prednisone  5. Prolia      Review of Systems   Constitutional:  Positive for activity change. Negative for appetite change, chills, fatigue and fever.   HENT:          Dry mouth   Eyes:  Negative for visual disturbance.        Dry eyes   Respiratory:  Positive for cough. Negative for shortness of breath.    Cardiovascular:  Negative for chest pain, palpitations and leg swelling.   Gastrointestinal:  Negative for abdominal pain and constipation.   Musculoskeletal:  Positive for arthralgias and joint swelling.   Allergic/Immunologic: Positive for immunocompromised state.   Neurological:  Negative for dizziness, weakness, light-headedness and headaches.         Objective:     Vitals:    02/26/24 1302   BP: (!) 155/76   Pulse: 66       Past Medical History:   Diagnosis Date    Asthma     COPD (chronic obstructive pulmonary disease)     Degenerative disc disease     Diabetes mellitus     Diabetes mellitus, type 2     Fibromyalgia     Hypertension     Rheumatoid arthritis     Rheumatoid arthritis(714.0)     Rheumatoid arthritis      Past Surgical History:   Procedure Laterality Date    BREAST SURGERY  1986    CATARACT EXTRACTION      EYE SURGERY  2013    Catatracts    ORIF FEMUR FRACTURE      right knee ligament rpeair  2005    right shoulder surgery  4/18/07    Dr. Gao          Physical Exam   Constitutional: She is oriented to person, place, and time.   Eyes: Right conjunctiva is not injected. Left conjunctiva is not injected.   Neck: No JVD present. No thyromegaly present.   Cardiovascular: Normal rate.   Pulmonary/Chest: Effort normal.   Musculoskeletal:      Right shoulder: Tenderness present.      Left shoulder: Tenderness present.      Right wrist: Swelling and tenderness present.      Left wrist: Swelling and tenderness present.   Lymphadenopathy:     She has no cervical adenopathy.   Neurological: She is alert and oriented to person, place, and time.   Pt ambulates with wheeled walker   Skin: No rash noted.   Psychiatric: Mood and affect normal.       Right Side Rheumatological Exam     Examination finds the 1st PIP, 2nd PIP, 3rd PIP, 4th PIP and 5th PIP normal.    The patient is tender to palpation of the shoulder, wrist, 1st MCP, 2nd MCP, 3rd MCP, 4th MCP and 5th MCP    She has swelling of the wrist, 1st MCP, 2nd MCP, 3rd MCP, 4th MCP and 5th MCP    The patient has an enlarged wrist    Left Side Rheumatological Exam     Examination finds the 1st PIP, 2nd PIP, 3rd PIP, 4th PIP and 5th PIP normal.    The patient is tender to palpation of the shoulder, wrist, 1st MCP, 2nd MCP, 3rd MCP, 4th MCP and 5th MCP.    She has swelling of the wrist, 1st MCP, 2nd MCP, 3rd MCP, 4th MCP and 5th MCP          Labs:  Component      Latest Ref Rng 2/20/2024   Sodium      136 - 145 mmol/L 138    Potassium      3.5 - 5.1 mmol/L 4.0    Chloride      95 - 110 mmol/L 104    CO2      23 - 29 mmol/L 25    Glucose      70 - 110 mg/dL 47 (LL)    BUN      8 - 23 mg/dL 20    Creatinine      0.5 - 1.4 mg/dL 1.4    Calcium      8.7 - 10.5 mg/dL 9.1    PROTEIN  TOTAL      6.0 - 8.4 g/dL 7.3    Albumin      3.5 - 5.2 g/dL 3.1 (L)    BILIRUBIN TOTAL      0.1 - 1.0 mg/dL 0.6    ALP      55 - 135 U/L 89    AST      10 - 40 U/L 16    ALT      10 - 44 U/L 12    eGFR      >60 mL/min/1.73 m^2 39.0 !    Anion Gap      8 - 16 mmol/L 9       Legend:  (LL) Low Panic  (L) Low  ! Abnormal  Assessment:       1. Seropositive rheumatoid arthritis    2. Decreased renal function    3. High risk medication use    4. Osteoporosis, unspecified osteoporosis type, unspecified pathological fracture presence    5. Cough, unspecified type            Plan:       Seropositive rheumatoid arthritis  -     methylPREDNISolone acetate injection 80 mg  -     cyanocobalamin injection 1,000 mcg    Decreased renal function    High risk medication use    Osteoporosis, unspecified osteoporosis type, unspecified pathological fracture presence    Cough, unspecified type  -     promethazine-dextromethorphan (PROMETHAZINE-DM) 6.25-15 mg/5 mL Syrp; Take 5 mLs by mouth every 8 (eight) hours as needed (cough).  Dispense: 150 mL; Refill: 0        Assessment:  78 year old female with  Seropositive (+RF/+CCP) rheumatoid arthritis, Sjogren's syndrome (+SSB) on LEF and plaquenil  --chronic pain syndrome   --GERD, d/c zantac start famotidine  --HTN  --asthma  --controlled type 2 diabetes  --chronic steroid use  --left renal mass s/p cryo, stable on recent imaging 9/2023, repeat CT 1 year    Plan:  1. Cont plaquenil 200 mg bid  2. cont prednisone 5-15 mg daily  3. Depo 80, b12 given today  4. Cont prolia every 6 months. Check calcium prior to injection. Follow up with clinical pharmacist to find out status of injection and set up ASAP.  5. Cont percocet per MD.  I have checked [place] prescription monitoring program site and no unusual or abnormal behavior has occurred pt understand the risk and benefits of taking opioid medications and has decided to continue the medication.  Pended x 3  6. Repeat labs in 2 weeks  7. Prom  dm cough syrup sent  8. Call pharmacy to find out if pt is filling LEF?  9. Stop boniva since on prolia    Follow up:  3 mo Dr. Johnson

## 2024-02-27 LAB
LEFT EYE DM RETINOPATHY: NEGATIVE
RIGHT EYE DM RETINOPATHY: NEGATIVE

## 2024-03-04 ENCOUNTER — PATIENT OUTREACH (OUTPATIENT)
Dept: ADMINISTRATIVE | Facility: HOSPITAL | Age: 79
End: 2024-03-04
Payer: MEDICARE

## 2024-03-11 ENCOUNTER — LAB VISIT (OUTPATIENT)
Dept: LAB | Facility: HOSPITAL | Age: 79
End: 2024-03-11
Attending: INTERNAL MEDICINE
Payer: MEDICARE

## 2024-03-11 DIAGNOSIS — M25.562 ARTHRALGIA OF BOTH KNEES: ICD-10-CM

## 2024-03-11 DIAGNOSIS — J32.9 SINUSITIS, UNSPECIFIED CHRONICITY, UNSPECIFIED LOCATION: ICD-10-CM

## 2024-03-11 DIAGNOSIS — M05.9 RHEUMATOID ARTHRITIS WITH POSITIVE RHEUMATOID FACTOR, INVOLVING UNSPECIFIED SITE: ICD-10-CM

## 2024-03-11 DIAGNOSIS — G89.4 CHRONIC PAIN SYNDROME: ICD-10-CM

## 2024-03-11 DIAGNOSIS — J40 BRONCHITIS: ICD-10-CM

## 2024-03-11 DIAGNOSIS — E11.9 TYPE 2 DIABETES MELLITUS WITHOUT COMPLICATION, WITHOUT LONG-TERM CURRENT USE OF INSULIN: ICD-10-CM

## 2024-03-11 DIAGNOSIS — M05.9 SEROPOSITIVE RHEUMATOID ARTHRITIS: ICD-10-CM

## 2024-03-11 DIAGNOSIS — M06.30 RHEUMATOID NODULES: ICD-10-CM

## 2024-03-11 DIAGNOSIS — Z79.899 IMMUNOCOMPROMISED STATE DUE TO DRUG THERAPY: ICD-10-CM

## 2024-03-11 DIAGNOSIS — M25.561 ARTHRALGIA OF BOTH KNEES: ICD-10-CM

## 2024-03-11 DIAGNOSIS — M35.00 SJOGREN'S SYNDROME, WITH UNSPECIFIED ORGAN INVOLVEMENT: ICD-10-CM

## 2024-03-11 DIAGNOSIS — M81.0 OSTEOPOROSIS, UNSPECIFIED OSTEOPOROSIS TYPE, UNSPECIFIED PATHOLOGICAL FRACTURE PRESENCE: ICD-10-CM

## 2024-03-11 DIAGNOSIS — D84.821 IMMUNOCOMPROMISED STATE DUE TO DRUG THERAPY: ICD-10-CM

## 2024-03-11 LAB
25(OH)D3+25(OH)D2 SERPL-MCNC: 35 NG/ML (ref 30–96)
ALBUMIN SERPL BCP-MCNC: 3.1 G/DL (ref 3.5–5.2)
ALP SERPL-CCNC: 75 U/L (ref 55–135)
ALT SERPL W/O P-5'-P-CCNC: 13 U/L (ref 10–44)
ANION GAP SERPL CALC-SCNC: 5 MMOL/L (ref 8–16)
AST SERPL-CCNC: 10 U/L (ref 10–40)
BASOPHILS # BLD AUTO: 0.08 K/UL (ref 0–0.2)
BASOPHILS NFR BLD: 0.9 % (ref 0–1.9)
BILIRUB SERPL-MCNC: 0.3 MG/DL (ref 0.1–1)
BUN SERPL-MCNC: 20 MG/DL (ref 8–23)
CALCIUM SERPL-MCNC: 9.6 MG/DL (ref 8.7–10.5)
CCP AB SER IA-ACNC: 173.7 U/ML
CHLORIDE SERPL-SCNC: 110 MMOL/L (ref 95–110)
CO2 SERPL-SCNC: 24 MMOL/L (ref 23–29)
CREAT SERPL-MCNC: 1 MG/DL (ref 0.5–1.4)
CRP SERPL-MCNC: 8.1 MG/L (ref 0–8.2)
DIFFERENTIAL METHOD BLD: ABNORMAL
EOSINOPHIL # BLD AUTO: 0.1 K/UL (ref 0–0.5)
EOSINOPHIL NFR BLD: 1.1 % (ref 0–8)
ERYTHROCYTE [DISTWIDTH] IN BLOOD BY AUTOMATED COUNT: 16 % (ref 11.5–14.5)
ERYTHROCYTE [SEDIMENTATION RATE] IN BLOOD BY WESTERGREN METHOD: 40 MM/HR (ref 0–20)
EST. GFR  (NO RACE VARIABLE): 58 ML/MIN/1.73 M^2
GLUCOSE SERPL-MCNC: 75 MG/DL (ref 70–110)
HCT VFR BLD AUTO: 35.8 % (ref 37–48.5)
HGB BLD-MCNC: 11 G/DL (ref 12–16)
IMM GRANULOCYTES # BLD AUTO: 0.03 K/UL (ref 0–0.04)
IMM GRANULOCYTES NFR BLD AUTO: 0.3 % (ref 0–0.5)
LYMPHOCYTES # BLD AUTO: 3.5 K/UL (ref 1–4.8)
LYMPHOCYTES NFR BLD: 38.4 % (ref 18–48)
MCH RBC QN AUTO: 26.1 PG (ref 27–31)
MCHC RBC AUTO-ENTMCNC: 30.7 G/DL (ref 32–36)
MCV RBC AUTO: 85 FL (ref 82–98)
MONOCYTES # BLD AUTO: 0.7 K/UL (ref 0.3–1)
MONOCYTES NFR BLD: 8.2 % (ref 4–15)
NEUTROPHILS # BLD AUTO: 4.6 K/UL (ref 1.8–7.7)
NEUTROPHILS NFR BLD: 51.1 % (ref 38–73)
NRBC BLD-RTO: 0 /100 WBC
PLATELET # BLD AUTO: 278 K/UL (ref 150–450)
PMV BLD AUTO: 11.4 FL (ref 9.2–12.9)
POTASSIUM SERPL-SCNC: 4.2 MMOL/L (ref 3.5–5.1)
PROT SERPL-MCNC: 6.8 G/DL (ref 6–8.4)
RBC # BLD AUTO: 4.22 M/UL (ref 4–5.4)
RHEUMATOID FACT SERPL-ACNC: 61 IU/ML (ref 0–15)
SODIUM SERPL-SCNC: 139 MMOL/L (ref 136–145)
WBC # BLD AUTO: 8.98 K/UL (ref 3.9–12.7)

## 2024-03-11 PROCEDURE — 82306 VITAMIN D 25 HYDROXY: CPT | Mod: HCNC | Performed by: INTERNAL MEDICINE

## 2024-03-11 PROCEDURE — 85025 COMPLETE CBC W/AUTO DIFF WBC: CPT | Mod: HCNC | Performed by: INTERNAL MEDICINE

## 2024-03-11 PROCEDURE — 86431 RHEUMATOID FACTOR QUANT: CPT | Mod: HCNC | Performed by: INTERNAL MEDICINE

## 2024-03-11 PROCEDURE — 86200 CCP ANTIBODY: CPT | Mod: HCNC | Performed by: INTERNAL MEDICINE

## 2024-03-11 PROCEDURE — 86235 NUCLEAR ANTIGEN ANTIBODY: CPT | Mod: HCNC | Performed by: INTERNAL MEDICINE

## 2024-03-11 PROCEDURE — 85651 RBC SED RATE NONAUTOMATED: CPT | Mod: HCNC,PO | Performed by: INTERNAL MEDICINE

## 2024-03-11 PROCEDURE — 36415 COLL VENOUS BLD VENIPUNCTURE: CPT | Mod: HCNC,PO | Performed by: INTERNAL MEDICINE

## 2024-03-11 PROCEDURE — 80053 COMPREHEN METABOLIC PANEL: CPT | Mod: HCNC | Performed by: INTERNAL MEDICINE

## 2024-03-11 PROCEDURE — 86140 C-REACTIVE PROTEIN: CPT | Mod: HCNC | Performed by: INTERNAL MEDICINE

## 2024-03-12 LAB
ANTI-SSB ANTIBODY: 0.11 RATIO (ref 0–0.99)
ANTI-SSB INTERPRETATION: NEGATIVE

## 2024-03-13 DIAGNOSIS — L40.9 PSORIASIS: ICD-10-CM

## 2024-03-13 DIAGNOSIS — E11.9 TYPE 2 DIABETES MELLITUS WITHOUT COMPLICATION, UNSPECIFIED WHETHER LONG TERM INSULIN USE: ICD-10-CM

## 2024-03-13 DIAGNOSIS — M47.817 LUMBAR AND SACRAL ARTHRITIS: ICD-10-CM

## 2024-03-13 DIAGNOSIS — E21.3 HYPERPARATHYROIDISM, UNSPECIFIED: ICD-10-CM

## 2024-03-13 DIAGNOSIS — M35.00 SJOGREN'S SYNDROME, WITH UNSPECIFIED ORGAN INVOLVEMENT: ICD-10-CM

## 2024-03-13 DIAGNOSIS — M05.9 RHEUMATOID ARTHRITIS WITH POSITIVE RHEUMATOID FACTOR, INVOLVING UNSPECIFIED SITE: ICD-10-CM

## 2024-03-13 DIAGNOSIS — E11.9 TYPE 2 DIABETES MELLITUS WITHOUT COMPLICATION, WITHOUT LONG-TERM CURRENT USE OF INSULIN: ICD-10-CM

## 2024-03-13 DIAGNOSIS — G89.4 CHRONIC PAIN SYNDROME: ICD-10-CM

## 2024-03-13 DIAGNOSIS — N28.89 RENAL MASS: ICD-10-CM

## 2024-03-13 DIAGNOSIS — K21.9 GASTROESOPHAGEAL REFLUX DISEASE WITHOUT ESOPHAGITIS: ICD-10-CM

## 2024-03-13 DIAGNOSIS — M17.0 PRIMARY OSTEOARTHRITIS OF BOTH KNEES: ICD-10-CM

## 2024-03-13 DIAGNOSIS — K21.9 GASTROESOPHAGEAL REFLUX DISEASE, UNSPECIFIED WHETHER ESOPHAGITIS PRESENT: ICD-10-CM

## 2024-03-13 PROBLEM — D84.9 IMMUNODEFICIENCY: Status: RESOLVED | Noted: 2022-12-21 | Resolved: 2024-03-13

## 2024-03-13 PROBLEM — Z79.899 IMMUNOCOMPROMISED STATE DUE TO DRUG THERAPY: Status: ACTIVE | Noted: 2024-03-13

## 2024-03-13 PROBLEM — E66.01 SEVERE OBESITY: Status: ACTIVE | Noted: 2024-03-13

## 2024-03-13 PROBLEM — N18.31 STAGE 3A CHRONIC KIDNEY DISEASE: Status: ACTIVE | Noted: 2024-03-13

## 2024-03-13 PROBLEM — D84.821 IMMUNOCOMPROMISED STATE DUE TO DRUG THERAPY: Status: ACTIVE | Noted: 2024-03-13

## 2024-03-13 NOTE — TELEPHONE ENCOUNTER
----- Message from Senia eNwsome sent at 3/13/2024 12:34 PM CDT -----  Type: Needs Medical Advice  Who Called:  pt     Pharmacy name and phone #:      [facility] [place] - [address]  [address]  [place] [address]  Phone: [phone] Fax: [phone]      Best Call Back Number: [phone] (home) [phone] (work)    Additional Information: pt is needing a refill and does not know the name , says its a green pill possibly for her stomach please advise

## 2024-03-13 NOTE — TELEPHONE ENCOUNTER
----- Message from Ofe Graham sent at 3/13/2024 12:28 PM CDT -----  Contact: self  Type:  Needs Medical Advice    Who Called: self  Symptoms (please be specific): pt need a refill on a medication. Pt doesn't know the name but it is for her stomach.     Pharmacy name and phone #:      [facility] - [place], [place] - [address]  [address] [address]  Phone: [phone] Fax: [phone]      Would the patient rather a call back or a response via [facility]? call  Best Call Back Number: [phone] (home) [phone] (work)    Additional Information: please advise and thank you.

## 2024-03-16 RX ORDER — SUCRALFATE 1 G/1
1 TABLET ORAL 4 TIMES DAILY
Qty: 360 TABLET | Refills: 0 | Status: SHIPPED | OUTPATIENT
Start: 2024-03-16

## 2024-03-16 RX ORDER — DICYCLOMINE HYDROCHLORIDE 20 MG/1
20 TABLET ORAL 4 TIMES DAILY
Qty: 120 TABLET | Refills: 3 | Status: SHIPPED | OUTPATIENT
Start: 2024-03-16

## 2024-04-01 ENCOUNTER — NURSE TRIAGE (OUTPATIENT)
Dept: ADMINISTRATIVE | Facility: CLINIC | Age: 79
End: 2024-04-01
Payer: MEDICARE

## 2024-04-01 NOTE — TELEPHONE ENCOUNTER
Pt calling, states she was contacted by someone within [facility] to schedule an AWV visit and wants to confirm the time. Advised pt that the visit is scheduled for 5/6, but pt states that the visit isn't supposed to be scheduled and that someone is supposed to contact her about scheduling a time. Advised pt that a message would be sent to provider and AWV team and that someone would reach out tomorrow. Pt verbalized understand.      Reason for Disposition   General information question, no triage required and triager able to answer question    Protocols used: Information Only Call - No Triage-A-

## 2024-04-23 DIAGNOSIS — E11.59 HYPERTENSION ASSOCIATED WITH DIABETES: ICD-10-CM

## 2024-04-23 DIAGNOSIS — I15.2 HYPERTENSION ASSOCIATED WITH DIABETES: ICD-10-CM

## 2024-04-23 NOTE — TELEPHONE ENCOUNTER
Care Due:                  Date            Visit Type   Department     Provider  --------------------------------------------------------------------------------                                EP -                              PRIMARY      [facility]  Last Visit: 01-      CARE ([facility])   MEDICINE       Divina Silva  Next Visit: None Scheduled  None         None Found                                                            Last  Test          Frequency    Reason                     Performed    Due Date  --------------------------------------------------------------------------------    HBA1C.......  6 months...  glipiZIDE................  11- 05-    [facility] Embedded Care Due Messages. Reference number: [id number].   4/23/2024 2:59:29 PM CDT

## 2024-04-24 RX ORDER — POTASSIUM CHLORIDE 20 MEQ/1
TABLET, EXTENDED RELEASE ORAL
Qty: 360 TABLET | Refills: 3 | Status: SHIPPED | OUTPATIENT
Start: 2024-04-24

## 2024-04-24 NOTE — TELEPHONE ENCOUNTER
Refill Decision Note   Rekha Paul  is requesting a refill authorization.  Brief Assessment and Rationale for Refill:  Approve     Medication Therapy Plan:         Comments:     Note composed:9:57 AM 04/24/2024

## 2024-04-25 ENCOUNTER — TELEPHONE (OUTPATIENT)
Dept: RHEUMATOLOGY | Facility: CLINIC | Age: 79
End: 2024-04-25
Payer: MEDICARE

## 2024-04-25 DIAGNOSIS — M81.0 OSTEOPOROSIS, UNSPECIFIED OSTEOPOROSIS TYPE, UNSPECIFIED PATHOLOGICAL FRACTURE PRESENCE: Primary | ICD-10-CM

## 2024-04-25 NOTE — TELEPHONE ENCOUNTER
----- Message from Ariana Henriquez PA-C sent at 2/26/2024  2:19 PM CST -----  Can you help me find out what happened to her prolia in October? Did the office just not call to set it up? Last given 4/23

## 2024-04-25 NOTE — TELEPHONE ENCOUNTER
Pt was due for Prolia 11/9 with Dr. Johnson but appointment was cancelled. It was rescheduled to 11/30/23 but Prolia was not given at that appointment     Staff,  Please call patient to schedule her for an appointment with me ASAP for prolia. Also needs CMP scheduled prior to Prolia appt. Ordered

## 2024-04-29 DIAGNOSIS — M81.0 OSTEOPOROSIS, UNSPECIFIED OSTEOPOROSIS TYPE, UNSPECIFIED PATHOLOGICAL FRACTURE PRESENCE: ICD-10-CM

## 2024-04-29 RX ORDER — DENOSUMAB 60 MG/ML
60 INJECTION SUBCUTANEOUS
Qty: 2 ML | Refills: 3 | Status: CANCELLED | OUTPATIENT
Start: 2024-04-29 | End: 2025-04-29

## 2024-04-30 DIAGNOSIS — M81.0 OSTEOPOROSIS, UNSPECIFIED OSTEOPOROSIS TYPE, UNSPECIFIED PATHOLOGICAL FRACTURE PRESENCE: ICD-10-CM

## 2024-05-01 ENCOUNTER — OFFICE VISIT (OUTPATIENT)
Dept: FAMILY MEDICINE | Facility: CLINIC | Age: 79
End: 2024-05-01
Payer: MEDICARE

## 2024-05-01 ENCOUNTER — TELEPHONE (OUTPATIENT)
Dept: FAMILY MEDICINE | Facility: CLINIC | Age: 79
End: 2024-05-01
Payer: MEDICARE

## 2024-05-01 ENCOUNTER — TELEPHONE (OUTPATIENT)
Dept: FAMILY MEDICINE | Facility: CLINIC | Age: 79
End: 2024-05-01

## 2024-05-01 ENCOUNTER — HOSPITAL ENCOUNTER (OUTPATIENT)
Dept: RADIOLOGY | Facility: HOSPITAL | Age: 79
Discharge: HOME | End: 2024-05-01
Attending: PHYSICIAN ASSISTANT
Payer: MEDICARE

## 2024-05-01 VITALS
RESPIRATION RATE: 18 BRPM | SYSTOLIC BLOOD PRESSURE: 160 MMHG | HEART RATE: 62 BPM | HEIGHT: 66 IN | BODY MASS INDEX: 34.38 KG/M2 | OXYGEN SATURATION: 95 % | DIASTOLIC BLOOD PRESSURE: 75 MMHG

## 2024-05-01 DIAGNOSIS — J45.40 MODERATE PERSISTENT ASTHMA WITHOUT COMPLICATION: ICD-10-CM

## 2024-05-01 DIAGNOSIS — R93.89 ABNORMAL CHEST X-RAY: Primary | ICD-10-CM

## 2024-05-01 DIAGNOSIS — J98.8 BACTERIAL RESPIRATORY INFECTION: ICD-10-CM

## 2024-05-01 DIAGNOSIS — J45.40 MODERATE PERSISTENT ASTHMA WITHOUT COMPLICATION: Primary | ICD-10-CM

## 2024-05-01 DIAGNOSIS — B96.89 BACTERIAL RESPIRATORY INFECTION: ICD-10-CM

## 2024-05-01 DIAGNOSIS — R05.9 COUGH, UNSPECIFIED TYPE: ICD-10-CM

## 2024-05-01 DIAGNOSIS — J18.9 PNEUMONIA OF RIGHT LOWER LOBE DUE TO INFECTIOUS ORGANISM: ICD-10-CM

## 2024-05-01 PROCEDURE — 2023F DILAT RTA XM W/O RTNOPTHY: CPT | Mod: HCNC,CPTII,S$GLB, | Performed by: PHYSICIAN ASSISTANT

## 2024-05-01 PROCEDURE — 1159F MED LIST DOCD IN RCRD: CPT | Mod: HCNC,CPTII,S$GLB, | Performed by: PHYSICIAN ASSISTANT

## 2024-05-01 PROCEDURE — 71046 X-RAY EXAM CHEST 2 VIEWS: CPT | Mod: TC,HCNC,PO

## 2024-05-01 PROCEDURE — 1160F RVW MEDS BY RX/DR IN RCRD: CPT | Mod: HCNC,CPTII,S$GLB, | Performed by: PHYSICIAN ASSISTANT

## 2024-05-01 PROCEDURE — 99214 OFFICE O/P EST MOD 30 MIN: CPT | Mod: HCNC,S$GLB,, | Performed by: PHYSICIAN ASSISTANT

## 2024-05-01 PROCEDURE — 71046 X-RAY EXAM CHEST 2 VIEWS: CPT | Mod: 26,HCNC,, | Performed by: RADIOLOGY

## 2024-05-01 PROCEDURE — 1126F AMNT PAIN NOTED NONE PRSNT: CPT | Mod: HCNC,CPTII,S$GLB, | Performed by: PHYSICIAN ASSISTANT

## 2024-05-01 PROCEDURE — 3078F DIAST BP <80 MM HG: CPT | Mod: HCNC,CPTII,S$GLB, | Performed by: PHYSICIAN ASSISTANT

## 2024-05-01 PROCEDURE — 3077F SYST BP >= 140 MM HG: CPT | Mod: HCNC,CPTII,S$GLB, | Performed by: PHYSICIAN ASSISTANT

## 2024-05-01 PROCEDURE — 99999 PR PBB SHADOW E&M-EST. PATIENT-LVL V: CPT | Mod: PBBFAC,HCNC,, | Performed by: PHYSICIAN ASSISTANT

## 2024-05-01 PROCEDURE — 3288F FALL RISK ASSESSMENT DOCD: CPT | Mod: HCNC,CPTII,S$GLB, | Performed by: PHYSICIAN ASSISTANT

## 2024-05-01 PROCEDURE — 1101F PT FALLS ASSESS-DOCD LE1/YR: CPT | Mod: HCNC,CPTII,S$GLB, | Performed by: PHYSICIAN ASSISTANT

## 2024-05-01 RX ORDER — FLUTICASONE PROPIONATE AND SALMETEROL 250; 50 UG/1; UG/1
1 POWDER RESPIRATORY (INHALATION) 2 TIMES DAILY
Qty: 60 EACH | Refills: 5 | Status: SHIPPED | OUTPATIENT
Start: 2024-05-01 | End: 2024-10-28

## 2024-05-01 RX ORDER — AMOXICILLIN 500 MG/1
500 CAPSULE ORAL EVERY 12 HOURS
Qty: 20 CAPSULE | Refills: 0 | Status: SHIPPED | OUTPATIENT
Start: 2024-05-01 | End: 2024-05-01

## 2024-05-01 RX ORDER — BUDESONIDE AND FORMOTEROL FUMARATE DIHYDRATE 160; 4.5 UG/1; UG/1
AEROSOL RESPIRATORY (INHALATION)
Qty: 10.2 G | Refills: 5 | Status: SHIPPED | OUTPATIENT
Start: 2024-05-01 | End: 2024-05-01 | Stop reason: ALTCHOICE

## 2024-05-01 RX ORDER — AMOXICILLIN 500 MG/1
500 CAPSULE ORAL EVERY 8 HOURS
Qty: 30 CAPSULE | Refills: 0 | Status: SHIPPED | OUTPATIENT
Start: 2024-05-01 | End: 2024-05-11

## 2024-05-01 RX ORDER — PROMETHAZINE HYDROCHLORIDE AND DEXTROMETHORPHAN HYDROBROMIDE 6.25; 15 MG/5ML; MG/5ML
5 SYRUP ORAL EVERY 6 HOURS PRN
Qty: 118 ML | Refills: 0 | Status: SHIPPED | OUTPATIENT
Start: 2024-05-01 | End: 2024-05-11

## 2024-05-01 NOTE — PROGRESS NOTES
Results have been released via [employer]. Please verify that these have been viewed by patient. If not, please call patient with results.     Please schedule the following orders: repeat CXR in 1 month    I have sent a message to them with the following interpretation (see below).    I have reviewed your recent CXR which showed a patchy infiltrate in the right lower lobe and trace right sided effusion. Based off your current symptoms, this could indicate pneumonia. Please start antibiotics as discussed at your visit. We will plan to repeat CXR in 4-6 weeks to make sure this resolves. Also, please make sure to follow up with pulmonology as scheduled. Let me know if your symptoms worsen or do not improve.     Please do not hesitate to call or message with any additional questions or concerns.    Zully Lua PA-C

## 2024-05-01 NOTE — TELEPHONE ENCOUNTER
It looks like Symbicort was denied. Going to try Advair instead.    I have signed for the following orders AND/OR meds.  Please call the patient and ask the patient to schedule the testing AND/OR inform about any medications that were sent. Medications have been sent to pharmacy listed below      No orders of the defined types were placed in this encounter.      Medications Ordered This Encounter   Medications    fluticasone-salmeterol diskus inhaler 250-50 mcg     Sig: Inhale 1 puff into the lungs 2 (two) times daily. Controller     Dispense:  60 each     Refill:  5         [id number] - [place] - [id number] SEC [address] [facility] [address] [facility]  [address]  [place] [address]  Phone: [phone] Fax: [phone]    [facility] [place]  [address]  [place] [address]  Phone: [phone] Fax: [phone]     Pt is laying in bed  No signs of distress noted at this time        Darnelle Merlin  12/07/17 [id number]

## 2024-05-01 NOTE — PATIENT INSTRUCTIONS
Renato Da Silva,     If you are due for any health screening(s) below please notify me so we can arrange them to be ordered and scheduled. Most healthy patients at your age complete them, but you are free to accept or refuse.     If you can't do it, I'll definitely understand. If you can, I'd certainly appreciate it!    All of your core healthy metrics are met.

## 2024-05-01 NOTE — PROGRESS NOTES
Assessment/Plan:    Problem List Items Addressed This Visit          Pulmonary    Moderate persistent asthma without complication    Overview     -chronic cough w/ hx of asthma and COPD  -remains on PRN albuterol HFA and nebs  -previously on Symbicort, but no longer taking the medication  -plan to restart Symbicort   -also referring to pulmonology for further evaluation and treatment         Relevant Medications    budesonide-formoterol 160-4.5 mcg (SYMBICORT) 160-4.5 mcg/actuation HFAA    Other Relevant Orders    Ambulatory referral/consult to Pulmonology     Other Visit Diagnoses       Bacterial respiratory infection    -  Primary  -start antibiotics as prescribed  -will obtain CXR today  -recommend Flonase and Mucinex  -supportive care- rest, increase hydration with water, OTC Tylenol/Ibuprofen for pain/fever  -follow up if no improvement in symptoms   -ER precautions for severe or worsening of symptoms       Relevant Medications    amoxicillin (AMOXIL) 500 MG capsule    Other Relevant Orders    X-Ray Chest PA And Lateral (Completed)    Cough, unspecified type        Relevant Medications    promethazine-dextromethorphan (PROMETHAZINE-DM) 6.25-15 mg/5 mL Syrp          Follow up if symptoms worsen or fail to improve.    Zully Lua PA-C  _____________________________________________________________________________________________________________________________________________________    CC: cough    HPI: Patient is in clinic today as an established patient here for cough. This is a recurrent problem. The current episode started >2 weeks ago. The problem is gradually worsening. The cough is productive of green/yellow sputum. Associated symptoms include wheezing. Pertinent negatives include no fever, chills, diaphoresis, ear pain, headaches, hoarse voice, neck pain, shortness of breath, sneezing, sore throat or swollen glands. Past treatments include none. She has had no known sick contacts. Patient has a  history of asthma and COPD in which she remains on PRN Albuterol HFA and nebs. She is also on chronic steroids per rheumatology. She was previously on Symbicort, but reports running out of the medication several months ago. She was also previously followed by pulmonology, but has not followed up in several years. She is mainly concerned for infection. She was hospitalized in May 2023 for pneumonia. No other complaints today.     Past Medical History:   Diagnosis Date    Asthma     COPD (chronic obstructive pulmonary disease)     Degenerative disc disease     Diabetes mellitus     Diabetes mellitus, type 2     Fibromyalgia     Hypertension     Rheumatoid arthritis     Rheumatoid arthritis(714.0)     Rheumatoid arthritis     Past Surgical History:   Procedure Laterality Date    BREAST SURGERY  1986    CATARACT EXTRACTION      EYE SURGERY  2013    Catatracts    ORIF FEMUR FRACTURE      right knee ligament rpeair  2005    right shoulder surgery  4/18/07    Dr. Gao     Review of patient's allergies indicates:   Allergen Reactions    Latex, natural rubber Swelling and Rash    Codeine     Dairy digestive ultra      Dairy products      Imuran [azathioprine sodium] Diarrhea    Imuran [azathioprine]     Lactobacillus     Lactobacillus acidoph-lactase Other (See Comments)    Lactobacillus acidophilus     Morphine      DOESN'T FEEL RIGHT     Plaquenil [hydroxychloroquine] Other (See Comments)     headaches    Milk containing products (dairy) Diarrhea     Social History     Tobacco Use    Smoking status: Never     Passive exposure: Never    Smokeless tobacco: Never   Substance Use Topics    Alcohol use: Never    Drug use: No     Family History   Problem Relation Name Age of Onset    Cancer Mother      Anemia Mother      Alcohol abuse Father      Anemia Maternal Grandmother      Hypertension Paternal Grandmother      Arthritis Paternal Grandmother       Current Outpatient Medications on File Prior to Visit   Medication Sig  Dispense Refill    ACCU-CHEK VERONIQUE CONTROL SOLN Soln USE ONCE A WEEK AS DIRECTED 1 each 0    acetaminophen (TYLENOL) 325 MG tablet Take 650 mg by mouth as needed.      albuterol (PROVENTIL) 2.5 mg /3 mL (0.083 %) nebulizer solution USE 1 VIAL IN NEBULIZER EVERY 6 HOURS AS NEEDED FOR WHEEZING 180 mL 3    albuterol (PROVENTIL/VENTOLIN HFA) 90 mcg/actuation inhaler Inhale 2 puffs into the lungs every 6 (six) hours as needed for Wheezing. 18 g 6    benazepriL (LOTENSIN) 20 MG tablet Take 1 tablet (20 mg total) by mouth once daily. 90 tablet 3    blood sugar diagnostic (TRUE METRIX GLUCOSE TEST STRIP) Strp Use to test blood glucose one (1) time a day, to be used with insurance-preferred brand of glucometer/supplies. 100 strip 11    busPIRone (BUSPAR) 5 MG Tab Take 1 tablet (5 mg total) by mouth 2 (two) times daily. 180 tablet 3    clotrimazole-betamethasone 1-0.05% (LOTRISONE) cream Apply topically 2 (two) times daily. 45 g 6    cyanocobalamin, vitamin B-12, 1,000 mcg Subl Place 1,000 mcg under the tongue once daily. 30 tablet 11    denosumab (PROLIA) 60 mg/mL Syrg Inject 1 mL (60 mg total) into the skin every 6 (six) months. 2 mL 3    diclofenac sodium (VOLTAREN) 1 % Gel Apply 2 g topically 3 (three) times daily as needed (arthritis). 150 g 1    dicyclomine (BENTYL) 20 mg tablet Take 1 tablet (20 mg total) by mouth 4 (four) times daily. 120 tablet 3    fluocinolone-hydroq.-tretinoin (TRI-VITALY) 0.01-4-0.05 % Crea Apply 1 application topically every evening. 30 g 1    furosemide (LASIX) 40 MG tablet TAKE 2 TABLETS EVERY DAY AS NEEDED 180 tablet 1    glipiZIDE 5 MG TR24 TAKE 1 TABLET BY MOUTH ONCE DAILY WITH BREAKFAST 90 tablet 3    hydroquin-tretinoin-hydrocort 4-0.025-0.5 % Emul Apply 1 applicator topically every evening. 30 g 3    hydroxychloroquine (PLAQUENIL) 200 mg tablet Take 1 tablet (200 mg total) by mouth 2 (two) times daily. 180 tablet 3    leflunomide (ARAVA) 10 MG Tab TAKE 1 TABLET EVERY DAY 90 tablet 1     menthol-zinc oxide (CALMOSEPTINE) 0.44-20.6 % Oint Apply topically once daily. 113 g 0    multivitamin with iron Tab Take 1 tablet by mouth once daily. 30 each 11    mupirocin (BACTROBAN) 2 % ointment Apply topically 3 (three) times daily. 30 g 6    [START ON 5/15/2024] oxyCODONE-acetaminophen (PERCOCET)  mg per tablet Take 1 tablet by mouth every 8 (eight) hours as needed for Pain. 90 tablet 0    oxyCODONE-acetaminophen (PERCOCET)  mg per tablet Take 1 tablet by mouth every 8 (eight) hours as needed for Pain. 90 tablet 0    potassium chloride SA (K-DUR,KLOR-CON) 20 MEQ tablet TAKE TWO TABLETS BY MOUTH TWICE DAILY 360 tablet 3    predniSONE (DELTASONE) 5 MG tablet Take 3 tablets by mouth every morning as needed 90 tablet 3    sodium chloride 5% (LALITHA 128) 5 % ophthalmic solution sodium chloride 5 % eye drops   INSTILL ONE DROP INTO EACH EYE FOUR TIMES DAILY      sucralfate (CARAFATE) 1 gram tablet Take 1 tablet (1 g total) by mouth 4 (four) times daily. 360 tablet 0    traZODone (DESYREL) 50 MG tablet TAKE 1 TABLET EVERY EVENING TO HELP WITH SLEEP 90 tablet 3    triamcinolone acetonide 0.1% (KENALOG) 0.1 % ointment Apply topically 2 (two) times daily. 80 g 0    TRUE METRIX GLUCOSE METER Misc test once DAILY AS DIRECTED      TRUEPLUS LANCETS 33 gauge Misc       [DISCONTINUED] budesonide-formoterol 160-4.5 mcg (SYMBICORT) 160-4.5 mcg/actuation HFAA Symbicort 160 mcg-4.5 mcg/actuation HFA aerosol inhaler  INHALE TWO PUFFS TWICE DAILY 10.2 g 5    blood-glucose calibrat control Cmpk 1 each by Misc.(Non-Drug; Combo Route) route once a week. 1 each 0    cetirizine (ZYRTEC) 10 MG tablet Take 1 tablet (10 mg total) by mouth once daily. 30 tablet 11    colchicine (COLCRYS) 0.6 mg tablet Take 1 tablet (0.6 mg total) by mouth 2 (two) times daily. for 5 days 10 tablet 0     No current facility-administered medications on file prior to visit.       Review of Systems   Constitutional:  Negative for chills, diaphoresis,  "fatigue and fever.   HENT:  Negative for congestion, ear pain, postnasal drip, sinus pain and sore throat.    Eyes:  Negative for pain and redness.   Respiratory:  Positive for cough and wheezing. Negative for chest tightness and shortness of breath.    Cardiovascular:  Negative for chest pain and leg swelling.   Gastrointestinal:  Negative for abdominal pain, constipation, diarrhea, nausea and vomiting.   Genitourinary:  Negative for dysuria and hematuria.   Musculoskeletal:  Negative for arthralgias and joint swelling.   Skin:  Negative for rash.   Neurological:  Negative for dizziness, syncope and headaches.   Psychiatric/Behavioral:  Negative for dysphoric mood. The patient is not nervous/anxious.        Vitals:    05/01/24 1004 05/01/24 1042   BP: (!) 152/75 (!) 160/75   Pulse: 62    Resp: 18    SpO2: 95%    Height: 5' 6" (1.676 m)        Wt Readings from Last 3 Encounters:   02/26/24 96.6 kg (213 lb)   01/29/24 89.4 kg (197 lb)   11/30/23 104.3 kg (229 lb 15 oz)       Physical Exam  Constitutional:       General: She is not in acute distress.     Appearance: Normal appearance. She is well-developed.   HENT:      Head: Normocephalic and atraumatic.      Right Ear: Tympanic membrane normal.      Left Ear: Tympanic membrane normal.      Nose: Nose normal.      Mouth/Throat:      Mouth: Mucous membranes are moist.      Pharynx: Oropharynx is clear.   Eyes:      Conjunctiva/sclera: Conjunctivae normal.   Cardiovascular:      Rate and Rhythm: Normal rate and regular rhythm.      Pulses: Normal pulses.      Heart sounds: Normal heart sounds. No murmur heard.  Pulmonary:      Effort: Pulmonary effort is normal. No respiratory distress.      Breath sounds: Wheezing present.   Abdominal:      General: Bowel sounds are normal. There is no distension.      Palpations: Abdomen is soft.      Tenderness: There is no abdominal tenderness.   Musculoskeletal:         General: Normal range of motion.      Cervical back: Normal " range of motion and neck supple.   Skin:     General: Skin is warm and dry.      Findings: No rash.   Neurological:      General: No focal deficit present.      Mental Status: She is alert and oriented to person, place, and time.   Psychiatric:         Mood and Affect: Mood normal.         Behavior: Behavior normal.         Health Maintenance   Topic Date Due    Shingles Vaccine (1 of 2) Never done    Foot Exam  02/21/2024    Hemoglobin A1c  05/02/2024    Mammogram  06/26/2024    Lipid Panel  11/02/2024    Eye Exam  02/27/2025    DEXA Scan  07/13/2025    TETANUS VACCINE  03/09/2026    Hepatitis C Screening  Completed

## 2024-05-02 ENCOUNTER — TELEPHONE (OUTPATIENT)
Dept: RHEUMATOLOGY | Facility: CLINIC | Age: 79
End: 2024-05-02
Payer: MEDICARE

## 2024-05-02 NOTE — TELEPHONE ENCOUNTER
Called patient to see if she was still coming for appointment today. Stated that she forgot and thought it was on a different day    Not sure what her schedule looks like. Ok with staff calling her tomorrow to reschedule Prolia    Staff,  Please call patient tomorrow to reschedule her for Prolia injection. Thanks!

## 2024-05-04 RX ORDER — DENOSUMAB 60 MG/ML
60 INJECTION SUBCUTANEOUS
Qty: 2 ML | Refills: 0 | Status: ACTIVE | OUTPATIENT
Start: 2024-05-04 | End: 2025-05-04

## 2024-05-07 PROBLEM — M81.0 OSTEOPOROSIS, POSTMENOPAUSAL: Status: ACTIVE | Noted: 2024-05-07

## 2024-05-08 ENCOUNTER — CLINICAL SUPPORT (OUTPATIENT)
Dept: RHEUMATOLOGY | Facility: CLINIC | Age: 79
End: 2024-05-08
Payer: MEDICARE

## 2024-05-08 VITALS
SYSTOLIC BLOOD PRESSURE: 134 MMHG | WEIGHT: 212.94 LBS | BODY MASS INDEX: 34.22 KG/M2 | DIASTOLIC BLOOD PRESSURE: 68 MMHG | HEART RATE: 82 BPM | HEIGHT: 66 IN

## 2024-05-08 DIAGNOSIS — M81.0 OSTEOPOROSIS, UNSPECIFIED OSTEOPOROSIS TYPE, UNSPECIFIED PATHOLOGICAL FRACTURE PRESENCE: Primary | ICD-10-CM

## 2024-05-08 DIAGNOSIS — M05.9 SEROPOSITIVE RHEUMATOID ARTHRITIS: ICD-10-CM

## 2024-05-08 DIAGNOSIS — D84.821 IMMUNOCOMPROMISED STATE DUE TO DRUG THERAPY: ICD-10-CM

## 2024-05-08 DIAGNOSIS — Z79.899 HIGH RISK MEDICATION USE: ICD-10-CM

## 2024-05-08 DIAGNOSIS — J06.9 UPPER RESPIRATORY TRACT INFECTION, UNSPECIFIED TYPE: ICD-10-CM

## 2024-05-08 DIAGNOSIS — Z91.89 FRACTURE RISK ASSESSMENT SCORE (FRAX) INDICATING GREATER THAN 3% RISK FOR HIP FRACTURE: ICD-10-CM

## 2024-05-08 DIAGNOSIS — Z79.899 IMMUNOCOMPROMISED STATE DUE TO DRUG THERAPY: ICD-10-CM

## 2024-05-08 DIAGNOSIS — B49 FUNGAL DISEASE: ICD-10-CM

## 2024-05-08 PROCEDURE — 99214 OFFICE O/P EST MOD 30 MIN: CPT | Mod: HCNC,25,S$GLB, | Performed by: INTERNAL MEDICINE

## 2024-05-08 PROCEDURE — 99999 PR PBB SHADOW E&M-EST. PATIENT-LVL V: CPT | Mod: PBBFAC,HCNC,,

## 2024-05-08 PROCEDURE — 96372 THER/PROPH/DIAG INJ SC/IM: CPT | Mod: HCNC,S$GLB,, | Performed by: INTERNAL MEDICINE

## 2024-05-08 RX ORDER — FLUCONAZOLE 150 MG/1
150 TABLET ORAL DAILY
Qty: 1 TABLET | Refills: 3 | Status: SHIPPED | OUTPATIENT
Start: 2024-05-08

## 2024-05-08 RX ORDER — AMOXICILLIN AND CLAVULANATE POTASSIUM 875; 125 MG/1; MG/1
1 TABLET, FILM COATED ORAL 2 TIMES DAILY
Qty: 28 TABLET | Refills: 0 | Status: SHIPPED | OUTPATIENT
Start: 2024-05-08 | End: 2024-06-05

## 2024-05-08 RX ORDER — CYANOCOBALAMIN 1000 UG/ML
1000 INJECTION, SOLUTION INTRAMUSCULAR; SUBCUTANEOUS
Status: COMPLETED | OUTPATIENT
Start: 2024-05-08 | End: 2024-05-08

## 2024-05-08 RX ADMIN — CYANOCOBALAMIN 1000 MCG: 1000 INJECTION, SOLUTION INTRAMUSCULAR; SUBCUTANEOUS at 02:05

## 2024-05-08 NOTE — PATIENT INSTRUCTIONS
It was a pleasure talking with you today. As a reminder, my name is Dr. Jesica Julio. I'm the clinical pharmacist in the Rheumatology office. If you have any questions or need any assistance, please reach out to the office.     Next Prolia injection will be due 11/9/2024    - Vaccines:   COVID vaccine: 3 new [id number] formulated doses- Local pharmacy    Pneumonia (Prevnar 20): 1 dose around 10/2025 - Primary care provider's (PCP's) office or local pharmacy  Shingles (Shingrix): 2 doses 2-6 months apart - PCP's office or local pharmacy  Respiratory syncytial virus (RSV): 1 dose - Local pharmacy

## 2024-05-08 NOTE — PROGRESS NOTES
Subjective:     Patient ID:  Rekha Miller    Chief Complaint: Osteoporosis Management     Rheumatology Provider: Dr. Johnson    History of Present Illness:  Pt is a 79 y.o. female who presents to the clinic for Osteoporosis Management. This is a new patient to me but has been following with Dr. Johnson & Ariana Henriquez PA-C. Patient is presenting with CKD, T2DM, AMOL, asthma, HTN, RA, OA, hx of ORIF femur fracture, osteoporosis, and sjogren's disease. Last clinic visit was 2/26/2024.      Prior Osteoporosis Therapies:   Boniva    Osteoporosis:  Current treatment: Prolia via [facility]  Would like home administration of Prolia d/t difficulty obtaining transportation to the office  Diet with adequate calcium: yes  Diet with adequate vitamin D: yes  On supplementation with calcium, vitamin D, or both: yes  Any fractures since your last visit: no  Any falls since your last visit: no  Physically active: no    URTI/Pneumonia:  Weight lost over last 2 weeks  Not feeling well  Saw PCP's JORGE and prescribed amoxil but not feeling better  Coughing and pain in left ear  Discussed with Dr. Johnson- augmentin 875 mg BID x 14 days and diflucan (if needed) sent     Vaccines:  Recommend influenza annually, prevnar 20 if not completed yet, TdaP every 10 years, shingrix x 2, COVID, and RSV if 60 years old or older  Patient is due for Prevnar 20 (once), COVID, Shingrix (2 doses 2-6 months apart once in lifetime), and RSV (once if 60 years old or older)    Influenza: Discuss and recommend annually. 10/2023. Due next flu season starting around Sept./Oct.   PCV 20: Based on share clinical decision making. Either way Pneumonia vaccines are complete. Due 10/2025 if pt would like to receive  Tetanus (TdaP): Discuss and recommend booster every 10 years. 3/2016. Due 3/2026  Shingrix: Discuss and recommend. 2 dose series 2-6 months apart. Due now  Covid: 3 doses are recommended in non-vaccinated immunocompromised patients per CDC. Due now  RSV: Discuss and  recommend 1 dose if 60 years old or older    For COVID vaccines, ACR recommends holding Arava for 1-2 weeks (as disease activity allows) after each COVID vaccines dose          Review of Systems   Review of Systems   Constitutional:  Positive for activity change, appetite change and unexpected weight change.   HENT:  Positive for ear pain.    Respiratory:  Positive for cough.    Musculoskeletal:  Positive for arthralgias, back pain, gait problem and myalgias.   Allergic/Immunologic: Positive for immunocompromised state.   All other systems reviewed and are negative.    Current Medications:  Current Outpatient Medications   Medication Instructions    ACCU-CHEK VERONIQUE CONTROL SOLN Soln USE ONCE A WEEK AS DIRECTED    acetaminophen (TYLENOL) 650 mg, Oral, As needed (PRN)    albuterol (PROVENTIL) 2.5 mg /3 mL (0.083 %) nebulizer solution USE 1 VIAL IN NEBULIZER EVERY 6 HOURS AS NEEDED FOR WHEEZING    albuterol (PROVENTIL/VENTOLIN HFA) 90 mcg/actuation inhaler 2 puffs, Inhalation, Every 6 hours PRN    amoxicillin-clavulanate 875-125mg (AUGMENTIN) 875-125 mg per tablet 1 tablet, Oral, 2 times daily    benazepriL (LOTENSIN) 20 mg, Oral, Daily    blood sugar diagnostic (TRUE METRIX GLUCOSE TEST STRIP) Strp Use to test blood glucose one (1) time a day, to be used with insurance-preferred brand of glucometer/supplies.    blood-glucose calibrat control Cmpk 1 each, Misc.(Non-Drug; Combo Route), Weekly    busPIRone (BUSPAR) 5 mg, Oral, 2 times daily    cetirizine (ZYRTEC) 10 mg, Oral, Daily    clotrimazole-betamethasone 1-0.05% (LOTRISONE) cream Topical (Top), 2 times daily    colchicine (COLCRYS) 0.6 mg, Oral, 2 times daily    cyanocobalamin (vitamin B-12) 1,000 mcg, Sublingual, Daily    diclofenac sodium (VOLTAREN) 2 g, Topical (Top), 3 times daily PRN    dicyclomine (BENTYL) 20 mg, Oral, 4 times daily    fluconazole (DIFLUCAN) 150 mg, Oral, Daily    fluocinolone-hydroq.-tretinoin (TRI-VITALY) 0.01-4-0.05 % Crea 1 application ,  "Topical (Top), Nightly    fluticasone-salmeterol diskus inhaler 250-50 mcg 1 puff, Inhalation, 2 times daily, Controller    furosemide (LASIX) 40 MG tablet TAKE 2 TABLETS EVERY DAY AS NEEDED    glipiZIDE 5 MG TR24 TAKE 1 TABLET BY MOUTH ONCE DAILY WITH BREAKFAST    hydroquin-tretinoin-hydrocort 4-0.025-0.5 % Emul 1 applicator, Topical (Top), Nightly    hydroxychloroquine (PLAQUENIL) 200 mg, Oral, 2 times daily    leflunomide (ARAVA) 10 MG Tab TAKE 1 TABLET EVERY DAY    menthol-zinc oxide (CALMOSEPTINE) 0.44-20.6 % Oint Topical (Top), Daily    multivitamin with iron Tab 1 tablet, Oral, Daily    mupirocin (BACTROBAN) 2 % ointment Topical (Top), 3 times daily    [START ON 5/15/2024] oxyCODONE-acetaminophen (PERCOCET)  mg per tablet 1 tablet, Oral, Every 8 hours PRN    oxyCODONE-acetaminophen (PERCOCET)  mg per tablet 1 tablet, Oral, Every 8 hours PRN    potassium chloride SA (K-DUR,KLOR-CON) 20 MEQ tablet TAKE TWO TABLETS BY MOUTH TWICE DAILY    predniSONE (DELTASONE) 5 MG tablet Take 3 tablets by mouth every morning as needed    PROLIA 60 mg, Subcutaneous, Every 6 months    sodium chloride 5% (LALITHA 128) 5 % ophthalmic solution sodium chloride 5 % eye drops   INSTILL ONE DROP INTO EACH EYE FOUR TIMES DAILY    sucralfate (CARAFATE) 1 g, Oral, 4 times daily    traZODone (DESYREL) 50 MG tablet TAKE 1 TABLET EVERY EVENING TO HELP WITH SLEEP    triamcinolone acetonide 0.1% (KENALOG) 0.1 % ointment Topical (Top), 2 times daily    TRUE METRIX GLUCOSE METER Misc test once DAILY AS DIRECTED    TRUEPLUS LANCETS 33 gauge Misc No dose, route, or frequency recorded.     Objective:     Vitals:    05/08/24 1337   BP: 134/68   Pulse: 82   Weight: 96.6 kg (212 lb 15.4 oz)   Height: 5' 6" (1.676 m)   PainSc: 10-Worst pain ever   PainLoc: Generalized     Body mass index is 34.37 kg/m².    Monitoring Lab Results:  Lab Results   Component Value Date    CALCIUM 9.3 05/01/2024    ALBUMIN 3.1 (L) 05/01/2024    [id number] 35 " 03/11/2024    MG 2.2 11/02/2023    PHOS 2.7 11/02/2023    CREATININE 0.9 05/01/2024    EGFRNORACEVR >60.0 05/01/2024     Lab Results   Component Value Date     05/01/2024    K 4.2 05/01/2024     05/01/2024    CO2 24 05/01/2024     (H) 05/01/2024    BUN 10 05/01/2024    PROT 7.0 05/01/2024    BILITOT 0.3 05/01/2024    ALKPHOS 109 05/01/2024    AST 12 05/01/2024    ALT 12 05/01/2024     Infectious Disease Screening:  Lab Results   Component Value Date    HEPBIGM Negative 10/15/2018     Lab Results   Component Value Date    HEPCAB Negative 10/15/2018     Lab Results   Component Value Date    TBGOLDPLUS Negative 10/15/2018     DEXA BONE DENSITY AXIAL SKELETON 1 OR MORE SITES  Order: [id number]  Impression    Osteoporosis    Electronically signed by Felipe Alvarez MD on 7/13/2022 2:23 PM  Narrative    REASON FOR EXAM: [M81.0]-Age-related osteoporosis without current pathological fracture / [M89.9]-Disorder of bone, unspecified / [N95.9]-Unspecified menopausal and perimenopausal disorder / [Z78.0]-Asymptomatic menopausal state    TECHNICAL FACTORS: Readings are obtained over the lumbar spine and bilateral hips using a GE Lunar Prodigy scanner.    COMPARISON: None    LUMBAR SPINE FINDINGS: Bone mineral density from L1 to L4 is 1.126 g/cm2. The T score is -0.6. The Z score is 0.5 .    LEFT HIP FINDINGS: Bone mineral density over the femoral neck is 0.706 g/cm2. The T score is -2.4. The Z score is -1.4. Bone mineral density over the total hip is 0.675 g/cm2. The T score is -2.6. The Z score is -1.9.    The 10-year probability of fracture utilizing FRAX is 11.7% for a major osteoporotic fracture and 3.2% for a hip fracture.    Assessment:     Pt is a 79 y.o. female with osteoporosis. Patient was referred to the Rheumatology pharmacist for Prolia injection and Osteoporosis management. Prolia via [facility]. Provided patient with education about bone health and fall risk.  Counseled patient about importance of calcium  and vitamin D.  Plan is to continue Prolia. Patient is also taking HCQ and Arava and due for safety labs. Also, explained importance of vaccines considering the increased risk of infections. Recently diagnosed with URTI/pneumonia. Typically wouldn't have administered Prolia but ok per provider to give. Amoxil wasn't helping. Discussed with Dr. Johnson. Augmentin x 14 days sent to pharmacy. Patient aware to f/u with PCP and go to ED if symptoms worsens.     Plan:      Problem List Items Addressed This Visit          Immunology/Multi System    Immunocompromised state due to drug therapy    Overview     -chronic use of plaquenil, leflunomide and prednisone  -precautions given for prevention of illness, such as good hand hygiene          Relevant Orders    Hepatitis B Core Antibody, Total    Hepatitis B Surface Antigen    Hepatitis C Antibody    Quantiferon Gold TB     Other Visit Diagnoses       Osteoporosis, unspecified osteoporosis type, unspecified pathological fracture presence    -  Primary    Relevant Medications    denosumab (PROLIA) injection 60 mg (Completed)    Other Relevant Orders    Hepatitis B Core Antibody, Total    Hepatitis B Surface Antigen    Hepatitis C Antibody    Quantiferon Gold TB    Seropositive rheumatoid arthritis        Relevant Medications    cyanocobalamin injection 1,000 mcg (Completed)    Other Relevant Orders    Hepatitis B Core Antibody, Total    Hepatitis B Surface Antigen    Hepatitis C Antibody    Quantiferon Gold TB    High risk medication use        Relevant Orders    Hepatitis B Core Antibody, Total    Hepatitis B Surface Antigen    Hepatitis C Antibody    Quantiferon Gold TB    Fracture Risk Assessment Score (FRAX) indicating greater than 3% risk for hip fracture        Upper respiratory tract infection, unspecified type        Relevant Medications    amoxicillin-clavulanate 875-125mg (AUGMENTIN) 875-125 mg per tablet    Fungal disease        Relevant Medications    fluconazole  (DIFLUCAN) 150 MG Tab            1. Osteoporosis:   Prolia administered appropriately and left office in stable condition.    ND: 11/13/24 but patient would like home administration - will complete referral and order needed CMP prior to injection  Patient provided with handout of foods/drinks rich in calcium and vitamin D  Pt verbalized understanding instructions  Next DXA due 7/2024     2. URTI/Pneumonia:     Stop Amoxil  Initiate Augmentin 875 mg BID x 14 days  Diflucan sent in case abx causes yeast infection - patient aware to only use if develops a yeast infection  Follow-up with PCP; if worsens go to ED    3. Health Maintenance:     Vitamin B12 injection   Ordered Hep B, Hep C, and TB  Vaccines needed: Influenza, PCV 20, 1 COVID new dose, Shingles, RSV, and TdaP   Pt agreed to complete recommended vaccines      Follow-up scheduled on 6/5/2024 with Dr[facility] Elizabeth Jul[facility]  Rheumatology Clinical Pharmacist  [facility]

## 2024-05-10 ENCOUNTER — PATIENT MESSAGE (OUTPATIENT)
Dept: ADMINISTRATIVE | Facility: CLINIC | Age: 79
End: 2024-05-10
Payer: MEDICARE

## 2024-05-13 ENCOUNTER — TELEPHONE (OUTPATIENT)
Dept: ADMINISTRATIVE | Facility: CLINIC | Age: 79
End: 2024-05-13
Payer: MEDICARE

## 2024-05-15 ENCOUNTER — TELEPHONE (OUTPATIENT)
Dept: ADMINISTRATIVE | Facility: CLINIC | Age: 79
End: 2024-05-15
Payer: MEDICARE

## 2024-05-15 NOTE — TELEPHONE ENCOUNTER
Called pt; informed pt I was just making a reminder call for pt's virtual visit today at 11:00am and to see if pt needed any help; pt stated she thought the appt was a phone call; informed pt it is a virtual appt and she would need to do everything through the [facility]; pt stated she is not tech savvy and to cancel the appt; pt declined to reschedule

## 2024-05-15 NOTE — PROGRESS NOTES
Physician attestation:   Credentials and Title of Author: Pedro Johnson MD  Title: Rheumatologist    I have personally seen and evaluated the patient and confirm the findings and recommendations made by the clinical pharmacist. I have discussed the treatment plan with the clinical pharmacist and agree with the proposed management plan as documented in her note.     Pedro Johnson MD  Rheumatology Dept  [place]    More than 50% of the  30 minute encounter was spent face to face counseling the patient regarding current status and future plan of care as well as side effects  of the medications. All questions were answered to patient's satisfaction also includes  non-face to face time preparing to see the patient (eg, review of tests), Obtaining and/or reviewing separately obtained history, Documenting clinical information in the electronic or other health record, Independently interpreting results

## 2024-05-16 ENCOUNTER — TELEPHONE (OUTPATIENT)
Dept: RHEUMATOLOGY | Facility: CLINIC | Age: 79
End: 2024-05-16
Payer: MEDICARE

## 2024-05-16 NOTE — TELEPHONE ENCOUNTER
Patient wants home Prolia. Completed referral to [facility] for home administration of prolia. Referral reviewed and signed by provider. Faxed to [facility] on 5/16/2024

## 2024-05-21 ENCOUNTER — PATIENT MESSAGE (OUTPATIENT)
Dept: ADMINISTRATIVE | Facility: HOSPITAL | Age: 79
End: 2024-05-21
Payer: MEDICARE

## 2024-05-27 DIAGNOSIS — M05.9 RHEUMATOID ARTHRITIS WITH POSITIVE RHEUMATOID FACTOR, INVOLVING UNSPECIFIED SITE: ICD-10-CM

## 2024-05-27 NOTE — TELEPHONE ENCOUNTER
Pharmacy requesting refill on Leflunomide 10mg  Pt's LOV 02/26/2024  Pt's NOV 06/05/2024  Medication pending

## 2024-05-31 RX ORDER — LEFLUNOMIDE 10 MG/1
10 TABLET ORAL DAILY
Qty: 90 TABLET | Refills: 1 | Status: SHIPPED | OUTPATIENT
Start: 2024-05-31

## 2024-06-05 ENCOUNTER — OFFICE VISIT (OUTPATIENT)
Dept: RHEUMATOLOGY | Facility: CLINIC | Age: 79
End: 2024-06-05
Payer: MEDICARE

## 2024-06-05 VITALS
BODY MASS INDEX: 33.8 KG/M2 | HEART RATE: 80 BPM | WEIGHT: 210.31 LBS | SYSTOLIC BLOOD PRESSURE: 119 MMHG | DIASTOLIC BLOOD PRESSURE: 69 MMHG | HEIGHT: 66 IN

## 2024-06-05 DIAGNOSIS — E11.9 TYPE 2 DIABETES MELLITUS WITHOUT COMPLICATION, WITHOUT LONG-TERM CURRENT USE OF INSULIN: ICD-10-CM

## 2024-06-05 DIAGNOSIS — G89.4 CHRONIC PAIN SYNDROME: ICD-10-CM

## 2024-06-05 DIAGNOSIS — M25.552 HIP PAIN, LEFT: ICD-10-CM

## 2024-06-05 DIAGNOSIS — M05.9 RHEUMATOID ARTHRITIS WITH POSITIVE RHEUMATOID FACTOR, INVOLVING UNSPECIFIED SITE: ICD-10-CM

## 2024-06-05 DIAGNOSIS — Z79.899 IMMUNOCOMPROMISED STATE DUE TO DRUG THERAPY: ICD-10-CM

## 2024-06-05 DIAGNOSIS — J06.9 UPPER RESPIRATORY TRACT INFECTION, UNSPECIFIED TYPE: Primary | ICD-10-CM

## 2024-06-05 DIAGNOSIS — D84.821 IMMUNOCOMPROMISED STATE DUE TO DRUG THERAPY: ICD-10-CM

## 2024-06-05 DIAGNOSIS — M05.9 SEROPOSITIVE RHEUMATOID ARTHRITIS: ICD-10-CM

## 2024-06-05 PROCEDURE — 20610 DRAIN/INJ JOINT/BURSA W/O US: CPT | Mod: HCNC,LT,S$GLB, | Performed by: INTERNAL MEDICINE

## 2024-06-05 PROCEDURE — 3074F SYST BP LT 130 MM HG: CPT | Mod: HCNC,CPTII,S$GLB, | Performed by: INTERNAL MEDICINE

## 2024-06-05 PROCEDURE — 99215 OFFICE O/P EST HI 40 MIN: CPT | Mod: 25,HCNC,S$GLB, | Performed by: INTERNAL MEDICINE

## 2024-06-05 PROCEDURE — 99999 PR PBB SHADOW E&M-EST. PATIENT-LVL V: CPT | Mod: PBBFAC,HCNC,, | Performed by: INTERNAL MEDICINE

## 2024-06-05 PROCEDURE — 3288F FALL RISK ASSESSMENT DOCD: CPT | Mod: HCNC,CPTII,S$GLB, | Performed by: INTERNAL MEDICINE

## 2024-06-05 PROCEDURE — 3078F DIAST BP <80 MM HG: CPT | Mod: HCNC,CPTII,S$GLB, | Performed by: INTERNAL MEDICINE

## 2024-06-05 PROCEDURE — 1101F PT FALLS ASSESS-DOCD LE1/YR: CPT | Mod: HCNC,CPTII,S$GLB, | Performed by: INTERNAL MEDICINE

## 2024-06-05 PROCEDURE — 1160F RVW MEDS BY RX/DR IN RCRD: CPT | Mod: HCNC,CPTII,S$GLB, | Performed by: INTERNAL MEDICINE

## 2024-06-05 PROCEDURE — 1159F MED LIST DOCD IN RCRD: CPT | Mod: HCNC,CPTII,S$GLB, | Performed by: INTERNAL MEDICINE

## 2024-06-05 PROCEDURE — 96372 THER/PROPH/DIAG INJ SC/IM: CPT | Mod: HCNC,XS,S$GLB, | Performed by: INTERNAL MEDICINE

## 2024-06-05 PROCEDURE — 1125F AMNT PAIN NOTED PAIN PRSNT: CPT | Mod: HCNC,CPTII,S$GLB, | Performed by: INTERNAL MEDICINE

## 2024-06-05 RX ORDER — OXYCODONE AND ACETAMINOPHEN 10; 325 MG/1; MG/1
1 TABLET ORAL EVERY 8 HOURS PRN
Qty: 90 TABLET | Refills: 0 | Status: SHIPPED | OUTPATIENT
Start: 2024-06-10 | End: 2024-06-05 | Stop reason: SDUPTHER

## 2024-06-05 RX ORDER — ACETAMINOPHEN 325 MG/1
650 TABLET ORAL
OUTPATIENT
Start: 2024-06-05

## 2024-06-05 RX ORDER — METHYLPREDNISOLONE SOD SUCC 125 MG
100 VIAL (EA) INJECTION
OUTPATIENT
Start: 2024-06-05

## 2024-06-05 RX ORDER — KETOROLAC TROMETHAMINE 30 MG/ML
30 INJECTION, SOLUTION INTRAMUSCULAR; INTRAVENOUS
Status: COMPLETED | OUTPATIENT
Start: 2024-06-05 | End: 2024-06-05

## 2024-06-05 RX ORDER — FAMOTIDINE 10 MG/ML
20 INJECTION INTRAVENOUS
OUTPATIENT
Start: 2024-06-05

## 2024-06-05 RX ORDER — DIPHENHYDRAMINE HYDROCHLORIDE 50 MG/ML
50 INJECTION INTRAMUSCULAR; INTRAVENOUS ONCE AS NEEDED
OUTPATIENT
Start: 2024-06-05

## 2024-06-05 RX ORDER — BUDESONIDE AND FORMOTEROL FUMARATE DIHYDRATE 160; 4.5 UG/1; UG/1
2 AEROSOL RESPIRATORY (INHALATION) 2 TIMES DAILY
COMMUNITY
Start: 2024-05-01

## 2024-06-05 RX ORDER — EPINEPHRINE 0.3 MG/.3ML
0.3 INJECTION SUBCUTANEOUS ONCE AS NEEDED
OUTPATIENT
Start: 2024-06-05

## 2024-06-05 RX ORDER — HEPARIN 100 UNIT/ML
500 SYRINGE INTRAVENOUS
OUTPATIENT
Start: 2024-06-05

## 2024-06-05 RX ORDER — IBANDRONATE SODIUM 150 MG/1
150 TABLET, FILM COATED ORAL
COMMUNITY
Start: 2024-03-26

## 2024-06-05 RX ORDER — OXYCODONE AND ACETAMINOPHEN 10; 325 MG/1; MG/1
1 TABLET ORAL EVERY 8 HOURS PRN
Qty: 90 TABLET | Refills: 0 | Status: SHIPPED | OUTPATIENT
Start: 2024-08-09 | End: 2024-09-08

## 2024-06-05 RX ORDER — DICLOFENAC SODIUM 10 MG/G
GEL TOPICAL
Qty: 200 G | Refills: 1 | Status: SHIPPED | OUTPATIENT
Start: 2024-06-05

## 2024-06-05 RX ORDER — OXYCODONE AND ACETAMINOPHEN 10; 325 MG/1; MG/1
1 TABLET ORAL EVERY 8 HOURS PRN
Qty: 90 TABLET | Refills: 0 | Status: SHIPPED | OUTPATIENT
Start: 2024-07-10 | End: 2024-08-09

## 2024-06-05 RX ORDER — CEFTRIAXONE 1 G/1
1 INJECTION, POWDER, FOR SOLUTION INTRAMUSCULAR; INTRAVENOUS
Status: COMPLETED | OUTPATIENT
Start: 2024-06-05 | End: 2024-06-05

## 2024-06-05 RX ORDER — MEPERIDINE HYDROCHLORIDE 50 MG/ML
25 INJECTION INTRAMUSCULAR; INTRAVENOUS; SUBCUTANEOUS
OUTPATIENT
Start: 2024-06-05 | End: 2024-06-06

## 2024-06-05 RX ORDER — SODIUM CHLORIDE 0.9 % (FLUSH) 0.9 %
10 SYRINGE (ML) INJECTION
OUTPATIENT
Start: 2024-06-05

## 2024-06-05 RX ORDER — CYANOCOBALAMIN 1000 UG/ML
1000 INJECTION, SOLUTION INTRAMUSCULAR; SUBCUTANEOUS
Status: COMPLETED | OUTPATIENT
Start: 2024-06-05 | End: 2024-06-05

## 2024-06-05 RX ADMIN — TRIAMCINOLONE ACETONIDE 40 MG: 40 INJECTION, SUSPENSION INTRA-ARTICULAR; INTRAMUSCULAR at 09:06

## 2024-06-05 RX ADMIN — KETOROLAC TROMETHAMINE 30 MG: 30 INJECTION, SOLUTION INTRAMUSCULAR; INTRAVENOUS at 11:06

## 2024-06-05 RX ADMIN — CYANOCOBALAMIN 1000 MCG: 1000 INJECTION, SOLUTION INTRAMUSCULAR; SUBCUTANEOUS at 11:06

## 2024-06-05 RX ADMIN — CEFTRIAXONE 1 G: 1 INJECTION, POWDER, FOR SOLUTION INTRAMUSCULAR; INTRAVENOUS at 11:06

## 2024-06-05 NOTE — TELEPHONE ENCOUNTER
No care due was identified.  [facility] Embedded Care Due Messages. Reference number: [id number].   6/05/2024 8:11:01 AM CDT

## 2024-06-05 NOTE — TELEPHONE ENCOUNTER
Refill Routing Note   Medication(s) are not appropriate for processing by [facility] for the following reason(s):        Outside of protocol    [facility] action(s):  Route             Appointments  past 12m or future 3m with PCP    Date Provider   Last Visit   1/29/2024 Divina Silva MD   Next Visit   Visit date not found Divina Silva MD   ED visits in past 90 days: 0        Note composed:8:38 AM 06/05/2024

## 2024-06-05 NOTE — PROGRESS NOTES
Subjective:     Patient ID:  Rekha Miller    Chief Complaint:  Disease Management     History of Present Illness:  Pt is a 79 y.o. female   RA. She has been compliant with plaquenil and prednisone 10-15 mg daily for RA. She has chronic pain in her hands, wrists, hips, and low back. She is not sure if she is taking leflunomide. She wants all of her medications labeled with their indication moving forward.      She started prolia in 4/2023 and denies s/e. Not sure why she did not receive injection in October 2023?     We reviewed her recent labs from PCP--renal function declined.      She is taking percocet tid prn for severe pain without s/e.      She complains of ongoing cough and is asking for refill of cough medication. She is on antibiotic by another provider.     Current tx:  1. Arava 10 mg  2. Plaquenil  3. Percocet  4. Prednisone  5. Prolia  Rheumatologic History:   - Diagnosis/es:  - Positive serologies:  - Infectious screening labs:  - Previous Treatments:  - Current Treatments:     Interval History:   Hospitalization since last office visit: No    Patient Active Problem List    Diagnosis Date Noted    Osteoporosis, postmenopausal 05/07/2024    Stage 3a chronic kidney disease 03/13/2024    Immunocompromised state due to drug therapy 03/13/2024    Severe obesity 03/13/2024    Aortic atherosclerosis 10/19/2023    ACP (advance care planning) 05/24/2023    Type 2 diabetes mellitus with morbid obesity 12/21/2022    Type 2 diabetes mellitus with stage 3a chronic kidney disease, without long-term current use of insulin 12/21/2022    Anxiety 08/22/2022    Greater trochanteric bursitis of right hip 10/22/2020    Mixed hyperlipidemia 10/09/2020    Renal mass 10/08/2020    AMOL on CPAP 10/08/2020    Moderate persistent asthma without complication 07/06/2016    Peripheral edema 07/06/2016    Type 2 diabetes mellitus with hyperlipidemia 03/11/2016    Hypertension associated with diabetes 03/11/2016    Rheumatoid  arthritis with positive rheumatoid factor 11/18/2014    Osteoarthritis of both hips 11/18/2014    Sjogren's disease 11/18/2014    Anemia  11/18/2014    Corticosteroid dependence 11/18/2014     Past Surgical History:   Procedure Laterality Date    BREAST SURGERY  1986    CATARACT EXTRACTION      EYE SURGERY  2013    Catatracts    ORIF FEMUR FRACTURE      right knee ligament rpeair  2005    right shoulder surgery  4/18/07    Dr. Gao     Social History     Tobacco Use    Smoking status: Never     Passive exposure: Never    Smokeless tobacco: Never   Substance Use Topics    Alcohol use: Never    Drug use: No     Family History   Problem Relation Name Age of Onset    Cancer Mother      Anemia Mother      Alcohol abuse Father      Anemia Maternal Grandmother      Hypertension Paternal Grandmother      Arthritis Paternal Grandmother       Review of patient's allergies indicates:   Allergen Reactions    Latex, natural rubber Swelling and Rash    Codeine     Dairy digestive ultra      Dairy products      Imuran [azathioprine sodium] Diarrhea    Imuran [azathioprine]     Lactobacillus     Lactobacillus acidoph-lactase Other (See Comments)    Lactobacillus acidophilus     Morphine      DOESN'T FEEL RIGHT     Plaquenil [hydroxychloroquine] Other (See Comments)     headaches    Milk containing products (dairy) Diarrhea       Review of Systems   Review of Systems     Current Medications:  Current Outpatient Medications   Medication Instructions    ACCU-CHEK VERONIQUE CONTROL SOLN Soln USE ONCE A WEEK AS DIRECTED    acetaminophen (TYLENOL) 650 mg, Oral, As needed (PRN)    albuterol (PROVENTIL) 2.5 mg /3 mL (0.083 %) nebulizer solution USE 1 VIAL IN NEBULIZER EVERY 6 HOURS AS NEEDED FOR WHEEZING    albuterol (PROVENTIL/VENTOLIN HFA) 90 mcg/actuation inhaler 2 puffs, Inhalation, Every 6 hours PRN    benazepriL (LOTENSIN) 20 mg, Oral, Daily    blood sugar diagnostic (TRUE METRIX GLUCOSE TEST STRIP) Strp Use to test blood glucose one (1)  time a day, to be used with insurance-preferred brand of glucometer/supplies.    blood-glucose calibrat control Cmpk 1 each, Misc.(Non-Drug; Combo Route), Weekly    busPIRone (BUSPAR) 5 mg, Oral, 2 times daily    cetirizine (ZYRTEC) 10 mg, Oral, Daily    clotrimazole-betamethasone 1-0.05% (LOTRISONE) cream Topical (Top), 2 times daily    colchicine (COLCRYS) 0.6 mg, Oral, 2 times daily    cyanocobalamin (vitamin B-12) 1,000 mcg, Sublingual, Daily    diclofenac sodium (VOLTAREN) 1 % Gel APPLY TWO GRAMS TOPICALLY THREE TIMES DAILY AS NEEDED FOR ARTHRITIS    dicyclomine (BENTYL) 20 mg, Oral, 4 times daily    fluconazole (DIFLUCAN) 150 mg, Oral, Daily    fluocinolone-hydroq.-tretinoin (TRI-VITALY) 0.01-4-0.05 % Crea 1 application , Topical (Top), Nightly    fluticasone-salmeterol diskus inhaler 250-50 mcg 1 puff, Inhalation, 2 times daily, Controller    furosemide (LASIX) 40 MG tablet TAKE 2 TABLETS EVERY DAY AS NEEDED    glipiZIDE 5 MG TR24 TAKE 1 TABLET BY MOUTH ONCE DAILY WITH BREAKFAST    hydroquin-tretinoin-hydrocort 4-0.025-0.5 % Emul 1 applicator, Topical (Top), Nightly    hydroxychloroquine (PLAQUENIL) 200 mg, Oral, 2 times daily    ibandronate (BONIVA) 150 mg, Oral, Every 30 days    leflunomide (ARAVA) 10 mg, Oral, Daily    menthol-zinc oxide (CALMOSEPTINE) 0.44-20.6 % Oint Topical (Top), Daily    multivitamin with iron Tab 1 tablet, Oral, Daily    mupirocin (BACTROBAN) 2 % ointment Topical (Top), 3 times daily    [START ON 7/10/2024] oxyCODONE-acetaminophen (PERCOCET)  mg per tablet 1 tablet, Oral, Every 8 hours PRN    [START ON 8/9/2024] oxyCODONE-acetaminophen (PERCOCET)  mg per tablet 1 tablet, Oral, Every 8 hours PRN    potassium chloride SA (K-DUR,KLOR-CON) 20 MEQ tablet TAKE TWO TABLETS BY MOUTH TWICE DAILY    predniSONE (DELTASONE) 5 MG tablet Take 3 tablets by mouth every morning as needed    PROLIA 60 mg, Subcutaneous, Every 6 months    sodium chloride 5% (LALITHA 128) 5 % ophthalmic solution  "sodium chloride 5 % eye drops   INSTILL ONE DROP INTO EACH EYE FOUR TIMES DAILY    sucralfate (CARAFATE) 1 g, Oral, 4 times daily    SYMBICORT 160-4.5 mcg/actuation HFAA 2 puffs, 2 times daily    traZODone (DESYREL) 50 MG tablet TAKE 1 TABLET EVERY EVENING TO HELP WITH SLEEP    triamcinolone acetonide 0.1% (KENALOG) 0.1 % ointment Topical (Top), 2 times daily    TRUE METRIX GLUCOSE METER Misc test once DAILY AS DIRECTED    TRUEPLUS LANCETS 33 gauge Misc No dose, route, or frequency recorded.         Objective:     Vitals:    06/05/24 1022   BP: 119/69   Pulse: 80   Weight: 95.4 kg (210 lb 5.1 oz)   Height: 5' 5.98" (1.676 m)   PainSc: 10-Worst pain ever   PainLoc: Generalized      Body mass index is 33.96 kg/m².     Physical Examinations:  Physical Exam   Constitutional: She is oriented to person, place, and time.   HENT:   Head: Normocephalic and atraumatic.   Mouth/Throat: Oropharynx is clear and moist.   Eyes: Pupils are equal, round, and reactive to light.   Neck: No thyromegaly present.   Cardiovascular: Normal rate, regular rhythm and normal heart sounds. Exam reveals no gallop and no friction rub.   No murmur heard.  Pulmonary/Chest: Breath sounds normal. She has no wheezes. She has no rales. She exhibits no tenderness.   Abdominal: There is no abdominal tenderness. There is no rebound and no guarding.   Musculoskeletal:      Right shoulder: Tenderness present.      Left shoulder: Tenderness present.      Right elbow: Normal.      Left elbow: Normal.      Right wrist: Swelling and tenderness present.      Left wrist: Swelling and tenderness present.      Cervical back: Neck supple.      Right knee: No effusion. Tenderness present.      Left knee: No effusion. Tenderness present.      Left ankle: Swelling present.   Lymphadenopathy:     She has no cervical adenopathy.   Neurological: She is alert and oriented to person, place, and time. Gait normal.   Skin: No rash noted. No erythema. No pallor.   Psychiatric: " Mood and affect normal.   Nursing note and vitals reviewed.      Right Side Rheumatological Exam     Examination finds the elbow normal.    The patient is tender to palpation of the shoulder, wrist, knee, 1st PIP, 1st MCP, 2nd PIP, 2nd MCP, 3rd PIP, 3rd MCP, 4th PIP, 4th MCP, 5th PIP and 5th MCP    She has swelling of the wrist, 1st PIP, 1st MCP, 2nd PIP, 2nd MCP, 3rd PIP, 3rd MCP, 4th PIP, 4th MCP, 5th PIP and 5th MCP    The patient has an enlarged wrist    Shoulder Exam   Tenderness Location: no tenderness    Range of Motion   Active abduction:  abnormal   Adduction: abnormal  Sensation: normal    Knee Exam   Tenderness Location: lateral joint line  Patellofemoral Crepitus: positive  Effusion: negative  Sensation: normal    Hip Exam   Tenderness Location: posterior  Sensation: normal    Elbow/Wrist Exam   Tenderness Location: no tenderness  Sensation: normal    Muscle Strength (0-5 scale):  Neck Flexion:  2  Neck Extension: 2  : 2/5     Left Side Rheumatological Exam     Examination finds the elbow normal.    The patient is tender to palpation of the shoulder, wrist, knee, 1st PIP, 1st MCP, 2nd PIP, 2nd MCP, 3rd PIP, 3rd MCP, 4th PIP, 4th MCP, 5th PIP, 5th MCP and temporomandibular.    She has swelling of the wrist, 1st PIP, 1st MCP, 2nd PIP, 2nd MCP, 3rd PIP, 3rd MCP, 4th PIP, 4th MCP, 5th PIP, 5th MCP, 1st CMC, 2nd DIP, 3rd DIP, 4th DIP, 5th DIP, knee, 1st MTP, 2nd MTP, 3rd MTP, 4th MTP, 1st toe IP, 2nd toe IP, 3rd toe IP, 4th toe IP and 5th toe IP    The patient has an enlarged wrist, 1st CMC, 2nd DIP, 3rd DIP, 4th DIP, 5th DIP, 1st toe IP, 2nd toe IP, 3rd toe IP, 4th toe IP and 5th toe IP.    Shoulder Exam   Tenderness Location: acromioclavicular joint    Range of Motion   Active abduction:  abnormal   Sensation: normal    Knee Exam     Patellofemoral Crepitus: positive  Effusion: negative  Sensation: normal    Hip Exam   Tenderness Location: posterior  Sensation: normal    Elbow/Wrist Exam   Sensation:  "normal    Muscle Strength (0-5 scale):  Neck Flexion:  2  Neck Extension: 2  :  1/5       Back/Neck Exam   General Inspection   Gait: normal            Disease Assessment Scores:  Patient's Global Assessment of arthritis (0-10): 4  Physician's Global Assessment of arthritis (0-10): 5  Number of Tender Joints (0-28): 4  Number of Swollen Joints (0-28): 10         No data to display                Monitoring Lab Results:  Lab Results   Component Value Date    WBC 8.98 03/11/2024    RBC 4.22 03/11/2024    HGB 11.0 (L) 03/11/2024    HCT 35.8 (L) 03/11/2024    MCV 85 03/11/2024    MCH 26.1 (L) 03/11/2024    MCHC 30.7 (L) 03/11/2024    RDW 16.0 (H) 03/11/2024     03/11/2024        Lab Results   Component Value Date     05/01/2024    K 4.2 05/01/2024     05/01/2024    CO2 24 05/01/2024     (H) 05/01/2024    BUN 10 05/01/2024    CREATININE 0.9 05/01/2024    CALCIUM 9.3 05/01/2024    PROT 7.0 05/01/2024    ALBUMIN 3.1 (L) 05/01/2024    BILITOT 0.3 05/01/2024    ALKPHOS 109 05/01/2024    AST 12 05/01/2024    ALT 12 05/01/2024    ANIONGAP 7 (L) 05/01/2024    EGFRNORACEVR >60.0 05/01/2024       Lab Results   Component Value Date    SEDRATE 40 (H) 03/11/2024    CRP 8.1 03/11/2024        Lab Results   Component Value Date    CHSIJVPS44WE 35 03/11/2024    BKPHMFCE15 1326 (H) 11/02/2023        Lab Results   Component Value Date    CHOL 151 11/02/2023    HDL 61 11/02/2023    LDLCALC 56.8 (L) 11/02/2023    TRIG 166 (H) 11/02/2023       Lab Results   Component Value Date    RF 61.0 (H) 03/11/2024    CCPANTIBODIE 173.7 (H) 03/11/2024     Lab Results   Component Value Date    ANASCREEN Positive (A) 04/16/2018    DSDNA Negative 1:10 04/16/2018     No results found for: "HLABB27"    Infectious Disease Screening:  Lab Results   Component Value Date    HEPBIGM Negative 10/15/2018     Lab Results   Component Value Date    HEPCAB Negative 10/15/2018     Lab Results   Component Value Date    TBGOLDPLUS Negative " "10/15/2018     No results found for: "QUANTIFERON", "SVCMT", "QUANTAGVALUE", "QUANTNILVALU", "QUANTMITOGEN", "QFTTBAG", "QINT"     Imaging: DEXA, Xrays, MRIs, CTs, etc    Old & Outside Medical Records:  Reviewed old and all outside medical records available in Care Everywhere     Assessment:      Encounter Diagnoses   Name Primary?    Upper respiratory tract infection, unspecified type Yes    Seropositive rheumatoid arthritis     Immunocompromised state due to drug therapy     Type 2 diabetes mellitus without complication, without long-term current use of insulin     Chronic pain syndrome     Rheumatoid arthritis with positive rheumatoid factor, involving unspecified site        Plan:      Encounter Diagnoses   Name Primary?    Upper respiratory tract infection, unspecified type Yes    Seropositive rheumatoid arthritis     Immunocompromised state due to drug therapy     Type 2 diabetes mellitus without complication, without long-term current use of insulin     Chronic pain syndrome     Rheumatoid arthritis with positive rheumatoid factor, involving unspecified site      Rekha was seen today for disease management.    Diagnoses and all orders for this visit:    Upper respiratory tract infection, unspecified type  -     cefTRIAXone injection 1 g  -     cyanocobalamin injection 1,000 mcg    Seropositive rheumatoid arthritis  -     Discontinue: oxyCODONE-acetaminophen (PERCOCET)  mg per tablet; Take 1 tablet by mouth every 8 (eight) hours as needed for Pain.  -     oxyCODONE-acetaminophen (PERCOCET)  mg per tablet; Take 1 tablet by mouth every 8 (eight) hours as needed for Pain.  -     ketorolac injection 30 mg    Immunocompromised state due to drug therapy  -     Discontinue: oxyCODONE-acetaminophen (PERCOCET)  mg per tablet; Take 1 tablet by mouth every 8 (eight) hours as needed for Pain.  -     oxyCODONE-acetaminophen (PERCOCET)  mg per tablet; Take 1 tablet by mouth every 8 (eight) hours as " needed for Pain.  -     Hepatitis C Antibody; Future  -     Hepatitis B Surface Antigen; Future  -     Hepatitis B Surface Antibody, Qual/Quant; Future  -     Hepatitis B Core Antibody, Total; Future  -     Hepatitis B Surface Ab, Qualitative; Future  -     Sedimentation rate; Future  -     C-Reactive Protein; Future  -     Comprehensive Metabolic Panel; Future  -     CBC Auto Differential; Future    Type 2 diabetes mellitus without complication, without long-term current use of insulin  -     Discontinue: oxyCODONE-acetaminophen (PERCOCET)  mg per tablet; Take 1 tablet by mouth every 8 (eight) hours as needed for Pain.  -     oxyCODONE-acetaminophen (PERCOCET)  mg per tablet; Take 1 tablet by mouth every 8 (eight) hours as needed for Pain.    Chronic pain syndrome  -     Discontinue: oxyCODONE-acetaminophen (PERCOCET)  mg per tablet; Take 1 tablet by mouth every 8 (eight) hours as needed for Pain.  -     oxyCODONE-acetaminophen (PERCOCET)  mg per tablet; Take 1 tablet by mouth every 8 (eight) hours as needed for Pain.  -     oxyCODONE-acetaminophen (PERCOCET)  mg per tablet; Take 1 tablet by mouth every 8 (eight) hours as needed for Pain.  -     Hepatitis C Antibody; Future  -     Hepatitis B Surface Antigen; Future  -     Hepatitis B Surface Antibody, Qual/Quant; Future  -     Hepatitis B Core Antibody, Total; Future  -     Hepatitis B Surface Ab, Qualitative; Future  -     Sedimentation rate; Future  -     C-Reactive Protein; Future  -     Comprehensive Metabolic Panel; Future  -     CBC Auto Differential; Future    Rheumatoid arthritis with positive rheumatoid factor, involving unspecified site  -     oxyCODONE-acetaminophen (PERCOCET)  mg per tablet; Take 1 tablet by mouth every 8 (eight) hours as needed for Pain.  -     Hepatitis C Antibody; Future  -     Hepatitis B Surface Antigen; Future  -     Hepatitis B Surface Antibody, Qual/Quant; Future  -     Hepatitis B Core  Antibody, Total; Future  -     Hepatitis B Surface Ab, Qualitative; Future  -     Sedimentation rate; Future  -     C-Reactive Protein; Future  -     Comprehensive Metabolic Panel; Future  -     CBC Auto Differential; Future    Other orders  -     acetaminophen tablet 650 mg  -     diphenhydrAMINE (BENADRYL) 50 mg in NS 50 mL IVPB  -     famotidine (PF) injection 20 mg  -     methylPREDNISolone sodium succinate injection 100 mg  -     riTUXimab-pvvr (RUXIENCE) 1,000 mg in sodium chloride 0.9% 1,000 mL infusion (conc: 1 mg/mL)  -     meperidine injection 25 mg  -     EPINEPHrine (EPIPEN) 0.3 mg/0.3 mL pen injection 0.3 mg  -     diphenhydrAMINE injection 50 mg  -     hydrocortisone sodium succinate injection 100 mg  -     sodium chloride 0.9% 250 mL flush bag  -     sodium chloride 0.9% flush 10 mL  -     heparin, porcine (PF) 100 unit/mL injection flush 500 Units  -     alteplase injection 2 mg        1. Rituxan ordered  2. Perocet refill I have  check [place] prescription monitoring program site and no unusual or abnormal behavior has occurred pt understand the risk and benefits of taking opioid medications and has decided to continue the  medication     3. L hip injected     Follow-up 4 months  Lr trochanteric bursa

## 2024-06-07 DIAGNOSIS — M05.9 RHEUMATOID ARTHRITIS WITH POSITIVE RHEUMATOID FACTOR, INVOLVING UNSPECIFIED SITE: ICD-10-CM

## 2024-06-09 RX ORDER — PREDNISONE 5 MG/1
TABLET ORAL
Qty: 90 TABLET | Refills: 3 | Status: SHIPPED | OUTPATIENT
Start: 2024-06-09

## 2024-06-10 ENCOUNTER — TELEPHONE (OUTPATIENT)
Dept: RHEUMATOLOGY | Facility: CLINIC | Age: 79
End: 2024-06-10
Payer: MEDICARE

## 2024-06-10 NOTE — TELEPHONE ENCOUNTER
----- Message from Taisha Peterson MA sent at 6/10/2024 11:16 AM CDT -----  Good Morning Lia,     Patient's Rituximab is approved but needs her labs before I can schedule. Can you please reach out to the patient to schedule and let me know so I can proceed with calling her and scheduling. N    Thank you hope you have a great weekend    Taisha

## 2024-06-10 NOTE — TELEPHONE ENCOUNTER
Called patient and informed blood work needed before infusion can be scheduled. Stated she may go today or tomorrow depending on when she can find a ride

## 2024-06-14 DIAGNOSIS — E78.5 TYPE 2 DIABETES MELLITUS WITH HYPERLIPIDEMIA: ICD-10-CM

## 2024-06-14 DIAGNOSIS — E11.69 TYPE 2 DIABETES MELLITUS WITH HYPERLIPIDEMIA: ICD-10-CM

## 2024-06-14 RX ORDER — GLIPIZIDE 5 MG/1
TABLET, FILM COATED, EXTENDED RELEASE ORAL
Qty: 90 TABLET | Refills: 3 | Status: SHIPPED | OUTPATIENT
Start: 2024-06-14

## 2024-06-14 NOTE — TELEPHONE ENCOUNTER
No care due was identified.  [facility] Embedded Care Due Messages. Reference number: [id number].   6/14/2024 10:41:50 AM CDT

## 2024-06-14 NOTE — TELEPHONE ENCOUNTER
Refill Routing Note   Medication(s) are not appropriate for processing by [facility] for the following reason(s):        Required labs outdated    [facility] action(s):  Defer               Appointments  past 12m or future 3m with PCP    Date Provider   Last Visit   1/29/2024 Divina Silva MD   Next Visit   Visit date not found Divina Silva MD   ED visits in past 90 days: 0        Note composed:11:21 AM 06/14/2024

## 2024-06-22 RX ORDER — TRIAMCINOLONE ACETONIDE 40 MG/ML
40 INJECTION, SUSPENSION INTRA-ARTICULAR; INTRAMUSCULAR
Status: DISCONTINUED | OUTPATIENT
Start: 2024-06-05 | End: 2024-06-22 | Stop reason: HOSPADM

## 2024-06-22 NOTE — PROCEDURES
Large Joint Aspiration/Injection: L greater trochanteric bursa    Date/Time: 6/5/2024 9:30 AM    Performed by: Pedro Johnson MD  Authorized by: Pedro Johnson MD    Consent Done?:  Yes (Verbal)  Indications:  Arthritis and pain  Prep: patient was prepped and draped in usual sterile fashion      Local anesthesia used?: Yes    Anesthesia:  Local infiltration  Local anesthetic:  Lidocaine 1% without epinephrine  Anesthetic total (ml):  5      Details:  Needle Size:  25 G  Approach:  Lateral  Location:  Hip  Site:  L greater trochanteric bursa  Medications:  40 mg triamcinolone acetonide 40 mg/mL      After verbal consent and cleansing with Chloraprep the left. Trochanteric bursa injected with Kenalog 40mg with 1 ml 1 % lidocaine. Patient tolerated procedure well.

## 2024-06-26 ENCOUNTER — LAB VISIT (OUTPATIENT)
Dept: LAB | Facility: HOSPITAL | Age: 79
End: 2024-06-26
Payer: MEDICARE

## 2024-06-26 DIAGNOSIS — M05.9 RHEUMATOID ARTHRITIS WITH POSITIVE RHEUMATOID FACTOR, INVOLVING UNSPECIFIED SITE: ICD-10-CM

## 2024-06-26 DIAGNOSIS — Z79.899 IMMUNOCOMPROMISED STATE DUE TO DRUG THERAPY: ICD-10-CM

## 2024-06-26 DIAGNOSIS — D84.821 IMMUNOCOMPROMISED STATE DUE TO DRUG THERAPY: ICD-10-CM

## 2024-06-26 DIAGNOSIS — G89.4 CHRONIC PAIN SYNDROME: ICD-10-CM

## 2024-06-26 LAB
ALBUMIN SERPL BCP-MCNC: 3.3 G/DL (ref 3.5–5.2)
ALP SERPL-CCNC: 95 U/L (ref 55–135)
ALT SERPL W/O P-5'-P-CCNC: 15 U/L (ref 10–44)
ANION GAP SERPL CALC-SCNC: 8 MMOL/L (ref 8–16)
AST SERPL-CCNC: 16 U/L (ref 10–40)
BASOPHILS # BLD AUTO: 0.09 K/UL (ref 0–0.2)
BASOPHILS NFR BLD: 1.4 % (ref 0–1.9)
BILIRUB SERPL-MCNC: 0.3 MG/DL (ref 0.1–1)
BUN SERPL-MCNC: 20 MG/DL (ref 8–23)
CALCIUM SERPL-MCNC: 9.2 MG/DL (ref 8.7–10.5)
CHLORIDE SERPL-SCNC: 112 MMOL/L (ref 95–110)
CO2 SERPL-SCNC: 18 MMOL/L (ref 23–29)
CREAT SERPL-MCNC: 1.5 MG/DL (ref 0.5–1.4)
CRP SERPL-MCNC: 14.5 MG/L (ref 0–8.2)
DIFFERENTIAL METHOD BLD: ABNORMAL
EOSINOPHIL # BLD AUTO: 0.1 K/UL (ref 0–0.5)
EOSINOPHIL NFR BLD: 1.5 % (ref 0–8)
ERYTHROCYTE [DISTWIDTH] IN BLOOD BY AUTOMATED COUNT: 15.6 % (ref 11.5–14.5)
ERYTHROCYTE [SEDIMENTATION RATE] IN BLOOD BY WESTERGREN METHOD: 42 MM/HR (ref 0–20)
EST. GFR  (NO RACE VARIABLE): 35.7 ML/MIN/1.73 M^2
GLUCOSE SERPL-MCNC: 81 MG/DL (ref 70–110)
HBV CORE AB SERPL QL IA: NORMAL
HBV SURFACE AB SER-ACNC: <3 MIU/ML
HBV SURFACE AB SER-ACNC: NORMAL M[IU]/ML
HBV SURFACE AG SERPL QL IA: NORMAL
HCT VFR BLD AUTO: 36.1 % (ref 37–48.5)
HCV AB SERPL QL IA: NORMAL
HGB BLD-MCNC: 11.3 G/DL (ref 12–16)
IMM GRANULOCYTES # BLD AUTO: 0.01 K/UL (ref 0–0.04)
IMM GRANULOCYTES NFR BLD AUTO: 0.2 % (ref 0–0.5)
LYMPHOCYTES # BLD AUTO: 2.9 K/UL (ref 1–4.8)
LYMPHOCYTES NFR BLD: 42.9 % (ref 18–48)
MCH RBC QN AUTO: 27 PG (ref 27–31)
MCHC RBC AUTO-ENTMCNC: 31.3 G/DL (ref 32–36)
MCV RBC AUTO: 86 FL (ref 82–98)
MONOCYTES # BLD AUTO: 0.6 K/UL (ref 0.3–1)
MONOCYTES NFR BLD: 8.7 % (ref 4–15)
NEUTROPHILS # BLD AUTO: 3 K/UL (ref 1.8–7.7)
NEUTROPHILS NFR BLD: 45.3 % (ref 38–73)
NRBC BLD-RTO: 0 /100 WBC
PLATELET # BLD AUTO: 261 K/UL (ref 150–450)
PMV BLD AUTO: 11.9 FL (ref 9.2–12.9)
POTASSIUM SERPL-SCNC: 5.2 MMOL/L (ref 3.5–5.1)
PROT SERPL-MCNC: 7 G/DL (ref 6–8.4)
RBC # BLD AUTO: 4.18 M/UL (ref 4–5.4)
SODIUM SERPL-SCNC: 138 MMOL/L (ref 136–145)
WBC # BLD AUTO: 6.66 K/UL (ref 3.9–12.7)

## 2024-06-26 PROCEDURE — 86140 C-REACTIVE PROTEIN: CPT | Mod: HCNC | Performed by: INTERNAL MEDICINE

## 2024-06-26 PROCEDURE — 80053 COMPREHEN METABOLIC PANEL: CPT | Mod: HCNC | Performed by: INTERNAL MEDICINE

## 2024-06-26 PROCEDURE — 86803 HEPATITIS C AB TEST: CPT | Mod: HCNC | Performed by: INTERNAL MEDICINE

## 2024-06-26 PROCEDURE — 86704 HEP B CORE ANTIBODY TOTAL: CPT | Mod: HCNC | Performed by: INTERNAL MEDICINE

## 2024-06-26 PROCEDURE — 85025 COMPLETE CBC W/AUTO DIFF WBC: CPT | Mod: HCNC | Performed by: INTERNAL MEDICINE

## 2024-06-26 PROCEDURE — 87340 HEPATITIS B SURFACE AG IA: CPT | Mod: HCNC | Performed by: INTERNAL MEDICINE

## 2024-06-26 PROCEDURE — 36415 COLL VENOUS BLD VENIPUNCTURE: CPT | Mod: HCNC,PO | Performed by: INTERNAL MEDICINE

## 2024-06-26 PROCEDURE — 86706 HEP B SURFACE ANTIBODY: CPT | Mod: 91,HCNC | Performed by: INTERNAL MEDICINE

## 2024-06-26 PROCEDURE — 85651 RBC SED RATE NONAUTOMATED: CPT | Mod: HCNC,PO | Performed by: INTERNAL MEDICINE

## 2024-06-29 LAB
HBV SURFACE AB SER QL IA: NEGATIVE
HBV SURFACE AB SERPL IA-ACNC: <3 MIU/ML

## 2024-07-09 ENCOUNTER — TELEPHONE (OUTPATIENT)
Dept: RHEUMATOLOGY | Facility: CLINIC | Age: 79
End: 2024-07-09
Payer: MEDICARE

## 2024-07-09 NOTE — TELEPHONE ENCOUNTER
----- Message from Ginger Rodríguez LPN sent at 7/9/2024  9:17 AM CDT -----    ----- Message -----  From: Anna Almaraz, AMY  Sent: 7/8/2024   1:00 PM CDT  To: [facility] Staff    I spoke with pt regarding scheduling her Rituxan infusion and she stated she can't come here, she needs someone to come to her.  I don't know of any home infusion centers that admin rituxan but I'm certainly not the expert in that arena.  I told her I would get with you all and someone would give her a call back to see if it was possible to coordinate.  Let me know if there is anything I can do.  I'm happy to schedule her here if she is agreeable.    Ella

## 2024-07-11 NOTE — TELEPHONE ENCOUNTER
Updated therapy plan. Sent to [facility] with [facility] so patient can receive RTX infusion at infusion center in [place]

## 2024-07-11 NOTE — TELEPHONE ENCOUNTER
Spoke with pt about infusion. She talked about the place she lives. Stated she needs to move and has boxes all over and she is tired and no place to go as of now. Nurse informed she can receive rituxan at [facility] in [place]. Patient stated that location would be easier for her due to transportation.

## 2024-07-15 ENCOUNTER — TELEPHONE (OUTPATIENT)
Dept: RHEUMATOLOGY | Facility: CLINIC | Age: 79
End: 2024-07-15
Payer: MEDICARE

## 2024-07-15 NOTE — TELEPHONE ENCOUNTER
----- Message from Dean Hendrickson sent at 7/15/2024 11:38 AM CDT -----  Regarding: PA needed to approve  Contact: anyone can help at [phone]  Type:  Pharmacy Calling to Clarify an RX    Name of Caller:  Amna with [employer]    Prescription Name:  NOT SURE    What do they need to clarify?:  needs name(s) of meds needing PA. Her office got fax but was only first page of enrollee form and clinic notes.    Best Call Back Number:  [phone]     Additional Information:  please call to give names of meds and all related info.

## 2024-07-17 ENCOUNTER — CLINICAL SUPPORT (OUTPATIENT)
Dept: RHEUMATOLOGY | Facility: CLINIC | Age: 79
End: 2024-07-17
Payer: MEDICARE

## 2024-07-17 ENCOUNTER — TELEPHONE (OUTPATIENT)
Dept: RHEUMATOLOGY | Facility: CLINIC | Age: 79
End: 2024-07-17
Payer: MEDICARE

## 2024-07-17 DIAGNOSIS — G89.4 CHRONIC PAIN SYNDROME: ICD-10-CM

## 2024-07-17 DIAGNOSIS — M05.9 RHEUMATOID ARTHRITIS WITH POSITIVE RHEUMATOID FACTOR, INVOLVING UNSPECIFIED SITE: Primary | ICD-10-CM

## 2024-07-17 PROCEDURE — 96372 THER/PROPH/DIAG INJ SC/IM: CPT | Mod: HCNC,S$GLB,, | Performed by: INTERNAL MEDICINE

## 2024-07-17 RX ORDER — KETOROLAC TROMETHAMINE 30 MG/ML
30 INJECTION, SOLUTION INTRAMUSCULAR; INTRAVENOUS
Status: COMPLETED | OUTPATIENT
Start: 2024-07-17 | End: 2024-07-17

## 2024-07-17 RX ADMIN — KETOROLAC TROMETHAMINE 30 MG: 30 INJECTION, SOLUTION INTRAMUSCULAR; INTRAVENOUS at 11:07

## 2024-07-17 NOTE — TELEPHONE ENCOUNTER
----- Message from Aminta Mcconnell sent at 7/16/2024  4:30 PM CDT -----  Regarding: INSURANCE INFO  Renato Amezcua,     I called [employer] to find out what they were calling about. I gave her names of different meds   Dr. Johnson has written to see if they require a PA but none did.     She did say Rituxan was APPROVED yesterday from 1/1/2024 to 12/31/2024.    I added Jesica to the message b/c I saw a note regarding RTX infusion at [facility].    Thank you,  Flako Mcconnell Nisha  Medication Access Specialist

## 2024-07-17 NOTE — PROGRESS NOTES
Patient arrived to clinic for nurse visit. 2 patient identifiers full name and date of birth verified by patient. Allergies verified orders verified.    BP:  111/60    Pulse: 79    Pain: 9/10      Toradol 30 administered right upper outer gluteal

## 2024-07-31 ENCOUNTER — PATIENT MESSAGE (OUTPATIENT)
Dept: ADMINISTRATIVE | Facility: HOSPITAL | Age: 79
End: 2024-07-31
Payer: MEDICARE

## 2024-08-05 DIAGNOSIS — I10 HYPERTENSION, UNSPECIFIED TYPE: ICD-10-CM

## 2024-08-06 RX ORDER — FUROSEMIDE 40 MG/1
TABLET ORAL
Qty: 180 TABLET | Refills: 1 | Status: SHIPPED | OUTPATIENT
Start: 2024-08-06

## 2024-09-04 DIAGNOSIS — M06.30 RHEUMATOID NODULES: ICD-10-CM

## 2024-09-04 DIAGNOSIS — M05.9 SEROPOSITIVE RHEUMATOID ARTHRITIS: Primary | ICD-10-CM

## 2024-09-04 DIAGNOSIS — M25.561 ARTHRALGIA OF BOTH KNEES: ICD-10-CM

## 2024-09-04 DIAGNOSIS — G89.4 CHRONIC PAIN SYNDROME: ICD-10-CM

## 2024-09-04 DIAGNOSIS — M81.0 OSTEOPOROSIS, UNSPECIFIED OSTEOPOROSIS TYPE, UNSPECIFIED PATHOLOGICAL FRACTURE PRESENCE: ICD-10-CM

## 2024-09-04 DIAGNOSIS — M35.00 SJOGREN'S SYNDROME, WITH UNSPECIFIED ORGAN INVOLVEMENT: ICD-10-CM

## 2024-09-04 DIAGNOSIS — M25.562 ARTHRALGIA OF BOTH KNEES: ICD-10-CM

## 2024-09-04 RX ORDER — HYDROXYCHLOROQUINE SULFATE 200 MG/1
200 TABLET, FILM COATED ORAL 2 TIMES DAILY
Qty: 180 TABLET | Refills: 3 | Status: SHIPPED | OUTPATIENT
Start: 2024-09-04 | End: 2025-09-04

## 2024-09-07 DIAGNOSIS — I10 HYPERTENSION, UNSPECIFIED TYPE: ICD-10-CM

## 2024-09-08 RX ORDER — AMLODIPINE AND BENAZEPRIL HYDROCHLORIDE 5; 20 MG/1; MG/1
CAPSULE ORAL
Qty: 180 CAPSULE | Refills: 1 | OUTPATIENT
Start: 2024-09-08

## 2024-09-08 NOTE — TELEPHONE ENCOUNTER
Refill Decision Note   Rekha Miller  is requesting a refill authorization.  Brief Assessment and Rationale for Refill:  Quick Discontinue     Medication Therapy Plan:  The original prescription was discontinued on 7/28/2023 by Zully Lua PA-C.      Comments:     Note composed:3:59 AM 09/08/2024

## 2024-09-09 DIAGNOSIS — M05.9 RHEUMATOID ARTHRITIS WITH POSITIVE RHEUMATOID FACTOR, INVOLVING UNSPECIFIED SITE: ICD-10-CM

## 2024-09-09 DIAGNOSIS — G89.4 CHRONIC PAIN SYNDROME: ICD-10-CM

## 2024-09-09 NOTE — TELEPHONE ENCOUNTER
----- Message from Meir Enriquez sent at 9/9/2024  3:17 PM CDT -----  Type: Needs Medical Advice    Who Called:  Pt    Pharmacy name and phone #:   [facility] [place] - [address]  [address]  [place] [address]  Phone: [phone] Fax: [phone]      Best Call Back Number: [phone]    Additional Information: Pt wants to talk to nurse and get refill of pain med, unsure of the name. Please call back to advise, Thanks!

## 2024-09-10 NOTE — TELEPHONE ENCOUNTER
----- Message from Zahira Quiñonez sent at 9/10/2024  4:22 PM CDT -----  Type:  Needs Medical Advice    Who Called: the patient  Symptoms (please be specific):  How long has patient had these symptoms:    Pharmacy name and phone #:        [facility] [place] - [address] [facility]  [place] [address]  Phone: [phone] Fax: [phone]      Would the patient rather a call back or a response via [facility]? call back/  Best Call Back Number: [phone]   Additional Information:pt called in regards to rx oxycodone HCl/acetaminophen  mg Take 1 tablet by mouth every 8 (eight) hours as needed for Pain. 5/15  Please advise   Thanks

## 2024-09-11 DIAGNOSIS — M05.9 RHEUMATOID ARTHRITIS WITH POSITIVE RHEUMATOID FACTOR, INVOLVING UNSPECIFIED SITE: ICD-10-CM

## 2024-09-11 DIAGNOSIS — G89.4 CHRONIC PAIN SYNDROME: ICD-10-CM

## 2024-09-11 RX ORDER — HYDROCODONE BITARTRATE AND ACETAMINOPHEN 10; 325 MG/1; MG/1
1 TABLET ORAL EVERY 8 HOURS PRN
Qty: 90 TABLET | Refills: 0 | OUTPATIENT
Start: 2024-09-11

## 2024-09-11 RX ORDER — OXYCODONE AND ACETAMINOPHEN 10; 325 MG/1; MG/1
1 TABLET ORAL EVERY 8 HOURS PRN
Qty: 90 TABLET | Refills: 0 | Status: SHIPPED | OUTPATIENT
Start: 2024-09-11

## 2024-09-12 ENCOUNTER — TELEPHONE (OUTPATIENT)
Dept: RHEUMATOLOGY | Facility: CLINIC | Age: 79
End: 2024-09-12
Payer: MEDICARE

## 2024-09-12 RX ORDER — OXYCODONE AND ACETAMINOPHEN 10; 325 MG/1; MG/1
TABLET ORAL
Qty: 90 TABLET | Refills: 0 | OUTPATIENT
Start: 2024-09-12

## 2024-09-12 NOTE — TELEPHONE ENCOUNTER
----- Message from Aidee Solo sent at 9/12/2024  1:02 PM CDT -----  Regarding: oxyCODONE-acetaminophen (PERCOCET)  mg per tablet  Type:  RX Refill Request    Who Called: pt    Refill or New Rx:refill    RX Name and Strength:oxyCODONE-acetaminophen (PERCOCET)  mg per tablet    How is the patient currently taking it? (ex. 1XDay):    Is this a 30 day or 90 day RX:    Preferred Pharmacy with phone number:[facility] - [place]  [address]   Phone: [phone]  Fax: [phone]          Local or Mail Order:local    Ordering Provider:    Would the patient rather a call back or a response via [facility]? call    Best Call Back Number: [phone]    Additional Information: last pharmacy didn't have medication, [facility] needs RX faxed in

## 2024-09-13 DIAGNOSIS — G89.4 CHRONIC PAIN SYNDROME: ICD-10-CM

## 2024-09-13 DIAGNOSIS — M05.9 RHEUMATOID ARTHRITIS WITH POSITIVE RHEUMATOID FACTOR, INVOLVING UNSPECIFIED SITE: ICD-10-CM

## 2024-09-13 RX ORDER — OXYCODONE AND ACETAMINOPHEN 10; 325 MG/1; MG/1
1 TABLET ORAL EVERY 8 HOURS PRN
Qty: 90 TABLET | Refills: 0 | Status: CANCELLED | OUTPATIENT
Start: 2024-09-13

## 2024-09-13 NOTE — TELEPHONE ENCOUNTER
----- Message from Nichol Gillette sent at 9/13/2024  3:09 PM CDT -----  Contact: self  Type: Needs Medical Advice  Who Called:  the patient     Best Call Back Number: [phone]  Additional Information: pt called to say her script went to the wrong pharm please take it out of [facility] and send it to [facility] instead. Please call when done.

## 2024-09-16 ENCOUNTER — LAB VISIT (OUTPATIENT)
Dept: LAB | Facility: HOSPITAL | Age: 79
End: 2024-09-16
Attending: INTERNAL MEDICINE
Payer: MEDICARE

## 2024-09-16 ENCOUNTER — OFFICE VISIT (OUTPATIENT)
Dept: RHEUMATOLOGY | Facility: CLINIC | Age: 79
End: 2024-09-16
Payer: MEDICARE

## 2024-09-16 VITALS
SYSTOLIC BLOOD PRESSURE: 168 MMHG | HEART RATE: 93 BPM | BODY MASS INDEX: 29.99 KG/M2 | WEIGHT: 180 LBS | HEIGHT: 65 IN | DIASTOLIC BLOOD PRESSURE: 80 MMHG

## 2024-09-16 DIAGNOSIS — M05.9 SEROPOSITIVE RHEUMATOID ARTHRITIS: Primary | ICD-10-CM

## 2024-09-16 DIAGNOSIS — G89.4 CHRONIC PAIN SYNDROME: ICD-10-CM

## 2024-09-16 DIAGNOSIS — M81.0 OSTEOPOROSIS, UNSPECIFIED OSTEOPOROSIS TYPE, UNSPECIFIED PATHOLOGICAL FRACTURE PRESENCE: ICD-10-CM

## 2024-09-16 DIAGNOSIS — N28.9 FUNCTION KIDNEY DECREASED: ICD-10-CM

## 2024-09-16 DIAGNOSIS — J98.8 RESPIRATORY INFECTION: ICD-10-CM

## 2024-09-16 DIAGNOSIS — Z79.899 HIGH RISK MEDICATION USE: ICD-10-CM

## 2024-09-16 LAB
ANION GAP SERPL CALC-SCNC: 11 MMOL/L (ref 8–16)
BUN SERPL-MCNC: 13 MG/DL (ref 8–23)
CALCIUM SERPL-MCNC: 8.8 MG/DL (ref 8.7–10.5)
CHLORIDE SERPL-SCNC: 111 MMOL/L (ref 95–110)
CO2 SERPL-SCNC: 19 MMOL/L (ref 23–29)
CREAT SERPL-MCNC: 0.9 MG/DL (ref 0.5–1.4)
EST. GFR  (NO RACE VARIABLE): >60 ML/MIN/1.73 M^2
GLUCOSE SERPL-MCNC: 84 MG/DL (ref 70–110)
POTASSIUM SERPL-SCNC: 3.5 MMOL/L (ref 3.5–5.1)
SODIUM SERPL-SCNC: 141 MMOL/L (ref 136–145)

## 2024-09-16 PROCEDURE — 36415 COLL VENOUS BLD VENIPUNCTURE: CPT | Mod: HCNC,PO | Performed by: PHYSICIAN ASSISTANT

## 2024-09-16 PROCEDURE — 1125F AMNT PAIN NOTED PAIN PRSNT: CPT | Mod: HCNC,CPTII,S$GLB, | Performed by: PHYSICIAN ASSISTANT

## 2024-09-16 PROCEDURE — 80048 BASIC METABOLIC PNL TOTAL CA: CPT | Mod: HCNC | Performed by: PHYSICIAN ASSISTANT

## 2024-09-16 PROCEDURE — 99214 OFFICE O/P EST MOD 30 MIN: CPT | Mod: 25,HCNC,S$GLB, | Performed by: PHYSICIAN ASSISTANT

## 2024-09-16 PROCEDURE — 3077F SYST BP >= 140 MM HG: CPT | Mod: HCNC,CPTII,S$GLB, | Performed by: PHYSICIAN ASSISTANT

## 2024-09-16 PROCEDURE — 3079F DIAST BP 80-89 MM HG: CPT | Mod: HCNC,CPTII,S$GLB, | Performed by: PHYSICIAN ASSISTANT

## 2024-09-16 PROCEDURE — 1159F MED LIST DOCD IN RCRD: CPT | Mod: HCNC,CPTII,S$GLB, | Performed by: PHYSICIAN ASSISTANT

## 2024-09-16 PROCEDURE — 96372 THER/PROPH/DIAG INJ SC/IM: CPT | Mod: HCNC,S$GLB,, | Performed by: PHYSICIAN ASSISTANT

## 2024-09-16 PROCEDURE — 99999 PR PBB SHADOW E&M-EST. PATIENT-LVL V: CPT | Mod: PBBFAC,HCNC,, | Performed by: PHYSICIAN ASSISTANT

## 2024-09-16 PROCEDURE — 1160F RVW MEDS BY RX/DR IN RCRD: CPT | Mod: HCNC,CPTII,S$GLB, | Performed by: PHYSICIAN ASSISTANT

## 2024-09-16 RX ORDER — AMOXICILLIN AND CLAVULANATE POTASSIUM 875; 125 MG/1; MG/1
1 TABLET, FILM COATED ORAL 2 TIMES DAILY
Qty: 20 TABLET | Refills: 0 | Status: SHIPPED | OUTPATIENT
Start: 2024-09-16 | End: 2024-09-26

## 2024-09-16 RX ORDER — DOXYCYCLINE 100 MG/1
100 CAPSULE ORAL EVERY 12 HOURS
Qty: 20 CAPSULE | Refills: 0 | Status: SHIPPED | OUTPATIENT
Start: 2024-09-16 | End: 2024-09-16

## 2024-09-16 RX ORDER — CEFTRIAXONE 1 G/1
1 INJECTION, POWDER, FOR SOLUTION INTRAMUSCULAR; INTRAVENOUS
Status: COMPLETED | OUTPATIENT
Start: 2024-09-16 | End: 2024-09-16

## 2024-09-16 RX ADMIN — CEFTRIAXONE 1 G: 1 INJECTION, POWDER, FOR SOLUTION INTRAMUSCULAR; INTRAVENOUS at 12:09

## 2024-09-16 NOTE — TELEPHONE ENCOUNTER
----- Message from Dean Hendrickson sent at 9/14/2024  9:36 AM CDT -----  Regarding: pharm request  Contact: rj at [phone]  Type:  Pharmacy Calling to Clarify an RX    Name of Caller:  Rj    Pharmacy Name:    [facility] - [place]  [id number] [facility]  [place] [address]  Phone: [phone] Fax: [phone]    Prescription Name:    oxyCODONE-acetaminophen (PERCOCET)  mg per tablet 90 tablet 0 9/11/2024 -   Sig - Route: Take 1 tablet by mouth every 8 (eight) hours as needed for Pain. - Oral   Sent to pharmacy as: oxyCODONE-acetaminophen (PERCOCET)  mg per tablet   Earliest Fill Date: 9/11/2024   Notes to Pharmacy: Quantity prescribed more than 7 day supply? Yes, quantity medically necessary  E-Prescribing Status: Receipt confirmed by pharmacy (9/11/2024  8:33 AM CDT)     What do they need to clarify?:  need new Rx sent to different pharm    Best Call Back Number:  [phone]    Additional Information:  [facility] does not have med in stock. Please send Rx asap.
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
## 2024-09-25 PROCEDURE — G0180 MD CERTIFICATION HHA PATIENT: HCPCS | Mod: ,,, | Performed by: INTERNAL MEDICINE

## 2024-09-26 ENCOUNTER — TELEPHONE (OUTPATIENT)
Dept: FAMILY MEDICINE | Facility: CLINIC | Age: 79
End: 2024-09-26
Payer: MEDICARE

## 2024-09-26 ENCOUNTER — TELEPHONE (OUTPATIENT)
Dept: RHEUMATOLOGY | Facility: CLINIC | Age: 79
End: 2024-09-26
Payer: MEDICARE

## 2024-09-26 NOTE — TELEPHONE ENCOUNTER
Spoke with pt's son. Stated that he wanted to know if we could send an order over to an agency for pt to have a sitter stay with her through the day. He was informed that we could only send orders for home health not a sitter, that would have to be set up by him. Pt's son stated that he would give us a call back with the name of a home health company to send and order to for continued home health.

## 2024-09-26 NOTE — TELEPHONE ENCOUNTER
----- Message from Teresa Sabrinafelipe Miller sent at 9/26/2024  1:18 PM CDT -----  Regarding: call back  Type: Patient Call Back    Who called: [facility]     What is the request in detail: calling to let provider know pt no-showed infusion appt with their clinic this morning     Can the clinic reply by [facility]?no    Would the patient rather a call back or a response via [facility]? call    Best call back number: [phone]    Additional Information:

## 2024-09-26 NOTE — TELEPHONE ENCOUNTER
----- Message from Brenda Degroot sent at 9/26/2024 12:56 PM CDT -----  Contact: Son, Wellington, [phone]  1MEDICALADVICE     Patient is calling for Medical Advice regarding: Patient keeps falling    How long has patient had these symptoms: 2 weeks    Pharmacy name and phone#:   [facility] - [place], [place] - [address] [facility] [address]  Phone: [phone] Fax: [phone]       Patient wants a call back or thru [facility]: Call back    Comments: Calling for advise. Please call him. Thanks.    Please advise patient replies from provider may take up to 48 hours.

## 2024-10-03 ENCOUNTER — TELEPHONE (OUTPATIENT)
Dept: FAMILY MEDICINE | Facility: CLINIC | Age: 79
End: 2024-10-03
Payer: MEDICARE
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
## 2024-10-03 NOTE — TELEPHONE ENCOUNTER
----- Message from Connie sent at 10/3/2024  2:51 PM CDT -----  Contact: Sujatha Solares with [facility] is calling to speak with the nurse regarding questions and concerns. Reports needing to speak with the nurse about the patient. Please give Sujatha a call back at [phone]

## 2024-10-03 NOTE — TELEPHONE ENCOUNTER
Wellington, pt's son, states he is wanting to have his mom placed in a Nursing Home. He states he is going around speaking with different locations to determine which location he would prefer. Advised him to contact us once he has chosen a place.

## 2024-10-03 NOTE — TELEPHONE ENCOUNTER
----- Message from Kelly sent at 10/3/2024 12:03 PM CDT -----  Contact: Wellington/son  Type:  Patient Requesting a call back     Who Called:Wellington  What is the call back request regarding?:Requesting a call back today if possible in regards to some questions he has about Rekha  Would the patient rather a call back or a response via [facility]?call  Best Call Back Number:[phone]  Additional Information:
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
## 2024-10-04 NOTE — PROGRESS NOTES
I spoke with home health nurse who said that they are arranging transport via EMS to the emergency department for immediate evaluation.

## 2024-10-17 ENCOUNTER — EXTERNAL HOME HEALTH (OUTPATIENT)
Dept: HOME HEALTH SERVICES | Facility: HOSPITAL | Age: 79
End: 2024-10-17
Payer: MEDICARE

## 2024-10-18 ENCOUNTER — TELEPHONE (OUTPATIENT)
Dept: RHEUMATOLOGY | Facility: CLINIC | Age: 79
End: 2024-10-18
Payer: MEDICARE

## 2024-10-18 NOTE — TELEPHONE ENCOUNTER
----- Message from [employer] sent at 10/18/2024  4:02 PM CDT -----  Type:  Needs Medical Advice    Who Called: Genevieve riggs/[facility]   Symptoms (please be specific):  How long has patient had these symptoms:    Pharmacy name and phone #:    Would the patient rather a call back or a response via [facility]? call back/  Best Call Back Number: [phone]  Additional Information: verify if pt is out of hospital and if pt is still wanting infusion  Can't reach pt or family  Please advise  Thanks

## 2024-10-21 NOTE — TELEPHONE ENCOUNTER
Called patient to inquire if released from hospital and to let patient know that [facility] was trying to reach patient.  LM on patient voicemail and left our number in case for call back.

## 2024-10-22 ENCOUNTER — TELEPHONE (OUTPATIENT)
Dept: FAMILY MEDICINE | Facility: CLINIC | Age: 79
End: 2024-10-22
Payer: MEDICARE

## 2024-10-22 DIAGNOSIS — I50.32 CHRONIC HEART FAILURE WITH PRESERVED EJECTION FRACTION: Primary | ICD-10-CM

## 2024-10-22 NOTE — TELEPHONE ENCOUNTER
I have signed for the following orders AND/OR meds.  Please call the patient and ask the patient to schedule the testing AND/OR inform about any medications that were sent. Medications have been sent to pharmacy listed below      Orders Placed This Encounter   Procedures    Ambulatory referral/consult to Home Health     Standing Status:   Future     Standing Expiration Date:   11/22/2025     Referral Priority:   Routine     Referral Type:   Home Health     Referral Reason:   Specialty Services Required     Requested Specialty:   Home Health Services     Number of Visits Requested:   1              [facility] - [place]  [address] [address]  [place] [address]  Phone: [phone] Fax: [phone]    [id number] - [place] - [address] AT [address] [facility] [address]  [address]  [place] [address]  Phone: [phone] Fax: [phone]    [facility] - [place] - [address] [facility]  [address] [facility]  [place] [address]  Phone: [phone] Fax: [phone]

## 2024-10-22 NOTE — TELEPHONE ENCOUNTER
10/22/2024 10:20 AM   I spoke with the patient's HH nurse, Aminta, about this. She stated pt was d/c from [facility] lest week and was not sent with orders to resume HH. She asked that we fax orders to [phone].

## 2024-10-22 NOTE — TELEPHONE ENCOUNTER
----- Message from Elpidio sent at 10/22/2024 10:05 AM CDT -----  Regarding: Homehealth Nurse Report  Contact:  Nurse Amitna [phone]  .1MEDICALADVICE     Patient is calling for Medical Advice regarding: Patient hospitalized and was not ordered home health services. Nurse called to request home health nurse orders be put in for patient.     How long has patient had these symptoms:    Pharmacy name and phone#:    Patient wants a call back or thru [facility]: Call    Comments:    Please advise patient replies from provider may take up to 48 hours.

## 2024-10-27 DIAGNOSIS — M25.561 ARTHRALGIA OF BOTH KNEES: ICD-10-CM

## 2024-10-27 DIAGNOSIS — G89.4 CHRONIC PAIN SYNDROME: ICD-10-CM

## 2024-10-27 DIAGNOSIS — M81.0 OSTEOPOROSIS, UNSPECIFIED OSTEOPOROSIS TYPE, UNSPECIFIED PATHOLOGICAL FRACTURE PRESENCE: ICD-10-CM

## 2024-10-27 DIAGNOSIS — M06.30 RHEUMATOID NODULES: ICD-10-CM

## 2024-10-27 DIAGNOSIS — M35.00 SJOGREN'S SYNDROME, WITH UNSPECIFIED ORGAN INVOLVEMENT: ICD-10-CM

## 2024-10-27 DIAGNOSIS — M25.562 ARTHRALGIA OF BOTH KNEES: ICD-10-CM

## 2024-10-28 RX ORDER — TRAZODONE HYDROCHLORIDE 50 MG/1
TABLET ORAL
Qty: 90 TABLET | Refills: 3 | Status: SHIPPED | OUTPATIENT
Start: 2024-10-28

## 2024-10-29 DIAGNOSIS — M05.9 RHEUMATOID ARTHRITIS WITH POSITIVE RHEUMATOID FACTOR, INVOLVING UNSPECIFIED SITE: ICD-10-CM

## 2024-10-29 RX ORDER — LEFLUNOMIDE 10 MG/1
10 TABLET ORAL DAILY
Qty: 90 TABLET | Refills: 1 | Status: CANCELLED | OUTPATIENT
Start: 2024-10-29

## 2024-10-29 NOTE — TELEPHONE ENCOUNTER
Pharmacy requesting refill on Leflunomide 10mg  Pt's LOV 09/16/2024  Pt's NOV 01/09/2025  Medication pending

## 2024-10-31 ENCOUNTER — TELEPHONE (OUTPATIENT)
Dept: FAMILY MEDICINE | Facility: CLINIC | Age: 79
End: 2024-10-31
Payer: MEDICARE

## 2024-11-05 ENCOUNTER — DOCUMENT SCAN (OUTPATIENT)
Dept: HOME HEALTH SERVICES | Facility: HOSPITAL | Age: 79
End: 2024-11-05
Payer: MEDICARE

## 2024-11-05 ENCOUNTER — TELEPHONE (OUTPATIENT)
Dept: FAMILY MEDICINE | Facility: CLINIC | Age: 79
End: 2024-11-05
Payer: MEDICARE

## 2024-11-05 DIAGNOSIS — L89.159 PRESSURE INJURY OF SKIN OF SACRAL REGION, UNSPECIFIED INJURY STAGE: ICD-10-CM

## 2024-11-05 DIAGNOSIS — R53.81 DEBILITY: Primary | ICD-10-CM

## 2024-11-05 DIAGNOSIS — I50.32 CHRONIC HEART FAILURE WITH PRESERVED EJECTION FRACTION: ICD-10-CM

## 2024-11-05 DIAGNOSIS — E78.5 TYPE 2 DIABETES MELLITUS WITH HYPERLIPIDEMIA: ICD-10-CM

## 2024-11-05 DIAGNOSIS — E11.69 TYPE 2 DIABETES MELLITUS WITH HYPERLIPIDEMIA: ICD-10-CM

## 2024-11-05 NOTE — TELEPHONE ENCOUNTER
Pt's daughter asking to have Home Health re-certified with [facility].     Jillian states pt is not wanting to eat and she is only drinking fluids. She is asking if there is anything that could be sent in to the pharmacy to help with her appetite? She states pt will eat yogurt, but that is the only solid. She states she has not tried Ensure due to the costs.     Jillian states she is now the only one taking care of her mother and she was unaware of the appt that was missed this morning and she states she would have brought her if she had known. She is scheduled next week and Jillian states she will make sure pt is there.

## 2024-11-05 NOTE — TELEPHONE ENCOUNTER
----- Message from Anneliese sent at 11/5/2024 12:39 PM CST -----  Name of Caller: Patient's daughterJillian Call Back Number:.[phone]  Additional Information: She is requesting a call back regarding her mother. Her  mother is only sleeping but not eating. She is waiting for a hospital bed and PT only went one time.

## 2024-11-06 ENCOUNTER — TELEPHONE (OUTPATIENT)
Dept: RHEUMATOLOGY | Facility: CLINIC | Age: 79
End: 2024-11-06
Payer: MEDICARE

## 2024-11-06 ENCOUNTER — DOCUMENT SCAN (OUTPATIENT)
Dept: HOME HEALTH SERVICES | Facility: HOSPITAL | Age: 79
End: 2024-11-06
Payer: MEDICARE

## 2024-11-06 NOTE — TELEPHONE ENCOUNTER
"Pt's daughter is requesting a "pain cream".  She does not remember the name.  Please advise.    Michelle Virk MA  [facility]  11/6/2024    "

## 2024-11-07 NOTE — TELEPHONE ENCOUNTER
I have signed for the following orders AND/OR meds.  Please call the patient and ask the patient to schedule the testing AND/OR inform about any medications that were sent. Medications have been sent to pharmacy listed below      Orders Placed This Encounter   Procedures    Ambulatory referral/consult to Home Health     Standing Status:   Future     Standing Expiration Date:   12/7/2025     Referral Priority:   Routine     Referral Type:   Home Health     Referral Reason:   Specialty Services Required     Requested Specialty:   Home Health Services     Number of Visits Requested:   1              [facility] - [place] - [address]  [place] [address]  Phone: [phone] Fax: [phone]    [id number] - [place] - [address] AT [address] [facility] [address]  [address]  [place] [address]  Phone: [phone] Fax: [phone]    [facility] - [place] - [id number] [facility]  [place] [address]  Phone: [phone] Fax: [phone]    [facility] - [place], [place] - [address] [facility]  [address] [facility] [address]  Phone: [phone] Fax: [phone]

## 2024-11-11 NOTE — TELEPHONE ENCOUNTER
Attempted to contact patient to find out what pain cream she is referring to. Left a message to call office.

## 2024-11-13 ENCOUNTER — TELEPHONE (OUTPATIENT)
Dept: RHEUMATOLOGY | Facility: CLINIC | Age: 79
End: 2024-11-13
Payer: MEDICARE

## 2024-11-13 NOTE — TELEPHONE ENCOUNTER
Pt daughter Shyanne states injection medication prolia was received at the hospital for patient. She will be shipping it back to the sender. Her daughter states pt is inpatient at [facility] and will be discharged to a nursing home once discharged. Pt would like noted that Jillian Miller and Mendy Miller are not to be given any information on pt.
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
## 2024-11-26 ENCOUNTER — DOCUMENT SCAN (OUTPATIENT)
Dept: HOME HEALTH SERVICES | Facility: HOSPITAL | Age: 79
End: 2024-11-26
Payer: MEDICARE

## 2024-12-02 ENCOUNTER — DOCUMENT SCAN (OUTPATIENT)
Dept: HOME HEALTH SERVICES | Facility: HOSPITAL | Age: 79
End: 2024-12-02
Payer: MEDICARE

## 2024-12-05 ENCOUNTER — DOCUMENT SCAN (OUTPATIENT)
Dept: HOME HEALTH SERVICES | Facility: HOSPITAL | Age: 79
End: 2024-12-05
Payer: MEDICARE

## 2024-12-10 ENCOUNTER — DOCUMENT SCAN (OUTPATIENT)
Dept: HOME HEALTH SERVICES | Facility: HOSPITAL | Age: 79
End: 2024-12-10
Payer: MEDICARE

## 2024-12-10 ENCOUNTER — PATIENT MESSAGE (OUTPATIENT)
Dept: RHEUMATOLOGY | Facility: CLINIC | Age: 79
End: 2024-12-10
Payer: MEDICARE

## 2024-12-10 ENCOUNTER — TELEPHONE (OUTPATIENT)
Dept: RHEUMATOLOGY | Facility: CLINIC | Age: 79
End: 2024-12-10
Payer: MEDICARE

## 2024-12-10 NOTE — TELEPHONE ENCOUNTER
Received email from [facility] . Unclear if patient received prolia or not or status of health. Seems that patient may be getting home health    Staff,  Please call patient/patient's family to get an update on patient and if she is at home, in the hospital, or in a nursing home. If she is in the hospital or nursing home, please ask the name of the facility and if she received her last dose of Prolia. Thanks!

## 2024-12-11 ENCOUNTER — PATIENT MESSAGE (OUTPATIENT)
Dept: ADMINISTRATIVE | Facility: CLINIC | Age: 79
End: 2024-12-11
Payer: MEDICARE

## 2025-04-30 NOTE — TELEPHONE ENCOUNTER
----- Message from Constantin Clark sent at 3/15/2023 11:30 AM CDT -----  Type: Call Back         Who called:[facility]         What is the request in detail: Regarding requesting orders for a Evaluation for a Social work fax to [phone]          Can the clinic reply by [facility]?no          Would the patient rather a call back or a response via [facility]?call back          Best call back number:[phone]          Additional Information:           Thank You     Contacted patient to follow-up regarding requested refill of Atorvastatin 20 mg, Trazodone 100 mg. Patient reports they received the medication and denies further needs at this time. No further pharmacy team follow-up is needed at this time.     Alayna Vides CPhT.  [facility] Pharm[facility] Technician Specialist  [phone]